# Patient Record
Sex: MALE | Race: WHITE | NOT HISPANIC OR LATINO | Employment: FULL TIME | URBAN - METROPOLITAN AREA
[De-identification: names, ages, dates, MRNs, and addresses within clinical notes are randomized per-mention and may not be internally consistent; named-entity substitution may affect disease eponyms.]

---

## 2017-01-16 ENCOUNTER — ALLSCRIPTS OFFICE VISIT (OUTPATIENT)
Dept: OTHER | Facility: OTHER | Age: 57
End: 2017-01-16

## 2017-01-16 DIAGNOSIS — N49.2 INFLAMMATORY DISORDERS OF SCROTUM: ICD-10-CM

## 2017-01-17 ENCOUNTER — APPOINTMENT (OUTPATIENT)
Dept: LAB | Facility: HOSPITAL | Age: 57
End: 2017-01-17
Payer: COMMERCIAL

## 2017-01-17 DIAGNOSIS — N49.2 INFLAMMATORY DISORDERS OF SCROTUM: ICD-10-CM

## 2017-01-17 PROCEDURE — 87147 CULTURE TYPE IMMUNOLOGIC: CPT

## 2017-01-17 PROCEDURE — 87070 CULTURE OTHR SPECIMN AEROBIC: CPT

## 2017-01-17 PROCEDURE — 87205 SMEAR GRAM STAIN: CPT

## 2017-01-19 ENCOUNTER — GENERIC CONVERSION - ENCOUNTER (OUTPATIENT)
Dept: OTHER | Facility: OTHER | Age: 57
End: 2017-01-19

## 2017-01-19 LAB
BACTERIA WND AEROBE CULT: NORMAL
BACTERIA WND AEROBE CULT: NORMAL
GRAM STN SPEC: NORMAL
GRAM STN SPEC: NORMAL

## 2017-02-23 ENCOUNTER — GENERIC CONVERSION - ENCOUNTER (OUTPATIENT)
Dept: OTHER | Facility: OTHER | Age: 57
End: 2017-02-23

## 2017-03-17 ENCOUNTER — GENERIC CONVERSION - ENCOUNTER (OUTPATIENT)
Dept: OTHER | Facility: OTHER | Age: 57
End: 2017-03-17

## 2017-03-27 LAB
LEFT EYE DIABETIC RETINOPATHY: NORMAL
RIGHT EYE DIABETIC RETINOPATHY: NORMAL

## 2017-03-30 ENCOUNTER — GENERIC CONVERSION - ENCOUNTER (OUTPATIENT)
Dept: OTHER | Facility: OTHER | Age: 57
End: 2017-03-30

## 2017-04-19 ENCOUNTER — ALLSCRIPTS OFFICE VISIT (OUTPATIENT)
Dept: OTHER | Facility: OTHER | Age: 57
End: 2017-04-19

## 2017-06-13 ENCOUNTER — APPOINTMENT (OUTPATIENT)
Dept: LAB | Facility: HOSPITAL | Age: 57
End: 2017-06-13
Payer: COMMERCIAL

## 2017-06-13 ENCOUNTER — TRANSCRIBE ORDERS (OUTPATIENT)
Dept: LAB | Facility: HOSPITAL | Age: 57
End: 2017-06-13

## 2017-06-13 DIAGNOSIS — Z00.8 ENCOUNTER FOR OTHER GENERAL EXAMINATION: ICD-10-CM

## 2017-06-13 DIAGNOSIS — Z00.8 ENCOUNTER FOR OTHER GENERAL EXAMINATION: Primary | ICD-10-CM

## 2017-06-13 LAB
CHOLEST SERPL-MCNC: 138 MG/DL (ref 50–200)
EST. AVERAGE GLUCOSE BLD GHB EST-MCNC: 194 MG/DL
HBA1C MFR BLD: 8.4 % (ref 4.2–6.3)
HDLC SERPL-MCNC: 37 MG/DL (ref 40–60)
LDLC SERPL CALC-MCNC: 72 MG/DL (ref 0–100)
TRIGL SERPL-MCNC: 145 MG/DL

## 2017-06-13 PROCEDURE — 80061 LIPID PANEL: CPT

## 2017-06-13 PROCEDURE — 83036 HEMOGLOBIN GLYCOSYLATED A1C: CPT

## 2017-06-13 PROCEDURE — 36415 COLL VENOUS BLD VENIPUNCTURE: CPT

## 2017-07-07 ENCOUNTER — ALLSCRIPTS OFFICE VISIT (OUTPATIENT)
Dept: OTHER | Facility: OTHER | Age: 57
End: 2017-07-07

## 2017-08-25 ENCOUNTER — GENERIC CONVERSION - ENCOUNTER (OUTPATIENT)
Dept: OTHER | Facility: OTHER | Age: 57
End: 2017-08-25

## 2017-12-07 ENCOUNTER — ALLSCRIPTS OFFICE VISIT (OUTPATIENT)
Dept: OTHER | Facility: OTHER | Age: 57
End: 2017-12-07

## 2017-12-18 NOTE — PROGRESS NOTES
Assessment  1  Acute respiratory infection (519 8) (J22)    Plan  Acute respiratory infection    · Zithromax Z-Burton 250 MG Oral Tablet (Azithromycin); TAKE 2 TABLETS ON DAY 1THEN TAKE 1 TABLET A DAY FOR 4 DAYS  PMH: Cough    · ProAir  (90 Base) MCG/ACT Inhalation Aerosol Solution; INHALE 1 TO 2PUFFS EVERY 4 TO 6 HOURS AS NEEDED  Screening for colon cancer    · COLONOSCOPY; Status:Temporary Deferral - Pt refuses;    12/7/2018    Discussion/Summary    Start zpakplenty of fluids and rest    Possible side effects of new medications were reviewed with the patient/guardian today  The treatment plan was reviewed with the patient/guardian  The patient/guardian understands and agrees with the treatment plan      Chief Complaint  Patient presents today for a cough      History of Present Illness  HPI: patient is here for a cough since novemberon and off cough      Review of Systems   Constitutional: no fever,-- not feeling poorly,-- no chills-- and-- not feeling tired  ENT: no earache,-- no nosebleeds,-- no hearing loss-- and-- no nasal discharge  Cardiovascular: the heart rate was not slow,-- no chest pain,-- the heart rate was not fast-- and-- no palpitations  Respiratory: cough, but-- no shortness of breath,-- no wheezing-- and-- no shortness of breath during exertion  Gastrointestinal: no abdominal pain,-- no nausea,-- no constipation-- and-- no diarrhea  Active Problems  1  Benign essential hypertension (401 1) (I10)   2  Diabetes mellitus type 2, uncontrolled (250 02) (E11 65)   3  Hyperlipidemia (272 4) (E78 5)   4  Kidney stones (592 0) (N20 0)   5  Morbid obesity (278 01) (E66 01)   6  Onychomycosis of toenail (110 1) (B35 1)   7  Rash (782 1) (R21)   8  Screening for colon cancer (V76 51) (Z12 11)   9  Denied: History of Sleep apnea    Past Medical History  Active Problems And Past Medical History Reviewed: The active problems and past medical history were reviewed and updated today        Surgical History  Surgical History Reviewed: The surgical history was reviewed and updated today  Social History   · Caffeine use (V49 89) (F15 90)   · Never a smoker   · No alcohol use   · No drug use  The social history was reviewed and updated today  The social history was reviewed and is unchanged  Family History  Family History Reviewed: The family history was reviewed and updated today  Current Meds   1  Aspirin 81 MG CAPS; TAKE 2 CAPSULE Daily; Therapy: (Recorded:17Jan2017) to Recorded   2  Atorvastatin Calcium 40 MG Oral Tablet; take 1 tablet by mouth every day; Therapy: 15PDQ3052 to (Evaluate:28Jun2016)  Requested for: 69Plc8645; Last Rx:03Osz4265 Ordered   3  Bydureon 2 MG Subcutaneous Suspension Reconstituted ER; inject 2mg subcutaneously every 7 days; Therapy: 49IJY1110 to (Evaluate:01Fht7914)  Requested for: 18SGZ1959; Last Rx:20Oct2017 Ordered   4  Bydureon 2 MG SUSR; inject 2mg subcutaneously every 7 days; Therapy: 01ELU2236 to (Evaluate:18Xzz6279)  Requested for: 21Mar2016; Last Rx:21Mar2016 Ordered   5  Famciclovir 500 MG Oral Tablet; TAKE 1 TABLET 3 TIMES DAILY; Therapy: 54VJF7824 to (Evaluate:39Mwj9005); Last Rx:39Kud3295 Ordered   6  Glimepiride 4 MG Oral Tablet; 1 BID; Therapy: (Recorded:16Jys5388) to Recorded   7  Ibuprofen 800 MG Oral Tablet; TAKE 1 TABLET EVERY 8 HOURS AS NEEDED; Therapy: 57DFS1129 to (Evaluate:28Zsa6581)  Requested for: 64AWY3642; Last Rx:30Oct2017 Ordered   8  Lisinopril 40 MG Oral Tablet; take 1 tablet by mouth every day; Therapy: 54Bol5105 to (Evaluate:30Oct2018)  Requested for: 11JAO2801; Last Rx:04Nov2017 Ordered   9  MetFORMIN HCl - 1000 MG Oral Tablet; TAKE 1 TABLET TWICE DAILY; Therapy: (Recorded:34Owp3156) to Recorded   10  Methocarbamol 500 MG Oral Tablet; Take 1 tablet 3  times daily as needed; Therapy: 23VXO9693 to (Last Rx:03Nov2016)  Requested for: 68LCG9641 Ordered   11  OneTouch Ultra Blue In [employer]; test 3 times daily;   Therapy: 32ZUJ2339 to (Evaluate:05Sxc2332)  Requested for: 44Gcc9115; Last  Rx:68Jcr4718 Ordered   12  ProAir  (90 Base) MCG/ACT Inhalation Aerosol Solution; INHALE 1 TO 2 PUFFS  EVERY 4 TO 6 HOURS AS NEEDED; Therapy: 21CBB4687 to (Evaluate:78Cog2923)  Requested for: 42MBC9397; Last  Rx:04Jan2016 Ordered   13  [place] SoloStar SOPN; INJECT 64 UNIT Daily; Therapy: (Recorded:17Jan2017) to Recorded   14  TraMADol HCl - 50 MG Oral Tablet; TAKE 1 TABLET Every 6 hours PRN pain; Therapy: 14KDC5096 to (Evaluate:06Feb2017)  Requested for: 97XCK9113; Last  Rx:79Drr0115 Ordered    The medication list was reviewed and updated today  Allergies  1  Zocor TABS    Vitals   Recorded: 74PSF5943 04:04PM   Heart Rate 112   Respiration 20   Systolic 219   Diastolic 80   Height 5 ft 5 5 in   Weight 261 lb 0 4 oz   BMI Calculated 42 78   BSA Calculated 2 23   O2 Saturation 97     Physical Exam   Constitutional  General appearance: No acute distress, well appearing and well nourished  Eyes  Conjunctiva and lids: No swelling, erythema, or discharge  Pupils and irises: Equal, round and reactive to light  Ears, Nose, Mouth, and Throat  External inspection of ears and nose: Normal    Otoscopic examination: Tympanic membrance translucent with normal light reflex  Canals patent without erythema  Nasal mucosa, septum, and turbinates: Normal without edema or erythema  Oropharynx: Normal with no erythema, edema, exudate or lesions  Pulmonary  Respiratory effort: No increased work of breathing or signs of respiratory distress  Auscultation of lungs: Clear to auscultation, equal breath sounds bilaterally, no wheezes, no rales, no rhonci  Cardiovascular  Palpation of heart: Normal PMI, no thrills  Auscultation of heart: Normal rate and rhythm, normal S1 and S2, without murmurs  Examination of extremities for edema and/or varicosities: Normal    Carotid pulses: Normal    Abdomen  Abdomen: Non-tender, no masses     Liver and spleen: No hepatomegaly or splenomegaly  Lymphatic  Palpation of lymph nodes in neck: No lymphadenopathy  Musculoskeletal  Gait and station: Normal    Digits and nails: Normal without clubbing or cyanosis  Inspection/palpation of joints, bones, and muscles: Normal    Skin  Skin and subcutaneous tissue: Normal without rashes or lesions  Neurologic  Cranial nerves: Cranial nerves 2-12 intact  Reflexes: 2+ and symmetric  Sensation: No sensory loss  Psychiatric  Orientation to person, place and time: Normal    Mood and affect: Normal          Future Appointments    Date/Time Provider Specialty Site   01/15/2018 04:30 PM LEEANN Alvarez   [facility]     Signatures   Electronically signed by : Arnold Tillman; Dec 13 2017  1:56PM EST                       (Author)    Electronically signed by : LEEANN Azul ; Dec 17 2017  7:56AM EST                       (Review)

## 2018-01-12 VITALS
SYSTOLIC BLOOD PRESSURE: 132 MMHG | HEIGHT: 66 IN | OXYGEN SATURATION: 97 % | TEMPERATURE: 97.8 F | DIASTOLIC BLOOD PRESSURE: 74 MMHG | WEIGHT: 262 LBS | BODY MASS INDEX: 42.11 KG/M2 | HEART RATE: 91 BPM

## 2018-01-14 VITALS
WEIGHT: 262 LBS | TEMPERATURE: 98 F | OXYGEN SATURATION: 96 % | DIASTOLIC BLOOD PRESSURE: 70 MMHG | SYSTOLIC BLOOD PRESSURE: 124 MMHG | RESPIRATION RATE: 16 BRPM | BODY MASS INDEX: 42.11 KG/M2 | HEIGHT: 66 IN | HEART RATE: 93 BPM

## 2018-01-14 VITALS
OXYGEN SATURATION: 92 % | HEIGHT: 66 IN | DIASTOLIC BLOOD PRESSURE: 84 MMHG | TEMPERATURE: 98 F | SYSTOLIC BLOOD PRESSURE: 118 MMHG | BODY MASS INDEX: 41.78 KG/M2 | WEIGHT: 260 LBS | RESPIRATION RATE: 16 BRPM | HEART RATE: 110 BPM

## 2018-01-15 ENCOUNTER — ALLSCRIPTS OFFICE VISIT (OUTPATIENT)
Dept: OTHER | Facility: OTHER | Age: 58
End: 2018-01-15

## 2018-01-15 DIAGNOSIS — N20.0 CALCULUS OF KIDNEY: ICD-10-CM

## 2018-01-15 NOTE — RESULT NOTES
Message   His chest Xray is normal   Finish the Levaquin as prescribed        Verified Results  * XR CHEST PA & LATERAL 25Apr2016 03:43PM [facility] Order Number: [id number]     Test Name Result Flag Reference   XR CHEST PA & LATERAL (Report)     CHEST      INDICATION: Productive cough  Wheezing  COMPARISON: 11/24/2015     VIEWS: Frontal and lateral projections; 2 images     FINDINGS:        Cardiomediastinal silhouette appears unremarkable  The lungs are clear  No pneumothorax or pleural effusion  Visualized osseous structures appear within normal limits for the patient's age  IMPRESSION:     No active pulmonary disease         Workstation performed: [id number]     Signed by:   Pardeep Melgar MD   4/26/16       Signatures   Electronically signed by : LEEANN Barron ; Apr 26 2016  7:11PM EST                       (Author)

## 2018-01-16 NOTE — PROGRESS NOTES
Assessment   1  Benign essential hypertension (401 1) (I10)   2  Diabetes mellitus type 2, uncontrolled (250 02) (E11 65)   3  Hyperlipidemia (272 4) (E78 5)   4  Kidney stones (592 0) (N20 0)   5  Morbid obesity (278 01) (E66 01)    Plan   Benign essential hypertension    · AmLODIPine Besylate 2 5 MG Oral Tablet; TAKE 1 TABLET DAILY  Kidney stones    · (LC) PSA Total (Reflex To Free); Status:Active; Requested for:15Jan2018;    · * XR ABDOMEN 1 VIEW KUB; Status:Active; Requested for:15Jan2018;   PMH: Acute low back pain    · Ibuprofen 800 MG Oral Tablet; TAKE 1 TABLET EVERY 8 HOURS AS NEEDED  Screening for colon cancer    · 1 - Hai Duenas DO (Gastroenterology) Co-Management  *  Status: Active  Requested for:    59DEZ8838  Care Summary provided  : Yes    Discussion/Summary   Discussion Summary:    Start amlodipine if BP is >130/90 in the next week has Rx for this already with Dr Faustine Cushing 6months  Counseling Documentation With Imm: The patient was counseled regarding impressions  Medication SE Review and Pt Understands Tx: Possible side effects of new medications were reviewed with the patient/guardian today  The treatment plan was reviewed with the patient/guardian  The patient/guardian understands and agrees with the treatment plan      Chief Complaint   Chief Complaint Chronic Condition [facility]: Patient is here today for follow up of chronic conditions described in HPI  History of Present Illness   HPI: Doing well overall Dr Faustine Cushing in August prescribed but he did not fill it bec of occasional hypoglycemic spells also prescribed for HTN has a log of his readings form a month ago 120s-130s/80s          Review of Systems   Complete-Male:      Constitutional: no fever-- and-- no chills  Cardiovascular: no chest pain,-- no palpitations-- and-- no extremity edema  Respiratory: no shortness of breath-- and-- no cough        Gastrointestinal: no abdominal pain,-- no constipation-- and-- no diarrhea  Endocrine: as noted in HPI  Active Problems   1  Benign essential hypertension (401 1) (I10)   2  Diabetes mellitus type 2, uncontrolled (250 02) (E11 65)   3  Hyperlipidemia (272 4) (E78 5)   4  Kidney stones (592 0) (N20 0)   5  Morbid obesity (278 01) (E66 01)   6  Onychomycosis of toenail (110 1) (B35 1)   7  Screening for colon cancer (V76 51) (Z12 11)   8  Denied: History of Sleep apnea    Past Medical History   1  History of Acute low back pain (724 2) (M54 5)   2  History of Acute respiratory infection (519 8) (J22)   3  History of Cellulitis, scrotum (608 4) (N49 2)   4  History of Cough (786 2) (R05)   5  History of Cough (786 2) (R05)   6  History of Flu vaccine need (V04 81) (Z23)   7  History of High cholesterol (272 0) (E78 00)   8  History of acute bronchitis (V12 69) (Z87 09)   9  History of herpes zoster (V12 09) (Z86 19)   10  History of hyperkalemia (V12 29) (Z86 39)   11  History of kidney disease (V13 09) (Z87 448)   12  History of low back pain (V13 59) (Z87 39)   13  History of Lumbar radiculopathy (724 4) (M54 16)   14  History of Need for pneumococcal vaccine (V03 82) (Z23)   15  History of Rash (782 1) (R21)   16  History of Retinopathy, central serous (362 41) (H35 719)   17  History of Screening for colon cancer (V76 51) (Z12 11)   18  Denied: History of Sleep apnea   19  History of Trochanteric bursitis (726 5) (M70 60)  Active Problems And Past Medical History Reviewed: The active problems and past medical history were reviewed and updated today  Surgical History   1  History of Percutan Removal & Replace Internal Dwelling Ureteral Stent   2  History of Renal Lithotripsy   3  History of Shoulder Repair  Surgical History Reviewed: The surgical history was reviewed and updated today  Family History   Father    1  Family history of    2  Family history of cardiac disorder (V17 49) (Z82 49)   3   Family history of cerebrovascular accident (V17 1) (Z82 3)   4  Family history of diabetes mellitus (V18 0) (Z83 3)   5  Family history of hypertension (V17 49) (Z82 49)  Sister    10  Family history of malignant neoplasm (V16 9) (Z80 9)  Paternal Grandmother    9  Family history of malignant neoplasm (V16 9) (Z80 9)  Family History    8  Family history of cardiac disorder (V17 49) (Z82 49)   9  Family history of hypertension (V17 49) (Z82 49)   10  Family history of kidney disease (V18 69) (Z84 1)   11  Family history of malignant neoplasm (V16 9) (Z80 9)  Family History Reviewed: The family history was reviewed and updated today  Social History    · Caffeine use (V49 89) (F15 90)   · Never a smoker   · No alcohol use   · No drug use  Social History Reviewed: The social history was reviewed and updated today  Current Meds    1  Aspirin 81 MG CAPS; TAKE 2 CAPSULE Daily; Therapy: (Recorded:17Jan2017) to Recorded   2  Atorvastatin Calcium 40 MG Oral Tablet; take 1 tablet by mouth every day; Therapy: 78UPO0531 to (Evaluate:28Jun2016)  Requested for: 95Jtq6161; Last Rx:77Wpa6754     Ordered   3  Bydureon 2 MG Subcutaneous Suspension Reconstituted ER; inject 2mg subcutaneously every 7     days; Therapy: 66AIG5257 to (Evaluate:57Xcm4316)  Requested for: 85JUV7883; Last Rx:14Jan2018     Ordered   4  Bydureon 2 MG SUSR; inject 2mg subcutaneously every 7 days; Therapy: 71YZI8732 to (Evaluate:81Zhi3981)  Requested for: 21Mar2016; Last Rx:21Mar2016     Ordered   5  Famciclovir 500 MG Oral Tablet; TAKE 1 TABLET 3 TIMES DAILY; Therapy: 87BJQ4544 to (Evaluate:96Yoj2726); Last Rx:00Ezu0139 Ordered   6  Glimepiride 4 MG Oral Tablet; 1 BID; Therapy: (Recorded:22Jfr0578) to Recorded   7  Ibuprofen 800 MG Oral Tablet; TAKE 1 TABLET EVERY 8 HOURS AS NEEDED; Therapy: 44QVD3972 to (Evaluate:67Pod8003)  Requested for: 48UDO0139; Last Rx:30Oct2017     Ordered   8  Lisinopril 40 MG Oral Tablet; take 1 tablet by mouth every day;      Therapy: 12Apr2013 to (Evaluate:30Oct2018)  Requested for: 93GAD8985; Last Rx:04Nov2017     Ordered   9  MetFORMIN HCl - 1000 MG Oral Tablet; TAKE 1 TABLET TWICE DAILY; Therapy: (Recorded:98Qsn3740) to Recorded   10  OneTouch Ultra Blue In [employer]; test 3 times daily; Therapy: 66IWT4945 to (Evaluate:26Cqz7327)  Requested for: 76Bde8620; Last Rx:41Lue8302      Ordered   11  ProAir  (90 Base) MCG/ACT Inhalation Aerosol Solution; INHALE 1 TO 2 PUFFS EVERY 4 TO      6 HOURS AS NEEDED; Therapy: 03ZKC9993 to (Evaluate:06Jan2018)  Requested for: 02CHE2735; Last Rx:67Ebi7831      Ordered   12  [place] SoloStar SOPN; INJECT 64 UNIT Daily; Therapy: (Recorded:17Jan2017) to Recorded  Medication List Reviewed: The medication list was reviewed and updated today  Allergies   1  Zocor TABS    Vitals   Vital Signs    Recorded: 05EEE2136 04:55PM Recorded: 64LBM3922 04:21PM   Temperature  98 3 F   Heart Rate  113   Respiration  18   Systolic 350 801, Sitting   Diastolic 90 80, Sitting   Height  5 ft 5 5 in   Weight  262 lb 0 8 oz   BMI Calculated  42 94   BSA Calculated  2 23   O2 Saturation  95     Physical Exam        Constitutional      General appearance: Abnormal   morbidly obese  Ears, Nose, Mouth, and Throat      Otoscopic examination: Tympanic membrance translucent with normal light reflex  Canals patent without erythema  Oropharynx: Normal with no erythema, edema, exudate or lesions  Pulmonary      Respiratory effort: No increased work of breathing or signs of respiratory distress  Auscultation of lungs: Clear to auscultation, equal breath sounds bilaterally, no wheezes, no rales, no rhonci  Cardiovascular      Auscultation of heart: Normal rate and rhythm, normal S1 and S2, without murmurs  Examination of extremities for edema and/or varicosities: Normal        Abdomen      Abdomen: Non-tender, no masses  Liver and spleen: No hepatomegaly or splenomegaly  Lymphatic      Palpation of lymph nodes in neck: No lymphadenopathy         Musculoskeletal      Gait and station: Normal        Psychiatric      Orientation to person, place and time: Normal        Mood and affect: Normal           Signatures    Electronically signed by : LEEANN Escalante ; Brennon 15 2018  5:49PM EST                       (Author)

## 2018-01-18 NOTE — RESULT NOTES
Message   Culture grew strep   How is he doing on the antibiotic? Is the wound improving? Verified Results  (1) WOUND CULTURE 98FBD2778 12:49PM [facility] Order Number: [id number]     Test Name Result Flag Reference   CLINICAL REPORT (Report)     Test:        Wound culture and Gram stain  Specimen Source:  Wound  Specimen Type: Wound  Specimen Date:   1/17/2017 12:49 PM  Result Date:    1/19/2017 9:04 AM  Result Status:   Final result  Resulting Lab:   [place] [address]            Tel: [phone]      CULTURE                                       ------------------                                   2+ Growth of Beta Hemolytic Streptococcus Group B     *** This organism is intrinisically susceptible to Penicillin  If sensitivites to other antibiotics are required, please call the      Microbiology Department at [phone] within 5 days   ***    2+ Growth of Mixed Skin Malena      STAIN                                        ------------------                                   No polys seen    1+ Gram positive cocci in pairs   Performing Comments: skin swab of left testicle       Signatures   Electronically signed by : LEEANN Platt ; Jan 19 2017 12:02PM EST                       (Author)

## 2018-01-23 VITALS
SYSTOLIC BLOOD PRESSURE: 166 MMHG | OXYGEN SATURATION: 95 % | RESPIRATION RATE: 18 BRPM | DIASTOLIC BLOOD PRESSURE: 90 MMHG | HEIGHT: 66 IN | TEMPERATURE: 98.3 F | BODY MASS INDEX: 42.11 KG/M2 | HEART RATE: 113 BPM | WEIGHT: 262.05 LBS

## 2018-01-23 VITALS
HEART RATE: 112 BPM | RESPIRATION RATE: 20 BRPM | HEIGHT: 66 IN | DIASTOLIC BLOOD PRESSURE: 80 MMHG | SYSTOLIC BLOOD PRESSURE: 140 MMHG | OXYGEN SATURATION: 97 % | BODY MASS INDEX: 41.95 KG/M2 | WEIGHT: 261.02 LBS

## 2018-02-11 LAB
PSA SERPL-MCNC: 7.3 NG/ML (ref 0–4)
SL AMB % FREE PSA: 10.7 %
SL AMB PSA, FREE: 0.78 NG/ML
SL AMB REFLEX CRITERIA: ABNORMAL

## 2018-02-12 ENCOUNTER — TELEPHONE (OUTPATIENT)
Dept: INTERNAL MEDICINE CLINIC | Facility: CLINIC | Age: 58
End: 2018-02-12

## 2018-03-19 ENCOUNTER — HOSPITAL ENCOUNTER (OUTPATIENT)
Dept: RADIOLOGY | Facility: HOSPITAL | Age: 58
Discharge: HOME | End: 2018-03-19
Payer: COMMERCIAL

## 2018-03-19 ENCOUNTER — TRANSCRIBE ORDERS (OUTPATIENT)
Dept: RADIOLOGY | Facility: HOSPITAL | Age: 58
End: 2018-03-19

## 2018-03-19 DIAGNOSIS — N20.0 CALCULUS OF KIDNEY: ICD-10-CM

## 2018-03-19 PROCEDURE — 74018 RADEX ABDOMEN 1 VIEW: CPT

## 2018-04-03 PROBLEM — R97.20 ELEVATED PSA: Status: ACTIVE | Noted: 2018-04-03

## 2018-04-03 NOTE — PROGRESS NOTES
4/5/2018    Arturo Arroyo  1960  [id number]    Discussion and Plan    Patient passed small calculus recently  KUB shows approximate 4-5 millimeter bilateral calculi which were relatively stable  KUB will be repeated in 1 year  Dietary and hydration recommendations again reviewed  We discussed is upward PSA trend which is currently 7 3  I therefore advised he proceed with a transrectal biopsies  Risks including bleeding and infection discussed  All questions answered at this time  1  Kidney stones  -dietary and hydration recommendations  -repeat KUB in 1 year  2  Elevated PSA  - ciprofloxacin (CIPRO) 500 mg tablet; Take 1 tablet (500 mg total) by mouth 2 (two) times a day for 3 days  Dispense: 6 tablet; Refill: 0  - Biopsy prostate; Future    Assessment      Patient Active Problem List   Diagnosis    Kidney stones    Elevated PSA       History of Present Illness    Howie Mccoy is a 63 y o  male seen today in regards to a history of left ESWL done on 12/14/15   Patient has a h/o long-standing calcium oxalate nephrolithiasis  He previously been treated at [facility] urology  Has passed multiple fragments since the time of treatment  Denies flank pain  KUB shows a small cluster of residual fragments on left  PSA has increased from 3 5 to 6 0   Patient had dental infection        Urinary Symptom Assessment        Past Medical History  Past Medical History:   Diagnosis Date    Acute bronchitis     Acute low back pain     Acute respiratory infection     Cellulitis of scrotum     Cough     Diabetes mellitus (HCC)     Herpes zoster     High cholesterol     Hyperkalemia     Hypertension     Kidney disease     Kidney stone     Low back pain     Lumbar radiculopathy     Rash     Retinopathy, central serous     Trochanteric bursitis        Past Social History  Past Surgical History:   Procedure Laterality Date    LITHOTRIPSY      renal    SHOULDER SURGERY         Past Family History  Family History   Problem Relation Age of Onset    Other Father      cardiac disorder    Stroke Father     Diabetes Father     Hypertension Father     Cancer Sister     Cancer Paternal Grandmother        Past Social history  Social History     Social History    Marital status: /Civil Union     Spouse name: N/A    Number of children: N/A    Years of education: N/A     Occupational History    Not on file       Social History Main Topics    Smoking status: Never Smoker    Smokeless tobacco: Never Used    Alcohol use Not on file    Drug use: Unknown    Sexual activity: Not on file     Other Topics Concern    Not on file     Social History Narrative    No narrative on file       Current Medications  Current Outpatient Prescriptions   Medication Sig Dispense Refill    albuterol (PROAIR HFA) 90 mcg/act inhaler Inhale 1-2 puffs      amLODIPine (NORVASC) 2 5 mg tablet Take 1 tablet by mouth daily      ASPIRIN 81 PO Take 2 capsules by mouth daily      atorvastatin (LIPITOR) 80 mg tablet Take 80 mg by mouth daily  0    Exenatide ER (BYDUREON) 2 MG SRER Inject under the skin      glimepiride (AMARYL) 4 mg tablet Take by mouth 2 (two) times a day      ibuprofen (MOTRIN) 800 mg tablet Take 1 tablet by mouth every 8 (eight) hours as needed      Insulin Glargine (TOUJEO SOLOSTAR) injection pen 300 units/mL Inject under the skin Daily      lisinopril (ZESTRIL) 40 mg tablet Take 1 tablet by mouth daily      metFORMIN (GLUCOPHAGE) 1000 MG tablet Take 1 tablet by mouth 2 (two) times a day      methocarbamol (ROBAXIN) 500 mg tablet Take by mouth      traMADol (ULTRAM) 50 mg tablet Take 1 tablet by mouth every 6 (six) hours as needed      ciprofloxacin (CIPRO) 500 mg tablet Take 1 tablet (500 mg total) by mouth 2 (two) times a day for 3 days 6 tablet 0    famciclovir (FAMVIR) 500 mg tablet Take 1 tablet by mouth 3 (three) times a day       No current facility-administered medications for this visit  Allergies  Allergies   Allergen Reactions    Simvastatin        Past Medical History, Social History, Family History, medications and allergies were reviewed  Review of Systems  Review of Systems   Constitutional: Negative  HENT: Negative  Eyes: Negative  Respiratory: Negative  Cardiovascular: Negative  Gastrointestinal: Negative  Endocrine: Negative  Genitourinary: Negative for decreased urine volume, difficulty urinating, hematuria and urgency  Musculoskeletal: Negative  Skin: Negative  Neurological: Negative  Hematological: Negative  Psychiatric/Behavioral: Negative  Vitals  Vitals:    04/05/18 0745   BP: 140/88   Pulse: 86   Weight: 115 kg (254 lb 9 6 oz)   Height: 5' 5" (1 651 m)         Physical Exam    Physical Exam   Constitutional: He is oriented to person, place, and time  He appears well-developed and well-nourished  HENT:   Head: Normocephalic and atraumatic  Eyes: Pupils are equal, round, and reactive to light  Neck: Normal range of motion  Cardiovascular: Normal rate, regular rhythm and normal heart sounds  Pulmonary/Chest: Effort normal and breath sounds normal  No accessory muscle usage  No respiratory distress  Abdominal: Soft  Normal appearance and bowel sounds are normal  There is no tenderness  Genitourinary: Rectum normal, prostate normal and penis normal  No penile tenderness  Genitourinary Comments: 30 grams  No distinct nodules appreciated  Musculoskeletal: Normal range of motion  Neurological: He is alert and oriented to person, place, and time  Skin: Skin is warm, dry and intact  Psychiatric: He has a normal mood and affect  His speech is normal  Cognition and memory are normal    Nursing note and vitals reviewed        Results    PSA Total (Reflex To Free)   Order: [id number]   Status:  Final result   Visible to patient:  No (Inaccessible in [address]) Next appt:  07/03/2018 at 04:30 PM in Internal Medicine Kira Jean MD)    Ref Range & Units 2/10/18  8:52 AM Flag   Prostate Specific Antigen Total 0 0 - 4 0 ng/mL 7 3                Lab Results   Component Value Date/Time    PSA 5 3 (H) 10/29/2016 09:13 AM     No results found for: GLUCOSE, CALCIUM, NA, K, CO2, CL, BUN, CREATININE  No results found for: WBC, HGB, HCT, MCV, PLT    No results found for this or any previous visit (from the past 1 hour(s)) ]    ABDOMEN     INDICATION:  Abdominal pain      COMPARISON:  August 1, 2016     VIEWS:  AP supine        FINDINGS:     There is a nonobstructive bowel gas pattern      No discernible free air on this supine study  Upright or left lateral decubitus imaging is more sensitive to detect subtle free air in the appropriate setting      5 mm left lower pole renal radiodensity likely representing a renal calculus  Questionable 5 mm right mid pole renal calculus  Consider follow-up with ultrasound for better evaluation      Visualized lung bases are clear      Thoracolumbar spine degenerative changes      IMPRESSION:     5 mm left lower pole renal radiodensity likely representing a renal calculus  Questionable 5 mm right mid pole renal calculus    Consider follow-up with ultrasound for better evaluation               Workstation performed: [id number]      Imaging     XR abdomen 1 view kub (Order #[id number]) on 3/19/2018 - Imaging Information

## 2018-04-04 RX ORDER — METHOCARBAMOL 500 MG/1
TABLET, FILM COATED ORAL
COMMUNITY
Start: 2016-11-03 | End: 2018-05-23 | Stop reason: ALTCHOICE

## 2018-04-04 RX ORDER — FAMCICLOVIR 500 MG/1
1 TABLET, FILM COATED ORAL 3 TIMES DAILY
COMMUNITY
Start: 2017-07-07 | End: 2018-05-23 | Stop reason: ALTCHOICE

## 2018-04-04 RX ORDER — LISINOPRIL 40 MG/1
1 TABLET ORAL DAILY
COMMUNITY
Start: 2013-04-12 | End: 2018-10-31 | Stop reason: SDUPTHER

## 2018-04-04 RX ORDER — IBUPROFEN 800 MG/1
1 TABLET ORAL EVERY 8 HOURS PRN
COMMUNITY
Start: 2014-01-03 | End: 2018-07-15 | Stop reason: SDUPTHER

## 2018-04-04 RX ORDER — GLIMEPIRIDE 4 MG/1
TABLET ORAL 2 TIMES DAILY
COMMUNITY
End: 2022-02-03 | Stop reason: SDUPTHER

## 2018-04-04 RX ORDER — ALBUTEROL SULFATE 90 UG/1
1-2 AEROSOL, METERED RESPIRATORY (INHALATION) EVERY 6 HOURS PRN
COMMUNITY
Start: 2016-01-04

## 2018-04-04 RX ORDER — AMLODIPINE BESYLATE 2.5 MG/1
1 TABLET ORAL DAILY
COMMUNITY
Start: 2018-01-15 | End: 2018-05-23 | Stop reason: ALTCHOICE

## 2018-04-04 RX ORDER — ATORVASTATIN CALCIUM 80 MG/1
80 TABLET, FILM COATED ORAL DAILY
Refills: 0 | COMMUNITY
Start: 2018-02-01 | End: 2022-02-03 | Stop reason: SDUPTHER

## 2018-04-04 RX ORDER — TRAMADOL HYDROCHLORIDE 50 MG/1
1 TABLET ORAL EVERY 6 HOURS PRN
COMMUNITY
Start: 2014-06-04 | End: 2018-05-23 | Stop reason: ALTCHOICE

## 2018-04-05 ENCOUNTER — TELEPHONE (OUTPATIENT)
Dept: UROLOGY | Facility: AMBULATORY SURGERY CENTER | Age: 58
End: 2018-04-05

## 2018-04-05 ENCOUNTER — OFFICE VISIT (OUTPATIENT)
Dept: UROLOGY | Facility: AMBULATORY SURGERY CENTER | Age: 58
End: 2018-04-05
Payer: COMMERCIAL

## 2018-04-05 VITALS
HEIGHT: 65 IN | HEART RATE: 86 BPM | SYSTOLIC BLOOD PRESSURE: 140 MMHG | WEIGHT: 254.6 LBS | DIASTOLIC BLOOD PRESSURE: 88 MMHG | BODY MASS INDEX: 42.42 KG/M2

## 2018-04-05 DIAGNOSIS — N20.0 KIDNEY STONES: Primary | ICD-10-CM

## 2018-04-05 DIAGNOSIS — R97.20 ELEVATED PSA: ICD-10-CM

## 2018-04-05 PROCEDURE — 99214 OFFICE O/P EST MOD 30 MIN: CPT | Performed by: UROLOGY

## 2018-04-05 RX ORDER — CIPROFLOXACIN 500 MG/1
500 TABLET, FILM COATED ORAL 2 TIMES DAILY
Qty: 6 TABLET | Refills: 0 | Status: SHIPPED | OUTPATIENT
Start: 2018-04-05 | End: 2018-04-08

## 2018-04-11 NOTE — PROGRESS NOTES
Biopsy prostate     Date/Time 4/11/2018 3:02 PM     Performed by  Efraín Khan by Sibyl Osgood, ERIC       Consent: Verbal consent obtained  Written consent obtained  Risks and benefits: risks, benefits and alternatives were discussed  Consent given by: patient  Patient understanding: patient states understanding of the procedure being performed  Patient consent: the patient's understanding of the procedure matches consent given  Patient identity confirmed: verbally with patient      Local anesthesia used: yes     Anesthesia   Local anesthesia used: yes  Local Anesthetic: lidocaine 1% without epinephrine     Sedation   Patient sedated: no         Trevon Cuellar is a 63 y o  male seen today in regards to a history of left ESWL done on 12/14/15   Patient has a h/o long-standing calcium oxalate nephrolithiasis  He previously been treated at [facility] urology  Has passed multiple fragments since the time of treatment  Denies flank pain  KUB shows a small cluster of residual fragments on left  PSA has increased from 3 5 to 6 0 and 7 2 at present  PROCEDURE- US GUIDED PROSTATE BIOPSY    After identification and obtaining informed consent the patient was placed in the left lateral decubitus position in the ultrasound room  He was prepped in the usual fashion  10 cc of 1% viscous lidocaine was administered per rectum  The 7 5 MHz transrectal probe was then introduced  A periprosthetic nerve block was provided by instilling 10 cc of 1% lidocaine via spinal needle into the periprosthetic space bilaterally  Serial images of the prostate were then obtained  Once completed biopsies were retrieved 4 from each peripheral zone and 2 from each central zone for a total of 12 cores  Patient tolerated this well and will follow up in one to 2 weeks for pathology results  PERTINENT FINDINGS AND PLAN        Prostate volume was approximately 24 cc  Central calcifications noted

## 2018-04-13 ENCOUNTER — PROCEDURE VISIT (OUTPATIENT)
Dept: UROLOGY | Facility: CLINIC | Age: 58
End: 2018-04-13
Payer: COMMERCIAL

## 2018-04-13 VITALS — HEIGHT: 65 IN | BODY MASS INDEX: 42.32 KG/M2 | WEIGHT: 254 LBS

## 2018-04-13 DIAGNOSIS — N20.0 KIDNEY STONES: Primary | ICD-10-CM

## 2018-04-13 DIAGNOSIS — R97.20 ELEVATED PSA: ICD-10-CM

## 2018-04-13 PROCEDURE — 88342 IMHCHEM/IMCYTCHM 1ST ANTB: CPT | Performed by: PATHOLOGY

## 2018-04-13 PROCEDURE — 88344 IMHCHEM/IMCYTCHM EA MLT ANTB: CPT | Performed by: PATHOLOGY

## 2018-04-13 PROCEDURE — 55700 PR BIOPSY OF PROSTATE,NEEDLE/PUNCH: CPT | Performed by: UROLOGY

## 2018-04-13 PROCEDURE — 76872 US TRANSRECTAL: CPT | Performed by: UROLOGY

## 2018-04-13 PROCEDURE — 76942 ECHO GUIDE FOR BIOPSY: CPT | Performed by: UROLOGY

## 2018-04-13 PROCEDURE — G0416 PROSTATE BIOPSY, ANY MTHD: HCPCS | Performed by: PATHOLOGY

## 2018-04-13 RX ORDER — PEN NEEDLE, DIABETIC 32GX 5/32"
NEEDLE, DISPOSABLE MISCELLANEOUS 2 TIMES DAILY
Refills: 5 | COMMUNITY
Start: 2018-04-06 | End: 2021-08-03 | Stop reason: SDUPTHER

## 2018-04-17 ENCOUNTER — TELEPHONE (OUTPATIENT)
Dept: UROLOGY | Facility: AMBULATORY SURGERY CENTER | Age: 58
End: 2018-04-17

## 2018-04-17 NOTE — TELEPHONE ENCOUNTER
Per Dr Kelsy Escalante, pathologist, patient's prostate biopsy pathology results show L peripheral zone and L central zone prostatic adenocarcinoma 3+3=6 and 3+4=7  Results are released in Epic for Dr Florinda Prado to review  Patient has upcoming appt with Dr Florinda Prado on 4/26/18

## 2018-04-23 NOTE — PROGRESS NOTES
4/26/2018    Jillian Ziegler  1960  [id number]    Discussion and Plan    Patient is seen for discussion of pathology results revealing adenocarcinoma of the prostate  At this time I spent approximately 45 minutes discussing both a diagnosis of prostate cancer and treatment options  Specific treatments such as active surveillance, brachytherapy, external beam radiation therapy, and radical prostatectomy including open and robotic techniques were reviewed  Risks, benefits, and long-term disease outcomes were explained  Literature was provided and the patient will be again seen in the next one to 2 weeks to finalize treatment planning  Staging CT abdomen pelvis will be obtained prior to next visit  1  Prostate CA ([facility] [address] )  - CT abdomen pelvis w wo contrast; Future          Assessment      Patient Active Problem List   Diagnosis    Kidney stones    Elevated PSA       History of Present Illness    Rosamaria Chong is a 63 y o  male seen today in regards to a history of left ESWL done on 12/14/15   Patient has a h/o long-standing calcium oxalate nephrolithiasis  He previously been treated at [facility] urology  Has passed multiple fragments since the time of treatment  Denies flank pain  KUB shows a small cluster of residual fragments on left  PSA has increased from 3 5 to 6 0 and 7 2 at present  He since underwent TRUS biopsies of the prostate  This unfortunately reveals [place] 6 and 7 adenocarcinoma involving the left side of the gland primarily      Urinary Symptom Assessment      Past Medical History  Past Medical History:   Diagnosis Date    Acute bronchitis     Acute low back pain     Acute low back pain     [id number] resolved    Acute respiratory infection     Cellulitis of scrotum     Cough     Diabetes mellitus (HCC)     Herpes zoster     High cholesterol     Hyperkalemia     Hypertension     Kidney disease     Kidney stone     Low back pain     Lumbar radiculopathy     Rash     Retinopathy, central serous     Sleep apnea     [id number]    Trochanteric bursitis        Past Social History  Past Surgical History:   Procedure Laterality Date    LITHOTRIPSY      renal    SHOULDER SURGERY         Past Family History  Family History   Problem Relation Age of Onset    Other Father      cardiac disorder    Stroke Father     Diabetes Father      mellitus    Hypertension Father     Heart disease Father      cardiac disorder    Cancer Sister     Cancer Paternal Grandmother     Heart disease Family      cardiac disorder    Kidney disease Family        Past Social history  Social History     Social History    Marital status: /Civil Union     Spouse name: N/A    Number of children: N/A    Years of education: N/A     Occupational History    Not on file       Social History Main Topics    Smoking status: Never Smoker    Smokeless tobacco: Never Used    Alcohol use No    Drug use: No    Sexual activity: Not on file     Other Topics Concern    Not on file     Social History Narrative    Caffeine use       Current Medications  Current Outpatient Prescriptions   Medication Sig Dispense Refill    albuterol (PROAIR HFA) 90 mcg/act inhaler Inhale 1-2 puffs      amLODIPine (NORVASC) 2 5 mg tablet Take 1 tablet by mouth daily      ASPIRIN 81 PO Take 2 capsules by mouth daily      atorvastatin (LIPITOR) 80 mg tablet Take 80 mg by mouth daily  0    BD PEN NEEDLE MAJO U/F 32G X 4 MM MISC 2 (two) times a day  5    Exenatide ER (BYDUREON) 2 MG SRER Inject under the skin      famciclovir (FAMVIR) 500 mg tablet Take 1 tablet by mouth 3 (three) times a day      glimepiride (AMARYL) 4 mg tablet Take by mouth 2 (two) times a day      ibuprofen (MOTRIN) 800 mg tablet Take 1 tablet by mouth every 8 (eight) hours as needed      Insulin Glargine (TOUJEO SOLOSTAR) injection pen 300 units/mL Inject under the skin Daily      lisinopril (ZESTRIL) 40 mg tablet Take 1 tablet by mouth daily      metFORMIN (GLUCOPHAGE) 1000 MG tablet Take 1 tablet by mouth 2 (two) times a day      methocarbamol (ROBAXIN) 500 mg tablet Take by mouth      traMADol (ULTRAM) 50 mg tablet Take 1 tablet by mouth every 6 (six) hours as needed       No current facility-administered medications for this visit  Allergies  Allergies   Allergen Reactions    Simvastatin        Past Medical History, Social History, Family History, medications and allergies were reviewed  Review of Systems  Review of Systems   Constitutional: Negative  HENT: Negative  Eyes: Negative  Respiratory: Negative  Cardiovascular: Negative  Gastrointestinal: Negative  Endocrine: Negative  Genitourinary: Negative for decreased urine volume, difficulty urinating and urgency  Musculoskeletal: Negative  Skin: Negative  Neurological: Negative  Hematological: Negative  Psychiatric/Behavioral: Negative  Vitals  Vitals:    04/26/18 1130   BP: 136/94   BP Location: Right arm   Patient Position: Sitting   Weight: 117 kg (257 lb 12 8 oz)   Height: 5' 5" (1 651 m)         Physical Exam    Physical Exam   Constitutional: He is oriented to person, place, and time  He appears well-developed and well-nourished  No distress  HENT:   Head: Normocephalic and atraumatic  Eyes: Pupils are equal, round, and reactive to light  Neck: Normal range of motion  Cardiovascular: Normal rate, regular rhythm and normal heart sounds  Pulmonary/Chest: Effort normal and breath sounds normal  No accessory muscle usage  No respiratory distress  Abdominal: Soft  Normal appearance and bowel sounds are normal  There is no tenderness  Genitourinary: Rectum normal, prostate normal and penis normal  No penile tenderness  Genitourinary Comments: 30 grams  No distinct nodules appreciated  Unchanged from previous  Musculoskeletal: Normal range of motion  Neurological: He is alert and oriented to person, place, and time   No cranial nerve deficit  Skin: Skin is warm, dry and intact  Psychiatric: He has a normal mood and affect  His speech is normal and behavior is normal  Cognition and memory are normal    Nursing note and vitals reviewed  Results    Below listed labs, pathology results, and radiology images were personally reviewed:    Lab Results   Component Value Date/Time    PSA 5 3 (H) 10/29/2016 09:13 AM     No results found for: GLUCOSE, CALCIUM, NA, K, CO2, CL, BUN, CREATININE  No results found for: WBC, HGB, HCT, MCV, PLT    No results found for this or any previous visit (from the past 1 hour(s)) ]    Case Report   Surgical Pathology Report                         Case: [id number]                                    Authorizing Provider: Tanvi Daly MD             Collected:           04/13/2018 [facility]               Ordering Location:     [address] [place] [address] Road:            04/13/2018 [facility]                                      Urology OS                                                                Pathologist:           Wesly Hahn MD                                                                Specimens:   A) - Prostate, Left PZ                                                                               B) - Prostate, Lect CZ                                                                               C) - Prostate, Right PZ                                                                              D) - Prostate, Right CZ                                                                    Final Diagnosis    A  Prostate, left PZ (core needle biopsy):  - Prostatic adenocarcinoma, [place] score 3+4=7 involving 75%, 90% of two (2) of four (4) cores [Group grade 2, </=5% pattern 4, 11 & 13mm]  - Prostatic adenocarcinoma, Nu score 3+3=6 involving 60%, 90% of two (2) of four (4) cores [Group grade 1, 11 & 9mm]      B   Prostate, left CZ (core needle biopsy):  - Prostatic adenocarcinoma, Nu score 3+3=6 involving 15% of one (1) of two (2) cores [Group grade 1, 2mm]  - Atypical acinar proliferation in one (1) of two (2) cores      C  Prostate, right PZ (core needle biopsy): - Benign prostatic tissue  - No carcinoma identified      Comment: Prostate multiplex stain (, p63, p504S/racemase) shows intact basal cells with absent racemase staining   highlights basal cells       D  Prostate, right CZ (core needle biopsy):   - Benign prostatic tissue  - No carcinoma identified     Comment: Prostate multiplex stain (, p63, p504S/racemase) shows intact basal cells with absent racemase staining       - Intradepartmental consultation concurs with the diagnosis of adenocarcinoma    - Dr Hernan Ibarra office was notified via voicemail of the diagnosis on 4/17/18 at 13:42 hours          MOLECULAR TESTING AND CONSULT SLIDES:      Molecular testing:  Unstained slides from blocks A1, A2 (3+4=7) are available for molecular testing       Consult slides: Freddie Escalante, A2-7, B1-7 are available for consultation              Electronically signed by Delonte Zuluaga MD on 4/17/2018 at  1:44 PM   Preliminary result electronically signed by Delonte Zuluaga MD on 4/16/2018 at 10:14 AM

## 2018-04-26 ENCOUNTER — OFFICE VISIT (OUTPATIENT)
Dept: UROLOGY | Facility: CLINIC | Age: 58
End: 2018-04-26
Payer: COMMERCIAL

## 2018-04-26 ENCOUNTER — TELEPHONE (OUTPATIENT)
Dept: UROLOGY | Facility: CLINIC | Age: 58
End: 2018-04-26

## 2018-04-26 VITALS
BODY MASS INDEX: 42.95 KG/M2 | SYSTOLIC BLOOD PRESSURE: 136 MMHG | DIASTOLIC BLOOD PRESSURE: 94 MMHG | WEIGHT: 257.8 LBS | HEIGHT: 65 IN

## 2018-04-26 DIAGNOSIS — C61 PROSTATE CA (HCC): Primary | ICD-10-CM

## 2018-04-26 PROCEDURE — 99214 OFFICE O/P EST MOD 30 MIN: CPT | Performed by: UROLOGY

## 2018-04-26 NOTE — TELEPHONE ENCOUNTER
Needs follow up with Babatunde Zayas within 2 weeks after Ct scan  Ct is scheduled 4/30/18  Prefers [facility]

## 2018-04-30 ENCOUNTER — TRANSCRIBE ORDERS (OUTPATIENT)
Dept: RADIOLOGY | Facility: HOSPITAL | Age: 58
End: 2018-04-30

## 2018-04-30 ENCOUNTER — HOSPITAL ENCOUNTER (OUTPATIENT)
Dept: RADIOLOGY | Facility: HOSPITAL | Age: 58
Discharge: HOME | End: 2018-04-30
Attending: UROLOGY
Payer: COMMERCIAL

## 2018-04-30 DIAGNOSIS — C61 PROSTATE CANCER (HCC): Primary | ICD-10-CM

## 2018-04-30 DIAGNOSIS — C61 PROSTATE CANCER (HCC): ICD-10-CM

## 2018-04-30 LAB
BUN SERPL-MCNC: 20 MG/DL (ref 6–24)
BUN/CREAT SERPL: 18 (ref 9–20)
CALCIUM SERPL-MCNC: 9.5 MG/DL (ref 8.7–10.2)
CHLORIDE SERPL-SCNC: 102 MMOL/L (ref 96–106)
CO2 SERPL-SCNC: 25 MMOL/L (ref 18–29)
CREAT SERPL-MCNC: 1.12 MG/DL (ref 0.76–1.27)
GLUCOSE SERPL-MCNC: 150 MG/DL (ref 65–99)
POTASSIUM SERPL-SCNC: 5.1 MMOL/L (ref 3.5–5.2)
SL AMB EGFR AFRICAN AMERICAN: 84 ML/MIN/1.73
SL AMB EGFR NON AFRICAN AMERICAN: 73 ML/MIN/1.73
SODIUM SERPL-SCNC: 143 MMOL/L (ref 134–144)

## 2018-04-30 PROCEDURE — 72193 CT PELVIS W/DYE: CPT

## 2018-04-30 RX ADMIN — IOHEXOL 100 ML: 350 INJECTION, SOLUTION INTRAVENOUS at 15:00

## 2018-05-04 DIAGNOSIS — IMO0002 UNCONTROLLED TYPE 2 DIABETES MELLITUS WITH COMPLICATION, WITHOUT LONG-TERM CURRENT USE OF INSULIN: Primary | ICD-10-CM

## 2018-05-04 RX ORDER — EXENATIDE 2 MG
KIT SUBCUTANEOUS
Qty: 4 EACH | Refills: 3 | Status: SHIPPED | OUTPATIENT
Start: 2018-05-04 | End: 2018-08-23 | Stop reason: SDUPTHER

## 2018-05-15 PROBLEM — R97.20 ELEVATED PSA: Status: RESOLVED | Noted: 2018-04-03 | Resolved: 2018-05-15

## 2018-05-15 PROBLEM — C61 PROSTATE CANCER (HCC): Status: ACTIVE | Noted: 2018-05-15

## 2018-05-15 NOTE — PROGRESS NOTES
5/16/2018    Kwame Johnson  1960  [id number]    Discussion and Plan    Patient has elected to proceed with a DaVinci prostatectomy  Operative risks including bleeding, infection, bowel or vascular injury, urinary incontinence, impotence, urethral stricture, disease recurrence, and need for secondary procedures were explained  Medical clearance will be obtained preoperatively  Informed consent obtained at this time  1  Kidney stones    2  Prostate cancer [facility])  - Case request operating room: PROSTATECTOMY RADICAL W ROBOTICS; Standing  - Case request operating room: PROSTATECTOMY RADICAL W ROBOTICS    Assessment      Patient Active Problem List   Diagnosis    Kidney stones    Prostate cancer [facility])       History of Present Illness    Patt Villalobos is a 63 y o  male seen today in regards to a history of history of left ESWL done on 12/14/15 and newly diagnosed prostate cancer  Patient has a h/o long-standing calcium oxalate nephrolithiasis  He previously been treated at [facility] urology  Has passed multiple fragments since the time of treatment  Denies flank pain  KUB shows a small cluster of residual fragments on left  PSA has increased from 3 5 to 6 0 and 7 2 at present  He since underwent TRUS biopsies of the prostate  This unfortunately reveals Nu 6 and 7 adenocarcinoma involving the left side of the gland primarily  CT scan fortunately shows no evidence of metastatic disease  We again extensively discussed treatment options      Urinary Symptom Assessment        Past Medical History  Past Medical History:   Diagnosis Date    Acute bronchitis     Acute low back pain     Acute low back pain     [id number] resolved    Acute respiratory infection     Cellulitis of scrotum     Cough     Diabetes mellitus (HCC)     Herpes zoster     High cholesterol     Hyperkalemia     Hypertension     Kidney disease     Kidney stone     Low back pain     Lumbar radiculopathy     Rash     Retinopathy, central serous     Sleep apnea     [id number]    Trochanteric bursitis        Past Social History  Past Surgical History:   Procedure Laterality Date    LITHOTRIPSY      renal    SHOULDER SURGERY         Past Family History  Family History   Problem Relation Age of Onset    Other Father      cardiac disorder    Stroke Father     Diabetes Father      mellitus    Hypertension Father     Heart disease Father      cardiac disorder    Cancer Sister     Cancer Paternal Grandmother     Heart disease Family      cardiac disorder    Kidney disease Family        Past Social history  Social History     Social History    Marital status: /Civil Union     Spouse name: N/A    Number of children: N/A    Years of education: N/A     Occupational History    Not on file  Social History Main Topics    Smoking status: Never Smoker    Smokeless tobacco: Never Used    Alcohol use No    Drug use: No    Sexual activity: Not on file     Other Topics Concern    Not on file     Social History Narrative    Caffeine use       Current Medications  Current Outpatient Prescriptions   Medication Sig Dispense Refill    albuterol (PROAIR HFA) 90 mcg/act inhaler Inhale 1-2 puffs      ASPIRIN 81 PO Take 2 capsules by mouth daily      atorvastatin (LIPITOR) 80 mg tablet Take 80 mg by mouth daily  0    BD PEN NEEDLE MAJO U/F 32G X 4 MM MISC 2 (two) times a day  5    BYDUREON 2 MG SRER INJECT 2MG SUBCUTANEOUSLY EVERY 7 DAYS   4 each 3    glimepiride (AMARYL) 4 mg tablet Take by mouth 2 (two) times a day      ibuprofen (MOTRIN) 800 mg tablet Take 1 tablet by mouth every 8 (eight) hours as needed      Insulin Glargine (TOUJEO SOLOSTAR) injection pen 300 units/mL Inject under the skin Daily      lisinopril (ZESTRIL) 40 mg tablet Take 1 tablet by mouth daily      metFORMIN (GLUCOPHAGE) 1000 MG tablet Take 1 tablet by mouth 2 (two) times a day      amLODIPine (NORVASC) 2 5 mg tablet Take 1 tablet by mouth daily      famciclovir (FAMVIR) 500 mg tablet Take 1 tablet by mouth 3 (three) times a day      methocarbamol (ROBAXIN) 500 mg tablet Take by mouth      traMADol (ULTRAM) 50 mg tablet Take 1 tablet by mouth every 6 (six) hours as needed       No current facility-administered medications for this visit  Allergies  Allergies   Allergen Reactions    Simvastatin        Past Medical History, Social History, Family History, medications and allergies were reviewed  Review of Systems  Review of Systems   Constitutional: Negative  HENT: Negative  Eyes: Negative  Respiratory: Negative  Cardiovascular: Negative  Gastrointestinal: Negative  Endocrine: Negative  Genitourinary: Negative for decreased urine volume, difficulty urinating, frequency and hematuria  Musculoskeletal: Negative  Skin: Negative  Neurological: Negative  Hematological: Negative  Psychiatric/Behavioral: Negative  Vitals  Vitals:    05/16/18 1550   BP: 152/90   BP Location: Left arm   Patient Position: Sitting   Cuff Size: Adult   Pulse: 66   Weight: 115 kg (254 lb)   Height: 5' 5" (1 651 m)         Physical Exam    Physical Exam   Constitutional: He is oriented to person, place, and time  He appears well-developed and well-nourished  HENT:   Head: Normocephalic and atraumatic  Eyes: Pupils are equal, round, and reactive to light  Neck: Normal range of motion  Cardiovascular: Normal rate, regular rhythm and normal heart sounds  Pulmonary/Chest: Effort normal and breath sounds normal  No accessory muscle usage  No respiratory distress  Abdominal: Soft  Normal appearance and bowel sounds are normal  There is no tenderness  Musculoskeletal: Normal range of motion  Neurological: He is alert and oriented to person, place, and time  Skin: Skin is warm, dry and intact  Psychiatric: He has a normal mood and affect   His speech is normal  Cognition and memory are normal    Nursing note and vitals reviewed  Results    Below listed labs, pathology results, and radiology images were personally reviewed:    Lab Results   Component Value Date/Time    PSA 5 3 (H) 10/29/2016 09:13 AM     Lab Results   Component Value Date    BUN 20 04/30/2018    CREATININE 1 12 04/30/2018     No results found for: WBC, HGB, HCT, MCV, PLT    No results found for this or any previous visit (from the past 1 hour(s)) ]    CT PELVIS WITH IV CONTRAST     INDICATION:   C61: Malignant neoplasm of prostate      COMPARISON:  CT abdomen and pelvis 8/21/2015     TECHNIQUE: CT examination of the pelvis was performed  Axial, sagittal, and coronal 2D reformatted images were created from the source data and submitted for interpretation      Radiation dose length product (DLP) for this visit:  728 11 mGy-cm   This examination, like all CT scans performed in the [facility], was performed utilizing techniques to minimize radiation dose exposure, including the use of iterative   reconstruction and automated exposure control      IV Contrast:  100 mL of iohexol (OMNIPAQUE)  350  Enteric Contrast:  Enteric contrast was not administered       FINDINGS:     VISUALIZED KIDNEYS/URETERS:  No significant abnormality identified in the partially imaged ureters  Kidneys not imaged      REPRODUCTIVE ORGANS:  CT appearance of the prostate gland unremarkable for patient's age  Calcification of bilateral vas deferens attributed to diabetes mellitus      URINARY BLADDER:  4 mm calculus in the left dependent bladder closely approximating left UVJ  No dilation of the distal ureter      APPENDIX:  A normal appendix was visualized      VISUALIZED BOWEL:  Unremarkable      ABDOMINOPELVIC CAVITY:  No enlarged lymph nodes, free fluid, or free gas  VISUALIZED VESSELS:  Minimal iliac artery calcification  No aneurysm  ABDOMINOPELVIC WALL/INGUINAL REGIONS: Subcentimeter ventral umbilical abdominal wall diastases containing fat    No bowel herniation  Tiny bilateral fat-containing inguinal hernias  No inguinal mass      OSSEOUS STRUCTURES:  No acute fracture or osseous destructive lesion identified  Degenerative changes of the spine, pubic symphysis, and multiple joints  Moderate to severe bilateral neuroforaminal narrowing at L4-5  Subcentimeter calcification   adjacent to bilateral greater trochanters attributed to dystrophic calcification or calcific tendinitis in the appropriate clinical scenario      IMPRESSION:     No enlarged lymph nodes or evidence of metastatic disease in the pelvis      4 mm calculus in the left dependent bladder closely approximating left UVJ    No ureteral dilation         Workstation performed: [id number]

## 2018-05-16 ENCOUNTER — OFFICE VISIT (OUTPATIENT)
Dept: UROLOGY | Facility: CLINIC | Age: 58
End: 2018-05-16
Payer: COMMERCIAL

## 2018-05-16 VITALS
SYSTOLIC BLOOD PRESSURE: 152 MMHG | DIASTOLIC BLOOD PRESSURE: 90 MMHG | BODY MASS INDEX: 42.32 KG/M2 | WEIGHT: 254 LBS | HEART RATE: 66 BPM | HEIGHT: 65 IN

## 2018-05-16 DIAGNOSIS — C61 PROSTATE CANCER (HCC): ICD-10-CM

## 2018-05-16 DIAGNOSIS — N20.0 KIDNEY STONES: Primary | ICD-10-CM

## 2018-05-16 PROCEDURE — 99213 OFFICE O/P EST LOW 20 MIN: CPT | Performed by: UROLOGY

## 2018-05-17 ENCOUNTER — TELEPHONE (OUTPATIENT)
Dept: UROLOGY | Facility: AMBULATORY SURGERY CENTER | Age: 58
End: 2018-05-17

## 2018-05-17 NOTE — TELEPHONE ENCOUNTER
Confirmed surgery date of 6/13/18 for robot assisted laparoscopic prostatectomy with Dr Helga Castellanos  Surgery packet emailed to patient

## 2018-05-17 NOTE — TELEPHONE ENCOUNTER
Auth submitted for inpatient [id number] through [facility]  Response was:  "No Action Required"; "Certification Not Required for this Service"; "NO AUTHORIZATION REQUIRED FOR THE SERVICE TYPE SPECIFIED"

## 2018-05-18 ENCOUNTER — OFFICE VISIT (OUTPATIENT)
Dept: LAB | Facility: HOSPITAL | Age: 58
End: 2018-05-18
Attending: UROLOGY
Payer: COMMERCIAL

## 2018-05-18 ENCOUNTER — APPOINTMENT (OUTPATIENT)
Dept: LAB | Facility: HOSPITAL | Age: 58
End: 2018-05-18
Attending: UROLOGY
Payer: COMMERCIAL

## 2018-05-18 DIAGNOSIS — C61 PROSTATE CANCER (HCC): ICD-10-CM

## 2018-05-18 DIAGNOSIS — C61 PROSTATE CA (HCC): ICD-10-CM

## 2018-05-18 LAB
ABO GROUP BLD: NORMAL
ANION GAP SERPL CALCULATED.3IONS-SCNC: 5 MMOL/L (ref 4–13)
APTT PPP: 30 SECONDS (ref 24–36)
BASOPHILS # BLD AUTO: 0.04 THOUSANDS/ΜL (ref 0–0.1)
BASOPHILS NFR BLD AUTO: 0 % (ref 0–1)
BLD GP AB SCN SERPL QL: NEGATIVE
BUN SERPL-MCNC: 25 MG/DL (ref 5–25)
CALCIUM SERPL-MCNC: 9.1 MG/DL (ref 8.3–10.1)
CHLORIDE SERPL-SCNC: 105 MMOL/L (ref 100–108)
CO2 SERPL-SCNC: 28 MMOL/L (ref 21–32)
CREAT SERPL-MCNC: 1.37 MG/DL (ref 0.6–1.3)
EOSINOPHIL # BLD AUTO: 0.3 THOUSAND/ΜL (ref 0–0.61)
EOSINOPHIL NFR BLD AUTO: 2 % (ref 0–6)
ERYTHROCYTE [DISTWIDTH] IN BLOOD BY AUTOMATED COUNT: 12.8 % (ref 11.6–15.1)
EST. AVERAGE GLUCOSE BLD GHB EST-MCNC: 192 MG/DL
GFR SERPL CREATININE-BSD FRML MDRD: 57 ML/MIN/1.73SQ M
GLUCOSE SERPL-MCNC: 188 MG/DL (ref 65–140)
HBA1C MFR BLD: 8.3 % (ref 4.2–6.3)
HCT VFR BLD AUTO: 44.5 % (ref 36.5–49.3)
HGB BLD-MCNC: 14.6 G/DL (ref 12–17)
INR PPP: 0.87 (ref 0.86–1.17)
LYMPHOCYTES # BLD AUTO: 3.88 THOUSANDS/ΜL (ref 0.6–4.47)
LYMPHOCYTES NFR BLD AUTO: 29 % (ref 14–44)
MCH RBC QN AUTO: 29.2 PG (ref 26.8–34.3)
MCHC RBC AUTO-ENTMCNC: 32.8 G/DL (ref 31.4–37.4)
MCV RBC AUTO: 89 FL (ref 82–98)
MONOCYTES # BLD AUTO: 0.93 THOUSAND/ΜL (ref 0.17–1.22)
MONOCYTES NFR BLD AUTO: 7 % (ref 4–12)
NEUTROPHILS # BLD AUTO: 8.31 THOUSANDS/ΜL (ref 1.85–7.62)
NEUTS SEG NFR BLD AUTO: 62 % (ref 43–75)
NRBC BLD AUTO-RTO: 0 /100 WBCS
PLATELET # BLD AUTO: 345 THOUSANDS/UL (ref 149–390)
PMV BLD AUTO: 9.4 FL (ref 8.9–12.7)
POTASSIUM SERPL-SCNC: 4.3 MMOL/L (ref 3.5–5.3)
PROTHROMBIN TIME: 11.9 SECONDS (ref 11.8–14.2)
RBC # BLD AUTO: 5 MILLION/UL (ref 3.88–5.62)
RH BLD: POSITIVE
SODIUM SERPL-SCNC: 138 MMOL/L (ref 136–145)
SPECIMEN EXPIRATION DATE: NORMAL
WBC # BLD AUTO: 13.49 THOUSAND/UL (ref 4.31–10.16)

## 2018-05-18 PROCEDURE — 85610 PROTHROMBIN TIME: CPT

## 2018-05-18 PROCEDURE — 85025 COMPLETE CBC W/AUTO DIFF WBC: CPT

## 2018-05-18 PROCEDURE — 86901 BLOOD TYPING SEROLOGIC RH(D): CPT

## 2018-05-18 PROCEDURE — 85730 THROMBOPLASTIN TIME PARTIAL: CPT

## 2018-05-18 PROCEDURE — 86850 RBC ANTIBODY SCREEN: CPT

## 2018-05-18 PROCEDURE — 80048 BASIC METABOLIC PNL TOTAL CA: CPT

## 2018-05-18 PROCEDURE — 36415 COLL VENOUS BLD VENIPUNCTURE: CPT

## 2018-05-18 PROCEDURE — 93005 ELECTROCARDIOGRAM TRACING: CPT

## 2018-05-18 PROCEDURE — 83036 HEMOGLOBIN GLYCOSYLATED A1C: CPT

## 2018-05-18 PROCEDURE — 86900 BLOOD TYPING SEROLOGIC ABO: CPT

## 2018-05-18 NOTE — TELEPHONE ENCOUNTER
I called [facility] to confirm if no [address] is correct for procedure  I spoke with Johana Greenbegr at [employer] and information is incorrect and started [address] process  I gave her all the clinicals  Pending auth # is [id number]

## 2018-05-21 LAB
ATRIAL RATE: 92 BPM
P AXIS: 57 DEGREES
PR INTERVAL: 152 MS
QRS AXIS: -8 DEGREES
QRSD INTERVAL: 78 MS
QT INTERVAL: 348 MS
QTC INTERVAL: 430 MS
T WAVE AXIS: 41 DEGREES
VENTRICULAR RATE: 92 BPM

## 2018-05-21 PROCEDURE — 93010 ELECTROCARDIOGRAM REPORT: CPT | Performed by: INTERNAL MEDICINE

## 2018-05-23 ENCOUNTER — ANESTHESIA EVENT (INPATIENT)
Dept: PERIOP | Facility: HOSPITAL | Age: 58
DRG: 707 | End: 2018-05-23
Payer: COMMERCIAL

## 2018-05-23 RX ORDER — MULTIVIT-MIN/IRON FUM/FOLIC AC 7.5 MG-4
1 TABLET ORAL DAILY
COMMUNITY

## 2018-05-23 NOTE — PRE-PROCEDURE INSTRUCTIONS
Pre-Surgery Instructions:   Medication Instructions    albuterol (PROAIR HFA) 90 mcg/act inhaler Instructed patient per Anesthesia Guidelines   ASPIRIN 81 PO Instructed patient per Anesthesia Guidelines   atorvastatin (LIPITOR) 80 mg tablet Instructed patient per Anesthesia Guidelines   BD PEN NEEDLE MAJO U/F 32G X 4 MM MISC Instructed patient per Anesthesia Guidelines   BYDUREON 2 MG SRER Instructed patient per Anesthesia Guidelines   glimepiride (AMARYL) 4 mg tablet Instructed patient per Anesthesia Guidelines   ibuprofen (MOTRIN) 800 mg tablet Instructed patient per Anesthesia Guidelines   Insulin Glargine (TOUJEO SOLOSTAR) injection pen 300 units/mL Instructed patient per Anesthesia Guidelines   lisinopril (ZESTRIL) 40 mg tablet Instructed patient per Anesthesia Guidelines   metFORMIN (GLUCOPHAGE) 1000 MG tablet Instructed patient per Anesthesia Guidelines   Multiple Vitamins-Minerals (MULTIVITAMIN WITH MINERALS) tablet Instructed patient per Anesthesia Guidelines  ACE/ARB Med Class   Do not take this medication the day before and the morning of the day of surgery/procedure  ASA Med Class: Aspirin   Should be discontinued at least one week prior to planned operation, unless specifically stated otherwise by surgical service  Your Surgeon may have patient stop taking aspirin up to a week before surgery if having intracranial, middle ear, posterior eye, spine surgery or prostate surgery  [Patients taking aspirin for coronary stents should be reviewed by an anesthesiologist in the optimization clinic  Please do not discontinue aspirin in patients with coronary stents unless given specific permission to do so by the cardiologist who prescribed medication ]   If your surgeon approves please continue to take this medication on your normal schedule  You may take this medication on the morning of your surgery with a sip of water        Inhalational Med Class   Continue to take these inhaler medications on your normal schedule up to and including the day of surgery  Insulin Med Class   Pre-Surgery/Procedure Instructions for Adult Patients who Take Medicine for Diabetes or to Control their Blood Sugar     Day Before Surgery/Procedure  Use the directions based on the type of medicine you take for your diabetes  1  If you are having a procedure that does not require a bowel prep:  ? Pre-Mixed Insulin (Intermediate Acting: Humalog 75/25, Humulin 70/30  Novolog 70/30, Regular Insulin)  § Take ½ your regular dose the evening before your procedure  ? Rapid/Fast Acting Insulin/Long Acting Insulin (Humalog U200, NovoLog, Apidra, Lantus, Levemir, Antonio Bogaert, [place])  § Take your FULL regular dose the day before procedure  ? Oral Diabetic Medicines including Glipizide/Glimepiride/Glucotrol (sulfonylurea)  § Take your regular dose with dinner the evening before your procedure  2  If you are having a procedure (e g  Colonoscopy) that requires a bowel prep and you are allowed to have at least a clear liquid diet:  ? Pre-Mixed Insulin (Intermediate Acting: Humalog 75/25, Humulin 70/30, Novolog 70/30, Regular Insulin)  § Take ½ your regular dose the evening before your procedure  ? Rapid/Fast Acting Insulin (Humalog U200, NovoLog, Apidra, Fiasp)  § Take ½ your regular dose the evening before your procedure  ? Long Acting Insulin (Lantus, Levemir, Antonio Bogaert)  § Take your FULL regular dose the day before procedure  ? Oral Glipizide/Glimepiride/Glucotrol (sulfonylurea)  § Take ½ your regular dose the evening before your procedure  ? Oral Diabetic Medicines that are NOT Glipizide/Glimepiride/Glucotrol  § Take your regular dose with dinner in the evening before your procedure      Day of Surgery/Procedure  · Long Acting Insulin (Lantus, Levemir, Antonio Bogaert)  ? If you usually take your Long-Acting Insulin in the morning, take the full dose as scheduled    · With the exception of the morning Long-Acting Insulin noted above, DO NOT take ANY diabetic medicine on the day of your procedure unless you were instructed by the doctor who manages your diabetic medicines  · Continue to check your blood sugars  · If you have an insulin pump then consult with your Endocrinologist for instructions  · If you cannot see your Endocrinologist, on the day of the procedure set your insulin pump to your basal rate only  Please bring your insulin pump supplies to the hospital      This Educational material has been approved by the Patient Education Advisory Committee  Date prepared: 1/17/2018          Expiration date: 1/17/2019        Approval Number:            NSAID Med Class   Stop taking this medication at least 3 days prior to surgery/procedure  Statin Med Class   Continue to take this medication on your normal schedule  If this is an oral medication and you take it in the morning, then you may take this medicine with a sip of water  Discussed with Pt and his wife the showering, above med instructions and the call from [facility] the day before surgery  Taught graff / leg bag teaching, again Pt & his wife verbalized understanding to both

## 2018-05-25 ENCOUNTER — TELEPHONE (OUTPATIENT)
Dept: UROLOGY | Facility: AMBULATORY SURGERY CENTER | Age: 58
End: 2018-05-25

## 2018-05-25 NOTE — TELEPHONE ENCOUNTER
I spoke with [facility] Holli 5/25/18, 8:45am from [facility]  Auth for [id number] is [id number], good for one overnight stay, then concurrent  Patient aware

## 2018-05-29 ENCOUNTER — OFFICE VISIT (OUTPATIENT)
Dept: INTERNAL MEDICINE CLINIC | Facility: CLINIC | Age: 58
End: 2018-05-29
Payer: COMMERCIAL

## 2018-05-29 VITALS
WEIGHT: 253.6 LBS | SYSTOLIC BLOOD PRESSURE: 128 MMHG | BODY MASS INDEX: 42.25 KG/M2 | HEART RATE: 113 BPM | OXYGEN SATURATION: 98 % | DIASTOLIC BLOOD PRESSURE: 78 MMHG | HEIGHT: 65 IN

## 2018-05-29 DIAGNOSIS — E66.01 MORBID OBESITY (HCC): ICD-10-CM

## 2018-05-29 DIAGNOSIS — E78.2 MIXED HYPERLIPIDEMIA: ICD-10-CM

## 2018-05-29 DIAGNOSIS — C61 PROSTATE CANCER (HCC): ICD-10-CM

## 2018-05-29 DIAGNOSIS — IMO0001 UNCONTROLLED TYPE 2 DIABETES MELLITUS WITHOUT COMPLICATION, WITH LONG-TERM CURRENT USE OF INSULIN: ICD-10-CM

## 2018-05-29 DIAGNOSIS — I10 BENIGN ESSENTIAL HYPERTENSION: ICD-10-CM

## 2018-05-29 DIAGNOSIS — R94.31 ABNORMAL EKG: ICD-10-CM

## 2018-05-29 DIAGNOSIS — Z01.818 PREOP EXAM FOR INTERNAL MEDICINE: Primary | ICD-10-CM

## 2018-05-29 PROCEDURE — 99243 OFF/OP CNSLTJ NEW/EST LOW 30: CPT | Performed by: INTERNAL MEDICINE

## 2018-05-29 NOTE — PATIENT INSTRUCTIONS
Hold ASA and NSAIDs  Start Humalog at night 5units  Only adminster half of the the Toujeo dose the night before  Do not use metformin the day before  Skip Amaryl the night before

## 2018-05-29 NOTE — PROGRESS NOTES
Assessment/Plan:  Stress test is normal! Discussed with his wife  Appears medically stable for surgery but bec of changes on EKG and risk factors, will order a stress test Obtain stress test  Hold ASA and NSAIDs  Start Humalog at night 5units  Only adminster half of the the Toujeo dose the night before  Do not use metformin the day before  Skip Amaryl the night before       Problem List Items Addressed This Visit     [facility]    Preop exam for internal medicine - Primary    Relevant Orders    NM myocardial perfusion spect (rx stress and/or rest)    Benign essential hypertension    Relevant Orders    NM myocardial perfusion spect (rx stress and/or rest)    Diabetes mellitus type 2, uncontrolled ([facility] )    Relevant Orders    NM myocardial perfusion spect (rx stress and/or rest)    Hyperlipidemia    Relevant Orders    NM myocardial perfusion spect (rx stress and/or rest)    Morbid obesity ([facility] )      Other Visit Diagnoses     Abnormal EKG        Relevant Orders    NM myocardial perfusion spect (rx stress and/or rest)            Subjective:      Patient ID: Arturo Arroyo is a 63 y o  male      HPI  Rising PSA   Biopsy + prostate adenocarcinoma  Scheduled for Da Edwardo prostatectomy 6/13/18  Preadmission testing reviewed- A1C stable 8 3 (He has not started the Humalog with dinner that Dr Cele Boudreaux recommended)   WBC( [address]) slightly elevated, normal Hgb Hct, cr also slightly elevated 1 37  FBS at home <150s, evening sugars 110s  + EKG changes (premature supraventricular complexes)  He recalls having a normal stress test years ago that was done bec of ekg changes part of preadmission testing before he had kidney stents  Denies chest pain, palpitations  No recent illness  No h/o DVTs, abnormal bleeding  Denies major adverse reaction to anesthesia      The following portions of the patient's history were reviewed and updated as appropriate: allergies, current medications, past family history, past medical history, past social history, past surgical history and problem list     Review of Systems   Constitutional: Negative for fatigue, fever and unexpected weight change  HENT: Negative for ear pain, hearing loss, sinus pain, sinus pressure and sore throat  Respiratory: Negative for cough, shortness of breath and wheezing  Cardiovascular: Negative for chest pain, palpitations and leg swelling  Gastrointestinal: Negative for abdominal pain, constipation, diarrhea, nausea and vomiting  Musculoskeletal: Negative for arthralgias and myalgias  Neurological: Negative for dizziness and headaches  Objective:      /78   Pulse (!) 113   Ht 5' 5" (1 651 m)   Wt 115 kg (253 lb 9 6 oz)   SpO2 98%   BMI 42 20 kg/m²          Physical Exam   Constitutional: He is oriented to person, place, and time  He appears well-developed and well-nourished  Morbidly obese   HENT:   Head: Normocephalic and atraumatic  Right Ear: External ear normal    Left Ear: External ear normal    Mouth/Throat: Oropharynx is clear and moist    Eyes: Conjunctivae are normal    Neck: Neck supple  Cardiovascular: Normal rate, regular rhythm and normal heart sounds  No murmur heard  Pulmonary/Chest: Effort normal and breath sounds normal  No respiratory distress  He has no wheezes  He has no rales  Abdominal: Soft  Bowel sounds are normal  He exhibits no distension and no mass  There is no tenderness  There is no rebound and no guarding  Musculoskeletal: Normal range of motion  Neurological: He is alert and oriented to person, place, and time  Skin: Skin is warm and dry  Psychiatric: He has a normal mood and affect   His behavior is normal  Judgment and thought content normal

## 2018-05-30 ENCOUNTER — TELEPHONE (OUTPATIENT)
Dept: UROLOGY | Facility: AMBULATORY SURGERY CENTER | Age: 58
End: 2018-05-30

## 2018-05-30 NOTE — TELEPHONE ENCOUNTER
Faxed completed and signed [facility] paperwork to Pablo Barnes [phone], and emailed copy to patient as well

## 2018-05-31 ENCOUNTER — HOSPITAL ENCOUNTER (OUTPATIENT)
Dept: NON INVASIVE DIAGNOSTICS | Facility: CLINIC | Age: 58
Discharge: HOME | End: 2018-05-31
Payer: COMMERCIAL

## 2018-05-31 DIAGNOSIS — Z01.818 PREOP EXAM FOR INTERNAL MEDICINE: ICD-10-CM

## 2018-05-31 DIAGNOSIS — IMO0001 UNCONTROLLED TYPE 2 DIABETES MELLITUS WITHOUT COMPLICATION, WITH LONG-TERM CURRENT USE OF INSULIN: ICD-10-CM

## 2018-05-31 DIAGNOSIS — E78.2 MIXED HYPERLIPIDEMIA: ICD-10-CM

## 2018-05-31 DIAGNOSIS — I10 BENIGN ESSENTIAL HYPERTENSION: ICD-10-CM

## 2018-05-31 DIAGNOSIS — R94.31 ABNORMAL EKG: ICD-10-CM

## 2018-05-31 PROCEDURE — A9502 TC99M TETROFOSMIN: HCPCS

## 2018-05-31 PROCEDURE — 93017 CV STRESS TEST TRACING ONLY: CPT

## 2018-05-31 PROCEDURE — 78452 HT MUSCLE IMAGE SPECT MULT: CPT

## 2018-06-07 ENCOUNTER — TRANSCRIBE ORDERS (OUTPATIENT)
Dept: LAB | Facility: HOSPITAL | Age: 58
End: 2018-06-07

## 2018-06-07 ENCOUNTER — APPOINTMENT (OUTPATIENT)
Dept: LAB | Facility: HOSPITAL | Age: 58
End: 2018-06-07
Payer: COMMERCIAL

## 2018-06-07 DIAGNOSIS — Z00.8 HEALTH EXAMINATION IN POPULATION SURVEY: Primary | ICD-10-CM

## 2018-06-07 DIAGNOSIS — Z00.8 HEALTH EXAMINATION IN POPULATION SURVEY: ICD-10-CM

## 2018-06-07 LAB
CHOLEST SERPL-MCNC: 87 MG/DL (ref 50–200)
EST. AVERAGE GLUCOSE BLD GHB EST-MCNC: 186 MG/DL
HBA1C MFR BLD: 8.1 % (ref 4.2–6.3)
HDLC SERPL-MCNC: 33 MG/DL (ref 40–60)
LDLC SERPL CALC-MCNC: 36 MG/DL (ref 0–100)
NONHDLC SERPL-MCNC: 54 MG/DL
TRIGL SERPL-MCNC: 88 MG/DL

## 2018-06-07 PROCEDURE — 36415 COLL VENOUS BLD VENIPUNCTURE: CPT

## 2018-06-07 PROCEDURE — 83036 HEMOGLOBIN GLYCOSYLATED A1C: CPT

## 2018-06-07 PROCEDURE — 80061 LIPID PANEL: CPT

## 2018-06-12 NOTE — ANESTHESIA PREPROCEDURE EVALUATION
Review of Systems/Medical History  Patient summary reviewed  Chart reviewed  No history of anesthetic complications     Cardiovascular  EKG reviewed, Exercise tolerance (METS): >4,  Hyperlipidemia, Hypertension controlled,   Comment: Normal stress test on 5/31/18,  Pulmonary  Negative pulmonary ROS        GI/Hepatic  Negative GI/hepatic ROS          Kidney stones, Prostatic disorder (prostate cancer),        Endo/Other  Diabetes (A1C 8 1 on 6/7/18) poorly controlled type 2 Insulin,   Obesity (BMI 42 2)  morbid obesity   GYN       Hematology  Negative hematology ROS      Musculoskeletal  Negative musculoskeletal ROS        Neurology  Negative neurology ROS      Psychology   Negative psychology ROS              Physical Exam    Airway    Mallampati score: II  TM Distance: >3 FB  Neck ROM: full     Dental   No notable dental hx     Cardiovascular  Rhythm: regular, Rate: normal, Cardiovascular exam normal    Pulmonary  Pulmonary exam normal Breath sounds clear to auscultation,     Other Findings        Anesthesia Plan  ASA Score- 3     Anesthesia Type- general with ASA Monitors  Additional Monitors: arterial line  Airway Plan: ETT  Plan Factors-    Induction- intravenous  Postoperative Plan- Plan for postoperative opioid use  Planned trial extubation    Informed Consent- Anesthetic plan and risks discussed with patient and spouse  I personally reviewed this patient with the CRNA  Discussed and agreed on the Anesthesia Plan with the CRNA         NPO and allergies verified  Patient did not take any medications this AM   He took 1/2 dose of his usually insulin dose yesterday evening  Pre-op glucose was 133 this AM     Plan:  GETA, +/- arterial line    Risks and benefits discussed with patient  Questions answered  Patient consented

## 2018-06-13 ENCOUNTER — ANESTHESIA (INPATIENT)
Dept: PERIOP | Facility: HOSPITAL | Age: 58
DRG: 707 | End: 2018-06-13
Payer: COMMERCIAL

## 2018-06-13 ENCOUNTER — HOSPITAL ENCOUNTER (INPATIENT)
Facility: HOSPITAL | Age: 58
LOS: 5 days | Discharge: HOME | DRG: 707 | End: 2018-06-18
Attending: UROLOGY | Admitting: UROLOGY
Payer: COMMERCIAL

## 2018-06-13 DIAGNOSIS — E11.65 UNCONTROLLED TYPE 2 DIABETES MELLITUS WITH HYPERGLYCEMIA, WITH LONG-TERM CURRENT USE OF INSULIN (HCC): ICD-10-CM

## 2018-06-13 DIAGNOSIS — E66.01 MORBID OBESITY (HCC): ICD-10-CM

## 2018-06-13 DIAGNOSIS — E78.2 MIXED HYPERLIPIDEMIA: ICD-10-CM

## 2018-06-13 DIAGNOSIS — C61 PROSTATE CANCER (HCC): ICD-10-CM

## 2018-06-13 DIAGNOSIS — I10 ESSENTIAL HYPERTENSION: ICD-10-CM

## 2018-06-13 DIAGNOSIS — I10 BENIGN ESSENTIAL HYPERTENSION: Primary | ICD-10-CM

## 2018-06-13 DIAGNOSIS — Z79.4 UNCONTROLLED TYPE 2 DIABETES MELLITUS WITH HYPERGLYCEMIA, WITH LONG-TERM CURRENT USE OF INSULIN (HCC): ICD-10-CM

## 2018-06-13 DIAGNOSIS — IMO0001 UNCONTROLLED TYPE 2 DIABETES MELLITUS WITHOUT COMPLICATION, WITH LONG-TERM CURRENT USE OF INSULIN: ICD-10-CM

## 2018-06-13 LAB
ABO GROUP BLD: NORMAL
ALBUMIN SERPL BCP-MCNC: 3.9 G/DL (ref 3.5–5)
ALP SERPL-CCNC: 88 U/L (ref 46–116)
ALT SERPL W P-5'-P-CCNC: 62 U/L (ref 12–78)
ANION GAP SERPL CALCULATED.3IONS-SCNC: 7 MMOL/L (ref 4–13)
AST SERPL W P-5'-P-CCNC: 46 U/L (ref 5–45)
BILIRUB SERPL-MCNC: 0.3 MG/DL (ref 0.2–1)
BLD GP AB SCN SERPL QL: NEGATIVE
BUN SERPL-MCNC: 15 MG/DL (ref 5–25)
CALCIUM SERPL-MCNC: 8.9 MG/DL (ref 8.3–10.1)
CHLORIDE SERPL-SCNC: 107 MMOL/L (ref 100–108)
CO2 SERPL-SCNC: 22 MMOL/L (ref 21–32)
CREAT SERPL-MCNC: 1.32 MG/DL (ref 0.6–1.3)
ERYTHROCYTE [DISTWIDTH] IN BLOOD BY AUTOMATED COUNT: 12.5 % (ref 11.6–15.1)
GFR SERPL CREATININE-BSD FRML MDRD: 59 ML/MIN/1.73SQ M
GLUCOSE SERPL-MCNC: 133 MG/DL (ref 65–140)
GLUCOSE SERPL-MCNC: 200 MG/DL (ref 65–140)
GLUCOSE SERPL-MCNC: 214 MG/DL (ref 65–140)
GLUCOSE SERPL-MCNC: 335 MG/DL (ref 65–140)
GLUCOSE SERPL-MCNC: 339 MG/DL (ref 65–140)
HCT VFR BLD AUTO: 48.2 % (ref 36.5–49.3)
HGB BLD-MCNC: 14.9 G/DL (ref 12–17)
MCH RBC QN AUTO: 28.8 PG (ref 26.8–34.3)
MCHC RBC AUTO-ENTMCNC: 30.9 G/DL (ref 31.4–37.4)
MCV RBC AUTO: 93 FL (ref 82–98)
PLATELET # BLD AUTO: 350 THOUSANDS/UL (ref 149–390)
PMV BLD AUTO: 9 FL (ref 8.9–12.7)
POTASSIUM SERPL-SCNC: 4.7 MMOL/L (ref 3.5–5.3)
PROT SERPL-MCNC: 7.5 G/DL (ref 6.4–8.2)
RBC # BLD AUTO: 5.17 MILLION/UL (ref 3.88–5.62)
RH BLD: POSITIVE
SODIUM SERPL-SCNC: 136 MMOL/L (ref 136–145)
SPECIMEN EXPIRATION DATE: NORMAL
WBC # BLD AUTO: 28.41 THOUSAND/UL (ref 4.31–10.16)

## 2018-06-13 PROCEDURE — 8E0W4CZ ROBOTIC ASSISTED PROCEDURE OF TRUNK REGION, PERCUTANEOUS ENDOSCOPIC APPROACH: ICD-10-PCS | Performed by: UROLOGY

## 2018-06-13 PROCEDURE — 80053 COMPREHEN METABOLIC PANEL: CPT | Performed by: UROLOGY

## 2018-06-13 PROCEDURE — 86901 BLOOD TYPING SEROLOGIC RH(D): CPT | Performed by: UROLOGY

## 2018-06-13 PROCEDURE — 88309 TISSUE EXAM BY PATHOLOGIST: CPT | Performed by: PATHOLOGY

## 2018-06-13 PROCEDURE — 0VT04ZZ RESECTION OF PROSTATE, PERCUTANEOUS ENDOSCOPIC APPROACH: ICD-10-PCS | Performed by: UROLOGY

## 2018-06-13 PROCEDURE — 55866 LAPS SURG PRST8ECT RPBIC RAD: CPT | Performed by: UROLOGY

## 2018-06-13 PROCEDURE — 86850 RBC ANTIBODY SCREEN: CPT | Performed by: UROLOGY

## 2018-06-13 PROCEDURE — 86920 COMPATIBILITY TEST SPIN: CPT

## 2018-06-13 PROCEDURE — 82948 REAGENT STRIP/BLOOD GLUCOSE: CPT

## 2018-06-13 PROCEDURE — 88311 DECALCIFY TISSUE: CPT | Performed by: PATHOLOGY

## 2018-06-13 PROCEDURE — 55866 LAPS SURG PRST8ECT RPBIC RAD: CPT | Performed by: PHYSICIAN ASSISTANT

## 2018-06-13 PROCEDURE — 85027 COMPLETE CBC AUTOMATED: CPT | Performed by: UROLOGY

## 2018-06-13 PROCEDURE — 86900 BLOOD TYPING SEROLOGIC ABO: CPT | Performed by: UROLOGY

## 2018-06-13 RX ORDER — PROPOFOL 10 MG/ML
INJECTION, EMULSION INTRAVENOUS AS NEEDED
Status: DISCONTINUED | OUTPATIENT
Start: 2018-06-13 | End: 2018-06-13 | Stop reason: SURG

## 2018-06-13 RX ORDER — SODIUM CHLORIDE, SODIUM LACTATE, POTASSIUM CHLORIDE, CALCIUM CHLORIDE 600; 310; 30; 20 MG/100ML; MG/100ML; MG/100ML; MG/100ML
100 INJECTION, SOLUTION INTRAVENOUS CONTINUOUS
Status: DISCONTINUED | OUTPATIENT
Start: 2018-06-13 | End: 2018-06-13

## 2018-06-13 RX ORDER — GLIMEPIRIDE 2 MG/1
4 TABLET ORAL
Status: DISCONTINUED | OUTPATIENT
Start: 2018-06-14 | End: 2018-06-15

## 2018-06-13 RX ORDER — ONDANSETRON 2 MG/ML
4 INJECTION INTRAMUSCULAR; INTRAVENOUS EVERY 4 HOURS PRN
Status: DISCONTINUED | OUTPATIENT
Start: 2018-06-13 | End: 2018-06-18 | Stop reason: HOSPADM

## 2018-06-13 RX ORDER — LABETALOL HYDROCHLORIDE 5 MG/ML
10 INJECTION, SOLUTION INTRAVENOUS ONCE
Status: COMPLETED | OUTPATIENT
Start: 2018-06-13 | End: 2018-06-13

## 2018-06-13 RX ORDER — BACITRACIN, NEOMYCIN, POLYMYXIN B 400; 3.5; 5 [USP'U]/G; MG/G; [USP'U]/G
1 OINTMENT TOPICAL 2 TIMES DAILY
Status: DISCONTINUED | OUTPATIENT
Start: 2018-06-13 | End: 2018-06-18 | Stop reason: HOSPADM

## 2018-06-13 RX ORDER — SODIUM CHLORIDE, SODIUM LACTATE, POTASSIUM CHLORIDE, CALCIUM CHLORIDE 600; 310; 30; 20 MG/100ML; MG/100ML; MG/100ML; MG/100ML
125 INJECTION, SOLUTION INTRAVENOUS CONTINUOUS
Status: DISCONTINUED | OUTPATIENT
Start: 2018-06-13 | End: 2018-06-15

## 2018-06-13 RX ORDER — HYDRALAZINE HYDROCHLORIDE 20 MG/ML
10 INJECTION INTRAMUSCULAR; INTRAVENOUS
Status: COMPLETED | OUTPATIENT
Start: 2018-06-13 | End: 2018-06-13

## 2018-06-13 RX ORDER — AMOXICILLIN 250 MG
2 CAPSULE ORAL DAILY
Status: DISCONTINUED | OUTPATIENT
Start: 2018-06-13 | End: 2018-06-18 | Stop reason: HOSPADM

## 2018-06-13 RX ORDER — SODIUM CHLORIDE 9 MG/ML
INJECTION, SOLUTION INTRAVENOUS CONTINUOUS PRN
Status: DISCONTINUED | OUTPATIENT
Start: 2018-06-13 | End: 2018-06-13 | Stop reason: SURG

## 2018-06-13 RX ORDER — FENTANYL CITRATE 50 UG/ML
INJECTION, SOLUTION INTRAMUSCULAR; INTRAVENOUS AS NEEDED
Status: DISCONTINUED | OUTPATIENT
Start: 2018-06-13 | End: 2018-06-13 | Stop reason: SURG

## 2018-06-13 RX ORDER — ROCURONIUM BROMIDE 10 MG/ML
INJECTION, SOLUTION INTRAVENOUS AS NEEDED
Status: DISCONTINUED | OUTPATIENT
Start: 2018-06-13 | End: 2018-06-13 | Stop reason: SURG

## 2018-06-13 RX ORDER — HYDROCODONE BITARTRATE AND ACETAMINOPHEN 5; 325 MG/1; MG/1
1 TABLET ORAL EVERY 4 HOURS PRN
Status: DISCONTINUED | OUTPATIENT
Start: 2018-06-13 | End: 2018-06-14

## 2018-06-13 RX ORDER — ALBUTEROL SULFATE 90 UG/1
1 AEROSOL, METERED RESPIRATORY (INHALATION) EVERY 4 HOURS PRN
Status: DISCONTINUED | OUTPATIENT
Start: 2018-06-13 | End: 2018-06-18 | Stop reason: HOSPADM

## 2018-06-13 RX ORDER — GLYCOPYRROLATE 0.2 MG/ML
INJECTION INTRAMUSCULAR; INTRAVENOUS AS NEEDED
Status: DISCONTINUED | OUTPATIENT
Start: 2018-06-13 | End: 2018-06-13 | Stop reason: SURG

## 2018-06-13 RX ORDER — MORPHINE SULFATE 2 MG/ML
2 INJECTION, SOLUTION INTRAMUSCULAR; INTRAVENOUS
Status: DISCONTINUED | OUTPATIENT
Start: 2018-06-13 | End: 2018-06-14

## 2018-06-13 RX ORDER — ONDANSETRON 2 MG/ML
INJECTION INTRAMUSCULAR; INTRAVENOUS AS NEEDED
Status: DISCONTINUED | OUTPATIENT
Start: 2018-06-13 | End: 2018-06-13 | Stop reason: SURG

## 2018-06-13 RX ORDER — DEXTROSE, SODIUM CHLORIDE, SODIUM LACTATE, POTASSIUM CHLORIDE, AND CALCIUM CHLORIDE 5; .6; .31; .03; .02 G/100ML; G/100ML; G/100ML; G/100ML; G/100ML
125 INJECTION, SOLUTION INTRAVENOUS CONTINUOUS
Status: DISCONTINUED | OUTPATIENT
Start: 2018-06-13 | End: 2018-06-13

## 2018-06-13 RX ORDER — FENTANYL CITRATE/PF 50 MCG/ML
50 SYRINGE (ML) INJECTION
Status: DISCONTINUED | OUTPATIENT
Start: 2018-06-13 | End: 2018-06-13 | Stop reason: HOSPADM

## 2018-06-13 RX ORDER — ATORVASTATIN CALCIUM 80 MG/1
80 TABLET, FILM COATED ORAL DAILY
Status: DISCONTINUED | OUTPATIENT
Start: 2018-06-13 | End: 2018-06-18 | Stop reason: HOSPADM

## 2018-06-13 RX ORDER — ONDANSETRON 2 MG/ML
4 INJECTION INTRAMUSCULAR; INTRAVENOUS ONCE AS NEEDED
Status: DISCONTINUED | OUTPATIENT
Start: 2018-06-13 | End: 2018-06-13 | Stop reason: HOSPADM

## 2018-06-13 RX ORDER — METOCLOPRAMIDE HYDROCHLORIDE 5 MG/ML
INJECTION INTRAMUSCULAR; INTRAVENOUS AS NEEDED
Status: DISCONTINUED | OUTPATIENT
Start: 2018-06-13 | End: 2018-06-13 | Stop reason: SURG

## 2018-06-13 RX ORDER — MIDAZOLAM HYDROCHLORIDE 1 MG/ML
INJECTION INTRAMUSCULAR; INTRAVENOUS AS NEEDED
Status: DISCONTINUED | OUTPATIENT
Start: 2018-06-13 | End: 2018-06-13 | Stop reason: SURG

## 2018-06-13 RX ORDER — MAGNESIUM HYDROXIDE 1200 MG/15ML
LIQUID ORAL AS NEEDED
Status: DISCONTINUED | OUTPATIENT
Start: 2018-06-13 | End: 2018-06-13 | Stop reason: HOSPADM

## 2018-06-13 RX ORDER — SODIUM CHLORIDE, SODIUM LACTATE, POTASSIUM CHLORIDE, CALCIUM CHLORIDE 600; 310; 30; 20 MG/100ML; MG/100ML; MG/100ML; MG/100ML
100 INJECTION, SOLUTION INTRAVENOUS CONTINUOUS
Status: DISCONTINUED | OUTPATIENT
Start: 2018-06-13 | End: 2018-06-15

## 2018-06-13 RX ORDER — METOPROLOL TARTRATE 5 MG/5ML
5 INJECTION INTRAVENOUS EVERY 6 HOURS PRN
Status: DISCONTINUED | OUTPATIENT
Start: 2018-06-13 | End: 2018-06-18 | Stop reason: HOSPADM

## 2018-06-13 RX ORDER — ACETAMINOPHEN 325 MG/1
650 TABLET ORAL EVERY 4 HOURS PRN
Status: DISCONTINUED | OUTPATIENT
Start: 2018-06-13 | End: 2018-06-18 | Stop reason: HOSPADM

## 2018-06-13 RX ORDER — LISINOPRIL 20 MG/1
40 TABLET ORAL DAILY
Status: DISCONTINUED | OUTPATIENT
Start: 2018-06-13 | End: 2018-06-15

## 2018-06-13 RX ADMIN — HYDROMORPHONE HYDROCHLORIDE 0.25 MG: 1 INJECTION, SOLUTION INTRAMUSCULAR; INTRAVENOUS; SUBCUTANEOUS at 13:33

## 2018-06-13 RX ADMIN — PROPOFOL 50 MG: 10 INJECTION, EMULSION INTRAVENOUS at 08:24

## 2018-06-13 RX ADMIN — HYDROMORPHONE HYDROCHLORIDE 0.25 MG: 1 INJECTION, SOLUTION INTRAMUSCULAR; INTRAVENOUS; SUBCUTANEOUS at 13:22

## 2018-06-13 RX ADMIN — LABETALOL 20 MG/4 ML (5 MG/ML) INTRAVENOUS SYRINGE 10 MG: at 12:04

## 2018-06-13 RX ADMIN — SODIUM CHLORIDE, SODIUM LACTATE, POTASSIUM CHLORIDE, AND CALCIUM CHLORIDE 125 ML/HR: .6; .31; .03; .02 INJECTION, SOLUTION INTRAVENOUS at 21:09

## 2018-06-13 RX ADMIN — SODIUM CHLORIDE: 0.9 INJECTION, SOLUTION INTRAVENOUS at 08:28

## 2018-06-13 RX ADMIN — LISINOPRIL 40 MG: 20 TABLET ORAL at 15:01

## 2018-06-13 RX ADMIN — NEOSTIGMINE METHYLSULFATE 5 MG: 1 INJECTION, SOLUTION INTRAMUSCULAR; INTRAVENOUS; SUBCUTANEOUS at 11:28

## 2018-06-13 RX ADMIN — Medication 2 TABLET: at 15:01

## 2018-06-13 RX ADMIN — Medication 1 TABLET: at 15:01

## 2018-06-13 RX ADMIN — FENTANYL CITRATE 50 MCG: 50 INJECTION INTRAMUSCULAR; INTRAVENOUS at 11:50

## 2018-06-13 RX ADMIN — INSULIN HUMAN 4 UNITS: 100 INJECTION, SOLUTION PARENTERAL at 12:06

## 2018-06-13 RX ADMIN — HYDROMORPHONE HYDROCHLORIDE 0.25 MG: 1 INJECTION, SOLUTION INTRAMUSCULAR; INTRAVENOUS; SUBCUTANEOUS at 12:40

## 2018-06-13 RX ADMIN — MORPHINE SULFATE 2 MG: 2 INJECTION, SOLUTION INTRAMUSCULAR; INTRAVENOUS at 15:46

## 2018-06-13 RX ADMIN — METFORMIN HYDROCHLORIDE 1000 MG: 500 TABLET, FILM COATED ORAL at 18:02

## 2018-06-13 RX ADMIN — PROPOFOL 200 MG: 10 INJECTION, EMULSION INTRAVENOUS at 08:22

## 2018-06-13 RX ADMIN — ROCURONIUM BROMIDE 10 MG: 10 INJECTION INTRAVENOUS at 09:28

## 2018-06-13 RX ADMIN — HYDROMORPHONE HYDROCHLORIDE 0.25 MG: 1 INJECTION, SOLUTION INTRAMUSCULAR; INTRAVENOUS; SUBCUTANEOUS at 12:35

## 2018-06-13 RX ADMIN — MORPHINE SULFATE 2 MG: 2 INJECTION, SOLUTION INTRAMUSCULAR; INTRAVENOUS at 20:32

## 2018-06-13 RX ADMIN — HYDROCODONE BITARTRATE AND ACETAMINOPHEN 1 TABLET: 5; 325 TABLET ORAL at 22:41

## 2018-06-13 RX ADMIN — ATORVASTATIN CALCIUM 80 MG: 80 TABLET, FILM COATED ORAL at 15:01

## 2018-06-13 RX ADMIN — HYDROMORPHONE HYDROCHLORIDE 0.5 MG: 1 INJECTION, SOLUTION INTRAMUSCULAR; INTRAVENOUS; SUBCUTANEOUS at 09:31

## 2018-06-13 RX ADMIN — ROCURONIUM BROMIDE 50 MG: 10 INJECTION INTRAVENOUS at 08:23

## 2018-06-13 RX ADMIN — HYDROMORPHONE HYDROCHLORIDE 0.25 MG: 1 INJECTION, SOLUTION INTRAMUSCULAR; INTRAVENOUS; SUBCUTANEOUS at 12:30

## 2018-06-13 RX ADMIN — HYDROMORPHONE HYDROCHLORIDE 0.5 MG: 1 INJECTION, SOLUTION INTRAMUSCULAR; INTRAVENOUS; SUBCUTANEOUS at 10:12

## 2018-06-13 RX ADMIN — HYDROCODONE BITARTRATE AND ACETAMINOPHEN 1 TABLET: 5; 325 TABLET ORAL at 15:02

## 2018-06-13 RX ADMIN — METOCLOPRAMIDE 10 MG: 5 INJECTION, SOLUTION INTRAMUSCULAR; INTRAVENOUS at 08:58

## 2018-06-13 RX ADMIN — HYDROMORPHONE HYDROCHLORIDE 0.25 MG: 1 INJECTION, SOLUTION INTRAMUSCULAR; INTRAVENOUS; SUBCUTANEOUS at 12:15

## 2018-06-13 RX ADMIN — Medication 3000 MG: at 08:36

## 2018-06-13 RX ADMIN — ROCURONIUM BROMIDE 10 MG: 10 INJECTION INTRAVENOUS at 11:10

## 2018-06-13 RX ADMIN — ROCURONIUM BROMIDE 10 MG: 10 INJECTION INTRAVENOUS at 09:04

## 2018-06-13 RX ADMIN — METRONIDAZOLE 500 MG: 500 INJECTION, SOLUTION INTRAVENOUS at 22:36

## 2018-06-13 RX ADMIN — FENTANYL CITRATE 50 MCG: 50 INJECTION, SOLUTION INTRAMUSCULAR; INTRAVENOUS at 08:20

## 2018-06-13 RX ADMIN — SODIUM CHLORIDE, SODIUM LACTATE, POTASSIUM CHLORIDE, AND CALCIUM CHLORIDE: .6; .31; .03; .02 INJECTION, SOLUTION INTRAVENOUS at 07:40

## 2018-06-13 RX ADMIN — BACITRACIN, NEOMYCIN, POLYMYXIN B 1 SMALL APPLICATION: 400; 3.5; 5 OINTMENT TOPICAL at 18:02

## 2018-06-13 RX ADMIN — CEFAZOLIN SODIUM 1000 MG: 1 SOLUTION INTRAVENOUS at 23:46

## 2018-06-13 RX ADMIN — DEXTROSE, SODIUM CHLORIDE, SODIUM LACTATE, POTASSIUM CHLORIDE, AND CALCIUM CHLORIDE 125 ML/HR: 5; .6; .31; .03; .02 INJECTION, SOLUTION INTRAVENOUS at 18:03

## 2018-06-13 RX ADMIN — MIDAZOLAM 2 MG: 1 INJECTION INTRAMUSCULAR; INTRAVENOUS at 08:12

## 2018-06-13 RX ADMIN — FENTANYL CITRATE 50 MCG: 50 INJECTION, SOLUTION INTRAMUSCULAR; INTRAVENOUS at 11:01

## 2018-06-13 RX ADMIN — ROCURONIUM BROMIDE 10 MG: 10 INJECTION INTRAVENOUS at 10:39

## 2018-06-13 RX ADMIN — LIDOCAINE HYDROCHLORIDE 100 MG: 20 INJECTION, SOLUTION INTRAVENOUS at 08:21

## 2018-06-13 RX ADMIN — ROCURONIUM BROMIDE 10 MG: 10 INJECTION INTRAVENOUS at 10:12

## 2018-06-13 RX ADMIN — HYDROMORPHONE HYDROCHLORIDE 0.25 MG: 1 INJECTION, SOLUTION INTRAMUSCULAR; INTRAVENOUS; SUBCUTANEOUS at 13:14

## 2018-06-13 RX ADMIN — FENTANYL CITRATE 50 MCG: 50 INJECTION, SOLUTION INTRAMUSCULAR; INTRAVENOUS at 09:04

## 2018-06-13 RX ADMIN — MORPHINE SULFATE 2 MG: 2 INJECTION, SOLUTION INTRAMUSCULAR; INTRAVENOUS at 16:50

## 2018-06-13 RX ADMIN — HYDRALAZINE HYDROCHLORIDE 10 MG: 20 INJECTION INTRAMUSCULAR; INTRAVENOUS at 13:32

## 2018-06-13 RX ADMIN — MORPHINE SULFATE 2 MG: 2 INJECTION, SOLUTION INTRAMUSCULAR; INTRAVENOUS at 23:52

## 2018-06-13 RX ADMIN — METRONIDAZOLE 500 MG: 500 INJECTION, SOLUTION INTRAVENOUS at 15:02

## 2018-06-13 RX ADMIN — HYDROMORPHONE HYDROCHLORIDE 0.25 MG: 1 INJECTION, SOLUTION INTRAMUSCULAR; INTRAVENOUS; SUBCUTANEOUS at 13:28

## 2018-06-13 RX ADMIN — CEFAZOLIN SODIUM 1000 MG: 1 SOLUTION INTRAVENOUS at 15:46

## 2018-06-13 RX ADMIN — DEXTROSE, SODIUM CHLORIDE, SODIUM LACTATE, POTASSIUM CHLORIDE, AND CALCIUM CHLORIDE 125 ML/HR: 5; .6; .31; .03; .02 INJECTION, SOLUTION INTRAVENOUS at 13:50

## 2018-06-13 RX ADMIN — FENTANYL CITRATE 50 MCG: 50 INJECTION INTRAMUSCULAR; INTRAVENOUS at 12:03

## 2018-06-13 RX ADMIN — ONDANSETRON 4 MG: 2 INJECTION INTRAMUSCULAR; INTRAVENOUS at 11:19

## 2018-06-13 RX ADMIN — HYDRALAZINE HYDROCHLORIDE 10 MG: 20 INJECTION INTRAMUSCULAR; INTRAVENOUS at 13:18

## 2018-06-13 RX ADMIN — GLYCOPYRROLATE 0.8 MG: 0.2 INJECTION, SOLUTION INTRAMUSCULAR; INTRAVENOUS at 11:28

## 2018-06-13 NOTE — H&P (VIEW-ONLY)
5/16/2018    Bree Tuttle  1960  [id number]    Discussion and Plan    Patient has elected to proceed with a DaVinci prostatectomy  Operative risks including bleeding, infection, bowel or vascular injury, urinary incontinence, impotence, urethral stricture, disease recurrence, and need for secondary procedures were explained  Medical clearance will be obtained preoperatively  Informed consent obtained at this time  1  Kidney stones    2  Prostate cancer [facility])  - Case request operating room: PROSTATECTOMY RADICAL W ROBOTICS; Standing  - Case request operating room: PROSTATECTOMY RADICAL W ROBOTICS    Assessment      Patient Active Problem List   Diagnosis    Kidney stones    Prostate cancer [facility])       History of Present Illness    José Miguel Pérez is a 63 y o  male seen today in regards to a history of history of left ESWL done on 12/14/15 and newly diagnosed prostate cancer  Patient has a h/o long-standing calcium oxalate nephrolithiasis  He previously been treated at [facility] urology  Has passed multiple fragments since the time of treatment  Denies flank pain  KUB shows a small cluster of residual fragments on left  PSA has increased from 3 5 to 6 0 and 7 2 at present  He since underwent TRUS biopsies of the prostate  This unfortunately reveals [place] 6 and 7 adenocarcinoma involving the left side of the gland primarily  CT scan fortunately shows no evidence of metastatic disease  We again extensively discussed treatment options      Urinary Symptom Assessment        Past Medical History  Past Medical History:   Diagnosis Date    Acute bronchitis     Acute low back pain     Acute low back pain     [id number] resolved    Acute respiratory infection     Cellulitis of scrotum     Cough     Diabetes mellitus (HCC)     Herpes zoster     High cholesterol     Hyperkalemia     Hypertension     Kidney disease     Kidney stone     Low back pain     Lumbar radiculopathy     Rash     Retinopathy, central serous     Sleep apnea     [id number]    Trochanteric bursitis        Past Social History  Past Surgical History:   Procedure Laterality Date    LITHOTRIPSY      renal    SHOULDER SURGERY         Past Family History  Family History   Problem Relation Age of Onset    Other Father      cardiac disorder    Stroke Father     Diabetes Father      mellitus    Hypertension Father     Heart disease Father      cardiac disorder    Cancer Sister     Cancer Paternal Grandmother     Heart disease Family      cardiac disorder    Kidney disease Family        Past Social history  Social History     Social History    Marital status: /Civil Union     Spouse name: N/A    Number of children: N/A    Years of education: N/A     Occupational History    Not on file  Social History Main Topics    Smoking status: Never Smoker    Smokeless tobacco: Never Used    Alcohol use No    Drug use: No    Sexual activity: Not on file     Other Topics Concern    Not on file     Social History Narrative    Caffeine use       Current Medications  Current Outpatient Prescriptions   Medication Sig Dispense Refill    albuterol (PROAIR HFA) 90 mcg/act inhaler Inhale 1-2 puffs      ASPIRIN 81 PO Take 2 capsules by mouth daily      atorvastatin (LIPITOR) 80 mg tablet Take 80 mg by mouth daily  0    BD PEN NEEDLE MAJO U/F 32G X 4 MM MISC 2 (two) times a day  5    BYDUREON 2 MG SRER INJECT 2MG SUBCUTANEOUSLY EVERY 7 DAYS   4 each 3    glimepiride (AMARYL) 4 mg tablet Take by mouth 2 (two) times a day      ibuprofen (MOTRIN) 800 mg tablet Take 1 tablet by mouth every 8 (eight) hours as needed      Insulin Glargine (TOUJEO SOLOSTAR) injection pen 300 units/mL Inject under the skin Daily      lisinopril (ZESTRIL) 40 mg tablet Take 1 tablet by mouth daily      metFORMIN (GLUCOPHAGE) 1000 MG tablet Take 1 tablet by mouth 2 (two) times a day      amLODIPine (NORVASC) 2 5 mg tablet Take 1 tablet by mouth daily      famciclovir (FAMVIR) 500 mg tablet Take 1 tablet by mouth 3 (three) times a day      methocarbamol (ROBAXIN) 500 mg tablet Take by mouth      traMADol (ULTRAM) 50 mg tablet Take 1 tablet by mouth every 6 (six) hours as needed       No current facility-administered medications for this visit  Allergies  Allergies   Allergen Reactions    Simvastatin        Past Medical History, Social History, Family History, medications and allergies were reviewed  Review of Systems  Review of Systems   Constitutional: Negative  HENT: Negative  Eyes: Negative  Respiratory: Negative  Cardiovascular: Negative  Gastrointestinal: Negative  Endocrine: Negative  Genitourinary: Negative for decreased urine volume, difficulty urinating, frequency and hematuria  Musculoskeletal: Negative  Skin: Negative  Neurological: Negative  Hematological: Negative  Psychiatric/Behavioral: Negative  Vitals  Vitals:    05/16/18 1550   BP: 152/90   BP Location: Left arm   Patient Position: Sitting   Cuff Size: Adult   Pulse: 66   Weight: 115 kg (254 lb)   Height: 5' 5" (1 651 m)         Physical Exam    Physical Exam   Constitutional: He is oriented to person, place, and time  He appears well-developed and well-nourished  HENT:   Head: Normocephalic and atraumatic  Eyes: Pupils are equal, round, and reactive to light  Neck: Normal range of motion  Cardiovascular: Normal rate, regular rhythm and normal heart sounds  Pulmonary/Chest: Effort normal and breath sounds normal  No accessory muscle usage  No respiratory distress  Abdominal: Soft  Normal appearance and bowel sounds are normal  There is no tenderness  Musculoskeletal: Normal range of motion  Neurological: He is alert and oriented to person, place, and time  Skin: Skin is warm, dry and intact  Psychiatric: He has a normal mood and affect   His speech is normal  Cognition and memory are normal    Nursing note and vitals reviewed  Results    Below listed labs, pathology results, and radiology images were personally reviewed:    Lab Results   Component Value Date/Time    PSA 5 3 (H) 10/29/2016 09:13 AM     Lab Results   Component Value Date    BUN 20 04/30/2018    CREATININE 1 12 04/30/2018     No results found for: WBC, HGB, HCT, MCV, PLT    No results found for this or any previous visit (from the past 1 hour(s)) ]    CT PELVIS WITH IV CONTRAST     INDICATION:   C61: Malignant neoplasm of prostate      COMPARISON:  CT abdomen and pelvis 8/21/2015     TECHNIQUE: CT examination of the pelvis was performed  Axial, sagittal, and coronal 2D reformatted images were created from the source data and submitted for interpretation      Radiation dose length product (DLP) for this visit:  728 11 mGy-cm   This examination, like all CT scans performed in the [facility], was performed utilizing techniques to minimize radiation dose exposure, including the use of iterative   reconstruction and automated exposure control      IV Contrast:  100 mL of iohexol (OMNIPAQUE)  350  Enteric Contrast:  Enteric contrast was not administered       FINDINGS:     VISUALIZED KIDNEYS/URETERS:  No significant abnormality identified in the partially imaged ureters  Kidneys not imaged      REPRODUCTIVE ORGANS:  CT appearance of the prostate gland unremarkable for patient's age  Calcification of bilateral vas deferens attributed to diabetes mellitus      URINARY BLADDER:  4 mm calculus in the left dependent bladder closely approximating left UVJ  No dilation of the distal ureter      APPENDIX:  A normal appendix was visualized      VISUALIZED BOWEL:  Unremarkable      ABDOMINOPELVIC CAVITY:  No enlarged lymph nodes, free fluid, or free gas  VISUALIZED VESSELS:  Minimal iliac artery calcification  No aneurysm  ABDOMINOPELVIC WALL/INGUINAL REGIONS: Subcentimeter ventral umbilical abdominal wall diastases containing fat    No bowel herniation  Tiny bilateral fat-containing inguinal hernias  No inguinal mass      OSSEOUS STRUCTURES:  No acute fracture or osseous destructive lesion identified  Degenerative changes of the spine, pubic symphysis, and multiple joints  Moderate to severe bilateral neuroforaminal narrowing at L4-5  Subcentimeter calcification   adjacent to bilateral greater trochanters attributed to dystrophic calcification or calcific tendinitis in the appropriate clinical scenario      IMPRESSION:     No enlarged lymph nodes or evidence of metastatic disease in the pelvis      4 mm calculus in the left dependent bladder closely approximating left UVJ    No ureteral dilation         Workstation performed: [id number]

## 2018-06-13 NOTE — OP NOTE
OPERATIVE REPORT  PATIENT NAME: Fito Back    :  1960  MRN: [id number]  Pt Location: BE OR ROOM 14    SURGERY DATE: 2018    Surgeon(s) and Role:     * Kristal Ortega MD - Primary     * Gerard Millard PA-C - Assisting    Preop Diagnosis:  Prostate cancer ([facility] [address] ) Renetta Nathanael    Post-Op Diagnosis Codes:     * Prostate cancer ([facility] [address] ) Renetta Span    Procedure(s) (LRB):  PROSTATECTOMY RADICAL W ROBOTICS (N/A)  LYSIS ADHESIONS (N/A)    Specimen(s):  ID Type Source Tests Collected by Time Destination   1 :  Tissue Prostate TISSUE EXAM Kristal Ortega MD 2018 1109        Estimated Blood Loss:   50 mL    Drains:  Closed/Suction Drain Left LLQ Bulb 10 Fr  (Active)   Number of days: 0       NG/OG/Enteral Tube Orogastric 18 Fr Center mouth (Active)   Number of days: 0       Urethral Catheter Latex; Other (Comment) 20 Fr  (Active)   Site Assessment Clean 2018 10:35 AM   Collection Container Standard drainage bag 2018 10:35 AM   Securement Method Tape 2018 10:35 AM   Number of days: 0       [REMOVED] Urethral Catheter Latex 18 Fr  (Removed)   Number of days: 0       Anesthesia Type:   General    Operative Indications:  Prostate cancer ([facility] [address] ) [C61]    Operative Findings:  Adhesions right mid-abdomen    Complications:   None    Procedure and Technique:    After in formed consent including risks of bleeding, infection, bowel/vascular injury, urinary incontinence, impotence, disease recurrence and need for secondary procedures, patient was properly identified and placed supine in the operating room theater  General anesthesia was administered  He was then placed in the low dorsal lithotomy position with arms tucked at the side care to pad all contact points  Sterilely prepped and draped in usual fashion  #18 [place] Grady catheter was placed  A 12 mm incision was made with a 11 blade supraumbilical region  Veress needle was introduced and the abdomen insufflated to 15 mmHg  A 12 mm trocar was then inserted   Laparoscopy was performed showing intra-abdominal adhesions  These were mobilized with sharp dissection  The remaining trochars were placed at this time  Two 8 mm trochars were placed below the umbilicus in the midclavicular line bilaterally  A 8 mm trochar was placed above the left iliac crest  A 12 mm trocar was placed above the right iliac crest  The patient was placed in steep Trendelenburg and the robot docked at this time  Dissection was initiated by mobilizing the bladder from its anterior peritoneal attachments  Space of Retzius was entered and fibrofatty tissue overlying the prostate was resected  Endopelvic fascia was incised bilaterally working from the base of the prostate toward the apex  With the apex reached the dorsal venous complex was oversewn with 2 interrupted figure-of-eight 0-Vicryl sutures  Prostatovesical junction was taken down in a bladder neck sparing approach  Grady catheter was then withdrawn and posterior bladder neck incised  Dissection proceeded further until the seminal vesicles and vasa were reached  The vasa were transected and the seminal vesicles dissected out in their entirety  Prostate pedicles were then taken down with a combination of bipolar cautery and sharp scissor dissection  The dorsal venous complex and urethra were then sharply transected as well  Specimen was set aside within an Endo-Catch bag  Inspection showed no evidence of rectal injury  Vesicourethral anastomosis is done in a standard fashion using a double-armed 3-0 Monocryl suture  With the posterior half of the anastomosis completed, a new catheter was placed under direct vision into the bladder  Anterior anastomosis was then performed and then tied down  Irrigation showed no evidence of extravasation  Therefore at this time the robot was undocked  Specimen string was retrieved via the midline port  A 10 flat KRISTI was placed via the left a robotic trocar  The abdomen was then desufflated and all trochars were removed  Midline incision was extended slightly to facilitate delivery of the prostate specimen  Fascia was closed with #1 PDS  Skin was closed with 4-0 Monocryl and histacryl  Patient awakened from anesthesia having tolerated the procedure well       I was present for the entire procedure    Patient Disposition:  PACU     SIGNATURE: Yojana Jansen MD  DATE: June 13, 2018  TIME: 11:27 AM

## 2018-06-13 NOTE — RESPIRATORY THERAPY NOTE
RT Protocol Note  Fito Back 63 y o  male MRN: [id number]  Unit/Bed#: [facility] 833-01 Encounter: [id number]    Assessment    Principal Problem:    Prostate cancer [facility])      Home Pulmonary Medications:  Albuterol hfa more then a month ago  Does not currently use  Past Medical History:   Diagnosis Date    Acute bronchitis     Acute low back pain     Acute low back pain     [id number] resolved    Acute respiratory infection     Cellulitis of scrotum     Cough     Diabetes mellitus (HCC)     Herpes zoster     High cholesterol     Hyperkalemia     Hypertension     Kidney disease     Kidney stone     Low back pain     Lumbar radiculopathy     Rash     Retinopathy, central serous     Sleep apnea     [id number]    Trochanteric bursitis      Social History     Social History    Marital status: /Civil Union     Spouse name: N/A    Number of children: N/A    Years of education: N/A     Social History Main Topics    Smoking status: Never Smoker    Smokeless tobacco: Never Used    Alcohol use No    Drug use: No    Sexual activity: Not Asked     Other Topics Concern    None     Social History Narrative    Caffeine use       Subjective         Objective    Physical Exam:   Assessment Type: Assess only  General Appearance: Drowsy  Respiratory Pattern: Normal  Chest Assessment: Chest expansion symmetrical  Bilateral Breath Sounds: Clear    Vitals:  Blood pressure 159/68, pulse 96, temperature 98 5 °F (36 9 °C), temperature source Axillary, resp  rate 18, height 5' 6" (1 676 m), weight 113 kg (250 lb), SpO2 91 %  Imaging and other studies: I have personally reviewed pertinent reports  Plan    Respiratory Plan: Discontinue Protocol        Resp Comments: pt  assessed for respiratory protocol at this time  pt has no pulmonary history, pt did not have a CXR on current admission  pt has previously been instructed on use of incentive spirometry  by RN   Pt demonstrated use for me and complained of abdominal pain  pt was then instructed on use of splinting to help with pain  pt able to achieve 750 ml at this time  pt is aware to continue use 5-10x per hour while awake  respiratory protocol dc'd at this time

## 2018-06-13 NOTE — SOCIAL WORK
CM met with pt and pt wife all aware cm role at discharge  Pt lives in a house with his wife , there are 2 steps to get in and 13 to get to bedroom and bathroom   Prior to admission pt independent all ADL" s  Pt denies any DME's, VNA or SNF   Pt has no mental health or [address] [place] [address] rehab in the past   Pt uses [facility] [address]   When medically clear wife will transport home  Pt and wife decline VNA wife is a nurse and can take care of the graff   CM reviewed d/c planning process including the following: identifying help at home, patient preference for d/c planning needs, Discharge Lounge, [facility] Meds to Bed program, availability of treatment team to discuss questions or concerns patient and/or family may have regarding understanding medications and recognizing signs and symptoms once discharged  CM also encouraged patient to follow up with all recommended appointments after discharge  Patient advised of importance for patient and family to participate in managing patients medical well being  Discharge checklist discussed with patient and family

## 2018-06-13 NOTE — ADDENDUM NOTE
Addendum  created 06/13/18 1239 by Jeanelle Barthel, CRNA    Order list changed, Order sets accessed

## 2018-06-13 NOTE — ANESTHESIA POSTPROCEDURE EVALUATION
Post-Op Assessment Note      CV Status:  Stable    Mental Status:  Alert and awake    Hydration Status:  Euvolemic    PONV Controlled:  Controlled    Airway Patency:  Patent    Post Op Vitals Reviewed: Yes          Staff: CRNA           BP (!) 185/90 (06/13/18 1152)    Temp (!) 97 3 °F (36 3 °C) (06/13/18 1152)    Pulse 96 (06/13/18 1152)   Resp 16 (06/13/18 1152)    SpO2 96 % (06/13/18 1152)

## 2018-06-14 LAB
ANION GAP SERPL CALCULATED.3IONS-SCNC: 8 MMOL/L (ref 4–13)
BUN SERPL-MCNC: 20 MG/DL (ref 5–25)
CALCIUM SERPL-MCNC: 7.9 MG/DL (ref 8.3–10.1)
CHLORIDE SERPL-SCNC: 106 MMOL/L (ref 100–108)
CO2 SERPL-SCNC: 24 MMOL/L (ref 21–32)
CREAT SERPL-MCNC: 2.22 MG/DL (ref 0.6–1.3)
ERYTHROCYTE [DISTWIDTH] IN BLOOD BY AUTOMATED COUNT: 12.7 % (ref 11.6–15.1)
GFR SERPL CREATININE-BSD FRML MDRD: 32 ML/MIN/1.73SQ M
GLUCOSE SERPL-MCNC: 219 MG/DL (ref 65–140)
GLUCOSE SERPL-MCNC: 238 MG/DL (ref 65–140)
GLUCOSE SERPL-MCNC: 242 MG/DL (ref 65–140)
GLUCOSE SERPL-MCNC: 247 MG/DL (ref 65–140)
GLUCOSE SERPL-MCNC: 285 MG/DL (ref 65–140)
HCT VFR BLD AUTO: 40.5 % (ref 36.5–49.3)
HGB BLD-MCNC: 13 G/DL (ref 12–17)
MCH RBC QN AUTO: 28.4 PG (ref 26.8–34.3)
MCHC RBC AUTO-ENTMCNC: 32.1 G/DL (ref 31.4–37.4)
MCV RBC AUTO: 88 FL (ref 82–98)
PLATELET # BLD AUTO: 365 THOUSANDS/UL (ref 149–390)
PMV BLD AUTO: 9.5 FL (ref 8.9–12.7)
POTASSIUM SERPL-SCNC: 4.7 MMOL/L (ref 3.5–5.3)
RBC # BLD AUTO: 4.58 MILLION/UL (ref 3.88–5.62)
SODIUM SERPL-SCNC: 138 MMOL/L (ref 136–145)
WBC # BLD AUTO: 17.5 THOUSAND/UL (ref 4.31–10.16)

## 2018-06-14 PROCEDURE — 85027 COMPLETE CBC AUTOMATED: CPT | Performed by: UROLOGY

## 2018-06-14 PROCEDURE — 80048 BASIC METABOLIC PNL TOTAL CA: CPT | Performed by: UROLOGY

## 2018-06-14 PROCEDURE — 99024 POSTOP FOLLOW-UP VISIT: CPT | Performed by: UROLOGY

## 2018-06-14 PROCEDURE — 82948 REAGENT STRIP/BLOOD GLUCOSE: CPT

## 2018-06-14 RX ORDER — METOCLOPRAMIDE HYDROCHLORIDE 5 MG/ML
10 INJECTION INTRAMUSCULAR; INTRAVENOUS ONCE
Status: COMPLETED | OUTPATIENT
Start: 2018-06-14 | End: 2018-06-14

## 2018-06-14 RX ORDER — OXYCODONE HYDROCHLORIDE 5 MG/1
5 TABLET ORAL EVERY 6 HOURS PRN
Qty: 40 TABLET | Refills: 0 | Status: SHIPPED | OUTPATIENT
Start: 2018-06-14 | End: 2018-06-17

## 2018-06-14 RX ORDER — OXYCODONE HYDROCHLORIDE 5 MG/1
5 TABLET ORAL EVERY 4 HOURS PRN
Status: DISCONTINUED | OUTPATIENT
Start: 2018-06-14 | End: 2018-06-18 | Stop reason: HOSPADM

## 2018-06-14 RX ADMIN — ONDANSETRON 4 MG: 2 INJECTION INTRAMUSCULAR; INTRAVENOUS at 20:25

## 2018-06-14 RX ADMIN — MORPHINE SULFATE 2 MG: 2 INJECTION, SOLUTION INTRAMUSCULAR; INTRAVENOUS at 07:20

## 2018-06-14 RX ADMIN — SODIUM CHLORIDE, SODIUM LACTATE, POTASSIUM CHLORIDE, AND CALCIUM CHLORIDE 125 ML/HR: .6; .31; .03; .02 INJECTION, SOLUTION INTRAVENOUS at 14:23

## 2018-06-14 RX ADMIN — BACITRACIN, NEOMYCIN, POLYMYXIN B 1 SMALL APPLICATION: 400; 3.5; 5 OINTMENT TOPICAL at 09:57

## 2018-06-14 RX ADMIN — INSULIN LISPRO 3 UNITS: 100 INJECTION, SOLUTION INTRAVENOUS; SUBCUTANEOUS at 19:23

## 2018-06-14 RX ADMIN — LISINOPRIL 40 MG: 20 TABLET ORAL at 09:56

## 2018-06-14 RX ADMIN — METFORMIN HYDROCHLORIDE 1000 MG: 500 TABLET, FILM COATED ORAL at 18:17

## 2018-06-14 RX ADMIN — OXYCODONE HYDROCHLORIDE 5 MG: 5 TABLET ORAL at 11:52

## 2018-06-14 RX ADMIN — Medication 2 TABLET: at 09:57

## 2018-06-14 RX ADMIN — ONDANSETRON 4 MG: 2 INJECTION INTRAMUSCULAR; INTRAVENOUS at 07:20

## 2018-06-14 RX ADMIN — Medication 1 TABLET: at 09:56

## 2018-06-14 RX ADMIN — GLIMEPIRIDE 4 MG: 2 TABLET ORAL at 09:59

## 2018-06-14 RX ADMIN — ATORVASTATIN CALCIUM 80 MG: 80 TABLET, FILM COATED ORAL at 09:56

## 2018-06-14 RX ADMIN — BACITRACIN, NEOMYCIN, POLYMYXIN B 1 SMALL APPLICATION: 400; 3.5; 5 OINTMENT TOPICAL at 18:17

## 2018-06-14 RX ADMIN — SODIUM CHLORIDE, SODIUM LACTATE, POTASSIUM CHLORIDE, AND CALCIUM CHLORIDE 125 ML/HR: .6; .31; .03; .02 INJECTION, SOLUTION INTRAVENOUS at 06:14

## 2018-06-14 RX ADMIN — METOCLOPRAMIDE 10 MG: 5 INJECTION, SOLUTION INTRAMUSCULAR; INTRAVENOUS at 23:20

## 2018-06-14 RX ADMIN — HYDROMORPHONE HYDROCHLORIDE 0.5 MG: 1 INJECTION, SOLUTION INTRAMUSCULAR; INTRAVENOUS; SUBCUTANEOUS at 14:30

## 2018-06-14 RX ADMIN — METFORMIN HYDROCHLORIDE 1000 MG: 500 TABLET, FILM COATED ORAL at 09:57

## 2018-06-14 RX ADMIN — SODIUM CHLORIDE, SODIUM LACTATE, POTASSIUM CHLORIDE, AND CALCIUM CHLORIDE 125 ML/HR: .6; .31; .03; .02 INJECTION, SOLUTION INTRAVENOUS at 22:29

## 2018-06-14 NOTE — CASE MANAGEMENT
[address] [facility] in the [facility] by [place] for 2017  Network Utilization Review Department  Phone: [phone]; Fax [phone]  ATTENTION: The Network Utilization Review Department is now centralized for our 7 Facilities  Please call with any questions or concerns to [phone] and carefully follow the prompts so that you are directed to the right person  All voicemails are confidential  Fax any determinations, approvals, denials, and requests for initial or continue stay review clinical to [phone]  Due to HIGH CALL volume, it would be easier if you could please send faxed requests to expedite your requests and in part, help us provide discharge notifications faster   /////////////////////////////////////////////////////////////////////////////////////////////////////////////////    Initial Clinical Review    Age/Sex: 63 y o  male    Surgery Date:  6/13    Procedure:   PROSTATECTOMY RADICAL W ROBOTICS -  LYSIS ADHESIONS (N/A)     Anesthesia: general    Admission Orders: Date/Time/Statement: 6/13/18 @ 32 82 12     Orders Placed This Encounter   Procedures    Inpatient Admission     Standing Status:   Standing     Number of Occurrences:   1     Order Specific Question:   Admitting Physician     Answer:   Alba Andrews [[id number]]     Order Specific Question:   Level of Care     Answer:   Med Surg [16]     Order Specific Question:   Bed request comments     Answer:   Sw 8 only     Order Specific Question:   Estimated length of stay     Answer:   More than 2 Midnights     Order Specific Question:   Certification     Answer:   I certify that inpatient services are medically necessary for this patient for a duration of greater than two midnights  See H&P and MD Progress Notes for additional information about the patient's course of treatment         Vital Signs: BP (!) 179/87 (BP Location: Right arm)   Pulse 99   Temp 98 8 °F (37 1 °C) (Oral)   Resp 16   Ht 5' 6" (1 676 m)   Wt 113 kg (250 lb)   SpO2 94%   BMI 40 35 kg/m²     Diet:        Diet Orders            Start     Ordered    06/13/18 1427  Diet Clear Liquid  Diet effective now     Question Answer Comment   Diet Type Clear Liquid    RD to adjust diet per protocol? Yes        06/13/18 1426          Mobility:  ambulate    DVT Prophylaxis:   Sequential compression device to b/l LE     Pain Control:   Pain Medications             ASPIRIN 81 PO Take 1 capsule by mouth 2 (two) times a day      ibuprofen (MOTRIN) 800 mg tablet Take 1 tablet by mouth every 8 (eight) hours as needed          6/14/2018  98 8   99   16   179/87   94% on 2L NC oxygen  MS IV @ 0720  zofran IV @ 0720  Tolerated 480 cc po intake this am     6/15/2018  99 4      122 - 129     16    92% RA   He states that he has been passing gas, however he continues to have episodes of nausea, and vomited small amount of clear liquid last night  -KRISTI out 125cc in last 24 hours, removed at bedside without any difficulty  -Will repeat BMP and check creatinine    2 22 yesterday, likely secondary to ATN  -Maintain graff catheter for discharge home, and provide teaching  -OOB and ambulating, continue incentive spirometer, advance diet slowly, continue anti emetics as needed, stop IVF as patient is adequately taking oral fluids

## 2018-06-14 NOTE — POST OP PROGRESS NOTES
UROLOGY PROGRESS NOTE   Patient Identifiers: Bella Keene (MRN [id number])  Date of Service: 6/14/2018    Subjective:     24 HR EVENTS:   no significant events  Patient has  complaints of abdominal discomfort, patient describes as "burning "  He states pain is primarily in left side of abdomen  Patient states having nausea, no vomiting, but per patient he has passed gas  Patient did not eat breakfast this morning, or gotten OOB          Objective:     VITALS:    Vitals:    06/13/18 2300   BP: 143/82   Pulse: 93   Resp: 16   Temp: 98 7 °F (37 1 °C)   SpO2: 94%       INS & OUTS:  I/O last 24 hours:   In: 4043 8 [I V :4043 8]  Out: 7140 [Urine:1525; Emesis/NG output:170; Drains:50; Blood:50]    LABS:  Lab Results   Component Value Date    HGB 13 0 06/14/2018    HCT 40 5 06/14/2018    WBC 17 50 (H) 06/14/2018     06/14/2018   ]    Lab Results   Component Value Date     06/14/2018    K 4 7 06/14/2018     06/14/2018    CO2 24 06/14/2018    BUN 20 06/14/2018    CREATININE 2 22 (H) 06/14/2018    CALCIUM 7 9 (L) 06/14/2018    GLUCOSE 247 (H) 06/14/2018   ]    INPATIENT MEDS:    Current Facility-Administered Medications:     acetaminophen (TYLENOL) tablet 650 mg, 650 mg, Oral, Q4H PRN, Khoi Manriquez MD    albuterol (PROVENTIL HFA,VENTOLIN HFA) inhaler 1 puff, 1 puff, Inhalation, Q4H PRN, Khoi Manriquez MD    atorvastatin (LIPITOR) tablet 80 mg, 80 mg, Oral, Daily, Khoi Manriquez MD, 80 mg at 06/13/18 1501    glimepiride (AMARYL) tablet 4 mg, 4 mg, Oral, Daily With Breakfast, Khoi Manriquez MD    HYDROcodone-acetaminophen [facility]) 5-325 mg per tablet 1 tablet, 1 tablet, Oral, Q4H PRN, Khoi Manriquez MD, 1 tablet at 06/13/18 2241    lactated ringers infusion, 100 mL/hr, Intravenous, Continuous, Tanya Lemus MD, Stopped at 06/13/18 1128    lactated ringers infusion, 100 mL/hr, Intravenous, Continuous, Tanya Lemus MD, Stopped at 06/13/18 1356    lactated ringers infusion, 125 mL/hr, Intravenous, Continuous, Haroon Reed PA-C, Last Rate: 125 mL/hr at 06/14/18 0614, 125 mL/hr at 06/14/18 0614    lisinopril (ZESTRIL) tablet 40 mg, 40 mg, Oral, Daily, Haley Keating MD, 40 mg at 06/13/18 1501    metFORMIN (GLUCOPHAGE) tablet 1,000 mg, 1,000 mg, Oral, BID, Haley Keating MD, 1,000 mg at 06/13/18 1802    metoprolol (LOPRESSOR) injection 5 mg, 5 mg, Intravenous, Q6H PRN, AKSHAT Ng    morphine injection 2 mg, 2 mg, Intravenous, Q1H PRN, Haley Keating MD, 2 mg at 06/14/18 0720    multivitamin-minerals (CENTRUM) tablet 1 tablet, 1 tablet, Oral, Daily, Haley Keating MD, 1 tablet at 06/13/18 1501    naloxone (NARCAN) 0 04 mg/mL syringe 0 04 mg, 0 04 mg, Intravenous, Q1MIN PRN, Haley Keating MD    neomycin-bacitracin-polymyxin b (NEOSPORIN) ointment 1 small application, 1 small application, Topical, BID, Haley Keating MD, 1 small application at 61/29/19 1802    ondansetron [facility]) injection 4 mg, 4 mg, Intravenous, Q4H PRN, Haley Keating MD, 4 mg at 06/14/18 0720    senna-docusate sodium (SENOKOT S) 8 6-50 mg per tablet 2 tablet, 2 tablet, Oral, Daily, Haley Keating MD, 2 tablet at 06/13/18 1501      Physical Exam:     GEN: alert and oriented x 3    RESP: breathing comfortably with no accessory muscle use    ABD: soft, appropriately tender to palpation, non-distended hypoactive bs  INCISION: no erythema, induration, or drainage   EXT: no significant peripheral edema   DRAINS: serosanguineous  DIALLO: in place draining clear yellow urine  no clots        Assessment:   POD 1 radical prostatectomy with robotics and lysis of adhesions     Plan:   -Creatinine elevated to 2 22, WBC 17 50, will continue to trend  -Maintain diallo catheter, begin diallo care teaching for discharge  -KRISTI out 50cc in last 24 hours, plans to remove prior to discharge  -OOB and ambulating, explained importance of ambulation to patient as he was noted to have hypoactive bowel sounds, patient receiving Senokot    Continue incentive spirometer, pain control, advance diet when tolerating clears, continue IVF  -Discharge planning for tomorrow      RN participated in rounds with AP  Plan of care discussed with patient and RN

## 2018-06-15 PROBLEM — D72.829 LEUKOCYTOSIS: Status: ACTIVE | Noted: 2018-06-15

## 2018-06-15 PROBLEM — N17.9 AKI (ACUTE KIDNEY INJURY) (HCC): Status: ACTIVE | Noted: 2018-06-15

## 2018-06-15 LAB
ABO GROUP BLD BPU: NORMAL
ABO GROUP BLD BPU: NORMAL
ANION GAP SERPL CALCULATED.3IONS-SCNC: 11 MMOL/L (ref 4–13)
BPU ID: NORMAL
BPU ID: NORMAL
BUN SERPL-MCNC: 25 MG/DL (ref 5–25)
CALCIUM SERPL-MCNC: 8.9 MG/DL (ref 8.3–10.1)
CHLORIDE SERPL-SCNC: 105 MMOL/L (ref 100–108)
CO2 SERPL-SCNC: 23 MMOL/L (ref 21–32)
CREAT SERPL-MCNC: 2.09 MG/DL (ref 0.6–1.3)
CROSSMATCH: NORMAL
CROSSMATCH: NORMAL
GFR SERPL CREATININE-BSD FRML MDRD: 34 ML/MIN/1.73SQ M
GLUCOSE SERPL-MCNC: 265 MG/DL (ref 65–140)
GLUCOSE SERPL-MCNC: 284 MG/DL (ref 65–140)
GLUCOSE SERPL-MCNC: 314 MG/DL (ref 65–140)
GLUCOSE SERPL-MCNC: 315 MG/DL (ref 65–140)
GLUCOSE SERPL-MCNC: 319 MG/DL (ref 65–140)
POTASSIUM SERPL-SCNC: 4.1 MMOL/L (ref 3.5–5.3)
SODIUM SERPL-SCNC: 139 MMOL/L (ref 136–145)
UNIT DISPENSE STATUS: NORMAL
UNIT DISPENSE STATUS: NORMAL
UNIT PRODUCT CODE: NORMAL
UNIT PRODUCT CODE: NORMAL
UNIT RH: NORMAL
UNIT RH: NORMAL

## 2018-06-15 PROCEDURE — 99024 POSTOP FOLLOW-UP VISIT: CPT | Performed by: NURSE PRACTITIONER

## 2018-06-15 PROCEDURE — 99254 IP/OBS CNSLTJ NEW/EST MOD 60: CPT | Performed by: PHYSICIAN ASSISTANT

## 2018-06-15 PROCEDURE — 80048 BASIC METABOLIC PNL TOTAL CA: CPT | Performed by: NURSE PRACTITIONER

## 2018-06-15 PROCEDURE — 82948 REAGENT STRIP/BLOOD GLUCOSE: CPT

## 2018-06-15 RX ORDER — SODIUM CHLORIDE, SODIUM LACTATE, POTASSIUM CHLORIDE, CALCIUM CHLORIDE 600; 310; 30; 20 MG/100ML; MG/100ML; MG/100ML; MG/100ML
75 INJECTION, SOLUTION INTRAVENOUS CONTINUOUS
Status: DISCONTINUED | OUTPATIENT
Start: 2018-06-15 | End: 2018-06-15

## 2018-06-15 RX ORDER — SODIUM CHLORIDE 9 MG/ML
75 INJECTION, SOLUTION INTRAVENOUS CONTINUOUS
Status: DISCONTINUED | OUTPATIENT
Start: 2018-06-15 | End: 2018-06-18

## 2018-06-15 RX ORDER — INSULIN GLARGINE 100 [IU]/ML
40 INJECTION, SOLUTION SUBCUTANEOUS
Status: DISCONTINUED | OUTPATIENT
Start: 2018-06-15 | End: 2018-06-16

## 2018-06-15 RX ADMIN — SODIUM CHLORIDE, SODIUM LACTATE, POTASSIUM CHLORIDE, AND CALCIUM CHLORIDE 75 ML/HR: .6; .31; .03; .02 INJECTION, SOLUTION INTRAVENOUS at 15:20

## 2018-06-15 RX ADMIN — INSULIN LISPRO 4 UNITS: 100 INJECTION, SOLUTION INTRAVENOUS; SUBCUTANEOUS at 16:22

## 2018-06-15 RX ADMIN — INSULIN LISPRO 5 UNITS: 100 INJECTION, SOLUTION INTRAVENOUS; SUBCUTANEOUS at 08:36

## 2018-06-15 RX ADMIN — SODIUM CHLORIDE, SODIUM LACTATE, POTASSIUM CHLORIDE, AND CALCIUM CHLORIDE 125 ML/HR: .6; .31; .03; .02 INJECTION, SOLUTION INTRAVENOUS at 05:56

## 2018-06-15 RX ADMIN — ONDANSETRON 4 MG: 2 INJECTION INTRAMUSCULAR; INTRAVENOUS at 01:08

## 2018-06-15 RX ADMIN — GLIMEPIRIDE 4 MG: 2 TABLET ORAL at 08:36

## 2018-06-15 RX ADMIN — ONDANSETRON 4 MG: 2 INJECTION INTRAMUSCULAR; INTRAVENOUS at 17:51

## 2018-06-15 RX ADMIN — ATORVASTATIN CALCIUM 80 MG: 80 TABLET, FILM COATED ORAL at 08:36

## 2018-06-15 RX ADMIN — INSULIN GLARGINE 40 UNITS: 100 INJECTION, SOLUTION SUBCUTANEOUS at 22:10

## 2018-06-15 RX ADMIN — INSULIN LISPRO 5 UNITS: 100 INJECTION, SOLUTION INTRAVENOUS; SUBCUTANEOUS at 12:46

## 2018-06-15 RX ADMIN — BACITRACIN, NEOMYCIN, POLYMYXIN B 1 SMALL APPLICATION: 400; 3.5; 5 OINTMENT TOPICAL at 17:51

## 2018-06-15 RX ADMIN — ONDANSETRON 4 MG: 2 INJECTION INTRAMUSCULAR; INTRAVENOUS at 09:38

## 2018-06-15 RX ADMIN — SODIUM CHLORIDE 75 ML/HR: 0.9 INJECTION, SOLUTION INTRAVENOUS at 20:39

## 2018-06-15 RX ADMIN — Medication 1 TABLET: at 08:36

## 2018-06-15 RX ADMIN — Medication 2 TABLET: at 08:36

## 2018-06-15 RX ADMIN — BACITRACIN, NEOMYCIN, POLYMYXIN B 1 SMALL APPLICATION: 400; 3.5; 5 OINTMENT TOPICAL at 08:36

## 2018-06-15 RX ADMIN — OXYCODONE HYDROCHLORIDE 5 MG: 5 TABLET ORAL at 12:47

## 2018-06-15 RX ADMIN — METFORMIN HYDROCHLORIDE 1000 MG: 500 TABLET, FILM COATED ORAL at 17:51

## 2018-06-15 RX ADMIN — METOPROLOL TARTRATE 5 MG: 5 INJECTION, SOLUTION INTRAVENOUS at 00:02

## 2018-06-15 RX ADMIN — ONDANSETRON 4 MG: 2 INJECTION INTRAMUSCULAR; INTRAVENOUS at 05:14

## 2018-06-15 RX ADMIN — LISINOPRIL 40 MG: 20 TABLET ORAL at 08:36

## 2018-06-15 RX ADMIN — METFORMIN HYDROCHLORIDE 1000 MG: 500 TABLET, FILM COATED ORAL at 08:36

## 2018-06-15 NOTE — ASSESSMENT & PLAN NOTE
· Limited prior labs, however prior creatinines between 1 12-1 37  · Creatinine up to 2 22 post-procedurally, down slightly to 2 09 today  · Stop metformin, glimepiride, and lisinopril   · Change IVF from LR to NS   · BMP in AM

## 2018-06-15 NOTE — ASSESSMENT & PLAN NOTE
Lab Results   Component Value Date    HGBA1C 8 1 (H) 06/07/2018       Recent Labs      06/14/18   2107  06/15/18   0720  06/15/18   1146  06/15/18   1552   POCGLU  219*  314*  315*  284*       Blood Sugar Average: Last 72 hrs:  (P) 265 [id number]     · Patient reports home diabetes regimen is as follows:  · Metformin 1,000mg BID, Glimepiride BID, Toujeo 72 units at bedtime, and HumaLog 5 units with dinner  · Glimepiride and meformin were re-ordered on admission - given renal function, will discontinue  · Discussed with pharmacist - from [address] to Lantus requires an 80% dose reduction (72U x 0 8 = 57 6U)  Patient reports poor appetite but is eating  Will start with even lower dose of long-acting coverage for now, 40 units, until appetite improves to avoid hypoglycemia  Check 2AM glucose  Hypoglycemia protocol ordered     · Continue diabetic diet  · SSI coverage

## 2018-06-15 NOTE — ASSESSMENT & PLAN NOTE
· Patient known to Dr Marilu Cadena  · Tissue exam from 4/13/18 revealed prostatic adenocarcinoma  · Patient is POD#2 radical prostatectomy with robotics  · Per primary team, urology

## 2018-06-15 NOTE — POST OP PROGRESS NOTES
UROLOGY PROGRESS NOTE   Patient Identifiers: Jaiden Gonzalez (MRN [id number])  Date of Service: 6/15/2018    Subjective:     24 HR EVENTS:   no significant events  Patient has  complaints of occasional hiccups  He states that he has been passing gas, however he continues to have episodes of nausea, and vomited small amount of clear liquid last night  Pain is well tolerated  Patient and nursing staff report walking x2 yesterday  He is currently lying in bed  BG remain elevated, Hgb A1C was 8 1% prior to surgery  Objective:     VITALS:    Vitals:    06/15/18 0718   BP: (!) 171/99   Pulse: (!) 129   Resp: 16   Temp: 99 4 °F (37 4 °C)   SpO2: 92%       INS & OUTS:  I/O last 24 hours: In: 3558 3 [P O :600;  I V :2958 3]  Out: 5964 [Urine:2400; Emesis/NG output:50; Drains:125]    LABS:  Lab Results   Component Value Date    HGB 13 0 06/14/2018    HCT 40 5 06/14/2018    WBC 17 50 (H) 06/14/2018     06/14/2018   ]    Lab Results   Component Value Date     06/14/2018    K 4 7 06/14/2018     06/14/2018    CO2 24 06/14/2018    BUN 20 06/14/2018    CREATININE 2 22 (H) 06/14/2018    CALCIUM 7 9 (L) 06/14/2018    GLUCOSE 247 (H) 06/14/2018   ]    INPATIENT MEDS:    Current Facility-Administered Medications:     acetaminophen (TYLENOL) tablet 650 mg, 650 mg, Oral, Q4H PRN, Iram Stone MD    albuterol (PROVENTIL HFA,VENTOLIN HFA) inhaler 1 puff, 1 puff, Inhalation, Q4H PRN, Iram Stone MD    atorvastatin (LIPITOR) tablet 80 mg, 80 mg, Oral, Daily, Iram Stone MD, 80 mg at 06/14/18 0956    glimepiride (AMARYL) tablet 4 mg, 4 mg, Oral, Daily With Breakfast, Iram Stone MD, 4 mg at 06/14/18 0959    HYDROmorphone (DILAUDID) injection 0 5 mg, 0 5 mg, Intravenous, Q2H PRN, AKSHAT Linares, 0 5 mg at 06/14/18 1430    insulin lispro (HumaLOG) 100 units/mL subcutaneous injection 1-6 Units, 1-6 Units, Subcutaneous, TID AC, 3 Units at 06/14/18 1923 **AND** Fingerstick Glucose (POCT), , , TID AC, AKSHAT Ambriz    lactated ringers infusion, 100 mL/hr, Intravenous, Continuous, Leonidas Villasenor MD, Stopped at 06/13/18 1128    lactated ringers infusion, 100 mL/hr, Intravenous, Continuous, Leonidas Villasenor MD, Stopped at 06/13/18 1356    lactated ringers infusion, 125 mL/hr, Intravenous, Continuous, Danielle Licona PA-C, Last Rate: 125 mL/hr at 06/15/18 0556, 125 mL/hr at 06/15/18 0556    lisinopril (ZESTRIL) tablet 40 mg, 40 mg, Oral, Daily, Norah Malcolm MD, 40 mg at 06/14/18 0956    metFORMIN (GLUCOPHAGE) tablet 1,000 mg, 1,000 mg, Oral, BID, Norah Malcolm MD, 1,000 mg at 06/14/18 1817    metoprolol (LOPRESSOR) injection 5 mg, 5 mg, Intravenous, Q6H PRN, AKSHAT Ambriz, 5 mg at 06/15/18 0002    multivitamin-minerals (CENTRUM) tablet 1 tablet, 1 tablet, Oral, Daily, Norha Malcolm MD, 1 tablet at 06/14/18 0956    naloxone (NARCAN) 0 04 mg/mL syringe 0 04 mg, 0 04 mg, Intravenous, Q1MIN PRN, Norah Malcolm MD    neomycin-bacitracin-polymyxin b (NEOSPORIN) ointment 1 small application, 1 small application, Topical, BID, Norah Malcolm MD, 1 small application at 79/31/30 1817    ondansetron (ZOFRAN) injection 4 mg, 4 mg, Intravenous, Q4H PRN, Norah Malcolm MD, 4 mg at 06/15/18 0514    oxyCODONE (ROXICODONE) IR tablet 5 mg, 5 mg, Oral, Q4H PRN, AKSHAT Ambriz, 5 mg at 06/14/18 1152    senna-docusate sodium (SENOKOT S) 8 6-50 mg per tablet 2 tablet, 2 tablet, Oral, Daily, Norah Malcolm MD, 2 tablet at 06/14/18 0957      Physical Exam:     GEN: alert and oriented x 3    RESP: breathing comfortably with no accessory muscle use    ABD: soft, appropriately tender to palpation, non-distended hypoactive bs, +gas  INCISION: no erythema, induration, or drainage   EXT: no significant peripheral edema   DRAINS: serosanguineous  DIALLO: in place draining clear yellow urine  no clots        Assessment:   POD 2 radical prostatectomy with robotics and lysis of adhesions     Plan:   -KRISTI out 125cc in last 24 hours, removed at bedside without any difficulty  -Will repeat BMP and check creatinine  2 22 yesterday, likely secondary to ATN  -Maintain graff catheter for discharge home, and provide teaching  -OOB and ambulating, continue incentive spirometer, advance diet slowly, continue anti emetics as needed, stop IVF as patient is adequately taking oral fluids  -Discharge planning for this afternoon  -Follow up in approximately 2 weeks with Dr Susannah Toussaint        RN participated in rounds with AP  Plan of care discussed with patient and RN

## 2018-06-15 NOTE — ASSESSMENT & PLAN NOTE
· BP has been fluctuating greatly, most recently stable  · Given renal function, will hold lisinopril  · Has PRN IV Lopressor on board

## 2018-06-15 NOTE — CONSULTS
Consult- Asim Iglesias 1960, 63 y o  male MRN: [id number]    [facility] Encounter: [id number]    Primary Care Provider: Nilton Aguilera MD   Date and time admitted to hospital: 6/13/2018  6:01 AM      Inpatient consult to Internal Medicine  Consult performed by: Nesha Escobar ordered by: Radha Bob Prostate cancer [facility])   Assessment & Plan    · Patient known to Dr Emir Mcgee  · Tissue exam from 4/13/18 revealed prostatic adenocarcinoma  · Patient is POD#2 radical prostatectomy with robotics  · Per primary team, urology        Uncontrolled type 2 diabetes mellitus with hyperglycemia, with long-term current use of insulin [facility])   Assessment & Plan    Lab Results   Component Value Date    HGBA1C 8 1 (H) 06/07/2018       Recent Labs      06/14/18   2107  06/15/18   0720  06/15/18   1146  06/15/18   1552   POCGLU  219*  314*  315*  284*       Blood Sugar Average: Last 72 hrs:  (P) 265 [id number]     · Patient reports home diabetes regimen is as follows:  · Metformin 1,000mg BID, Glimepiride BID, Toujeo 72 units at bedtime, and HumaLog 5 units with dinner  · Glimepiride and meformin were re-ordered on admission - given renal function, will discontinue  · Discussed with pharmacist - from Swapna Cooper to Lantus requires an 80% dose reduction (72U x 0 8 = 57 6U)  Patient reports poor appetite but is eating  Will start with even lower dose of long-acting coverage for now, 40 units, until appetite improves to avoid hypoglycemia  Check 2AM glucose  Hypoglycemia protocol ordered     · Continue diabetic diet  · SSI coverage        JAYNE (acute kidney injury) ([facility] [address] )   Assessment & Plan    · Limited prior labs, however prior creatinines between 1 12-1 37  · Creatinine up to 2 22 post-procedurally, down slightly to 2 09 today  · Stop metformin, glimepiride, and lisinopril   · Change IVF from LR to NS   · BMP in AM        Leukocytosis   Assessment & Plan    · Only one prior CBC pre-admission and WBCs were 13  · WBCs trended thus far this admission 28, 17  Unclear etiology, suspect reactive vs secondary to prostate cancer   · CBC in AM        Benign essential hypertension   Assessment & Plan    · BP has been fluctuating greatly, most recently stable  · Given renal function, will hold lisinopril  · Has PRN IV Lopressor on board        Hyperlipidemia   Assessment & Plan    · Continue statin        Morbid obesity ([address] )   Assessment & Plan    · Encourage lifestyle modifications  · Carb controlled diet            VTE Prophylaxis: Per primary service  Patient had SCDs on b/l  Recommendations for Discharge:  · SLIM will continue to follow and make recommendations regarding sugar control and BP     Counseling / Coordination of Care Time: 45 minutes  Greater than 50% of total time spent on patient counseling and coordination of care  Collaboration of Care: Were Recommendations Directly Discussed with Primary Treatment Team? - Yes  Mark Sesay  Also discussed the above plan with Dr Carol Panda  History of Present Illness:    Romulo Alonzo is a 63 y o  male with a history of prostate adenocarcinoma, type 2 diabetes, hypertension and hyperlipidemia who is originally admitted to the urology service for radical prostatectomy with robotics  We are consulted for diabetes, hypertension, and hyperlipidemia  Patient is POD#2  He reports that he takes metformin twice daily, glimepiride twice daily, 72 units of Toujeo at night and 5 units of HumaLog with dinner at home and his sugars average   He reports that with the addition of the HumaLog his A1c is already improving  He reports feeling generally unwell and attributes this to his high sugars  He reports poor appetite but is eating  He reports taking lisinopril for BP and Lipitor for his cholesterol  He reports that he has been feeling nauseous and has been burping and hiccupping  He reports abdominal "gas" pain   He reports that he is passing gas  He is having BMs  He has a Grady and urine is dark yellow without obvious blood  He reports that his KRISTI drain was removed and he had some discomfort there  He denies fever, chills, dizziness or lightheadedness, neck pain  He reports back pain when sitting or lying in bed too long  He denies chest pain or shortness of breath  He denies rash or easy bruising  He denies pain or swelling in his legs  He reports feeling fatigued  Review of Systems:    Review of Systems   Constitutional: Positive for appetite change (Decreased) and fatigue  Negative for chills and fever  HENT: Negative  Respiratory: Negative for shortness of breath  Cardiovascular: Negative for chest pain and leg swelling  Gastrointestinal: Positive for abdominal pain and nausea  Negative for constipation, diarrhea and vomiting  Genitourinary: Negative for hematuria  Musculoskeletal: Negative for neck pain  Skin: Negative for rash  Neurological: Negative for dizziness, light-headedness, numbness and headaches  Hematological: Does not bruise/bleed easily         Past Medical and Surgical History:     Past Medical History:   Diagnosis Date    Acute bronchitis     Acute low back pain     Acute low back pain     [id number] resolved    Acute respiratory infection     Cellulitis of scrotum     Cough     Diabetes mellitus (HCC)     Herpes zoster     High cholesterol     Hyperkalemia     Hypertension     Kidney disease     Kidney stone     Low back pain     Lumbar radiculopathy     Rash     Retinopathy, central serous     Sleep apnea     [id number]    Trochanteric bursitis        Past Surgical History:   Procedure Laterality Date    ABDOMINAL ADHESION SURGERY N/A 6/13/2018    Procedure: LYSIS ADHESIONS;  Surgeon: Bandra Kathleen MD;  [facility]: [facility];  Service: Urology    LITHOTRIPSY      renal    IL LAP,PROSTATECTOMY,RADICAL,W/NERVE SPARE,INCL ROBOTIC N/A 6/13/2018    Procedure: PROSTATECTOMY RADICAL W ROBOTICS;  Surgeon: Horace Matthews MD;  Location: [facility];  Service: Urology    SHOULDER SURGERY         Meds/Allergies:    PTA meds:   Prior to Admission Medications   Prescriptions Last Dose Informant Patient Reported? Taking? ASPIRIN 81 PO 2018 Self Yes Yes   Sig: Take 1 capsule by mouth 2 (two) times a day     BD PEN NEEDLE MAJO U/F 32G X 4 MM MISC  Self Yes Yes   Si (two) times a day   BYDUREON 2 MG SRER 6/10/2018 Self No Yes   Sig: INJECT 2MG SUBCUTANEOUSLY EVERY 7 DAYS  Patient taking differently: INJECT 2MG SUBCUTANEOUSLY EVERY 7 DAYS  on sundays   Insulin Glargine (TOUJEO SOLOSTAR) injection pen 300 units/mL 2018 at Unknown time Self Yes Yes   Sig: Inject 72 Units under the skin Daily     Multiple Vitamins-Minerals (MULTIVITAMIN WITH MINERALS) tablet 2018  Yes Yes   Sig: Take 1 tablet by mouth daily   albuterol (PROAIR HFA) 90 mcg/act inhaler More than a month at Unknown time Self Yes No   Sig: Inhale 1-2 puffs every 6 (six) hours as needed     atorvastatin (LIPITOR) 80 mg tablet 2018 Self Yes Yes   Sig: Take 80 mg by mouth daily   glimepiride (AMARYL) 4 mg tablet 2018 at Unknown time Self Yes Yes   Sig: Take by mouth 2 (two) times a day   ibuprofen (MOTRIN) 800 mg tablet More than a month at Unknown time Self Yes No   Sig: Take 1 tablet by mouth every 8 (eight) hours as needed   insulin lispro (HUMALOG) 100 units/mL injection 2018  Yes Yes   lisinopril (ZESTRIL) 40 mg tablet 2018 Self Yes Yes   Sig: Take 1 tablet by mouth daily   metFORMIN (GLUCOPHAGE) 1000 MG tablet 2018 Self Yes Yes   Sig: Take 1 tablet by mouth 2 (two) times a day      Facility-Administered Medications: None       Allergies:    Allergies   Allergen Reactions    Simvastatin      Increases Liver functoin       Social History:     Marital Status: /Civil Union    Substance Use History:   History   Alcohol Use No     History   Smoking Status    Never Smoker   Smokeless Tobacco    Never Used     History   Drug Use No       Family History:    Family History   Problem Relation Age of Onset    Other Father         cardiac disorder    Stroke Father     Diabetes Father         mellitus    Hypertension Father     Heart disease Father         cardiac disorder    Cancer Sister     Cancer Paternal Grandmother     Heart disease Family         cardiac disorder    Kidney disease Family        Physical Exam:     Vitals:   Blood Pressure: 115/59 (06/15/18 1546)  Pulse: (!) 117 (Lizzy Ng (RN) notified) (06/15/18 1546)  Temperature: 98 7 °F (37 1 °C) (06/15/18 1546)  Temp Source: Oral (06/15/18 1546)  Respirations: 18 (06/15/18 1546)  Height: 5' 6" (167 6 cm) (06/13/18 0702)  Weight - Scale: 113 kg (250 lb) (06/13/18 0702)  SpO2: 92 % (06/15/18 1546)    Physical Exam   Constitutional: He is oriented to person, place, and time  Patient seen sitting upright in bedside chair  He has oscillating fan behind him and meal tray in front of him  obese   Cardiovascular: Regular rhythm  Tachycardia present  Pulmonary/Chest: Effort normal and breath sounds normal  No respiratory distress  He has no wheezes  Abdominal: Soft  Bowel sounds are normal  He exhibits distension  Left side of abdomen where patient reports KRISTI drain was with dressing which he reports was just changed  Small abdominal incisions sites status post suturing   Musculoskeletal: He exhibits no edema or tenderness (He denies calf tenderness bilaterally)  Neurological: He is alert and oriented to person, place, and time  Eyebrows raise equally bilaterally  Corners of mouth are even  Speech is clear  Patient reports equal and intact sensation to light touch on face x3, upper and lower extremities bilaterally  Shoulder shrug,  strength, elbow flexion and extension all equal bilaterally  Knee extension on right slightly decreased compared to left due to Grady being on that side and causing discomfort   Skin: Skin is warm  Psychiatric: He has a normal mood and affect  Vitals reviewed  Additional Data:     Lab Results: I have personally reviewed pertinent reports  Results from last 7 days  Lab Units 06/14/18  0436   WBC Thousand/uL 17 50*   HEMOGLOBIN g/dL 13 0   HEMATOCRIT % 40 5   PLATELETS Thousands/uL 365       Results from last 7 days  Lab Units 06/15/18  0935  06/13/18  1229   SODIUM mmol/L 139  < > 136   POTASSIUM mmol/L 4 1  < > 4 7   CHLORIDE mmol/L 105  < > 107   CO2 mmol/L 23  < > 22   BUN mg/dL 25  < > 15   CREATININE mg/dL 2 09*  < > 1 32*   CALCIUM mg/dL 8 9  < > 8 9   TOTAL PROTEIN g/dL  --   --  7 5   BILIRUBIN TOTAL mg/dL  --   --  0 30   ALK PHOS U/L  --   --  88   ALT U/L  --   --  62   AST U/L  --   --  46*   GLUCOSE RANDOM mg/dL 319*  < > 200*   < > = values in this interval not displayed  Lab Results   Component Value Date/Time    HGBA1C 8 1 (H) 06/07/2018 07:07 AM    HGBA1C 8 3 (H) 05/18/2018 03:33 PM    HGBA1C 8 4 (H) 06/13/2017 07:03 AM    HGBA1C 11 3 (H) 06/17/2015 07:59 AM       Results from last 7 days  Lab Units 06/15/18  1552 06/15/18  1146 06/15/18  0720 06/14/18  2107 06/14/18  1732 06/14/18  1118 06/14/18  0714 06/13/18  2044 06/13/18  1733 06/13/18  1148 06/13/18  0709   POC GLUCOSE mg/dl 284* 315* 314* 219* 238* 285* 242* 339* 335* 214* 133       Imaging: I have personally reviewed pertinent reports  No orders to display       EKG, Pathology, and Other Studies Reviewed on Admission:   · 5/31/18 NM myocardial perfusion spect    ** Please Note: This note has been constructed using a voice recognition system   **

## 2018-06-15 NOTE — ASSESSMENT & PLAN NOTE
· Only one prior CBC pre-admission and WBCs were 13  · WBCs trended thus far this admission 28, 17   Unclear etiology, suspect reactive vs secondary to prostate cancer   · CBC in AM

## 2018-06-15 NOTE — PROGRESS NOTES
Progress Note - Lexis Santos 63 y o  male MRN: [id number]    [facility]: [id number]      Mr April Jurado is a 60-year-old male with adenocarcinoma of the prostate Postop day 2 robotic radical prostatectomy  Patient tolerating small amounts of diabetic diet  As ambulated intermittently  Is afebrile however, blood pressure and blood sugar lability noted dot blood glucose values exceeding 300  Patient states this is making him unwell  Awaiting internal medicine consultation for further medical management

## 2018-06-15 NOTE — SOCIAL WORK
Cm discussed pt during care coordination rounds pt having nausea , pain and elevated blood pressure   Cm spoke with pt wife and when medically clear she will transport home

## 2018-06-16 LAB
ANION GAP SERPL CALCULATED.3IONS-SCNC: 10 MMOL/L (ref 4–13)
BUN SERPL-MCNC: 30 MG/DL (ref 5–25)
CALCIUM SERPL-MCNC: 8.7 MG/DL (ref 8.3–10.1)
CHLORIDE SERPL-SCNC: 107 MMOL/L (ref 100–108)
CO2 SERPL-SCNC: 23 MMOL/L (ref 21–32)
CREAT SERPL-MCNC: 1.82 MG/DL (ref 0.6–1.3)
ERYTHROCYTE [DISTWIDTH] IN BLOOD BY AUTOMATED COUNT: 13 % (ref 11.6–15.1)
GFR SERPL CREATININE-BSD FRML MDRD: 40 ML/MIN/1.73SQ M
GLUCOSE SERPL-MCNC: 233 MG/DL (ref 65–140)
GLUCOSE SERPL-MCNC: 251 MG/DL (ref 65–140)
GLUCOSE SERPL-MCNC: 255 MG/DL (ref 65–140)
GLUCOSE SERPL-MCNC: 256 MG/DL (ref 65–140)
GLUCOSE SERPL-MCNC: 269 MG/DL (ref 65–140)
GLUCOSE SERPL-MCNC: 303 MG/DL (ref 65–140)
HCT VFR BLD AUTO: 38.4 % (ref 36.5–49.3)
HGB BLD-MCNC: 12.3 G/DL (ref 12–17)
MCH RBC QN AUTO: 28.6 PG (ref 26.8–34.3)
MCHC RBC AUTO-ENTMCNC: 32 G/DL (ref 31.4–37.4)
MCV RBC AUTO: 89 FL (ref 82–98)
PLATELET # BLD AUTO: 407 THOUSANDS/UL (ref 149–390)
PMV BLD AUTO: 9.2 FL (ref 8.9–12.7)
POTASSIUM SERPL-SCNC: 3.8 MMOL/L (ref 3.5–5.3)
RBC # BLD AUTO: 4.3 MILLION/UL (ref 3.88–5.62)
SODIUM SERPL-SCNC: 140 MMOL/L (ref 136–145)
WBC # BLD AUTO: 23.33 THOUSAND/UL (ref 4.31–10.16)

## 2018-06-16 PROCEDURE — 85027 COMPLETE CBC AUTOMATED: CPT | Performed by: PHYSICIAN ASSISTANT

## 2018-06-16 PROCEDURE — 99024 POSTOP FOLLOW-UP VISIT: CPT | Performed by: UROLOGY

## 2018-06-16 PROCEDURE — 82948 REAGENT STRIP/BLOOD GLUCOSE: CPT

## 2018-06-16 PROCEDURE — 80048 BASIC METABOLIC PNL TOTAL CA: CPT | Performed by: PHYSICIAN ASSISTANT

## 2018-06-16 RX ORDER — PROMETHAZINE HYDROCHLORIDE 25 MG/ML
25 INJECTION, SOLUTION INTRAMUSCULAR; INTRAVENOUS EVERY 6 HOURS PRN
Status: DISCONTINUED | OUTPATIENT
Start: 2018-06-16 | End: 2018-06-18 | Stop reason: HOSPADM

## 2018-06-16 RX ORDER — HYDRALAZINE HYDROCHLORIDE 20 MG/ML
10 INJECTION INTRAMUSCULAR; INTRAVENOUS EVERY 6 HOURS PRN
Status: DISCONTINUED | OUTPATIENT
Start: 2018-06-16 | End: 2018-06-18 | Stop reason: HOSPADM

## 2018-06-16 RX ORDER — INSULIN GLARGINE 100 [IU]/ML
50 INJECTION, SOLUTION SUBCUTANEOUS
Status: DISCONTINUED | OUTPATIENT
Start: 2018-06-16 | End: 2018-06-17

## 2018-06-16 RX ADMIN — OXYCODONE HYDROCHLORIDE 5 MG: 5 TABLET ORAL at 12:02

## 2018-06-16 RX ADMIN — SODIUM CHLORIDE 75 ML/HR: 0.9 INJECTION, SOLUTION INTRAVENOUS at 08:38

## 2018-06-16 RX ADMIN — ONDANSETRON 4 MG: 2 INJECTION INTRAMUSCULAR; INTRAVENOUS at 12:02

## 2018-06-16 RX ADMIN — INSULIN LISPRO 3 UNITS: 100 INJECTION, SOLUTION INTRAVENOUS; SUBCUTANEOUS at 17:10

## 2018-06-16 RX ADMIN — ATORVASTATIN CALCIUM 80 MG: 80 TABLET, FILM COATED ORAL at 08:35

## 2018-06-16 RX ADMIN — Medication 1 TABLET: at 08:35

## 2018-06-16 RX ADMIN — INSULIN GLARGINE 50 UNITS: 100 INJECTION, SOLUTION SUBCUTANEOUS at 21:28

## 2018-06-16 RX ADMIN — ONDANSETRON 4 MG: 2 INJECTION INTRAMUSCULAR; INTRAVENOUS at 05:10

## 2018-06-16 RX ADMIN — INSULIN LISPRO 4 UNITS: 100 INJECTION, SOLUTION INTRAVENOUS; SUBCUTANEOUS at 07:45

## 2018-06-16 RX ADMIN — BACITRACIN, NEOMYCIN, POLYMYXIN B 1 SMALL APPLICATION: 400; 3.5; 5 OINTMENT TOPICAL at 08:35

## 2018-06-16 RX ADMIN — BACITRACIN, NEOMYCIN, POLYMYXIN B 1 SMALL APPLICATION: 400; 3.5; 5 OINTMENT TOPICAL at 17:10

## 2018-06-16 RX ADMIN — OXYCODONE HYDROCHLORIDE 5 MG: 5 TABLET ORAL at 21:28

## 2018-06-16 RX ADMIN — PROMETHAZINE HYDROCHLORIDE 25 MG: 25 INJECTION INTRAMUSCULAR; INTRAVENOUS at 07:46

## 2018-06-16 RX ADMIN — INSULIN LISPRO 3 UNITS: 100 INJECTION, SOLUTION INTRAVENOUS; SUBCUTANEOUS at 12:01

## 2018-06-16 RX ADMIN — SODIUM CHLORIDE 75 ML/HR: 0.9 INJECTION, SOLUTION INTRAVENOUS at 20:34

## 2018-06-16 RX ADMIN — Medication 2 TABLET: at 08:35

## 2018-06-16 NOTE — PROGRESS NOTES
UROLOGY PROGRESS NOTE   Patient Identifiers: Priscilla Rankin (MRN [id number])  Date of Service: 6/16/2018    Subjective:     24 HR EVENTS:   no significant events  Patient has  complaints of sporadic nausea      Feeling better overall however  Seen sitting in a chair today  Has not been ambulating well thus far  Objective:     VITALS:    Vitals:    06/16/18 0808   BP: (!) 200/81   Pulse: 56   Resp: 18   Temp: 98 3 °F (36 8 °C)   SpO2: 97%       INS & OUTS:  I/O last 24 hours:   In: 620 5 [P O :388; I V :232 5]  Out: 1600 [Urine:1600]    LABS:  Lab Results   Component Value Date    HGB 12 3 06/16/2018    HCT 38 4 06/16/2018    WBC 23 33 (H) 06/16/2018     (H) 06/16/2018   ]    Lab Results   Component Value Date     06/16/2018    K 3 8 06/16/2018     06/16/2018    CO2 23 06/16/2018    BUN 30 (H) 06/16/2018    CREATININE 1 82 (H) 06/16/2018    CALCIUM 8 7 06/16/2018    GLUCOSE 251 (H) 06/16/2018   ]    INPATIENT MEDS:    Current Facility-Administered Medications:     acetaminophen (TYLENOL) tablet 650 mg, 650 mg, Oral, Q4H PRN, Norah Malcolm MD    albuterol (PROVENTIL HFA,VENTOLIN HFA) inhaler 1 puff, 1 puff, Inhalation, Q4H PRN, Norah Malcolm MD    atorvastatin (LIPITOR) tablet 80 mg, 80 mg, Oral, Daily, Norah Malcolm MD, 80 mg at 06/16/18 0835    HYDROmorphone (DILAUDID) injection 0 5 mg, 0 5 mg, Intravenous, Q2H PRN, AKSHAT Ambriz, 0 5 mg at 06/14/18 1430    insulin glargine (LANTUS) subcutaneous injection 40 Units 0 4 mL, 40 Units, Subcutaneous, HS, Phyllis Brooks PA-C, 40 Units at 06/15/18 2210    insulin lispro (HumaLOG) 100 units/mL subcutaneous injection 1-6 Units, 1-6 Units, Subcutaneous, TID AC, 4 Units at 06/16/18 0745 **AND** Fingerstick Glucose (POCT), , , TID AC, Phyllis Brooks, JUSTINA    metoprolol (LOPRESSOR) injection 5 mg, 5 mg, Intravenous, Q6H PRN, AKSHAT Ambriz, 5 mg at 06/15/18 0002    multivitamin-minerals (CENTRUM) tablet 1 tablet, 1 tablet, Oral, Daily, Trevon Werner MD, 1 tablet at 06/16/18 0835    naloxone (NARCAN) 0 04 mg/mL syringe 0 04 mg, 0 04 mg, Intravenous, Q1MIN PRN, Trevon Werner MD    neomycin-bacitracin-polymyxin b (NEOSPORIN) ointment 1 small application, 1 small application, Topical, BID, Trevon Werner MD, 1 small application at 47/68/25 0835    ondansetron (ZOFRAN) injection 4 mg, 4 mg, Intravenous, Q4H PRN, Trevon Werner MD, 4 mg at 06/16/18 0510    oxyCODONE (ROXICODONE) IR tablet 5 mg, 5 mg, Oral, Q4H PRN, Cleophsharee Yanes, CRNP, 5 mg at 06/15/18 1247    promethazine (PHENERGAN) injection 25 mg, 25 mg, Intramuscular, Q6H PRN, W Romayne Stengel Hohenshilt, PA-C, 25 mg at 06/16/18 0746    senna-docusate sodium (SENOKOT S) 8 6-50 mg per tablet 2 tablet, 2 tablet, Oral, Daily, Trevon Werner MD, 2 tablet at 06/16/18 0835    sodium chloride 0 9 % infusion, 75 mL/hr, Intravenous, Continuous, Constantino Baldwin PA-C, Last Rate: 75 mL/hr at 06/16/18 0838, 75 mL/hr at 06/16/18 0838      Physical Exam:   BP (!) 200/81 (BP Location: Right arm)   Pulse 56   Temp 98 3 °F (36 8 °C) (Oral)   Resp 18   Ht 5' 6" (1 676 m)   Wt 113 kg (250 lb)   SpO2 97%   BMI 40 35 kg/m²   GEN: alert and oriented x 3    RESP: breathing comfortably with no accessory muscle use    ABD: soft, appropriately tender to palpation, non-distended   INCISION: clean, dry, intact   EXT: no significant peripheral edema   DRAINS: none  DIALLO: draining pink clear urine        RADIOLOGY:    I have personally reviewed pertinent reports  Assessment:   Principal Problem:    Prostate cancer ([facility] )  Active Problems:    Benign essential hypertension    Uncontrolled type 2 diabetes mellitus with hyperglycemia, with long-term current use of insulin (HCC)    Hyperlipidemia    Morbid obesity (HCC)    JAYNE (acute kidney injury) ([address] )    Leukocytosis       Plan:   -medicine input greatly appreciated  -strongly encouraged ambulation and incentive spirometry use    -etiology of leukocytosis uncertain, patient is afebrile    Will continue to monitor closely   -maintain Grady catheter

## 2018-06-17 LAB
ALBUMIN SERPL BCP-MCNC: 2.5 G/DL (ref 3.5–5)
ALP SERPL-CCNC: 69 U/L (ref 46–116)
ALT SERPL W P-5'-P-CCNC: 43 U/L (ref 12–78)
ANION GAP SERPL CALCULATED.3IONS-SCNC: 8 MMOL/L (ref 4–13)
AST SERPL W P-5'-P-CCNC: 33 U/L (ref 5–45)
BASOPHILS # BLD AUTO: 0.03 THOUSANDS/ΜL (ref 0–0.1)
BASOPHILS NFR BLD AUTO: 0 % (ref 0–1)
BILIRUB SERPL-MCNC: 0.48 MG/DL (ref 0.2–1)
BUN SERPL-MCNC: 37 MG/DL (ref 5–25)
CALCIUM SERPL-MCNC: 8.1 MG/DL (ref 8.3–10.1)
CHLORIDE SERPL-SCNC: 107 MMOL/L (ref 100–108)
CO2 SERPL-SCNC: 24 MMOL/L (ref 21–32)
CREAT SERPL-MCNC: 1.68 MG/DL (ref 0.6–1.3)
EOSINOPHIL # BLD AUTO: 0.03 THOUSAND/ΜL (ref 0–0.61)
EOSINOPHIL NFR BLD AUTO: 0 % (ref 0–6)
ERYTHROCYTE [DISTWIDTH] IN BLOOD BY AUTOMATED COUNT: 12.9 % (ref 11.6–15.1)
GFR SERPL CREATININE-BSD FRML MDRD: 44 ML/MIN/1.73SQ M
GLUCOSE SERPL-MCNC: 159 MG/DL (ref 65–140)
GLUCOSE SERPL-MCNC: 185 MG/DL (ref 65–140)
GLUCOSE SERPL-MCNC: 225 MG/DL (ref 65–140)
GLUCOSE SERPL-MCNC: 226 MG/DL (ref 65–140)
GLUCOSE SERPL-MCNC: 238 MG/DL (ref 65–140)
HCT VFR BLD AUTO: 39 % (ref 36.5–49.3)
HGB BLD-MCNC: 12.3 G/DL (ref 12–17)
IMM GRANULOCYTES # BLD AUTO: 0.15 THOUSAND/UL (ref 0–0.2)
IMM GRANULOCYTES NFR BLD AUTO: 1 % (ref 0–2)
LYMPHOCYTES # BLD AUTO: 1.84 THOUSANDS/ΜL (ref 0.6–4.47)
LYMPHOCYTES NFR BLD AUTO: 10 % (ref 14–44)
MCH RBC QN AUTO: 28.5 PG (ref 26.8–34.3)
MCHC RBC AUTO-ENTMCNC: 31.5 G/DL (ref 31.4–37.4)
MCV RBC AUTO: 91 FL (ref 82–98)
MONOCYTES # BLD AUTO: 1.91 THOUSAND/ΜL (ref 0.17–1.22)
MONOCYTES NFR BLD AUTO: 10 % (ref 4–12)
NEUTROPHILS # BLD AUTO: 14.33 THOUSANDS/ΜL (ref 1.85–7.62)
NEUTS SEG NFR BLD AUTO: 79 % (ref 43–75)
NRBC BLD AUTO-RTO: 0 /100 WBCS
PLATELET # BLD AUTO: 396 THOUSANDS/UL (ref 149–390)
PMV BLD AUTO: 9.4 FL (ref 8.9–12.7)
POTASSIUM SERPL-SCNC: 3.8 MMOL/L (ref 3.5–5.3)
PROT SERPL-MCNC: 6.2 G/DL (ref 6.4–8.2)
RBC # BLD AUTO: 4.31 MILLION/UL (ref 3.88–5.62)
SODIUM SERPL-SCNC: 139 MMOL/L (ref 136–145)
WBC # BLD AUTO: 18.29 THOUSAND/UL (ref 4.31–10.16)

## 2018-06-17 PROCEDURE — 99233 SBSQ HOSP IP/OBS HIGH 50: CPT | Performed by: INTERNAL MEDICINE

## 2018-06-17 PROCEDURE — 80053 COMPREHEN METABOLIC PANEL: CPT | Performed by: UROLOGY

## 2018-06-17 PROCEDURE — 99254 IP/OBS CNSLTJ NEW/EST MOD 60: CPT | Performed by: INTERNAL MEDICINE

## 2018-06-17 PROCEDURE — 82948 REAGENT STRIP/BLOOD GLUCOSE: CPT

## 2018-06-17 PROCEDURE — 85025 COMPLETE CBC W/AUTO DIFF WBC: CPT | Performed by: INTERNAL MEDICINE

## 2018-06-17 PROCEDURE — 99024 POSTOP FOLLOW-UP VISIT: CPT | Performed by: UROLOGY

## 2018-06-17 RX ORDER — INSULIN GLARGINE 100 [IU]/ML
55 INJECTION, SOLUTION SUBCUTANEOUS
Status: DISCONTINUED | OUTPATIENT
Start: 2018-06-17 | End: 2018-06-18 | Stop reason: HOSPADM

## 2018-06-17 RX ORDER — OXYCODONE HYDROCHLORIDE 5 MG/1
5 TABLET ORAL EVERY 6 HOURS PRN
Qty: 20 TABLET | Refills: 0 | Status: SHIPPED | OUTPATIENT
Start: 2018-06-17 | End: 2018-06-27

## 2018-06-17 RX ADMIN — METOPROLOL TARTRATE 5 MG: 5 INJECTION, SOLUTION INTRAVENOUS at 03:50

## 2018-06-17 RX ADMIN — BACITRACIN, NEOMYCIN, POLYMYXIN B 1 SMALL APPLICATION: 400; 3.5; 5 OINTMENT TOPICAL at 17:21

## 2018-06-17 RX ADMIN — INSULIN LISPRO 5 UNITS: 100 INJECTION, SOLUTION INTRAVENOUS; SUBCUTANEOUS at 17:25

## 2018-06-17 RX ADMIN — INSULIN LISPRO 4 UNITS: 100 INJECTION, SOLUTION INTRAVENOUS; SUBCUTANEOUS at 11:37

## 2018-06-17 RX ADMIN — Medication 1 TABLET: at 08:07

## 2018-06-17 RX ADMIN — ONDANSETRON 4 MG: 2 INJECTION INTRAMUSCULAR; INTRAVENOUS at 17:21

## 2018-06-17 RX ADMIN — ATORVASTATIN CALCIUM 80 MG: 80 TABLET, FILM COATED ORAL at 08:07

## 2018-06-17 RX ADMIN — INSULIN LISPRO 10 UNITS: 100 INJECTION, SOLUTION INTRAVENOUS; SUBCUTANEOUS at 11:37

## 2018-06-17 RX ADMIN — SODIUM CHLORIDE 75 ML/HR: 0.9 INJECTION, SOLUTION INTRAVENOUS at 21:35

## 2018-06-17 RX ADMIN — Medication 2 TABLET: at 08:07

## 2018-06-17 RX ADMIN — INSULIN GLARGINE 55 UNITS: 100 INJECTION, SOLUTION SUBCUTANEOUS at 21:32

## 2018-06-17 RX ADMIN — HYDRALAZINE HYDROCHLORIDE 10 MG: 20 INJECTION INTRAMUSCULAR; INTRAVENOUS at 01:57

## 2018-06-17 RX ADMIN — BACITRACIN, NEOMYCIN, POLYMYXIN B 1 SMALL APPLICATION: 400; 3.5; 5 OINTMENT TOPICAL at 08:07

## 2018-06-17 RX ADMIN — INSULIN LISPRO 2 UNITS: 100 INJECTION, SOLUTION INTRAVENOUS; SUBCUTANEOUS at 17:23

## 2018-06-17 RX ADMIN — SODIUM CHLORIDE 75 ML/HR: 0.9 INJECTION, SOLUTION INTRAVENOUS at 08:19

## 2018-06-17 RX ADMIN — INSULIN LISPRO 1 UNITS: 100 INJECTION, SOLUTION INTRAVENOUS; SUBCUTANEOUS at 21:32

## 2018-06-17 RX ADMIN — INSULIN LISPRO 3 UNITS: 100 INJECTION, SOLUTION INTRAVENOUS; SUBCUTANEOUS at 08:07

## 2018-06-17 NOTE — PROGRESS NOTES
UROLOGY PROGRESS NOTE   Patient Identifiers: Lexis Santos (MRN [id number])  Date of Service: 6/17/2018    Subjective:     24 HR EVENTS:   no significant events  Patient has improvement in regards to bowel function  He offers no complaints  Ambulated somewhat better yesterday  Leukocytosis is greatly improved and renal function is trending towards baseline as well  Objective:     VITALS:    Vitals:    06/17/18 0700   BP: 156/67   Pulse: 96   Resp: 18   Temp: 97 8 °F (36 6 °C)   SpO2: 96%       INS & OUTS:  I/O last 24 hours: In: 2280 [P O :720;  I V :1560]  Out: 1405 [Urine:1405]    LABS:  Lab Results   Component Value Date    HGB 12 3 06/17/2018    HCT 39 0 06/17/2018    WBC 18 29 (H) 06/17/2018     (H) 06/17/2018   ]    Lab Results   Component Value Date     06/17/2018    K 3 8 06/17/2018     06/17/2018    CO2 24 06/17/2018    BUN 37 (H) 06/17/2018    CREATININE 1 68 (H) 06/17/2018    CALCIUM 8 1 (L) 06/17/2018    GLUCOSE 226 (H) 06/17/2018   ]    INPATIENT MEDS:    Current Facility-Administered Medications:     acetaminophen (TYLENOL) tablet 650 mg, 650 mg, Oral, Q4H PRN, Ben Croft MD    albuterol (PROVENTIL HFA,VENTOLIN HFA) inhaler 1 puff, 1 puff, Inhalation, Q4H PRN, Ben Croft MD    atorvastatin (LIPITOR) tablet 80 mg, 80 mg, Oral, Daily, Ben Croft MD, 80 mg at 06/17/18 0807    hydrALAZINE (APRESOLINE) injection 10 mg, 10 mg, Intravenous, Q6H PRN, Karsten Conley MD, 10 mg at 06/17/18 0157    HYDROmorphone (DILAUDID) injection 0 5 mg, 0 5 mg, Intravenous, Q2H PRN, AKSHAT Aldrich, 0 5 mg at 06/14/18 1430    insulin glargine (LANTUS) subcutaneous injection 55 Units 0 55 mL, 55 Units, Subcutaneous, HS, Delicia Paniagua,     insulin lispro (HumaLOG) 100 units/mL subcutaneous injection 1-6 Units, 1-6 Units, Subcutaneous, HS, [place] Hence, DO    insulin lispro (HumaLOG) 100 units/mL subcutaneous injection 10 Units, 10 Units, Subcutaneous, TID With Meals, Madalyn Burns DO    insulin lispro (HumaLOG) 100 units/mL subcutaneous injection 2-12 Units, 2-12 Units, Subcutaneous, TID AC **AND** Fingerstick Glucose (POCT), , , TID AC, Madalyn Burns DO    metoprolol (LOPRESSOR) injection 5 mg, 5 mg, Intravenous, Q6H PRN, Mearl Haste, CRNP, 5 mg at 06/17/18 0350    multivitamin-minerals (CENTRUM) tablet 1 tablet, 1 tablet, Oral, Daily, Savannah Chapman MD, 1 tablet at 06/17/18 0807    naloxone (NARCAN) 0 04 mg/mL syringe 0 04 mg, 0 04 mg, Intravenous, Q1MIN PRN, Savannah Chapman MD    neomycin-bacitracin-polymyxin b (NEOSPORIN) ointment 1 small application, 1 small application, Topical, BID, Savannah Chapman MD, 1 small application at 90/79/41 0807    ondansetron (ZOFRAN) injection 4 mg, 4 mg, Intravenous, Q4H PRN, Savannah Chapman MD, 4 mg at 06/16/18 1202    oxyCODONE (ROXICODONE) IR tablet 5 mg, 5 mg, Oral, Q4H PRN, Mearl Haste, CRNP, 5 mg at 06/16/18 2128    promethazine (PHENERGAN) injection 25 mg, 25 mg, Intramuscular, Q6H PRN, W Romayne Stengel Hohenshilt, PA-C, 25 mg at 06/16/18 0746    senna-docusate sodium (SENOKOT S) 8 6-50 mg per tablet 2 tablet, 2 tablet, Oral, Daily, Savannah Chapman MD, 2 tablet at 06/17/18 0807    sodium chloride 0 9 % infusion, 75 mL/hr, Intravenous, Continuous, Alejandra Espana PA-C, Last Rate: 75 mL/hr at 06/17/18 0819, 75 mL/hr at 06/17/18 0819      Physical Exam:   /67 (BP Location: Right arm)   Pulse 96   Temp 97 8 °F (36 6 °C) (Oral)   Resp 18   Ht 5' 6" (1 676 m)   Wt 113 kg (250 lb)   SpO2 96%   BMI 40 35 kg/m²   GEN: alert and oriented x 3    RESP: breathing comfortably with no accessory muscle use    ABD: soft, appropriately tender to palpation, non-distended   INCISION: clean, dry, intact   EXT: no significant peripheral edema   DRAINS: none  DIALLO: draining pink clear urine        RADIOLOGY:    I have personally reviewed pertinent reports        Assessment:   Principal Problem:    Prostate cancer [facility])  Active Problems: Benign essential hypertension    Uncontrolled type 2 diabetes mellitus with hyperglycemia, with long-term current use of insulin (HCC)    Hyperlipidemia    Morbid obesity (HCC)    JAYNE (acute kidney injury) ([facility] )    Leukocytosis       Plan:   -medicine input greatly appreciated  -strongly encouraged ambulation and incentive spirometry use   -maintain Grady catheter  -discharge planning  Patient may return home once medically cleared

## 2018-06-17 NOTE — PROGRESS NOTES
[facility] Internal Medicine Progress Note  Patient: Guillaume Vila 63 y o  male   MRN: [id number]  PCP: Melchor Bloch, MD  Unit/Bed#: [facility] 833-01 Encounter: [id number]  Date Of Visit: 06/17/18    Assessment:    Principal Problem:    Prostate cancer ([facility] )  Active Problems:    Benign essential hypertension    Uncontrolled type 2 diabetes mellitus with hyperglycemia, with long-term current use of insulin (HCC)    Hyperlipidemia    Morbid obesity ([facility] )    JAYNE (acute kidney injury) ([place] [id number] )    Leukocytosis      Plan:    #1 status post radical prostatectomy  Continue Grady catheter  Follow-up with urology closely    #2DM Type II:    Begin no concentrated carbohydrate diet  Cover with Aspart SSI as needed , prandial insulin currently at 10 units  Will order HgbA1C to assess status of recent glycemic control  Well initiate home medications once med rec is completed and confirmed by pharmacy  Appreciate endocrinology input  #3 History of HTN:   We will continue patient home medications  Although initially his blood pressure was higher in emergency department, it later stabilized  Well also initiate IV hydralazine and IV metoprolol for SBP greater than 160 mmHg  We will advise patient to follow-up with next PCP in regards to further better management of  ongoing HTN  Patient does report some noncompliance was dietary regimen, extensive consultation was given about appropriate last child nutritional modifications, including stress reduction to achieve optimal blood pressure control  #4 acute kidney injury  Renal function is stabilized  Will avoid nephrotoxic agents  Daily labs    #5 disposition:  Patient is a discharge by urology today, I recommend patient to be discharged in a m  To give us adequate time 20 with control of blood pressure and blood glucose levels  Discharge patient with current insulin regimen              VTE Pharmacologic Prophylaxis:   Pharmacologic: Heparin  Mechanical VTE Prophylaxis in Place: Yes    Patient Centered Rounds: I have performed bedside rounds with nursing staff today  Discussions with Specialists or Other Care Team Provider: Yes    Education and Discussions with Family / Patient: yes    Time Spent for Care: 45 minutes  More than 50% of total time spent on counseling and coordination of care as described above  Current Length of Stay: 4 day(s)    Current Patient Status: Inpatient   Certification Statement: The patient will continue to require additional inpatient hospital stay due to acute renal failure, hyperglycemia,    Discharge Plan / Estimated Discharge Date: medically stable for discharge in a m  Code Status: No Order      Subjective:   No complaints "feeling better today "    Objective:     Vitals:   Temp (24hrs), Av 5 °F (36 9 °C), Min:97 8 °F (36 6 °C), Max:99 °F (37 2 °C)    HR:  [] 97  Resp:  [16-18] 18  BP: (137-172)/(67-97) 137/81  SpO2:  [95 %-96 %] 95 %  Body mass index is 40 35 kg/m²  Input and Output Summary (last 24 hours): Intake/Output Summary (Last 24 hours) at 18 1750  Last data filed at 18 1300   Gross per 24 hour   Intake             2400 ml   Output             1255 ml   Net             1145 ml       Physical Exam:     Physical Exam    GENERAL APPEARANCE: WD/WN in NAD pleasant  SKIN: no rash  HEENT: NC/AT, PERRLA (B), moist MM, no epistaxis  NECK: Supple, no JVD    LUNGS: CTA (B) mildly prolonged expiratory phase,   no use of accessory muscles  HEART:          S1S 2, RRR  , PMI is not displaced  ABDOMEN: Soft, nontender, nondistended, +BS  Rectal exam:  Genitourinary, Grady catheter in place decrease gross hematuria in Grady catheter bag compared with yesterday    EXTREMITIES: no edema   PERIPHERAL VASCULAR: palpable pulses   NEURO:  AAO x 3, CN 2-12: non focal  MUSCLE STRENGHT: 5/5 (B), SENSATION: nonfocal  DTR: ++, CEREBELLAR: non focal  Additional Data:     Labs:      Results from last 7 days  Lab Units 18  0720   WBC Thousand/uL 18 29*   HEMOGLOBIN g/dL 12 3   HEMATOCRIT % 39 0   PLATELETS Thousands/uL 396*   NEUTROS PCT % 79*   LYMPHS PCT % 10*   MONOS PCT % 10   EOS PCT % 0       Results from last 7 days  Lab Units 06/17/18  0720   SODIUM mmol/L 139   POTASSIUM mmol/L 3 8   CHLORIDE mmol/L 107   CO2 mmol/L 24   BUN mg/dL 37*   CREATININE mg/dL 1 68*   CALCIUM mg/dL 8 1*   TOTAL PROTEIN g/dL 6 2*   BILIRUBIN TOTAL mg/dL 0 48   ALK PHOS U/L 69   ALT U/L 43   AST U/L 33   GLUCOSE RANDOM mg/dL 226*           * I Have Reviewed All Lab Data Listed Above  * Additional Pertinent Lab Tests Reviewed:  [facility] Admission Reviewed    Imaging:    Imaging Reports Reviewed Today Include: this  Imaging Personally Reviewed by Myself Includes:  yes    Recent Cultures (last 7 days):           Last 24 Hours Medication List:     Current Facility-Administered Medications:  acetaminophen 650 mg Oral Q4H PRN Arcadio Breen MD    albuterol 1 puff Inhalation Q4H PRN Arcadio Breen MD    atorvastatin 80 mg Oral Daily Arcadio Breen MD    hydrALAZINE 10 mg Intravenous Q6H PRN Eddie Deras MD    HYDROmorphone 0 5 mg Intravenous Q2H PRN AKSHAT Martinez    insulin glargine 55 Units Subcutaneous HS Chey Colvin, DO    insulin lispro 1-6 Units Subcutaneous HS Chey Colvin, DO    insulin lispro 10 Units Subcutaneous TID With Meals Chey Colvin, DO    insulin lispro 2-12 Units Subcutaneous TID AC Chey Colvin, DO    metoprolol 5 mg Intravenous Q6H PRN AKSHAT Martinez    multivitamin-minerals 1 tablet Oral Daily Arcadio Breen MD    naloxone 0 04 mg Intravenous Q1MIN PRN Arcadio Brene MD    neomycin-bacitracin-polymyxin b 1 small application Topical BID Arcadio Breen MD    ondansetron 4 mg Intravenous Q4H PRN Arcadio Breen MD    oxyCODONE 5 mg Oral Q4H PRN AKSHAT Martinez    promethazine 25 mg Intramuscular Q6H PRN W Romayne Stengel Hohenshilt, PA-C    senna-docusate sodium 2 tablet Oral Daily Arcadio Breen MD    sodium chloride 75 mL/hr Intravenous Continuous Ambar Menezes PA-C Last Rate: 75 mL/hr (06/17/18 0819)        Today, Patient Was Seen By: Rosalio Lawler MD    ** Please Note: This note has been constructed using a voice recognition system   **

## 2018-06-17 NOTE — CONSULTS
Consultation - Liza Rainey 63 y o  male MRN: [id number]    Unit/Bed#: [place] 833-01 Encounter: [id number]      Assessment/Plan     Assessment/Plan:  Type 2 diabetes with long-term insulin use and hyperglycemia:  Will add Humalog 10 units with meals  Will increase sliding scale algorithm  Increase Lantus to 55 units q h s     Will continue to follow and adjust as needed  Monitor for hypoglycemia tree per protocol  May need increasing prandial doses as appetite improves  CC: Diabetes Consult    History of Present Illness     HPI: Liza Rainey is a 63y o  year old male with type 2 diabetes for over 20 years  He is on oral agents and insulin at home  He denies any polyuria, polydipsia, nocturia and blurry vision  He denies neuropathy, nephropathy and retinopathy  He denies any hypoglycemia  He is postop day number 4 status post radical prostatectomy  He reports some nausea as well as poor appetite  He did eat about 25% of his breakfast which included fruit  Consults    Review of Systems   Constitutional: Positive for appetite change  Negative for chills and fever  HENT: Negative for congestion and trouble swallowing  Eyes: Negative for visual disturbance  Respiratory: Negative for shortness of breath  Cardiovascular: Negative for chest pain, palpitations and leg swelling  Gastrointestinal: Positive for nausea  Negative for constipation and diarrhea  Endocrine: Negative for polydipsia and polyuria  Genitourinary: Negative for difficulty urinating and frequency  Musculoskeletal: Negative for arthralgias  Skin: Negative for rash  Neurological: Negative for dizziness and weakness  Psychiatric/Behavioral: Negative for agitation and confusion         Historical Information   Past Medical History:   Diagnosis Date    Acute bronchitis     Acute low back pain     Acute low back pain     [id number] resolved    Acute respiratory infection     Cellulitis of scrotum     Cough     Diabetes mellitus ([facility] )     Herpes zoster     High cholesterol     Hyperkalemia     Hypertension     Kidney disease     Kidney stone     Low back pain     Lumbar radiculopathy     Rash     Retinopathy, central serous     Sleep apnea     [id number]    Trochanteric bursitis      Past Surgical History:   Procedure Laterality Date    ABDOMINAL ADHESION SURGERY N/A 6/13/2018    Procedure: LYSIS ADHESIONS;  Surgeon: Phineas Mohs, MD;  Location: [facility];  Service: Urology    LITHOTRIPSY      renal    HI LAP,PROSTATECTOMY,RADICAL,W/NERVE SPARE,INCL ROBOTIC N/A 6/13/2018    Procedure: PROSTATECTOMY RADICAL W ROBOTICS;  Surgeon: Phineas Mohs, MD;  Location: [facility];  Service: Urology    SHOULDER SURGERY       Social History   History   Alcohol Use No     History   Drug Use No     History   Smoking Status    Never Smoker   Smokeless Tobacco    Never Used     Family History:   Family History   Problem Relation Age of Onset    Other Father         cardiac disorder    Stroke Father     Diabetes Father         mellitus    Hypertension Father     Heart disease Father         cardiac disorder    Cancer Sister     Cancer Paternal Grandmother     Heart disease Family         cardiac disorder    Kidney disease Family        Meds/Allergies   Current Facility-Administered Medications   Medication Dose Route Frequency Provider Last Rate Last Dose    acetaminophen (TYLENOL) tablet 650 mg  650 mg Oral Q4H PRN Phineas Mohs, MD        albuterol (PROVENTIL HFA,VENTOLIN HFA) inhaler 1 puff  1 puff Inhalation Q4H PRN Phineas Mohs, MD        atorvastatin (LIPITOR) tablet 80 mg  80 mg Oral Daily Phineas Mohs, MD   80 mg at 06/17/18 0807    hydrALAZINE (APRESOLINE) injection 10 mg  10 mg Intravenous Q6H PRN Trixie Smith MD   10 mg at 06/17/18 0157    HYDROmorphone (DILAUDID) injection 0 5 mg  0 5 mg Intravenous Q2H PRN AKSHAT Berry   0 5 mg at 06/14/18 1430    insulin glargine (LANTUS) subcutaneous injection 50 Units 0 5 mL  50 Units Subcutaneous HS Santi Schroeder MD   50 Units at 06/16/18 2128    insulin lispro (HumaLOG) 100 units/mL subcutaneous injection 1-6 Units  1-6 Units Subcutaneous [facility] K[facility]   3 Units at 06/17/18 0807    metoprolol (LOPRESSOR) injection 5 mg  5 mg Intravenous Q6H PRN Colleen Grady, CRNP   5 mg at 06/17/18 0350    multivitamin-minerals (CENTRUM) tablet 1 tablet  1 tablet Oral Daily Abigail Clark MD   1 tablet at 06/17/18 0807    naloxone (NARCAN) 0 04 mg/mL syringe 0 04 mg  0 04 mg Intravenous Q1MIN PRN Abigail Clark MD        neomycin-bacitracin-polymyxin b (NEOSPORIN) ointment 1 small application  1 small application Topical BID Abigail Clark MD   1 small application at 63/12/60 0807    ondansetron (ZOFRAN) injection 4 mg  4 mg Intravenous Q4H PRN Abigail Clark MD   4 mg at 06/16/18 1202    oxyCODONE (ROXICODONE) IR tablet 5 mg  5 mg Oral Q4H PRN Colleen Miguel A, CRNP   5 mg at 06/16/18 2128    promethazine (PHENERGAN) injection 25 mg  25 mg Intramuscular Q6H PRN W Romayne Stengel Hohenshilt, PA-C   25 mg at 06/16/18 0746    senna-docusate sodium (SENOKOT S) 8 6-50 mg per tablet 2 tablet  2 tablet Oral Daily Abigail Clark MD   2 tablet at 06/17/18 0807    sodium chloride 0 9 % infusion  75 mL/hr Intravenous Continuous Dax Dorsey PA-C 75 mL/hr at 06/17/18 0819 75 mL/hr at 06/17/18 0819     Allergies   Allergen Reactions    Simvastatin      Increases Liver functoin       Objective   Vitals: Blood pressure 156/67, pulse 96, temperature 97 8 °F (36 6 °C), temperature source Oral, resp  rate 18, height 5' 6" (1 676 m), weight 113 kg (250 lb), SpO2 96 %      Intake/Output Summary (Last 24 hours) at 06/17/18 0919  Last data filed at 06/17/18 0700   Gross per 24 hour   Intake             2160 ml   Output             1405 ml   Net              755 ml     Invasive Devices     Peripheral Intravenous Line            Peripheral IV 06/17/18 Right Forearm less than 1 day          Drain Urethral Catheter Latex; Other (Comment) 20 Fr  3 days                Physical Exam   Constitutional: He is oriented to person, place, and time  He appears well-developed and well-nourished  No distress  HENT:   Head: Normocephalic and atraumatic  Mouth/Throat: No oropharyngeal exudate  Eyes: Conjunctivae and EOM are normal  Pupils are equal, round, and reactive to light  No scleral icterus  Neck: Normal range of motion  Neck supple  No thyromegaly present  Cardiovascular: Normal rate and regular rhythm  No murmur heard  Pulmonary/Chest: Effort normal and breath sounds normal  No respiratory distress  He has no wheezes  Abdominal: Soft  Bowel sounds are normal  He exhibits no distension  There is no tenderness  Musculoskeletal: Normal range of motion  He exhibits no edema  Lymphadenopathy:     He has no cervical adenopathy  Neurological: He is alert and oriented to person, place, and time  He exhibits normal muscle tone  Skin: Skin is warm and dry  No rash noted  He is not diaphoretic  Psychiatric: He has a normal mood and affect  His behavior is normal        The history was obtained from the review of the chart, patient      Lab Results:       Lab Results   Component Value Date    WBC 18 29 (H) 06/17/2018    HGB 12 3 06/17/2018    HCT 39 0 06/17/2018    MCV 91 06/17/2018     (H) 06/17/2018     Lab Results   Component Value Date/Time    BUN 37 (H) 06/17/2018 07:20 AM    BUN 20 04/30/2018 10:57 AM     06/17/2018 07:20 AM    K 3 8 06/17/2018 07:20 AM     06/17/2018 07:20 AM    CO2 24 06/17/2018 07:20 AM    CREATININE 1 68 (H) 06/17/2018 07:20 AM    AST 33 06/17/2018 07:20 AM    ALT 43 06/17/2018 07:20 AM    ALB 2 5 (L) 06/17/2018 07:20 AM       POC Glucose (mg/dl)   Date Value   06/17/2018 238 (H)   06/16/2018 233 (H)   06/16/2018 255 (H)   06/16/2018 269 (H)   06/16/2018 303 (H)   06/16/2018 256 (H)   06/15/2018 265 (H)   06/15/2018 284 (H)   06/15/2018 315 (H) 06/15/2018 314 (H)       Imaging Studies: No imaging to review this admission

## 2018-06-18 VITALS
SYSTOLIC BLOOD PRESSURE: 121 MMHG | BODY MASS INDEX: 42.52 KG/M2 | TEMPERATURE: 98.1 F | DIASTOLIC BLOOD PRESSURE: 81 MMHG | OXYGEN SATURATION: 95 % | WEIGHT: 264.55 LBS | RESPIRATION RATE: 20 BRPM | HEIGHT: 66 IN | HEART RATE: 105 BPM

## 2018-06-18 LAB
ANION GAP SERPL CALCULATED.3IONS-SCNC: 9 MMOL/L (ref 4–13)
BASOPHILS # BLD AUTO: 0.03 THOUSANDS/ΜL (ref 0–0.1)
BASOPHILS NFR BLD AUTO: 0 % (ref 0–1)
BUN SERPL-MCNC: 31 MG/DL (ref 5–25)
CALCIUM SERPL-MCNC: 7.8 MG/DL (ref 8.3–10.1)
CHLORIDE SERPL-SCNC: 107 MMOL/L (ref 100–108)
CO2 SERPL-SCNC: 24 MMOL/L (ref 21–32)
CREAT SERPL-MCNC: 1.32 MG/DL (ref 0.6–1.3)
EOSINOPHIL # BLD AUTO: 0.14 THOUSAND/ΜL (ref 0–0.61)
EOSINOPHIL NFR BLD AUTO: 1 % (ref 0–6)
ERYTHROCYTE [DISTWIDTH] IN BLOOD BY AUTOMATED COUNT: 12.8 % (ref 11.6–15.1)
GFR SERPL CREATININE-BSD FRML MDRD: 59 ML/MIN/1.73SQ M
GLUCOSE SERPL-MCNC: 147 MG/DL (ref 65–140)
GLUCOSE SERPL-MCNC: 188 MG/DL (ref 65–140)
GLUCOSE SERPL-MCNC: 214 MG/DL (ref 65–140)
HCT VFR BLD AUTO: 37.4 % (ref 36.5–49.3)
HGB BLD-MCNC: 11.7 G/DL (ref 12–17)
IMM GRANULOCYTES # BLD AUTO: 0.08 THOUSAND/UL (ref 0–0.2)
IMM GRANULOCYTES NFR BLD AUTO: 1 % (ref 0–2)
LYMPHOCYTES # BLD AUTO: 2.36 THOUSANDS/ΜL (ref 0.6–4.47)
LYMPHOCYTES NFR BLD AUTO: 14 % (ref 14–44)
MCH RBC QN AUTO: 28.2 PG (ref 26.8–34.3)
MCHC RBC AUTO-ENTMCNC: 31.3 G/DL (ref 31.4–37.4)
MCV RBC AUTO: 90 FL (ref 82–98)
MONOCYTES # BLD AUTO: 2 THOUSAND/ΜL (ref 0.17–1.22)
MONOCYTES NFR BLD AUTO: 12 % (ref 4–12)
NEUTROPHILS # BLD AUTO: 12.84 THOUSANDS/ΜL (ref 1.85–7.62)
NEUTS SEG NFR BLD AUTO: 72 % (ref 43–75)
NRBC BLD AUTO-RTO: 0 /100 WBCS
PLATELET # BLD AUTO: 392 THOUSANDS/UL (ref 149–390)
PMV BLD AUTO: 9.6 FL (ref 8.9–12.7)
POTASSIUM SERPL-SCNC: 3.7 MMOL/L (ref 3.5–5.3)
RBC # BLD AUTO: 4.15 MILLION/UL (ref 3.88–5.62)
SODIUM SERPL-SCNC: 140 MMOL/L (ref 136–145)
WBC # BLD AUTO: 17.45 THOUSAND/UL (ref 4.31–10.16)

## 2018-06-18 PROCEDURE — 85025 COMPLETE CBC W/AUTO DIFF WBC: CPT | Performed by: INTERNAL MEDICINE

## 2018-06-18 PROCEDURE — 82948 REAGENT STRIP/BLOOD GLUCOSE: CPT

## 2018-06-18 PROCEDURE — 99024 POSTOP FOLLOW-UP VISIT: CPT | Performed by: NURSE PRACTITIONER

## 2018-06-18 PROCEDURE — 80048 BASIC METABOLIC PNL TOTAL CA: CPT | Performed by: UROLOGY

## 2018-06-18 PROCEDURE — 99232 SBSQ HOSP IP/OBS MODERATE 35: CPT | Performed by: INTERNAL MEDICINE

## 2018-06-18 RX ORDER — ONDANSETRON 4 MG/1
4 TABLET, FILM COATED ORAL EVERY 8 HOURS PRN
Qty: 10 TABLET | Refills: 0 | Status: SHIPPED | OUTPATIENT
Start: 2018-06-18 | End: 2019-11-19 | Stop reason: ALTCHOICE

## 2018-06-18 RX ADMIN — INSULIN LISPRO 2 UNITS: 100 INJECTION, SOLUTION INTRAVENOUS; SUBCUTANEOUS at 08:08

## 2018-06-18 RX ADMIN — BACITRACIN, NEOMYCIN, POLYMYXIN B 1 SMALL APPLICATION: 400; 3.5; 5 OINTMENT TOPICAL at 08:09

## 2018-06-18 RX ADMIN — INSULIN LISPRO 10 UNITS: 100 INJECTION, SOLUTION INTRAVENOUS; SUBCUTANEOUS at 08:09

## 2018-06-18 RX ADMIN — ATORVASTATIN CALCIUM 80 MG: 80 TABLET, FILM COATED ORAL at 08:09

## 2018-06-18 RX ADMIN — INSULIN LISPRO 4 UNITS: 100 INJECTION, SOLUTION INTRAVENOUS; SUBCUTANEOUS at 11:50

## 2018-06-18 RX ADMIN — Medication 1 TABLET: at 08:09

## 2018-06-18 RX ADMIN — ONDANSETRON 4 MG: 2 INJECTION INTRAMUSCULAR; INTRAVENOUS at 01:59

## 2018-06-18 RX ADMIN — Medication 2 TABLET: at 08:09

## 2018-06-18 RX ADMIN — INSULIN LISPRO 10 UNITS: 100 INJECTION, SOLUTION INTRAVENOUS; SUBCUTANEOUS at 11:50

## 2018-06-18 NOTE — POST OP PROGRESS NOTES
UROLOGY PROGRESS NOTE   Patient Identifiers: Jacinta Lester (MRN [id number])  Date of Service: 6/18/2018    Subjective:     24 HR EVENTS:   no significant events  Patient has  no complaints  He denies any abdominal discomfort, however states that he has occasional nausea, but has been able to tolerate diet without any vomiting  Patient states that he has had several BM  He is OOB and sitting in chair  Patient remains afebrile, pain well tolerated  Grady in place draining clear, angela urine  Objective:     VITALS:    Vitals:    06/18/18 0700   BP: 121/81   Pulse: 105   Resp: 20   Temp: 98 1 °F (36 7 °C)   SpO2: 95%       INS & OUTS:  I/O last 24 hours:   In: 1855 [P O :880; I V :975]  Out: 1200 [Urine:1200]    LABS:  Lab Results   Component Value Date    HGB 11 7 (L) 06/18/2018    HCT 37 4 06/18/2018    WBC 17 45 (H) 06/18/2018     (H) 06/18/2018   ]    Lab Results   Component Value Date     06/18/2018    K 3 7 06/18/2018     06/18/2018    CO2 24 06/18/2018    BUN 31 (H) 06/18/2018    CREATININE 1 32 (H) 06/18/2018    CALCIUM 7 8 (L) 06/18/2018    GLUCOSE 147 (H) 06/18/2018   ]    INPATIENT MEDS:    Current Facility-Administered Medications:     acetaminophen (TYLENOL) tablet 650 mg, 650 mg, Oral, Q4H PRN, Tanvi Daly MD    albuterol (PROVENTIL HFA,VENTOLIN HFA) inhaler 1 puff, 1 puff, Inhalation, Q4H PRN, Tanvi Daly MD    atorvastatin (LIPITOR) tablet 80 mg, 80 mg, Oral, Daily, Tanvi Daly MD, 80 mg at 06/17/18 0807    hydrALAZINE (APRESOLINE) injection 10 mg, 10 mg, Intravenous, Q6H PRN, Jose De Jesus Roy MD, 10 mg at 06/17/18 0157    HYDROmorphone (DILAUDID) injection 0 5 mg, 0 5 mg, Intravenous, Q2H PRN, AKSHAT Lemos, 0 5 mg at 06/14/18 1430    insulin glargine (LANTUS) subcutaneous injection 55 Units 0 55 mL, 55 Units, Subcutaneous, HS, Zenon Tobias DO, 55 Units at 06/17/18 2130    insulin lispro (HumaLOG) 100 units/mL subcutaneous injection 1-6 Units, 1-6 Units, Subcutaneous, HS, Ricci Found, DO, 1 Units at 06/17/18 2132    insulin lispro (HumaLOG) 100 units/mL subcutaneous injection 10 Units, 10 Units, Subcutaneous, TID With Meals, Ricci Found, DO, 5 Units at 06/17/18 1725    insulin lispro (HumaLOG) 100 units/mL subcutaneous injection 2-12 Units, 2-12 Units, Subcutaneous, TID AC, 2 Units at 06/17/18 1723 **AND** Fingerstick Glucose (POCT), , , TID AC, Ricci Found, DO    metoprolol (LOPRESSOR) injection 5 mg, 5 mg, Intravenous, Q6H PRN, AKSHAT Guzman, 5 mg at 06/17/18 0350    multivitamin-minerals (CENTRUM) tablet 1 tablet, 1 tablet, Oral, Daily, Aleksandr Desai MD, 1 tablet at 06/17/18 0807    naloxone (NARCAN) 0 04 mg/mL syringe 0 04 mg, 0 04 mg, Intravenous, Q1MIN PRN, Aleksandr Desai MD    neomycin-bacitracin-polymyxin b (NEOSPORIN) ointment 1 small application, 1 small application, Topical, BID, Aleksandr Desai MD, 1 small application at 65/37/37 1721    ondansetron (ZOFRAN) injection 4 mg, 4 mg, Intravenous, Q4H PRN, Aleksandr Desai MD, 4 mg at 06/18/18 0159    oxyCODONE (ROXICODONE) IR tablet 5 mg, 5 mg, Oral, Q4H PRN, AKSHAT Guzman, 5 mg at 06/16/18 2128    promethazine (PHENERGAN) injection 25 mg, 25 mg, Intramuscular, Q6H PRN, W Romayne Stengel Hohenshilt, PA-C, 25 mg at 06/16/18 0746    senna-docusate sodium (SENOKOT S) 8 6-50 mg per tablet 2 tablet, 2 tablet, Oral, Daily, Aleksandr Desai MD, 2 tablet at 06/17/18 0807    sodium chloride 0 9 % infusion, 75 mL/hr, Intravenous, Continuous, Yo Mata PA-C, Last Rate: 75 mL/hr at 06/17/18 2135, 75 mL/hr at 06/17/18 2135      Physical Exam:     GEN: alert and oriented x 3    RESP: breathing comfortably with no accessory muscle use    ABD: soft, appropriately tender to palpation, non-distended +bs, +gas  INCISION: no erythema, induration, or drainage   EXT: no significant peripheral edema   DIALLO: in place draining clear angela urine  no clots      Assessment:   POD 5 robotic radical prostatectomy with lysis of adhesions    Plan:   -Creatinine stabilizing 1 32, WBC 17 45, Hgb 11 7  -Continue patient's home medications for control of DMII and HTN, patient evaluated by SLIM and endocrinology  -Patient will follow up with PCP in regards to better management of HTN  -Maintain graff catheter upon discharge home  -Discharge today  -Follow up in approximately 10 days with Dr Marilu Cadena      RN participated in rounds with AP  Plan of care discussed with patient and RN

## 2018-06-18 NOTE — RESTORATIVE TECHNICIAN NOTE
Restorative Specialist Mobility Note       Activity: Other (Comment) (Patient refused OOB ambulatio at this time as he is preparing for d/c)

## 2018-06-18 NOTE — CASE MANAGEMENT
Continued Stay Review    Date/POD#: 6/18/18 POD #5     Vital Signs: /81 (BP Location: Left arm)   Pulse 105   Temp 98 1 °F (36 7 °C) (Oral)   Resp 20   Ht 5' 6" (1 676 m)   Wt 120 kg (264 lb 8 8 oz)   SpO2 95%   BMI 42 70 kg/m²     Medication:   Scheduled Meds:   Current Facility-Administered Medications:  acetaminophen 650 mg Oral Q4H PRN Payton Nair MD   albuterol 1 puff Inhalation Q4H PRN Payton Nair MD   atorvastatin 80 mg Oral Daily Payton Nair MD   hydrALAZINE 10 mg Intravenous Q6H PRN Nunu Dodson MD   HYDROmorphone 0 5 mg Intravenous Q2H PRN AKSHAT Michaels   insulin glargine 55 Units Subcutaneous HS Henrry Hu DO   insulin lispro 1-6 Units Subcutaneous HS Henrry Hu DO   insulin lispro 10 Units Subcutaneous TID With Meals Henrry Hu DO   insulin lispro 2-12 Units Subcutaneous TID AC Henrry Hu DO   metoprolol 5 mg Intravenous Q6H PRN AKSHAT Michaels   multivitamin-minerals 1 tablet Oral Daily Payton Nair MD   naloxone 0 04 mg Intravenous Q1MIN PRN Payton Nair MD   neomycin-bacitracin-polymyxin b 1 small application Topical BID Payton Nair MD   ondansetron 4 mg Intravenous Q4H PRN Payton Nair MD   oxyCODONE 5 mg Oral Q4H PRN AKSHAT Michaels   promethazine 25 mg Intramuscular Q6H PRN W Romayne Stengel Hohenshilt, PA-C   senna-docusate sodium 2 tablet Oral Daily Payton Nair MD     PRN Meds:   acetaminophen    albuterol    hydrALAZINE    HYDROmorphone    metoprolol    naloxone    Ondansetron x1    oxyCODONE    promethazine    Abnormal Labs/Diagnostic Results:   BUN/Cr 31/1 32  Glucose 147  Calc 7 8  WBC 17 45  Hgb 11 7  POC glucose 188, 214    Age/Sex: 63 y o  male     Assessment/Plan:   6/18 Urology Progress Note  POD 5 robotic radical prostatectomy with lysis of adhesions     Plan:   -Creatinine stabilizing 1 32, WBC 17 45, Hgb 11 7  -Continue patient's home medications for control of DMII and HTN, patient evaluated by SLIM and endocrinology  -Patient will follow up with PCP in regards to better management of HTN  -Maintain graff catheter upon discharge home  -Discharge today  -Follow up in approximately 10 days with Dr Dilma Amin     Discharge Plan: possible home today

## 2018-06-18 NOTE — PROGRESS NOTES
Progress Note - Veronica Angel 63 y o  male MRN: [id number]    Unit/Bed#: [facility] 833-01 Encounter: [id number]      CC: diabetes f/u    Subjective:   Veronica Angel is a 63y o  year old male with type 2 diabetes  Feels well  No complaints  No hypoglycemia  Objective:     Vitals: Blood pressure 121/81, pulse 105, temperature 98 1 °F (36 7 °C), temperature source Oral, resp  rate 20, height 5' 6" (1 676 m), weight 120 kg (264 lb 8 8 oz), SpO2 95 %  ,Body mass index is 42 7 kg/m²  Intake/Output Summary (Last 24 hours) at 06/18/18 1430  Last data filed at 06/18/18 0800   Gross per 24 hour   Intake             2276 ml   Output              850 ml   Net             1426 ml       Physical Exam:  General: No acute distress  Alert, awake, and oriented x3  HEENT: Normocephalic, atraumatic  Heart: Regular rate and rhythm  Lungs: Clear  No respiratory distress  Extremities: No edema  Skin: Warm, dry  No rash  Neuro: Moves all 4 extremities spontaneously  Psych: Appropriate mood and affect  Cooperative  Lab, Imaging and other studies: I have personally reviewed pertinent reports  POC Glucose (mg/dl)   Date Value   06/18/2018 214 (H)   06/18/2018 188 (H)   06/17/2018 185 (H)   06/17/2018 159 (H)   06/17/2018 225 (H)   06/17/2018 238 (H)   06/16/2018 233 (H)   06/16/2018 255 (H)   06/16/2018 269 (H)   06/16/2018 303 (H)       Assessment/Plan:  Type 2 diabetes with long-term insulin use and hyperglycemia:  Patient to be discharged today  Upon discharge I would continue Lantus 55 units q h s   Continue Humalog 10 units with meals  He can resume metformin and glipizide  He has an appointment with his provider who manages his diabetes in about 10 days which will work well

## 2018-06-18 NOTE — DISCHARGE SUMMARY
Discharge Summary    Johnnie Meckel 63 y o  male MRN: [id number]  [facility] Encounter: [id number]    Admission Date: 6/13/18  Admission Orders     Ordered        06/13/18 9952  Inpatient Admission  Once                Discharge Date: 6/18/18    Admitting Diagnosis: Prostate cancer [facility]) [place] [place]    Discharge Diagnosis: Prostate Cancer    Resolved Problems  Date Reviewed: 6/18/2018    None          Attending: Dr Sandra Mccabe      Procedures Performed: No orders of the defined types were placed in this encounter  Robotic assisted laparoscopic prostatectomy     Pathology: Pending    Hospital Course: 5 days    Condition at Discharge: good     Discharge instructions/Information to patient and family:   See after visit summary for information provided to patient and family  Provisions for Follow-Up Care:  See after visit summary for information related to follow-up care and any pertinent home health orders  Disposition: See After Visit Summary for discharge disposition information  Planned Readmission: No    Discharge Statement   I spent 25 minutes discharging the patient  This time was spent on the day of discharge  I had direct contact with the patient on the day of discharge  Additional documentation is required if more than 30 minutes were spent on discharge  Discharge Medications:  See after visit summary for reconciled discharge medications provided to patient and family

## 2018-06-19 ENCOUNTER — TRANSITIONAL CARE MANAGEMENT (OUTPATIENT)
Dept: INTERNAL MEDICINE CLINIC | Facility: CLINIC | Age: 58
End: 2018-06-19

## 2018-06-19 NOTE — CASE MANAGEMENT
Notification of Discharge  This is a Notification of Discharge from our facility [address]  Please be advised that this patient has been discharge from our facility  Below you will find the admission and discharge date and time including the patients disposition  PRESENTATION DATE: 6/13/2018  6:01 AM  IP ADMISSION DATE: 6/13/18 0712  DISCHARGE DATE: 6/18/2018  4:28 PM  DISPOSITION: [address] [place] [address] in the [facility] [place] for 2017  Network Utilization Review Department  Phone: [phone]; Fax [phone]  ATTENTION: The Network Utilization Review Department is now centralized for our 7 Facilities  Make a note that we have a new phone and fax numbers for our Department  Please call with any questions or concerns to [phone] and carefully follow the prompts so that you are directed to the right person  All voicemails are confidential  Fax any determinations, approvals, denials, and requests for initial or continue stay review clinical to [phone]  Due to HIGH CALL volume, it would be easier if you could please send faxed requests to expedite your requests and in part, help us provide discharge notifications faster

## 2018-06-25 DIAGNOSIS — C61 PROSTATE CANCER (HCC): Primary | ICD-10-CM

## 2018-06-25 RX ORDER — CIPROFLOXACIN 500 MG/1
500 TABLET, FILM COATED ORAL EVERY 12 HOURS SCHEDULED
Qty: 6 TABLET | Refills: 0 | Status: SHIPPED | OUTPATIENT
Start: 2018-06-27 | End: 2018-06-30

## 2018-06-25 NOTE — TELEPHONE ENCOUNTER
Patient needs Cipro sent to [facility] [address] to start prior to Grady removal on 6/28/18  Informed wife patient should start the Cipro on the morning of 6/27/18, and it is for a 3 day course  Wife verbalized understanding

## 2018-06-26 ENCOUNTER — OFFICE VISIT (OUTPATIENT)
Dept: INTERNAL MEDICINE CLINIC | Facility: CLINIC | Age: 58
End: 2018-06-26
Payer: COMMERCIAL

## 2018-06-26 VITALS
DIASTOLIC BLOOD PRESSURE: 72 MMHG | HEIGHT: 66 IN | BODY MASS INDEX: 42.07 KG/M2 | SYSTOLIC BLOOD PRESSURE: 148 MMHG | HEART RATE: 103 BPM | OXYGEN SATURATION: 99 % | WEIGHT: 261.8 LBS

## 2018-06-26 DIAGNOSIS — Z79.4 UNCONTROLLED TYPE 2 DIABETES MELLITUS WITH HYPERGLYCEMIA, WITH LONG-TERM CURRENT USE OF INSULIN (HCC): ICD-10-CM

## 2018-06-26 DIAGNOSIS — E87.71 TRANSFUSION ASSOCIATED CIRCULATORY OVERLOAD: Primary | ICD-10-CM

## 2018-06-26 DIAGNOSIS — I10 BENIGN ESSENTIAL HYPERTENSION: ICD-10-CM

## 2018-06-26 DIAGNOSIS — R60.9 EDEMA, UNSPECIFIED TYPE: ICD-10-CM

## 2018-06-26 DIAGNOSIS — E11.65 UNCONTROLLED TYPE 2 DIABETES MELLITUS WITH HYPERGLYCEMIA, WITH LONG-TERM CURRENT USE OF INSULIN (HCC): ICD-10-CM

## 2018-06-26 DIAGNOSIS — C61 PROSTATE CANCER (HCC): ICD-10-CM

## 2018-06-26 DIAGNOSIS — N17.9 AKI (ACUTE KIDNEY INJURY) (HCC): ICD-10-CM

## 2018-06-26 PROBLEM — E87.70 VOLUME OVERLOAD: Status: ACTIVE | Noted: 2018-06-26

## 2018-06-26 PROBLEM — Z01.818 PREOP EXAM FOR INTERNAL MEDICINE: Status: RESOLVED | Noted: 2018-05-29 | Resolved: 2018-06-26

## 2018-06-26 PROCEDURE — 99496 TRANSJ CARE MGMT HIGH F2F 7D: CPT | Performed by: INTERNAL MEDICINE

## 2018-06-26 PROCEDURE — 1111F DSCHRG MED/CURRENT MED MERGE: CPT | Performed by: INTERNAL MEDICINE

## 2018-06-26 RX ORDER — POTASSIUM CHLORIDE 20 MEQ/1
20 TABLET, EXTENDED RELEASE ORAL DAILY
Qty: 10 TABLET | Refills: 0 | Status: SHIPPED | OUTPATIENT
Start: 2018-06-26 | End: 2019-11-19 | Stop reason: ALTCHOICE

## 2018-06-26 RX ORDER — FUROSEMIDE 40 MG/1
40 TABLET ORAL DAILY
Qty: 10 TABLET | Refills: 0 | Status: SHIPPED | OUTPATIENT
Start: 2018-06-26 | End: 2018-08-14

## 2018-06-26 NOTE — PROGRESS NOTES
Assessment/Plan:  Volume overload- 3 days of furosemide + Kcl   BMP next week if not sooner  Cr peaked at 2 22 in hospital, was 1 32 on discharge  F/U with urology  Continue current Toujeo 30 units and Humalog 5 units with dinner, Bydureon, metformin, Amaryl  F/U with Dr Ashely Diaz as scheduled       Problem List Items Addressed This Visit     Prostate cancer ([facility] )    Benign essential hypertension    Relevant Medications    furosemide (LASIX) 40 mg tablet    Uncontrolled type 2 diabetes mellitus with hyperglycemia, with long-term current use of insulin (HCC)    JAYNE (acute kidney injury) ([facility] )    Relevant Medications    furosemide (LASIX) 40 mg tablet    Other Relevant Orders    Basic metabolic panel    Volume overload - Primary      Other Visit Diagnoses     Edema, unspecified type        Relevant Medications    furosemide (LASIX) 40 mg tablet    potassium chloride (K-DUR,KLOR-CON) 20 mEq tablet    Other Relevant Orders    Basic metabolic panel            Subjective:      Patient ID: Veronica Angel is a 63 y o  male      HPI   Date and time hospital follow up call was made:  6/19/2018  8:45 AM  Hospital care reviewed:  Records reviewed  Patient was hopsitalized at:  [address]  Date of admission:  6/13/18  Date of discharge:  6/18/18  Diagnosis:  Prostate cancer  Disposition:  Home  Were the patients medicaitons reviewed and updated:  No  Current symptoms:  None (Comment: edema)  Post hospital issues:  Reduced activity  Should patient be enrolled in anticoag monitoring?:  No  Scheduled for follow up?:  Yes  Patients specialists:  Urologist, Endocrinologist  Endrocinologist's Name:  Dr Dhruv Watters  Urologist's Name:  Dr Paul Olmedo  Did you obtain your prescribed medications:  Yes  I have advised the patient to call PCP with any new or worsening symptoms (please type in name along with any credentials):  Faith Rivera   Are you recieving outpatient services:  No  Are you recieving home care services: No  Are you using any community resources:  No  Have you fallen in the last 12 months:  No  Interperter language line required?:  No  Counseling:  Family         S/p robotic radical prosatatectomy 6/13  Discharged 6/18  Blood sugars were very high in the hosital   Endocrine was consulted and Humalog increased to 55 units and Humalog to 10 units with meals  Sugars have been under 100 fasting at times since he has been home, other readings under 150  He is only administering 30 units of Toujeo and 5 units of Humalog with dinner  Reports severe fatigue and swelling in the abdomen, LE  LE edema slightly better  Denies SOB, pain  Gained 14 lbs in the hospital    The following portions of the patient's history were reviewed and updated as appropriate: allergies, current medications, past family history, past social history, past surgical history and problem list     Review of Systems   Constitutional: Positive for fatigue  Negative for fever and unexpected weight change  HENT: Negative for sinus pain  Respiratory: Negative for cough, shortness of breath and wheezing  Cardiovascular: Positive for leg swelling  Negative for chest pain and palpitations  Gastrointestinal: Positive for abdominal distention  Negative for abdominal pain, constipation, diarrhea, nausea and vomiting  Genitourinary: Grady in place  + leaking around meatus   Musculoskeletal: Negative for arthralgias  Objective:      /72   Pulse 103   Ht 5' 6" (1 676 m)   Wt 119 kg (261 lb 12 8 oz)   SpO2 99%   BMI 42 26 kg/m²          Physical Exam   Constitutional: He is oriented to person, place, and time  No distress  Appears tired   HENT:   Head: Normocephalic and atraumatic  Eyes: Conjunctivae are normal    Neck: Neck supple  Cardiovascular: Normal rate, regular rhythm and normal heart sounds  No murmur heard  Pulmonary/Chest: Effort normal  No respiratory distress  He has no wheezes   He has rales in the right lower field  Abdominal: Soft  He exhibits distension  He exhibits no mass  There is no tenderness  There is no rebound and no guarding  Musculoskeletal: Normal range of motion  He exhibits edema (1+ LE edema)  Neurological: He is alert and oriented to person, place, and time  Skin: Skin is warm and dry  He is not diaphoretic  Psychiatric: He has a normal mood and affect   His behavior is normal  Judgment and thought content normal

## 2018-06-27 NOTE — PROGRESS NOTES
6/28/2018    Jaiden Gonzalez  1960  [id number]    Discussion and Plan    Pathology results were reviewed demonstrating a Rupert 7 4+3 T2c adenocarcinoma with all margins negative  Grady catheter was removed without incident  Lorne hercules reviewed  He will otherwise return in 1 month with PSA prior to visit  I again discussed the need for him to follow up in regards to further medical management of his diabetes and severe morbid obesity  1  Prostate cancer ([facility] )  - PSA Total, Diagnostic; Future    Assessment      Patient Active Problem List   Diagnosis    Kidney stones    Prostate cancer ([facility] )    Benign essential hypertension    Uncontrolled type 2 diabetes mellitus with hyperglycemia, with long-term current use of insulin (HCC)    Hyperlipidemia    Hypertension    Morbid obesity ([facility] )    [facility]    Leukocytosis    Volume overload       History of Present Illness    José Agrawal is a 63 y o  male seen today in regards to a history of left ESWL done on 12/14/15 and newly diagnosed prostate cancer  Patient has a h/o long-standing calcium oxalate nephrolithiasis  He previously been treated at [facility] urology  Has passed multiple fragments since the time of treatment  Denies flank pain  KUB shows a small cluster of residual fragments on left  PSA has increased from 3 5 to 6 0 and 7 2 at present  Thais Funez since underwent TRUS biopsies of the prostate   This unfortunately reveals Rupert 6 and 7 adenocarcinoma involving the left side of the gland primarily  CT scan fortunately shows no evidence of metastatic disease  He since underwent DaVinci prostatectomy  Procedure was challenging secondary to a BMI of greater than 50      Urinary Symptom Assessment        Past Medical History  Past Medical History:   Diagnosis Date    Acute bronchitis     Acute low back pain     Acute low back pain     [id number] resolved    Acute respiratory infection     Cellulitis of scrotum  Cough     Diabetes mellitus (HCC)     Herpes zoster     High cholesterol     Hyperkalemia     Hypertension     Kidney disease     Kidney stone     Low back pain     Lumbar radiculopathy     Rash     Retinopathy, central serous     Sleep apnea     [id number]    Trochanteric bursitis        Past Social History  Past Surgical History:   Procedure Laterality Date    ABDOMINAL ADHESION SURGERY N/A 6/13/2018    Procedure: LYSIS ADHESIONS;  Surgeon: Natali Browning MD;  [facility];  Service: Urology    LITHOTRIPSY      renal    NM LAP,PROSTATECTOMY,RADICAL,W/NERVE SPARE,INCL ROBOTIC N/A 6/13/2018    Procedure: Agustin Quinn;  Surgeon: Natali Browning MD;  [facility];  Service: Urology    PROSTATE SURGERY  06/13/2018    SHOULDER SURGERY         Past Family History  Family History   Problem Relation Age of Onset    Other Father         cardiac disorder    Stroke Father     Diabetes Father         mellitus    Hypertension Father     Heart disease Father         cardiac disorder    Cancer Sister     Cancer Paternal Grandmother     Heart disease Family         cardiac disorder    Kidney disease Family        Past Social history  Social History     Social History    Marital status: /Civil Union     Spouse name: N/A    Number of children: N/A    Years of education: N/A     Occupational History    Not on file       Social History Main Topics    Smoking status: Never Smoker    Smokeless tobacco: Never Used    Alcohol use No    Drug use: No    Sexual activity: Not on file     Other Topics Concern    Not on file     Social History Narrative    Caffeine use       Current Medications  Current Outpatient Prescriptions   Medication Sig Dispense Refill    albuterol (PROAIR HFA) 90 mcg/act inhaler Inhale 1-2 puffs every 6 (six) hours as needed        ASPIRIN 81 PO Take 1 capsule by mouth 2 (two) times a day        atorvastatin (LIPITOR) 80 mg tablet Take 80 mg by mouth daily  0    BD PEN NEEDLE MAJO U/F 32G X 4 MM MISC 2 (two) times a day  5    BYDUREON 2 MG SRER INJECT 2MG SUBCUTANEOUSLY EVERY 7 DAYS  (Patient taking differently: INJECT 2MG SUBCUTANEOUSLY EVERY 7 DAYS  on sundays) 4 each 3    ciprofloxacin (CIPRO) 500 mg tablet Take 1 tablet (500 mg total) by mouth every 12 (twelve) hours for 3 days Starting the morning prior to Grady catheter removal 6 tablet 0    furosemide (LASIX) 40 mg tablet Take 1 tablet (40 mg total) by mouth daily 10 tablet 0    glimepiride (AMARYL) 4 mg tablet Take by mouth 2 (two) times a day      ibuprofen (MOTRIN) 800 mg tablet Take 1 tablet by mouth every 8 (eight) hours as needed      Insulin Glargine (TOUJEO SOLOSTAR) 300 units/mL CONCETRATED U-300 injection pen 55 units daily  3 pen 0    insulin lispro (HUMALOG) 100 units/mL injection 10 units with each meal  10 mL 0    lisinopril (ZESTRIL) 40 mg tablet Take 1 tablet by mouth daily      metFORMIN (GLUCOPHAGE) 1000 MG tablet Take 1 tablet by mouth 2 (two) times a day      Multiple Vitamins-Minerals (MULTIVITAMIN WITH MINERALS) tablet Take 1 tablet by mouth daily      ondansetron (ZOFRAN) 4 mg tablet Take 1 tablet (4 mg total) by mouth every 8 (eight) hours as needed for nausea or vomiting 10 tablet 0    potassium chloride (K-DUR,KLOR-CON) 20 mEq tablet Take 1 tablet (20 mEq total) by mouth daily 10 tablet 0     No current facility-administered medications for this visit  Allergies  Allergies   Allergen Reactions    Simvastatin      Increases Liver functoin       Past Medical History, Social History, Family History, medications and allergies were reviewed  Review of Systems  Review of Systems   Constitutional: Negative  HENT: Negative  Eyes: Negative  Respiratory: Negative  Cardiovascular: Negative  Gastrointestinal: Negative  Endocrine: Negative  Musculoskeletal: Negative  Skin: Negative  Neurological: Negative  Hematological: Negative  Psychiatric/Behavioral: Negative  Vitals  Vitals:    06/28/18 1058   BP: 140/90   BP Location: Left arm   Patient Position: Sitting   Cuff Size: Adult   Pulse: 76   Weight: 115 kg (252 lb 9 6 oz)   Height: 5' 6" (1 676 m)         Physical Exam    Physical Exam   Constitutional: He is oriented to person, place, and time  He appears well-developed and well-nourished  HENT:   Head: Normocephalic and atraumatic  Eyes: Pupils are equal, round, and reactive to light  Neck: Normal range of motion  Cardiovascular: Normal rate, regular rhythm and normal heart sounds  Pulmonary/Chest: Effort normal and breath sounds normal  No accessory muscle usage  No respiratory distress  Abdominal: Soft  Normal appearance and bowel sounds are normal  There is no tenderness  Musculoskeletal: Normal range of motion  Neurological: He is alert and oriented to person, place, and time  Skin: Skin is warm, dry and intact  Psychiatric: He has a normal mood and affect  His speech is normal  Cognition and memory are normal    Nursing note and vitals reviewed        Results    Below listed labs, pathology results, and radiology images were personally reviewed:    Lab Results   Component Value Date/Time    PSA 5 3 (H) 10/29/2016 09:13 AM     Lab Results   Component Value Date    GLUCOSE 147 (H) 06/18/2018    CALCIUM 7 8 (L) 06/18/2018     06/18/2018    K 3 7 06/18/2018    CO2 24 06/18/2018     06/18/2018    BUN 31 (H) 06/18/2018    CREATININE 1 32 (H) 06/18/2018     Lab Results   Component Value Date    WBC 17 45 (H) 06/18/2018    HGB 11 7 (L) 06/18/2018    HCT 37 4 06/18/2018    MCV 90 06/18/2018     (H) 06/18/2018       No results found for this or any previous visit (from the past 1 hour(s)) ]    Case Report   Surgical Pathology Report                         Case: [id number]                                    Authorizing Provider: Arcadio Breen MD             Collected:           06/13/2018 0953               Ordering Location:     [facility]      Received:            06/13/2018 1210                                      [facility] Operating Room                                                       Pathologist:           Melissa Yoo MD                                                          Specimen:    Prostate                                                                                   Final Diagnosis   A  Prostate, prostatectomy:     - Adenocarcinoma of prostate, acinar type, Nu's sum 4+3=7      - See synoptic report below      Surgical Pathology Cancer Case Summary - Carcinoma of the Prostate     1  Specimen identification:       - Procedure:  Radical prostatectomy with robotics     - Prostate size and weight:  4 3 x 4 2 x 3 6 cm; 34 grams     - Lymph node sampling:  Not performed   2  Tumor     - Histologic type:  Acinar type prostatic adenocarcinoma     - Histologic Grade: Nu Pattern:       * primary pattern:  4     * secondary pattern:  3     * tertiary pattern:  Not applicable     * total Nu Score:  7     * Grade Group:  3     * Percentage of pattern 4:  55%     * Percentage of pattern 5:  45%     * Intraductal carcinoma:  None identified    - Tumor quantitation:  Tumor present in left lobe and involves approximately 15% of specimen    - Extraprostatic extension (pT2):  None identified    - Urinary bladder neck invasion:  None identified    - Seminal vesicle invasion:  None identified   5   Margins:     - Right Apical:  Negative for tumor     - Right Bladder neck:  Negative for tumor     - Right Anterior:  Negative for tumor     - Right Lateral:  Negative for tumor     - Right Postero-lateral (neurovascular bundle):  Negative for tumor     - Right Posterior: Negative for tumor     - Left Apical:  Negative for tumor     - Left Bladder neck:  Negative for tumor     - Left Anterior:  Negative for tumor     - Left Lateral:  Negative for tumor     - Left Postero-lateral (neurovascular bundle):  Negative for tumor     - Left Posterior:  Negative for tumor   6  Margin positivity in area of extraprostatic extension:   7  Treatment effect on carcinoma:  Not applicable   8  Lymph-vascular invasion:  None identified     9  Perineural invasion:  Present    10  Regional lymph nodes (pNX):  None submitted or identified    11  Additional pathologic findings:  No further significant pathologic abnormalities    12  Ancillary studies:  Unknown  13  PSA:  7 3 ng/mL  (2/10/18)  14  Best representative block if additional studies are needed:  A17  15  8th Ed AJCC Prognostic Stage Group:  at least Stage IIC -pT2, pNX, Nu's score 4+3=7      Electronically signed by Sinai Arzate MD on 6/20/2018 at  3:14 PM   Additional Information   All controls performed with the immunohistochemical stains reported above reacted appropriately  These tests were developed and their performance characteristics determined by [facility] or [facility]  They may not be cleared or approved by the [place]  Food and Drug Administration  The FDA has determined that such clearance or approval is not necessary  These tests are used for clinical purposes  They should not be regarded as investigational or for research   This laboratory has been approved by CLIA 88, designated as a high-complexity laboratory and is qualified to perform these tests      - Interpretation performed at [facility], [address] [place], [place] [id number]

## 2018-06-28 ENCOUNTER — OFFICE VISIT (OUTPATIENT)
Dept: UROLOGY | Facility: CLINIC | Age: 58
End: 2018-06-28

## 2018-06-28 VITALS
HEART RATE: 76 BPM | WEIGHT: 252.6 LBS | SYSTOLIC BLOOD PRESSURE: 140 MMHG | BODY MASS INDEX: 40.6 KG/M2 | DIASTOLIC BLOOD PRESSURE: 90 MMHG | HEIGHT: 66 IN

## 2018-06-28 DIAGNOSIS — C61 PROSTATE CANCER (HCC): Primary | ICD-10-CM

## 2018-06-28 PROCEDURE — 99024 POSTOP FOLLOW-UP VISIT: CPT | Performed by: UROLOGY

## 2018-07-03 ENCOUNTER — OFFICE VISIT (OUTPATIENT)
Dept: INTERNAL MEDICINE CLINIC | Facility: CLINIC | Age: 58
End: 2018-07-03
Payer: COMMERCIAL

## 2018-07-03 VITALS
SYSTOLIC BLOOD PRESSURE: 144 MMHG | HEART RATE: 97 BPM | BODY MASS INDEX: 40.15 KG/M2 | WEIGHT: 249.8 LBS | OXYGEN SATURATION: 98 % | DIASTOLIC BLOOD PRESSURE: 90 MMHG | HEIGHT: 66 IN

## 2018-07-03 DIAGNOSIS — Z79.4 UNCONTROLLED TYPE 2 DIABETES MELLITUS WITH HYPERGLYCEMIA, WITH LONG-TERM CURRENT USE OF INSULIN (HCC): ICD-10-CM

## 2018-07-03 DIAGNOSIS — R53.83 OTHER FATIGUE: ICD-10-CM

## 2018-07-03 DIAGNOSIS — I10 BENIGN ESSENTIAL HYPERTENSION: ICD-10-CM

## 2018-07-03 DIAGNOSIS — N17.9 AKI (ACUTE KIDNEY INJURY) (HCC): Primary | ICD-10-CM

## 2018-07-03 DIAGNOSIS — E78.2 MIXED HYPERLIPIDEMIA: ICD-10-CM

## 2018-07-03 DIAGNOSIS — E11.65 UNCONTROLLED TYPE 2 DIABETES MELLITUS WITH HYPERGLYCEMIA, WITH LONG-TERM CURRENT USE OF INSULIN (HCC): ICD-10-CM

## 2018-07-03 DIAGNOSIS — E87.71 TRANSFUSION ASSOCIATED CIRCULATORY OVERLOAD: ICD-10-CM

## 2018-07-03 DIAGNOSIS — E66.01 MORBID OBESITY (HCC): ICD-10-CM

## 2018-07-03 PROBLEM — E87.70 VOLUME OVERLOAD: Status: RESOLVED | Noted: 2018-06-26 | Resolved: 2018-07-03

## 2018-07-03 LAB
BUN SERPL-MCNC: 14 MG/DL (ref 6–24)
BUN/CREAT SERPL: 11 (ref 9–20)
CALCIUM SERPL-MCNC: 8.6 MG/DL (ref 8.7–10.2)
CHLORIDE SERPL-SCNC: 102 MMOL/L (ref 96–106)
CO2 SERPL-SCNC: 23 MMOL/L (ref 20–29)
CREAT SERPL-MCNC: 1.23 MG/DL (ref 0.76–1.27)
GLUCOSE SERPL-MCNC: 75 MG/DL (ref 65–99)
POTASSIUM SERPL-SCNC: 3.7 MMOL/L (ref 3.5–5.2)
SL AMB EGFR AFRICAN AMERICAN: 75 ML/MIN/1.73
SL AMB EGFR NON AFRICAN AMERICAN: 65 ML/MIN/1.73
SODIUM SERPL-SCNC: 143 MMOL/L (ref 134–144)

## 2018-07-03 PROCEDURE — 99214 OFFICE O/P EST MOD 30 MIN: CPT | Performed by: INTERNAL MEDICINE

## 2018-07-03 NOTE — PROGRESS NOTES
Assessment/Plan:  Cont current meds  BP normal at home  Call if new symptoms develop  Labs as ordered by Dr Micaela Roman for Nov       Problem List Items Addressed This Visit     Benign essential hypertension    Uncontrolled type 2 diabetes mellitus with hyperglycemia, with long-term current use of insulin ([facility] )    Hyperlipidemia    Morbid obesity ([facility] )    RESOLVED: J[facility]NE [facility] - Primary    RESOLVED: Volume overload    Other fatigue            Subjective:      Patient ID: Lolis Conley is a 63 y o  male  HPI  S/p prostatectomy on 6/13  He was seen here last week c/o severe fatigue and edema  Responded well to 3 days of furosemide  BMP this morning was normal  Edema has resolved, minimal in the RLE, mild abd distention  Grady is out  + Urinary incontinence, trying to keep up with Kegel exercises  He is walking much better with the resolution of the swelling  Continues to feel tired  Just saw Dr Micaela Roman- Krunal Cord 28u and Humalog 5u with dinner  Sugars much better, very well controlled, no symptomatic hypoglycemic spells    The following portions of the patient's history were reviewed and updated as appropriate: allergies, current medications, past family history, past medical history, past social history, past surgical history and problem list     Review of Systems   Constitutional: Positive for fatigue  Negative for fever and unexpected weight change  HENT: Negative for sinus pain, sinus pressure and sore throat  Respiratory: Negative for cough, shortness of breath and wheezing  Cardiovascular: Negative for chest pain, palpitations and leg swelling  Gastrointestinal: Negative for abdominal pain, constipation, diarrhea, nausea and vomiting     Genitourinary:        Incontinence         Objective:      /90   Pulse 97   Ht 5' 6" (1 676 m)   Wt 113 kg (249 lb 12 8 oz)   SpO2 98%   BMI 40 32 kg/m²          Physical Exam   Constitutional: He is oriented to person, place, and time  He appears well-developed and well-nourished  HENT:   Head: Normocephalic and atraumatic  Neck: Neck supple  Cardiovascular: Normal rate, regular rhythm and normal heart sounds  No murmur heard  Pulmonary/Chest: Effort normal and breath sounds normal  No respiratory distress  He has no wheezes  He has no rales  Abdominal: Soft  Bowel sounds are normal  He exhibits no distension and no mass  There is no tenderness  There is no rebound and no guarding  Musculoskeletal: Normal range of motion  He exhibits no edema  Neurological: He is alert and oriented to person, place, and time  Skin: Skin is warm and dry  Psychiatric: He has a normal mood and affect   His behavior is normal  Judgment and thought content normal

## 2018-07-10 ENCOUNTER — HOSPITAL ENCOUNTER (OUTPATIENT)
Dept: RADIOLOGY | Facility: MEDICAL CENTER | Age: 58
Discharge: HOME | End: 2018-07-10
Payer: COMMERCIAL

## 2018-07-10 ENCOUNTER — TELEPHONE (OUTPATIENT)
Dept: UROLOGY | Facility: AMBULATORY SURGERY CENTER | Age: 58
End: 2018-07-10

## 2018-07-10 ENCOUNTER — TELEPHONE (OUTPATIENT)
Dept: UROLOGY | Facility: CLINIC | Age: 58
End: 2018-07-10

## 2018-07-10 DIAGNOSIS — N20.0 NEPHROLITHIASIS: ICD-10-CM

## 2018-07-10 DIAGNOSIS — R35.0 URINARY FREQUENCY: Primary | ICD-10-CM

## 2018-07-10 DIAGNOSIS — N20.0 NEPHROLITHIASIS: Primary | ICD-10-CM

## 2018-07-10 PROCEDURE — 76770 US EXAM ABDO BACK WALL COMP: CPT

## 2018-07-10 NOTE — TELEPHONE ENCOUNTER
Called and informed patient, recommend that he be scheduled for ultrasound of kidney and bladder, advised order has been placed and instructed patient to call central scheduling, provided him with phone number and advised him to call to schedule  Provided ER precautions, advised to go to ER for worsening pain, fever, chills, nausea or vomiting  Advised he will be called with results once reviewed by provider

## 2018-07-10 NOTE — TELEPHONE ENCOUNTER
Patient managed by Dr Wolfe at the [facility] office  Patient left message reporting pain  Called and spoke to patient  Patient is reporting pain in the right testicle and right lower abdominal quadrant  Patient denies pain the back  Patient is not having any nausea or vomiting  Patient is not having any fevers or chills  Patient states that he is having frequency and incontinence  Patient reports that he has had the frequency and incontinence since the catheter was removed but the pain started yesterday  Patient is s/p prostatectomy on 6/13/18  Patient reports that he does also have a history of kidney stones

## 2018-07-10 NOTE — TELEPHONE ENCOUNTER
Would advise patient to have kidney/bladder ultrasound to rule out calculus  Order placed in EPIC  Will call patient with results  Please provide ER precautions if pain becomes severe

## 2018-07-10 NOTE — TELEPHONE ENCOUNTER
Please inform patient that results of recent ultrasound show no evidence of obstructing obstructing calculi or hydronephrosis  5 mm non-obstructing left lower pole calculus again noted, as in previous images  Can also obtain UA and culture to rule out UTI due to urinary frequency and incontinence, however would encourage to continue practicing Kegel exercises

## 2018-07-10 NOTE — TELEPHONE ENCOUNTER
Spoke with patient  Patient instructed that U/S showed no obstructing stones, stable 5 mm non obstructing left lower pole stone  Patient instructed on possible urine testing  Patient unsure if he would be able to get a urine sample as urine "leaks out" when he "takes 3 steps "     Patient voiced concern regarding right sided testicle pain  Patient denies swelling  Reports pain is tolerable when sitting with leg evaluated, and no pressure is on testicle  Patient reports pain severe when standing or walking and there is pressure on testicle  Discussed further with AKSHAT Sandoval, who advised patient should have OV to further evaluate  Patient scheduled for OV tomorrow with AKSHAT Bustillos  Patient instructed to take ibuprofen as needed for pain  Instructed to monitor symptoms, if pain becomes severe or he is unable to urinate, patient instructed to go to ER

## 2018-07-11 ENCOUNTER — OFFICE VISIT (OUTPATIENT)
Dept: UROLOGY | Facility: CLINIC | Age: 58
End: 2018-07-11

## 2018-07-11 VITALS
SYSTOLIC BLOOD PRESSURE: 140 MMHG | WEIGHT: 237.8 LBS | DIASTOLIC BLOOD PRESSURE: 84 MMHG | HEART RATE: 78 BPM | BODY MASS INDEX: 38.22 KG/M2 | HEIGHT: 66 IN

## 2018-07-11 DIAGNOSIS — N45.1 EPIDIDYMITIS: Primary | ICD-10-CM

## 2018-07-11 DIAGNOSIS — N39.3 URINARY STRESS INCONTINENCE, MALE: ICD-10-CM

## 2018-07-11 DIAGNOSIS — C61 PROSTATE CANCER (HCC): ICD-10-CM

## 2018-07-11 PROCEDURE — 99024 POSTOP FOLLOW-UP VISIT: CPT | Performed by: NURSE PRACTITIONER

## 2018-07-11 RX ORDER — CIPROFLOXACIN 500 MG/1
500 TABLET, FILM COATED ORAL EVERY 12 HOURS SCHEDULED
Qty: 24 TABLET | Refills: 0 | Status: SHIPPED | OUTPATIENT
Start: 2018-07-11 | End: 2018-07-23

## 2018-07-11 RX ORDER — INSULIN LISPRO 100 [IU]/ML
5 INJECTION, SOLUTION INTRAVENOUS; SUBCUTANEOUS
COMMUNITY
Start: 2018-07-05 | End: 2018-10-02 | Stop reason: SDUPTHER

## 2018-07-11 NOTE — PATIENT INSTRUCTIONS
Epididymitis   WHAT YOU NEED TO KNOW:   Epididymitis is inflammation of your epididymis  The epididymis is a coiled tube inside your scrotum  It stores and carries sperm from your testicles to your penis  Acute epididymitis lasts for 6 weeks or less  Chronic epididymitis lasts longer than 6 weeks  DISCHARGE INSTRUCTIONS:   Return to the emergency department if:   · You have severe pain in your testicles  · Your symptoms become worse even after you start treatment with medicine  Contact your healthcare provider if:   · Your symptoms do not get better within 3 days of treatment or come back after treatment  · You have a hot, red, tender area on your testicles  · You have questions or concerns about your condition or care  Medicines:   · Antibiotics  may be given if epididymitis is caused by a bacterial infection  · NSAIDs , such as ibuprofen, help decrease swelling, pain, and fever  This medicine is available with or without a doctor's order  NSAIDs can cause stomach bleeding or kidney problems in certain people  If you take blood thinner medicine, always ask if NSAIDs are safe for you  Always read the medicine label and follow directions  Do not give these medicines to children under 10months of age without direction from your child's healthcare provider  · Acetaminophen  decreases pain and fever  It is available without a doctor's order  Ask how much to take and how often to take it  Follow directions  Acetaminophen can cause liver damage if not taken correctly  · Prescription pain medicine  may be given  Ask how to take this medicine safely  · Take your medicine as directed  Contact your healthcare provider if you think your medicine is not helping or if you have side effects  Tell him of her if you are allergic to any medicine  Keep a list of the medicines, vitamins, and herbs you take  Include the amounts, and when and why you take them   Bring the list or the pill bottles to follow-up visits  Carry your medicine list with you in case of an emergency  Self-care:   · Apply ice  on your testicles for 15 to 20 minutes every hour or as directed  Use an ice pack, or put crushed ice in a plastic bag  Cover it with a towel  Ice helps prevent tissue damage and decreases swelling and pain  · Rest in bed  as directed  Elevate your scrotum when you sit or lie down to help reduce swelling and pain  You may be asked to do this by placing a rolled-up towel under your scrotum  · Scrotal support  may be recommended  An athletic supporter provides scrotal support and may make you more comfortable when you stand  Ask your healthcare provider how to use an athletic supporter  · Do not lift heavy objects  You can make swelling worse if you lift heavy objects or strain  Follow up with your healthcare provider as directed:  Write down your questions so you remember to ask them during your visits  © 2017 [employer] Information is for End User's use only and may not be sold, redistributed or otherwise used for commercial purposes  All illustrations and images included in CareNotes® are the copyrighted property of [employer]  or [employer]  The above information is an  only  It is not intended as medical advice for individual conditions or treatments  Talk to your doctor, nurse or pharmacist before following any medical regimen to see if it is safe and effective for you

## 2018-07-11 NOTE — PROGRESS NOTES
7/11/2018    Priscilla Rankin  1960  [id number]      Assessment  -T2c [place] 7 (4+3) prostate cancer s/p radical prostatectomy 6/13/18  -Urinary stress incontinence  -Epididymitis     Discussion/Plan  Grzegorz Gonzalez is a 63 y o  male being managed by Dr Dilma Amin        -Based on patient's physical exam findings, of extreme pain upon palpating right scrotum, will prescribe 12 day course of Cipro for possible epididymitis  Prescription sent to patient's pharmacy  Patient unable to provide urine specimen  Advised patient that he can be provided specimen cups to produce urine sample at home  However, patient adamantly states that he is unable to provide, as he is constantly leaking small amounts of urine  Patient refusing straight cath to obtain clean urine specimen  Would recommend ultrasound of scrotum/testicles, however patient states that given current discomfort, he is unable to have ultrasound probe touch scrotum  Will have patient return in 2 weeks to re-evaluate, and will discuss obtaining ultrasound at that time  Conservative measures were instructed to patient including taking ibuprofen PRN for discomfort, and wearing scrotal support  -All questions answered, patient and wife agrees with plan      History of Present Illness  63 y o  male with a history of T2c Nu 7 (4+3) prostate cancer s/p radical prostatectomy 6/13/18, urinary stress incontinence, and suspected epididymitis presents today for follow up  Patient called office yesterday with reports of severe right-sided back pain radiating to the right lower quadrant and scrotum  He denies any associated vomiting, fever, or chills, but does report nausea with pain and episode of dysuria  Patient has been taking ibuprofen as needed for discomfort  He states he has not had any scrotal swelling, erythema, or gross hematuria  Renal ultrasound was obtained to rule out obstructing calculi, as he has known history of nephrolithiasis    Results showed no evidence of ureteral calculi or hydronephrosis, known 5mm right renal stone again identified  Patient was recently evaluated postoperatively by Dr Emir Mcgee on 06/20/2018, Grady catheter was removed at that office visit without any difficulties  Patient reports that since prostatectomy he has had ongoing urinary incontinence and frequency  He continues to wear a diaper for protection  He reports constant bladder fullness and incomplete bladder emptying  Review of Systems  Review of Systems   Constitutional: Negative  HENT: Negative  Respiratory: Negative  Cardiovascular: Negative  Gastrointestinal: Negative  Genitourinary: Positive for difficulty urinating, dysuria (urinary incontinence), frequency and testicular pain  Negative for decreased urine volume, discharge, flank pain, hematuria, penile pain, penile swelling, scrotal swelling ( right-sided) and urgency  Musculoskeletal: Negative  Skin: Negative  Neurological: Negative  Psychiatric/Behavioral: Negative            Past Medical History  Past Medical History:   Diagnosis Date    Acute bronchitis     Acute low back pain     Acute low back pain     [id number] resolved    Acute respiratory infection     Cellulitis of scrotum     Cough     Diabetes mellitus (HCC)     Herpes zoster     High cholesterol     Hyperkalemia     Hypertension     Kidney disease     Kidney stone     Low back pain     Lumbar radiculopathy     Rash     Retinopathy, central serous     Sleep apnea     [id number]    Trochanteric bursitis        Past Social History  Past Surgical History:   Procedure Laterality Date    ABDOMINAL ADHESION SURGERY N/A 6/13/2018    Procedure: LYSIS ADHESIONS;  Surgeon: Alejandra Gan MD;  [facility];  Service: Urology    LITHOTRIPSY      renal    MS LAP,PROSTATECTOMY,RADICAL,W/NERVE SPARE,INCL ROBOTIC N/A 6/13/2018    Procedure: Issa Soler W ROBOTICS;  Surgeon: Alejandra Gan MD;  [facility];  Service: Urology    PROSTATE SURGERY  06/13/2018    SHOULDER SURGERY         Past Family History  Family History   Problem Relation Age of Onset    Other Father         cardiac disorder    Stroke Father     Diabetes Father         mellitus    Hypertension Father     Heart disease Father         cardiac disorder    Cancer Sister     Cancer Paternal Grandmother     Heart disease Family         cardiac disorder    Kidney disease Family        Past Social history  Social History     Social History    Marital status: /Civil Union     Spouse name: N/A    Number of children: N/A    Years of education: N/A     Occupational History    Not on file  Social History Main Topics    Smoking status: Never Smoker    Smokeless tobacco: Never Used    Alcohol use No    Drug use: No    Sexual activity: Not on file     Other Topics Concern    Not on file     Social History Narrative    Caffeine use       Current Medications  Current Outpatient Prescriptions   Medication Sig Dispense Refill    albuterol (PROAIR HFA) 90 mcg/act inhaler Inhale 1-2 puffs every 6 (six) hours as needed        ASPIRIN 81 PO Take 1 capsule by mouth 2 (two) times a day        atorvastatin (LIPITOR) 80 mg tablet Take 80 mg by mouth daily  0    BD PEN NEEDLE MAJO U/F 32G X 4 MM MISC 2 (two) times a day  5    BYDUREON 2 MG SRER INJECT 2MG SUBCUTANEOUSLY EVERY 7 DAYS  (Patient taking differently: INJECT 2MG SUBCUTANEOUSLY EVERY 7 DAYS  on sundays) 4 each 3    furosemide (LASIX) 40 mg tablet Take 1 tablet (40 mg total) by mouth daily 10 tablet 0    glimepiride (AMARYL) 4 mg tablet Take by mouth 2 (two) times a day      HUMALOG KWIKPEN 100 units/mL injection pen 5 Units        ibuprofen (MOTRIN) 800 mg tablet Take 1 tablet by mouth every 8 (eight) hours as needed      Insulin Glargine (TOUJEO SOLOSTAR) 300 units/mL CONCETRATED U-300 injection pen 55 units daily   (Patient taking differently: 28 Units 55 units daily  ) 3 pen 0    insulin lispro (HUMALOG) 100 units/mL injection 10 units with each meal  10 mL 0    lisinopril (ZESTRIL) 40 mg tablet Take 1 tablet by mouth daily      metFORMIN (GLUCOPHAGE) 1000 MG tablet Take 1 tablet by mouth 2 (two) times a day      Multiple Vitamins-Minerals (MULTIVITAMIN WITH MINERALS) tablet Take 1 tablet by mouth daily      ondansetron (ZOFRAN) 4 mg tablet Take 1 tablet (4 mg total) by mouth every 8 (eight) hours as needed for nausea or vomiting 10 tablet 0    ONE TOUCH ULTRA TEST test strip       potassium chloride (K-DUR,KLOR-CON) 20 mEq tablet Take 1 tablet (20 mEq total) by mouth daily 10 tablet 0    ciprofloxacin (CIPRO) 500 mg tablet Take 1 tablet (500 mg total) by mouth every 12 (twelve) hours for 12 days 24 tablet 0     No current facility-administered medications for this visit  Allergies  Allergies   Allergen Reactions    Simvastatin      Increases Liver functoin       Past Medical History, Social History, Family History, medications and allergies were reviewed  Vitals  Vitals:    07/11/18 1042   BP: 140/84   BP Location: Right arm   Patient Position: Sitting   Cuff Size: Standard   Pulse: 78   Weight: 108 kg (237 lb 12 8 oz)   Height: 5' 6" (1 676 m)       Physical Exam  Physical Exam   Constitutional: He is oriented to person, place, and time  He appears well-developed and well-nourished  HENT:   Head: Normocephalic  Eyes: Pupils are equal, round, and reactive to light  Neck: Normal range of motion  Cardiovascular: Normal rate and regular rhythm  Pulmonary/Chest: Effort normal    Abdominal: Soft  Normal appearance  There is no CVA tenderness  Genitourinary: Penis normal    Genitourinary Comments: Negative CVA tenderness, difficulty performing physical exam due to extreme pain/discomfort upon palpating right sided scrotum/testicles  No erythema or edema noted, no bilateral inguinal hernia noted  Musculoskeletal: Normal range of motion     Gait unsteady, using cane due to pain  Patient unable to sit appropriately on chair due to scrotal discomfort   Neurological: He is alert and oriented to person, place, and time  He has normal reflexes  Skin: Skin is warm and dry  Psychiatric: He has a normal mood and affect  His behavior is normal  Judgment and thought content normal        Results    I have personally reviewed all pertinent lab results and reviewed with patient  Lab Results   Component Value Date    PSA 5 3 (H) 10/29/2016    PSA 6 0 (H) 08/20/2016     Lab Results   Component Value Date    GLUCOSE 147 (H) 06/18/2018    CALCIUM 7 8 (L) 06/18/2018     06/18/2018    K 3 7 06/18/2018    CO2 24 06/18/2018     06/18/2018    BUN 14 07/02/2018    CREATININE 1 23 07/02/2018     Lab Results   Component Value Date    WBC 17 45 (H) 06/18/2018    HGB 11 7 (L) 06/18/2018    HCT 37 4 06/18/2018    MCV 90 06/18/2018     (H) 06/18/2018     No results found for this or any previous visit (from the past 1 hour(s))

## 2018-07-15 DIAGNOSIS — M54.5 CHRONIC LOW BACK PAIN, UNSPECIFIED BACK PAIN LATERALITY, WITH SCIATICA PRESENCE UNSPECIFIED: Primary | ICD-10-CM

## 2018-07-15 DIAGNOSIS — G89.29 CHRONIC LOW BACK PAIN, UNSPECIFIED BACK PAIN LATERALITY, WITH SCIATICA PRESENCE UNSPECIFIED: Primary | ICD-10-CM

## 2018-07-15 RX ORDER — IBUPROFEN 800 MG/1
TABLET ORAL
Qty: 90 TABLET | Refills: 0 | Status: SHIPPED | OUTPATIENT
Start: 2018-07-15 | End: 2022-08-09

## 2018-07-31 ENCOUNTER — OFFICE VISIT (OUTPATIENT)
Dept: UROLOGY | Facility: CLINIC | Age: 58
End: 2018-07-31

## 2018-07-31 VITALS
SYSTOLIC BLOOD PRESSURE: 142 MMHG | HEART RATE: 80 BPM | DIASTOLIC BLOOD PRESSURE: 76 MMHG | WEIGHT: 237.6 LBS | HEIGHT: 66 IN | BODY MASS INDEX: 38.18 KG/M2

## 2018-07-31 DIAGNOSIS — N45.1 RIGHT EPIDIDYMITIS: Primary | ICD-10-CM

## 2018-07-31 PROCEDURE — 99024 POSTOP FOLLOW-UP VISIT: CPT | Performed by: PHYSICIAN ASSISTANT

## 2018-07-31 RX ORDER — LANCETS
EACH MISCELLANEOUS 4 TIMES DAILY
Refills: 3 | COMMUNITY
Start: 2018-07-05 | End: 2021-03-19

## 2018-07-31 NOTE — PROGRESS NOTES
1  Right epididymitis  US scrotum and testicles       Assessment and plan:       1  But[place] 7 (4+3) T2c prostate cancer s/p robotic prostatectomy (6/13/18) - managed by Dr Sarmad Beard  - patient scheduled in the near future for his post postoperative PSA    2  Stress urinary incontinence  - we reviewed that this is common after recent prostatectomy  I encouraged him to continue to perform Kegel exercises  Reviewed pelvic floor physical therapy which he is going to further consider  3   Right epididymitis  -status post 12 day course of ciprofloxacin  - right testicular pain has resolved  Mild right testicular fullness/firmness appreciated on physical exam   No erythema, fluctuance, or drainage  We discussed scrotal support, anti-inflammatories, and warm soaks  We will obtain an ultrasound of the testicles/scrotum prior to follow-up to review  Patient was unable to provide a urine specimen in the office today  Sai Torres PA-C      Chief Complaint     F/u prostate cancer    History of Present Illness     Justyna Chavez is a 63 y o  male patient of Dr Sarmad Beard with a history of nephrolithiasis and Butler 7 (4+3) T2c prostate cancer s/p robotic prostatectomy (6/13/18) presenting for follow up  Patient has longstanding history of npehrolithiasis, previously  been treated at [facility]rology  He was found to have a progression in PSA to 7 2  TRUS prostate biopsy (4/13/18) revealed Butler 7 prostate cancer  CT was negative for metastatic disease  Robotic radical prostatectomy 8/92/98 without complications  Pathology revealed negative surgical margins  Patient had been seen in our office 2 weeks ago due to right testicular pain  He was found to have epididymitis  He was on a 12 day course of ciprofloxacin  Patient's testicular discomfort has significantly improved following his 12 day course of ciprofloxacin  He denies right testicular fullness sensation    Denies any suprapubic pressure, flank pain, fevers, chills, dysuria, or gross hematuria  Denies any drainage from his scrotum  Patient continues to have constant urinary leakage  Wearing approximately 12 depends daily  Laboratory     Lab Results   Component Value Date    CREATININE 1 23 07/02/2018       Lab Results   Component Value Date    PSA 5 3 (H) 10/29/2016    PSA 6 0 (H) 08/20/2016       Review of Systems     Review of Systems   Constitutional: Negative for activity change, appetite change, chills, diaphoresis, fatigue, fever and unexpected weight change  Respiratory: Negative for chest tightness and shortness of breath  Cardiovascular: Negative for chest pain, palpitations and leg swelling  Gastrointestinal: Negative for abdominal distention, abdominal pain, constipation, diarrhea, nausea and vomiting  Genitourinary: Negative for decreased urine volume, difficulty urinating, dysuria, enuresis, flank pain, frequency, genital sores, hematuria and urgency  Musculoskeletal: Negative for back pain, gait problem and myalgias  Skin: Negative for color change, pallor, rash and wound  Psychiatric/Behavioral: Negative for behavioral problems  The patient is not nervous/anxious  Allergies     Allergies   Allergen Reactions    Simvastatin      Increases Liver functoin       Physical Exam     Physical Exam   Constitutional: He is oriented to person, place, and time  He appears well-developed and well-nourished  No distress  HENT:   Head: Normocephalic and atraumatic  Eyes: Conjunctivae are normal    Neck: Normal range of motion  No tracheal deviation present  Pulmonary/Chest: Effort normal    Abdominal:   Obese   Genitourinary:   Genitourinary Comments: Constant urine drip  Left testicle within normal limits  Right testicle mildly larger with firmness  No erythema, fluctuance, or drainage  Nontender to palpation  Musculoskeletal: Normal range of motion  He exhibits no edema or deformity  Neurological: He is alert and oriented to person, place, and time  Skin: Skin is warm and dry  No rash noted  He is not diaphoretic  No erythema  Psychiatric: He has a normal mood and affect  His behavior is normal          Vital Signs     Vitals:    07/31/18 0909   BP: 142/76   BP Location: Left arm   Patient Position: Sitting   Cuff Size: Standard   Pulse: 80   Weight: 108 kg (237 lb 9 6 oz)   Height: 5' 5 5" (1 664 m)         Current Medications       Current Outpatient Prescriptions:     albuterol (PROAIR HFA) 90 mcg/act inhaler, Inhale 1-2 puffs every 6 (six) hours as needed  , Disp: , Rfl:     ASPIRIN 81 PO, Take 1 capsule by mouth 2 (two) times a day  , Disp: , Rfl:     atorvastatin (LIPITOR) 80 mg tablet, Take 80 mg by mouth daily, Disp: , Rfl: 0    BD PEN NEEDLE MAJO U/F 32G X 4 MM MISC, 2 (two) times a day, Disp: , Rfl: 5    BYDUREON 2 MG SRER, INJECT 2MG SUBCUTANEOUSLY EVERY 7 DAYS  (Patient taking differently: INJECT 2MG SUBCUTANEOUSLY EVERY 7 DAYS  on sundays), Disp: 4 each, Rfl: 3    glimepiride (AMARYL) 4 mg tablet, Take by mouth 2 (two) times a day, Disp: , Rfl:     HUMALOG KWIKPEN 100 units/mL injection pen, 5 Units  , Disp: , Rfl:     ibuprofen (MOTRIN) 800 mg tablet, TAKE 1 TABLET EVERY 8 HOURS AS NEEDED, Disp: 90 tablet, Rfl: 0    Insulin Glargine (TOUJEO SOLOSTAR) 300 units/mL CONCETRATED U-300 injection pen, 55 units daily   (Patient taking differently: 28 Units 55 units daily  ), Disp: 3 pen, Rfl: 0    insulin lispro (HUMALOG) 100 units/mL injection, 10 units with each meal , Disp: 10 mL, Rfl: 0    Lancets (ONETOUCH ULTRASOFT) lancets, 4 (four) times a day Test, Disp: , Rfl: 3    lisinopril (ZESTRIL) 40 mg tablet, Take 1 tablet by mouth daily, Disp: , Rfl:     metFORMIN (GLUCOPHAGE) 1000 MG tablet, Take 1 tablet by mouth 2 (two) times a day, Disp: , Rfl:     Multiple Vitamins-Minerals (MULTIVITAMIN WITH MINERALS) tablet, Take 1 tablet by mouth daily, Disp: , Rfl:     ONE TOUCH ULTRA TEST test strip, , Disp: , Rfl:     furosemide (LASIX) 40 mg tablet, Take 1 tablet (40 mg total) by mouth daily, Disp: 10 tablet, Rfl: 0    ondansetron (ZOFRAN) 4 mg tablet, Take 1 tablet (4 mg total) by mouth every 8 (eight) hours as needed for nausea or vomiting, Disp: 10 tablet, Rfl: 0    potassium chloride (K-DUR,KLOR-CON) 20 mEq tablet, Take 1 tablet (20 mEq total) by mouth daily, Disp: 10 tablet, Rfl: 0      Active Problems     Patient Active Problem List   Diagnosis    Kidney stones    [facility]    Benign essential hypertension    Uncontrolled type 2 diabetes mellitus with hyperglycemia, with long-term current use of insulin (HCC)    Hyperlipidemia    Hypertension    Morbid obesity ([facility] )    Leukocytosis    Other fatigue         Past Medical History     Past Medical History:   Diagnosis Date    Acute bronchitis     Acute low back pain     Acute low back pain     [id number] resolved    Acute respiratory infection     Cellulitis of scrotum     Cough     Diabetes mellitus (HCC)     Herpes zoster     High cholesterol     Hyperkalemia     Hypertension     Kidney disease     Kidney stone     Low back pain     Lumbar radiculopathy     Rash     Retinopathy, central serous     Sleep apnea     [id number]    Trochanteric bursitis          Surgical History     Past Surgical History:   Procedure Laterality Date    ABDOMINAL ADHESION SURGERY N/A 6/13/2018    Procedure: LYSIS ADHESIONS;  Surgeon: Alvina Simmons MD;  [facility];  Service: Urology    LITHOTRIPSY      renal    HI LAP,PROSTATECTOMY,RADICAL,W/NERVE SPARE,INCL ROBOTIC N/A 6/13/2018    Procedure: PROSTATECTOMY RADICAL W ROBOTICS;  Surgeon: Alvina Simmons MD;  [facility];  Service: Urology    PROSTATE SURGERY  06/13/2018    SHOULDER SURGERY           Family History     Family History   Problem Relation Age of Onset    Other Father         cardiac disorder    Stroke Father     Diabetes Father mellitus    Hypertension Father     Heart disease Father         cardiac disorder    Cancer Sister     Cancer Paternal Grandmother     Heart disease Family         cardiac disorder    Kidney disease Family          Social History     Social History       Radiology

## 2018-08-02 LAB
LABORATORY COMMENT REPORT: NORMAL
THYROPEROXIDASE AB SERPL-ACNC: 12 IU/ML (ref 0–34)

## 2018-08-03 ENCOUNTER — APPOINTMENT (OUTPATIENT)
Dept: LAB | Facility: HOSPITAL | Age: 58
End: 2018-08-03
Attending: UROLOGY
Payer: COMMERCIAL

## 2018-08-03 DIAGNOSIS — C61 PROSTATE CANCER (HCC): ICD-10-CM

## 2018-08-03 LAB — PSA SERPL-MCNC: <0.1 NG/ML (ref 0–4)

## 2018-08-03 PROCEDURE — 84153 ASSAY OF PSA TOTAL: CPT

## 2018-08-08 ENCOUNTER — HOSPITAL ENCOUNTER (OUTPATIENT)
Dept: RADIOLOGY | Facility: MEDICAL CENTER | Age: 58
Discharge: HOME | End: 2018-08-08
Payer: COMMERCIAL

## 2018-08-08 DIAGNOSIS — N45.1 RIGHT EPIDIDYMITIS: ICD-10-CM

## 2018-08-08 PROCEDURE — 76870 US EXAM SCROTUM: CPT

## 2018-08-13 NOTE — PROGRESS NOTES
8/14/2018    [facility]  1960  [id number]    Discussion and Plan    Patient continues to do well status post DaVinci prostatectomy for [place] 7 stage T2c adenocarcinoma  Is undetectable  He will continue Kegel exercises  Return in 3 months with PSA prior to visit  KUB will be recheck next year  Advised testicular self examination  1  Prostate cancer ([facility] [address] )  - PSA Total, Diagnostic; Future    2  Kidney stones    Assessment      Patient Active Problem List   Diagnosis    Kidney stones    Prostate cancer ([facility] [address] )    Benign essential hypertension    Uncontrolled type 2 diabetes mellitus with hyperglycemia, with long-term current use of insulin ([facility] )    Hyperlipidemia    Hypertension    Morbid [facility] ([email or web address]    Leukocytosis    Other fatigue       History of Present Illness    Miah Carrillo is a 63 y o  male seen today in regards to a history of nephrolithiasis and Nu 7 (4+3) T2c prostate cancer s/p robotic prostatectomy (6/13/18) presenting for follow up      Patient has longstanding history of npehrolithiasis, previously  been treated at [facility] urology      He was found to have a progression in PSA to 7 2  TRUS prostate biopsy (4/13/18) revealed [place] 7 prostate cancer  CT was negative for metastatic disease  Robotic radical prostatectomy 3/03/45 without complications  Pathology revealed negative surgical margins       Patient had been seen due to right testicular pain  He was found to have epididymitis  He was on a 12 day course of ciprofloxacin  Patient's testicular discomfort has significantly improved following his 12 day course of ciprofloxacin  He denies right testicular fullness sensation  Denies any suprapubic pressure, flank pain, fevers, chills, dysuria, or gross hematuria  Denies any drainage from his scrotum  Symptoms have largely resolved  Urinary incontinence is greatly improved as well now noted with certain position changes or coughing    Ultrasound reviewed showing no significant findings except a solitary microlith within the left testis  PSA is undetectable at less than 0 1  Urinary Symptom Assessment        Past Medical History  Past Medical History:   Diagnosis Date    Acute bronchitis     Acute low back pain     Acute low back pain     [id number] resolved    Acute respiratory infection     Cellulitis of scrotum     Cough     Diabetes mellitus (HCC)     Herpes zoster     High cholesterol     Hyperkalemia     Hypertension     Kidney disease     Kidney stone     Low back pain     Lumbar radiculopathy     Rash     Retinopathy, central serous     Sleep apnea     [id number]    Trochanteric bursitis        Past Social History  Past Surgical History:   Procedure Laterality Date    ABDOMINAL ADHESION SURGERY N/A 6/13/2018    Procedure: LYSIS ADHESIONS;  Surgeon: Haley Keating MD;  [facility];  Service: Urology    LITHOTRIPSY      renal    OR LAP,PROSTATECTOMY,RADICAL,W/NERVE SPARE,INCL ROBOTIC N/A 6/13/2018    Procedure: PROSTATECTOMY RADICAL W ROBOTICS;  Surgeon: Haley Keating MD;  [facility];  Service: Urology    PROSTATE SURGERY  06/13/2018    SHOULDER SURGERY         Past Family History  Family History   Problem Relation Age of Onset    Other Father         cardiac disorder    Stroke Father     Diabetes Father         mellitus    Hypertension Father     Heart disease Father         cardiac disorder    Cancer Sister     Cancer Paternal Grandmother     Heart disease Family         cardiac disorder    Kidney disease Family        Past Social history  Social History     Social History    Marital status: /Civil Union     Spouse name: N/A    Number of children: N/A    Years of education: N/A     Occupational History    Not on file       Social History Main Topics    Smoking status: Never Smoker    Smokeless tobacco: Never Used    Alcohol use No    Drug use: No    Sexual activity: Not on file     Other Topics Concern    Not on file     Social History Narrative    Caffeine use       Current Medications  Current Outpatient Prescriptions   Medication Sig Dispense Refill    albuterol (PROAIR HFA) 90 mcg/act inhaler Inhale 1-2 puffs every 6 (six) hours as needed        ASPIRIN 81 PO Take 1 capsule by mouth 2 (two) times a day        atorvastatin (LIPITOR) 80 mg tablet Take 80 mg by mouth daily  0    BD PEN NEEDLE MAJO U/F 32G X 4 MM MISC 2 (two) times a day  5    BYDUREON 2 MG SRER INJECT 2MG SUBCUTANEOUSLY EVERY 7 DAYS  (Patient taking differently: INJECT 2MG SUBCUTANEOUSLY EVERY 7 DAYS  on sundays) 4 each 3    glimepiride (AMARYL) 4 mg tablet Take by mouth 2 (two) times a day      HUMALOG KWIKPEN 100 units/mL injection pen 5 Units        ibuprofen (MOTRIN) 800 mg tablet TAKE 1 TABLET EVERY 8 HOURS AS NEEDED 90 tablet 0    Insulin Glargine (TOUJEO SOLOSTAR) 300 units/mL CONCETRATED U-300 injection pen 55 units daily  (Patient taking differently: 28 Units 55 units daily  ) 3 pen 0    insulin lispro (HUMALOG) 100 units/mL injection 10 units with each meal  10 mL 0    Lancets (ONETOUCH ULTRASOFT) lancets 4 (four) times a day Test  3    lisinopril (ZESTRIL) 40 mg tablet Take 1 tablet by mouth daily      metFORMIN (GLUCOPHAGE) 1000 MG tablet Take 1 tablet by mouth 2 (two) times a day      Multiple Vitamins-Minerals (MULTIVITAMIN WITH MINERALS) tablet Take 1 tablet by mouth daily      ondansetron (ZOFRAN) 4 mg tablet Take 1 tablet (4 mg total) by mouth every 8 (eight) hours as needed for nausea or vomiting 10 tablet 0    ONE TOUCH ULTRA TEST test strip       potassium chloride (K-DUR,KLOR-CON) 20 mEq tablet Take 1 tablet (20 mEq total) by mouth daily 10 tablet 0     No current facility-administered medications for this visit          Allergies  Allergies   Allergen Reactions    Simvastatin      Increases Liver functoin       Past Medical History, Social History, Family History, medications and allergies were reviewed  Review of Systems  Review of Systems   Constitutional: Negative  HENT: Negative  Eyes: Negative  Respiratory: Negative  Cardiovascular: Negative  Gastrointestinal: Negative  Endocrine: Negative  Genitourinary: Negative for decreased urine volume, difficulty urinating, hematuria and urgency  Musculoskeletal: Negative  Skin: Negative  Neurological: Negative  Hematological: Negative  Psychiatric/Behavioral: Negative  Vitals  Vitals:    08/14/18 0742   BP: 148/80   BP Location: Left arm   Patient Position: Sitting   Cuff Size: Standard   Pulse: 78   Weight: 109 kg (239 lb 3 2 oz)   Height: 5' 5" (1 651 m)         Physical Exam    Physical Exam   Constitutional: He is oriented to person, place, and time  He appears well-developed and well-nourished  HENT:   Head: Normocephalic and atraumatic  Eyes: Pupils are equal, round, and reactive to light  Neck: Normal range of motion  Cardiovascular: Normal rate, regular rhythm and normal heart sounds  Pulmonary/Chest: Effort normal and breath sounds normal  No accessory muscle usage  No respiratory distress  Abdominal: Soft  Normal appearance and bowel sounds are normal  There is no tenderness  Musculoskeletal: Normal range of motion  Neurological: He is alert and oriented to person, place, and time  Skin: Skin is warm, dry and intact  Psychiatric: He has a normal mood and affect  His speech is normal  Cognition and memory are normal    Nursing note and vitals reviewed        Results    Below listed labs, pathology results, and radiology images were personally reviewed:    Lab Results   Component Value Date/Time    PSA <0 1 08/03/2018 07:06 AM    PSA 5 3 (H) 10/29/2016 09:13 AM     Lab Results   Component Value Date    GLUCOSE 147 (H) 06/18/2018    CALCIUM 7 8 (L) 06/18/2018     06/18/2018    K 3 7 06/18/2018    CO2 24 06/18/2018     06/18/2018    BUN 14 07/02/2018    CREATININE 1 23 07/02/2018 Lab Results   Component Value Date    WBC 17 45 (H) 06/18/2018    HGB 11 7 (L) 06/18/2018    HCT 37 4 06/18/2018    MCV 90 06/18/2018     (H) 06/18/2018       No results found for this or any previous visit (from the past 1 hour(s)) ]    SCROTAL ULTRASOUND     INDICATION:    N45 1: Epididymitis      COMPARISON: None     TECHNIQUE:   Ultrasound the scrotal contents was performed with a high frequency linear transducer utilizing volumetric sweep imaging as well as standard still image techniques  Imaging performed in longitudinal and transverse orientation  Color and   spectral Doppler evaluation also performed bilaterally      FINDINGS:     TESTES:   Testes are symmetric and normal in size      RIGHT testis = 3 7 x 2 3 x 2 8 cm   Normal contour with homogeneous smooth echotexture  No intratesticular mass lesion or calcifications      LEFT testis = 4 6 x 2 x 3 1 cm  Normal contour with homogeneous smooth echotexture  No intratesticular mass lesion  Single punctate microlith image 33      Doppler flow within both testes is present and symmetric      EPIDIDYMIDES:   Normal Size  Doppler ultrasound demonstrates normal blood flow  No epididymal lesions      HYDROCELE:  No significant fluid present      VARICOCELE:  None present      SCROTUM:  Scrotal thickness and appearance within normal limits  No evidence for extratesticular mass or hernia demonstrated      IMPRESSION:     Essentially normal scrotal sonogram      Single left testicular microlith is present without intratesticular mass or other worrisome findings  In the absence of any other risk factors for testicular cancer (e g , personal history of testicular cancer, father or brother with testicular cancer,   history of cryptorchidism for maldescent, testicular atrophy, or other risk factors), no further imaging or biochemical follow-up is necessary; all that is recommended is routine monthly testicular self-examination    However if the patient has risk   factors for testicular cancer, evaluation and determination of an optimal follow-up strategy is deferred to urologist  (Reference: AJR 2016: [id number] )      Workstation performed: [id number]

## 2018-08-14 ENCOUNTER — OFFICE VISIT (OUTPATIENT)
Dept: UROLOGY | Facility: CLINIC | Age: 58
End: 2018-08-14

## 2018-08-14 VITALS
WEIGHT: 239.2 LBS | DIASTOLIC BLOOD PRESSURE: 80 MMHG | HEIGHT: 65 IN | BODY MASS INDEX: 39.85 KG/M2 | HEART RATE: 78 BPM | SYSTOLIC BLOOD PRESSURE: 148 MMHG

## 2018-08-14 DIAGNOSIS — C61 PROSTATE CANCER (HCC): Primary | ICD-10-CM

## 2018-08-14 DIAGNOSIS — N20.0 KIDNEY STONES: ICD-10-CM

## 2018-08-14 PROCEDURE — 99024 POSTOP FOLLOW-UP VISIT: CPT | Performed by: UROLOGY

## 2018-08-23 DIAGNOSIS — IMO0002 UNCONTROLLED TYPE 2 DIABETES MELLITUS WITH COMPLICATION, WITHOUT LONG-TERM CURRENT USE OF INSULIN: ICD-10-CM

## 2018-08-23 RX ORDER — EXENATIDE 2 MG
KIT SUBCUTANEOUS
Qty: 4 EACH | Refills: 3 | Status: SHIPPED | OUTPATIENT
Start: 2018-08-23 | End: 2018-12-22 | Stop reason: SDUPTHER

## 2018-10-02 ENCOUNTER — OFFICE VISIT (OUTPATIENT)
Dept: INTERNAL MEDICINE CLINIC | Facility: CLINIC | Age: 58
End: 2018-10-02
Payer: COMMERCIAL

## 2018-10-02 VITALS
HEIGHT: 65 IN | DIASTOLIC BLOOD PRESSURE: 80 MMHG | SYSTOLIC BLOOD PRESSURE: 158 MMHG | HEART RATE: 82 BPM | BODY MASS INDEX: 39.35 KG/M2 | WEIGHT: 236.2 LBS

## 2018-10-02 DIAGNOSIS — E66.01 MORBID OBESITY (HCC): ICD-10-CM

## 2018-10-02 DIAGNOSIS — E11.65 UNCONTROLLED TYPE 2 DIABETES MELLITUS WITH HYPERGLYCEMIA, WITH LONG-TERM CURRENT USE OF INSULIN (HCC): Primary | ICD-10-CM

## 2018-10-02 DIAGNOSIS — C61 PROSTATE CANCER (HCC): ICD-10-CM

## 2018-10-02 DIAGNOSIS — E78.2 MIXED HYPERLIPIDEMIA: ICD-10-CM

## 2018-10-02 DIAGNOSIS — I10 BENIGN ESSENTIAL HYPERTENSION: ICD-10-CM

## 2018-10-02 DIAGNOSIS — Z79.4 UNCONTROLLED TYPE 2 DIABETES MELLITUS WITH HYPERGLYCEMIA, WITH LONG-TERM CURRENT USE OF INSULIN (HCC): Primary | ICD-10-CM

## 2018-10-02 DIAGNOSIS — Z12.11 SCREENING FOR COLON CANCER: ICD-10-CM

## 2018-10-02 PROCEDURE — 99214 OFFICE O/P EST MOD 30 MIN: CPT | Performed by: INTERNAL MEDICINE

## 2018-10-02 NOTE — PROGRESS NOTES
Assessment/Plan:  Already had his flu vaccine  Discussed Shingrix  Continue current meds-lisinopril for HTN  He is on atorvastatin for hyperlipidemia  DM managed by Dr Meme Rice  F/U with urology         Problem List Items Addressed This Visit        Endocrine    Uncontrolled type 2 diabetes mellitus with hyperglycemia, with long-term current use of insulin ([facility]) - Primary    Relevant Medications    insulin lispro (HUMALOG) 100 units/mL injection    Insulin Glargine (TOUJEO SOLOSTAR) 300 units/mL CONCETRATED U-300 injection pen       Cardiovascular and Mediastinum    Benign essential hypertension       Genitourinary    Prostate cancer ([facility] [address] )       Other    Hyperlipidemia    Morbid obesity ([facility] [address] )      Other Visit Diagnoses     Screening for colon cancer                Subjective:      Patient ID: Kaylynn Hernandez is a 63 y o  male  HPI  4 months since prostatectomy  Urinary incontinence every time he gets up from sitting  Dizziness with getting up from a chair, bed  FBS ; noon 130s   No hypoglycemia  Cognitively better, busy at work  Denies feeling depressed    The following portions of the patient's history were reviewed and updated as appropriate: allergies, current medications, past family history, past medical history, past social history, past surgical history and problem list     Review of Systems   Constitutional: Negative for fatigue, fever and unexpected weight change  HENT: Negative for ear pain, hearing loss, sinus pain, sinus pressure and sore throat  Respiratory: Negative for cough, shortness of breath and wheezing  Cardiovascular: Negative for chest pain, palpitations and leg swelling  Gastrointestinal: Negative for abdominal pain, constipation, diarrhea, nausea and vomiting  Genitourinary:        Incontinence   Musculoskeletal: Negative for arthralgias and myalgias  Neurological: Negative for dizziness and headaches           Objective:      /80   Pulse 82   Ht 5' 5" (7 421 m)   Wt 107 kg (236 lb 3 2 oz)   BMI 39 31 kg/m²          Physical Exam   Constitutional: He is oriented to person, place, and time  He appears well-developed and well-nourished  HENT:   Head: Normocephalic and atraumatic  Right Ear: External ear normal    Left Ear: External ear normal    Mouth/Throat: Oropharynx is clear and moist    Eyes: Conjunctivae are normal    Neck: Neck supple  Cardiovascular: Normal rate, regular rhythm and normal heart sounds  No murmur heard  Pulmonary/Chest: Effort normal and breath sounds normal  No respiratory distress  He has no wheezes  He has no rales  Abdominal: Soft  Bowel sounds are normal  He exhibits no distension and no mass  There is no tenderness  There is no rebound and no guarding  Musculoskeletal: Normal range of motion  Neurological: He is alert and oriented to person, place, and time  Skin: Skin is warm and dry  Psychiatric: He has a normal mood and affect   His behavior is normal  Judgment and thought content normal

## 2018-10-31 DIAGNOSIS — I10 HYPERTENSION, ESSENTIAL, BENIGN: Primary | ICD-10-CM

## 2018-11-01 PROCEDURE — 4010F ACE/ARB THERAPY RXD/TAKEN: CPT | Performed by: INTERNAL MEDICINE

## 2018-11-01 RX ORDER — LISINOPRIL 40 MG/1
TABLET ORAL
Qty: 90 TABLET | Refills: 3 | Status: SHIPPED | OUTPATIENT
Start: 2018-11-01 | End: 2019-10-24 | Stop reason: SDUPTHER

## 2018-11-06 ENCOUNTER — APPOINTMENT (OUTPATIENT)
Dept: LAB | Facility: HOSPITAL | Age: 58
End: 2018-11-06
Attending: UROLOGY
Payer: COMMERCIAL

## 2018-11-06 DIAGNOSIS — E11.641 UNCONTROLLED TYPE 2 DIABETES MELLITUS WITH HYPOGLYCEMIA AND COMA (HCC): Primary | ICD-10-CM

## 2018-11-06 DIAGNOSIS — I10 ESSENTIAL HYPERTENSION, MALIGNANT: ICD-10-CM

## 2018-11-06 DIAGNOSIS — C61 PROSTATE CANCER (HCC): ICD-10-CM

## 2018-11-06 DIAGNOSIS — E03.9 MYXEDEMA HEART DISEASE: ICD-10-CM

## 2018-11-06 DIAGNOSIS — I51.9 MYXEDEMA HEART DISEASE: ICD-10-CM

## 2018-11-06 LAB
25(OH)D3 SERPL-MCNC: 26.6 NG/ML (ref 30–100)
ANION GAP SERPL CALCULATED.3IONS-SCNC: 7 MMOL/L (ref 4–13)
BUN SERPL-MCNC: 36 MG/DL (ref 5–25)
CALCIUM SERPL-MCNC: 9.4 MG/DL (ref 8.3–10.1)
CHLORIDE SERPL-SCNC: 105 MMOL/L (ref 100–108)
CO2 SERPL-SCNC: 24 MMOL/L (ref 21–32)
CREAT SERPL-MCNC: 1.43 MG/DL (ref 0.6–1.3)
EST. AVERAGE GLUCOSE BLD GHB EST-MCNC: 163 MG/DL
GFR SERPL CREATININE-BSD FRML MDRD: 54 ML/MIN/1.73SQ M
GLUCOSE SERPL-MCNC: 112 MG/DL (ref 65–140)
HBA1C MFR BLD: 7.3 % (ref 4.2–6.3)
POTASSIUM SERPL-SCNC: 5.4 MMOL/L (ref 3.5–5.3)
PSA SERPL-MCNC: <0.1 NG/ML (ref 0–4)
SODIUM SERPL-SCNC: 136 MMOL/L (ref 136–145)
TSH SERPL DL<=0.05 MIU/L-ACNC: 1.66 UIU/ML (ref 0.36–3.74)

## 2018-11-06 PROCEDURE — 84153 ASSAY OF PSA TOTAL: CPT

## 2018-11-06 PROCEDURE — 84443 ASSAY THYROID STIM HORMONE: CPT

## 2018-11-06 PROCEDURE — 82306 VITAMIN D 25 HYDROXY: CPT

## 2018-11-06 PROCEDURE — 36415 COLL VENOUS BLD VENIPUNCTURE: CPT

## 2018-11-06 PROCEDURE — 83036 HEMOGLOBIN GLYCOSYLATED A1C: CPT

## 2018-11-06 PROCEDURE — 80048 BASIC METABOLIC PNL TOTAL CA: CPT

## 2018-11-19 NOTE — PROGRESS NOTES
11/21/2018    Arcelia Johns  1960  [id number]    Discussion and Plan    Patient continues to do well with no evidence of recurrent prostate cancer  I stressed the need for continuing Kegel exercises which were reviewed again today  He will otherwise return in 3 months with PSA prior to visit  KUB should be obtained in 6 months  All questions answered at this time  1  Prostate cancer ([facility] [address] )  - PSA Total, Diagnostic; Future    2  Kidney stones    Assessment      Patient Active Problem List   Diagnosis    Kidney stones    Prostate cancer ([facility] [address] [facility]    Benign essential hypertension    Uncontrolled type 2 diabetes mellitus with hyperglycemia, with [facility]term [facility]    Hyperlipidemia    Hypertension    [facility]    Leukocytosis    Other fatigue       History of Present Illness    Adams Rodriguez is a 63 y o  male seen today in regards to a history of nephrolithiasis and Nu 7 (4+3) T2c prostate cancer s/p robotic prostatectomy (6/13/18) presenting for follow up      Patient has longstanding history of npehrolithiasis, previously  been treated at [facility] urology      He was found to have a progression in PSA to 7 2  TRUS prostate biopsy (4/13/18) revealed Nu 7 prostate cancer  CT was negative for metastatic disease  Robotic radical prostatectomy 3/32/88 without complications  Pathology revealed negative surgical margins  Has persistent stress incontinence requiring 4-5 pads daily      Urinary Symptom Assessment        Past Medical History  Past Medical History:   Diagnosis Date    Acute bronchitis     Acute low back pain     Acute low back pain     [id number] resolved    Acute respiratory infection     Cellulitis of scrotum     Cough     Diabetes mellitus (HCC)     Herpes zoster     High cholesterol     Hyperkalemia     Hypertension     Kidney disease     Kidney stone     Low back pain     Lumbar radiculopathy     Rash     Retinopathy, central serous     Sleep apnea     [id number]    Trochanteric bursitis        Past Social History  Past Surgical History:   Procedure Laterality Date    ABDOMINAL ADHESION SURGERY N/A 6/13/2018    Procedure: LYSIS ADHESIONS;  Surgeon: Too Thomson MD;  [facility];  Service: Urology    LITHOTRIPSY      renal    OR LAP,PROSTATECTOMY,RADICAL,W/NERVE SPARE,INCL ROBOTIC N/A 6/13/2018    Procedure: Gattis Po;  Surgeon: Too Thomson MD;  [facility];  Service: Urology    PROSTATE SURGERY  06/13/2018    SHOULDER SURGERY         Past Family History  Family History   Problem Relation Age of Onset    Other Father         cardiac disorder    Stroke Father     Diabetes Father         mellitus    Hypertension Father     Heart disease Father         cardiac disorder    Cancer Sister     Cancer Paternal Grandmother     Heart disease Family         cardiac disorder    Kidney disease Family        Past Social history  Social History     Social History    Marital status: /Civil Union     Spouse name: N/A    Number of children: N/A    Years of education: N/A     Occupational History    Not on file  Social History Main Topics    Smoking status: Never Smoker    Smokeless tobacco: Never Used    Alcohol use No    Drug use: No    Sexual activity: Not on file     Other Topics Concern    Not on file     Social History Narrative    Caffeine use       Current Medications  Current Outpatient Prescriptions   Medication Sig Dispense Refill    albuterol (PROAIR HFA) 90 mcg/act inhaler Inhale 1-2 puffs every 6 (six) hours as needed        ASPIRIN 81 PO Take 1 capsule by mouth 2 (two) times a day        atorvastatin (LIPITOR) 80 mg tablet Take 80 mg by mouth daily  0    BD PEN NEEDLE MAJO U/F 32G X 4 MM MISC 2 (two) times a day  5    BYDUREON 2 MG SRER INJECT 2MG SUBCUTANEOUSLY EVERY 7 DAYS   4 each 3    glimepiride (AMARYL) 4 mg tablet Take by mouth 2 (two) times a day      HUMALOG KWIKPEN 100 units/mL injection pen INJECT 5 UNITS BEFORE EACH MEAL  3    ibuprofen (MOTRIN) 800 mg tablet TAKE 1 TABLET EVERY 8 HOURS AS NEEDED 90 tablet 0    Insulin Glargine (TOUJEO SOLOSTAR) 300 units/mL CONCETRATED U-300 injection pen 38 units daily  3 pen 0    insulin lispro (HUMALOG) 100 units/mL injection 5-10 units with each meal  10 mL 0    Lancets (ONETOUCH ULTRASOFT) lancets 4 (four) times a day Test  3    lisinopril (ZESTRIL) 40 mg tablet TAKE 1 TABLET BY MOUTH EVERY DAY 90 tablet 3    metFORMIN (GLUCOPHAGE) 1000 MG tablet Take 1 tablet by mouth 2 (two) times a day      Multiple Vitamins-Minerals (MULTIVITAMIN WITH MINERALS) tablet Take 1 tablet by mouth daily      ONE TOUCH ULTRA TEST test strip       ondansetron (ZOFRAN) 4 mg tablet Take 1 tablet (4 mg total) by mouth every 8 (eight) hours as needed for nausea or vomiting (Patient not taking: Reported on 11/21/2018 ) 10 tablet 0    potassium chloride (K-DUR,KLOR-CON) 20 mEq tablet Take 1 tablet (20 mEq total) by mouth daily (Patient not taking: Reported on 11/21/2018 ) 10 tablet 0     No current facility-administered medications for this visit  Allergies  Allergies   Allergen Reactions    Simvastatin      Increases Liver functoin       Past Medical History, Social History, Family History, medications and allergies were reviewed  Review of Systems  Review of Systems   Constitutional: Negative  HENT: Negative  Eyes: Negative  Respiratory: Negative  Cardiovascular: Negative  Gastrointestinal: Negative  Endocrine: Negative  Genitourinary: Negative for decreased urine volume, difficulty urinating, hematuria and urgency  Musculoskeletal: Negative  Skin: Negative  Neurological: Negative  Hematological: Negative  Psychiatric/Behavioral: Negative  Vitals  There were no vitals filed for this visit        Physical Exam    Physical Exam   Constitutional: He is oriented to person, place, and time  He appears well-developed and well-nourished  HENT:   Head: Normocephalic and atraumatic  Eyes: Pupils are equal, round, and reactive to light  Neck: Normal range of motion  Cardiovascular: Normal rate, regular rhythm and normal heart sounds  Pulmonary/Chest: Effort normal and breath sounds normal  No accessory muscle usage  No respiratory distress  Abdominal: Soft  Normal appearance and bowel sounds are normal  There is no tenderness  Musculoskeletal: Normal range of motion  Neurological: He is alert and oriented to person, place, and time  Skin: Skin is warm, dry and intact  Psychiatric: He has a normal mood and affect  His speech is normal  Cognition and memory are normal    Nursing note and vitals reviewed        Results    Below listed labs, pathology results, and radiology images were personally reviewed:    Lab Results   Component Value Date/Time    PSA <0 1 11/06/2018 06:58 AM    PSA 5 3 (H) 10/29/2016 09:13 AM     Lab Results   Component Value Date    CALCIUM 9 4 11/06/2018    K 5 4 (H) 11/06/2018    CO2 24 11/06/2018     11/06/2018    BUN 36 (H) 11/06/2018    CREATININE 1 43 (H) 11/06/2018     Lab Results   Component Value Date    WBC 17 45 (H) 06/18/2018    HGB 11 7 (L) 06/18/2018    HCT 37 4 06/18/2018    MCV 90 06/18/2018     (H) 06/18/2018       No results found for this or any previous visit (from the past 1 hour(s)) ]

## 2018-11-21 ENCOUNTER — OFFICE VISIT (OUTPATIENT)
Dept: UROLOGY | Facility: CLINIC | Age: 58
End: 2018-11-21
Payer: COMMERCIAL

## 2018-11-21 DIAGNOSIS — N20.0 KIDNEY STONES: ICD-10-CM

## 2018-11-21 DIAGNOSIS — C61 PROSTATE CANCER (HCC): Primary | ICD-10-CM

## 2018-11-21 PROCEDURE — 99213 OFFICE O/P EST LOW 20 MIN: CPT | Performed by: UROLOGY

## 2018-11-21 RX ORDER — INSULIN LISPRO 100 [IU]/ML
15 INJECTION, SOLUTION INTRAVENOUS; SUBCUTANEOUS
Refills: 3 | COMMUNITY
Start: 2018-11-12 | End: 2021-08-30 | Stop reason: SDUPTHER

## 2018-12-22 DIAGNOSIS — IMO0002 UNCONTROLLED TYPE 2 DIABETES MELLITUS WITH COMPLICATION, WITHOUT LONG-TERM CURRENT USE OF INSULIN: ICD-10-CM

## 2018-12-22 RX ORDER — EXENATIDE 2 MG
KIT SUBCUTANEOUS
Qty: 4 EACH | Refills: 3 | Status: SHIPPED | OUTPATIENT
Start: 2018-12-22 | End: 2019-11-19

## 2018-12-23 DIAGNOSIS — IMO0002 UNCONTROLLED TYPE 2 DIABETES MELLITUS WITH COMPLICATION, WITHOUT LONG-TERM CURRENT USE OF INSULIN: ICD-10-CM

## 2018-12-24 RX ORDER — EXENATIDE 2 MG
KIT SUBCUTANEOUS
Qty: 4 EACH | Refills: 3 | Status: SHIPPED | OUTPATIENT
Start: 2018-12-24 | End: 2019-11-19 | Stop reason: ALTCHOICE

## 2019-02-05 ENCOUNTER — OFFICE VISIT (OUTPATIENT)
Dept: INTERNAL MEDICINE CLINIC | Facility: CLINIC | Age: 59
End: 2019-02-05
Payer: COMMERCIAL

## 2019-02-05 VITALS
WEIGHT: 250.6 LBS | DIASTOLIC BLOOD PRESSURE: 78 MMHG | HEART RATE: 102 BPM | HEIGHT: 65 IN | OXYGEN SATURATION: 99 % | BODY MASS INDEX: 41.75 KG/M2 | SYSTOLIC BLOOD PRESSURE: 144 MMHG

## 2019-02-05 DIAGNOSIS — Z79.4 UNCONTROLLED TYPE 2 DIABETES MELLITUS WITH HYPERGLYCEMIA, WITH LONG-TERM CURRENT USE OF INSULIN (HCC): Primary | ICD-10-CM

## 2019-02-05 DIAGNOSIS — E55.9 VITAMIN D DEFICIENCY: ICD-10-CM

## 2019-02-05 DIAGNOSIS — E66.01 MORBID OBESITY (HCC): ICD-10-CM

## 2019-02-05 DIAGNOSIS — I10 BENIGN ESSENTIAL HYPERTENSION: ICD-10-CM

## 2019-02-05 DIAGNOSIS — Z11.59 NEED FOR HEPATITIS C SCREENING TEST: ICD-10-CM

## 2019-02-05 DIAGNOSIS — E11.65 UNCONTROLLED TYPE 2 DIABETES MELLITUS WITH HYPERGLYCEMIA, WITH LONG-TERM CURRENT USE OF INSULIN (HCC): Primary | ICD-10-CM

## 2019-02-05 DIAGNOSIS — N20.0 KIDNEY STONES: ICD-10-CM

## 2019-02-05 DIAGNOSIS — E78.2 MIXED HYPERLIPIDEMIA: ICD-10-CM

## 2019-02-05 DIAGNOSIS — C61 PROSTATE CANCER (HCC): ICD-10-CM

## 2019-02-05 PROBLEM — N39.3 STRESS INCONTINENCE OF URINE: Status: ACTIVE | Noted: 2019-02-05

## 2019-02-05 PROCEDURE — 3008F BODY MASS INDEX DOCD: CPT | Performed by: INTERNAL MEDICINE

## 2019-02-05 PROCEDURE — 99214 OFFICE O/P EST MOD 30 MIN: CPT | Performed by: INTERNAL MEDICINE

## 2019-02-05 NOTE — PROGRESS NOTES
Assessment/Plan:    Uncontrolled type 2 diabetes mellitus with hyperglycemia, with long-term current use of insulin ([facility])  Lab Results   Component Value Date    HGBA1C 7 3 (H) 11/06/2018   Better controlled  Managed by Dr Sj Thomson  On metformin Amaryl Bydureon Toujeo and premeal insulin        Benign essential hypertension  I asked that he start checking his BP at home  Discussed starting a second agent like amlodipine    Kidney stones  Stable on right    Prostate cancer ([facility] [address] )  Continues to follow up with urology    Hyperlipidemia  Last LDL > 6months ago was 36 on atorvastatin    Start vit d 1000 units daily in addition to MVI  Hep c screen with next labs for Dr Sj Thomson     Problem List Items Addressed This Visit        Endocrine    Uncontrolled type 2 diabetes mellitus with hyperglycemia, with long-term current use of insulin ([facility] [address] ) - Primary     Lab Results   Component Value Date    HGBA1C 7 3 (H) 11/06/2018   Better controlled  Managed by Dr Sj Thomson  On metformin Amaryl Bydureon Toujeo and premeal insulin                Cardiovascular and Mediastinum    Benign essential hypertension     I asked that he start checking his BP at home  Discussed starting a second agent like amlodipine            Genitourinary    Kidney stones     Stable on right         Prostate cancer ([facility] [address] )     Continues to follow up with urology            Other    Morbid obesity ([facility] [address] )    Hyperlipidemia     Last LDL > 6months ago was 36 on atorvastatin           Other Visit Diagnoses     Need for hepatitis C screening test        Relevant Orders    Hepatitis C antibody    Vitamin D deficiency                Subjective:      Patient ID: Jani Ugalde is a 63 y o  male      HPI  Here for a follow up  He remains incontinent since robotic radical prostatectomy in June 2018  DM managed by Dr Sj Thomson, A1C  In Nov 7 3  FBS remain high, 120-200, prelunch/noon close to 100 , predinner same as fasting    The following portions of the patient's history were reviewed and updated as appropriate: allergies, past family history, past medical history, past social history, past surgical history and problem list     Review of Systems   Constitutional: Negative for fatigue, fever and unexpected weight change  HENT: Negative for ear pain, hearing loss, sinus pain, sinus pressure and sore throat  Respiratory: Negative for cough, shortness of breath and wheezing  Cardiovascular: Negative for chest pain, palpitations and leg swelling  Gastrointestinal: Negative for abdominal pain, constipation, diarrhea, nausea and vomiting  Genitourinary:        Urinary incontinence   Musculoskeletal: Negative for arthralgias and myalgias  Neurological: Negative for dizziness and headaches  Objective:      /78   Pulse 102   Ht 5' 5" (1 651 m)   Wt 114 kg (250 lb 9 6 oz)   SpO2 99%   BMI 41 70 kg/m²          Physical Exam   Constitutional: He is oriented to person, place, and time  He appears well-developed and well-nourished  HENT:   Head: Normocephalic and atraumatic  Right Ear: External ear normal    Left Ear: External ear normal    Mouth/Throat: Oropharynx is clear and moist    Eyes: Conjunctivae are normal    Neck: Neck supple  Cardiovascular: Normal rate, regular rhythm and normal heart sounds  No murmur heard  Pulmonary/Chest: Effort normal and breath sounds normal  No respiratory distress  He has no wheezes  He has no rales  Abdominal: Soft  Bowel sounds are normal  He exhibits no distension and no mass  There is no tenderness  There is no rebound and no guarding  Musculoskeletal: Normal range of motion  Neurological: He is alert and oriented to person, place, and time  Skin: Skin is warm and dry  Psychiatric: He has a normal mood and affect   His behavior is normal  Judgment and thought content normal

## 2019-02-06 NOTE — ASSESSMENT & PLAN NOTE
Lab Results   Component Value Date    HGBA1C 7 3 (H) 11/06/2018   Better controlled  Managed by Dr Bonita Ng  On metformin Amaryl Bydureon Toujeo and premeal insulin

## 2019-02-21 ENCOUNTER — TELEPHONE (OUTPATIENT)
Dept: INTERNAL MEDICINE CLINIC | Facility: CLINIC | Age: 59
End: 2019-02-21

## 2019-02-21 NOTE — TELEPHONE ENCOUNTER
Please call Dr Avina's office about this   She manages his diabetes  I would like her to decide what he should use instead  Please inform patient/his wife as well

## 2019-02-21 NOTE — TELEPHONE ENCOUNTER
I spoke to  at Dr [facility] and advised them of the situation  They will order something for Abby Fonseca before Sunday  I then called the patient and spoke to his wife  I explained the situation to her  All is taken care of

## 2019-02-21 NOTE — TELEPHONE ENCOUNTER
Pt's wife left a message on the prescription line stating they received a call from [employer] that [id number] is no longer made and they would need a different medication sent into the pharmacy in its place

## 2019-02-25 DIAGNOSIS — IMO0002 UNCONTROLLED TYPE 2 DIABETES MELLITUS WITH COMPLICATION, WITHOUT LONG-TERM CURRENT USE OF INSULIN: ICD-10-CM

## 2019-02-25 RX ORDER — EXENATIDE 2 MG/.65ML
2 INJECTION, SUSPENSION, EXTENDED RELEASE SUBCUTANEOUS
Qty: 4 EACH | Refills: 2 | Status: SHIPPED | OUTPATIENT
Start: 2019-02-25 | End: 2019-05-06 | Stop reason: SDUPTHER

## 2019-03-19 ENCOUNTER — APPOINTMENT (OUTPATIENT)
Dept: LAB | Facility: HOSPITAL | Age: 59
End: 2019-03-19
Attending: UROLOGY
Payer: COMMERCIAL

## 2019-03-19 ENCOUNTER — TELEPHONE (OUTPATIENT)
Dept: UROLOGY | Facility: MEDICAL CENTER | Age: 59
End: 2019-03-19

## 2019-03-19 DIAGNOSIS — C61 PROSTATE CANCER (HCC): ICD-10-CM

## 2019-03-19 DIAGNOSIS — N20.0 KIDNEY STONES: Primary | ICD-10-CM

## 2019-03-19 DIAGNOSIS — E78.5 HYPERLIPIDEMIA, UNSPECIFIED HYPERLIPIDEMIA TYPE: ICD-10-CM

## 2019-03-19 DIAGNOSIS — E11.649 UNCONTROLLED TYPE 2 DIABETES MELLITUS WITH HYPOGLYCEMIA, UNSPECIFIED HYPOGLYCEMIA COMA STATUS (HCC): Primary | ICD-10-CM

## 2019-03-19 DIAGNOSIS — Z11.59 NEED FOR HEPATITIS C SCREENING TEST: ICD-10-CM

## 2019-03-19 LAB
CHOLEST SERPL-MCNC: 119 MG/DL (ref 50–200)
EST. AVERAGE GLUCOSE BLD GHB EST-MCNC: 163 MG/DL
HBA1C MFR BLD: 7.3 % (ref 4.2–6.3)
HCV AB SER QL: NORMAL
HDLC SERPL-MCNC: 39 MG/DL (ref 40–60)
LDLC SERPL CALC-MCNC: 55 MG/DL (ref 0–100)
NONHDLC SERPL-MCNC: 80 MG/DL
PSA SERPL-MCNC: <0.1 NG/ML (ref 0–4)
TRIGL SERPL-MCNC: 127 MG/DL

## 2019-03-19 PROCEDURE — 80061 LIPID PANEL: CPT

## 2019-03-19 PROCEDURE — 84153 ASSAY OF PSA TOTAL: CPT

## 2019-03-19 PROCEDURE — 83036 HEMOGLOBIN GLYCOSYLATED A1C: CPT

## 2019-03-19 PROCEDURE — 86803 HEPATITIS C AB TEST: CPT

## 2019-03-19 PROCEDURE — 36415 COLL VENOUS BLD VENIPUNCTURE: CPT

## 2019-03-19 NOTE — TELEPHONE ENCOUNTER
Pt called to reschedule due to provider this will be 3rd new appointment his schedule varies with meetings please call him directly at his office he was to be seen Feb 3 month follow w/psa only will see Dr Wolfe

## 2019-03-20 NOTE — TELEPHONE ENCOUNTER
Patient made aware that PSA remains undetectable  He continues to have mild incontinence but denies any other issues  We discussed that a follow up is not necessary at this time  Patient should return in 3 months with a PSA and KUB for history of prostate cancer and kidney stones  OK to see Dr Markie Motta or AP  Orders placed in Epic for testing  Please mail patient a copy of these orders

## 2019-03-21 NOTE — TELEPHONE ENCOUNTER
Spoke with patient and scheduled 6/5/19 at 8am with Dr Yvrose Stubbs on [address]  I confirmed address and mailed KUB/PSA scripts and appointment card

## 2019-04-01 LAB
LEFT EYE DIABETIC RETINOPATHY: NORMAL
RIGHT EYE DIABETIC RETINOPATHY: NORMAL

## 2019-04-01 PROCEDURE — 3072F LOW RISK FOR RETINOPATHY: CPT | Performed by: INTERNAL MEDICINE

## 2019-05-06 DIAGNOSIS — IMO0002 UNCONTROLLED TYPE 2 DIABETES MELLITUS WITH COMPLICATION, WITHOUT LONG-TERM CURRENT USE OF INSULIN: ICD-10-CM

## 2019-05-06 RX ORDER — EXENATIDE 2 MG/.65ML
2 INJECTION, SUSPENSION, EXTENDED RELEASE SUBCUTANEOUS
Qty: 4 EACH | Refills: 2 | Status: SHIPPED | OUTPATIENT
Start: 2019-05-06 | End: 2019-11-19 | Stop reason: ALTCHOICE

## 2019-05-28 ENCOUNTER — TRANSCRIBE ORDERS (OUTPATIENT)
Dept: RADIOLOGY | Facility: HOSPITAL | Age: 59
End: 2019-05-28

## 2019-05-28 ENCOUNTER — APPOINTMENT (OUTPATIENT)
Dept: LAB | Facility: HOSPITAL | Age: 59
End: 2019-05-28
Payer: COMMERCIAL

## 2019-05-28 ENCOUNTER — HOSPITAL ENCOUNTER (OUTPATIENT)
Dept: RADIOLOGY | Facility: HOSPITAL | Age: 59
Discharge: HOME | End: 2019-05-28
Payer: COMMERCIAL

## 2019-05-28 DIAGNOSIS — C61 PROSTATE CANCER (HCC): ICD-10-CM

## 2019-05-28 DIAGNOSIS — N20.0 KIDNEY STONES: ICD-10-CM

## 2019-05-28 LAB — PSA SERPL-MCNC: <0.1 NG/ML (ref 0–4)

## 2019-05-28 PROCEDURE — 74018 RADEX ABDOMEN 1 VIEW: CPT

## 2019-05-28 PROCEDURE — 36415 COLL VENOUS BLD VENIPUNCTURE: CPT

## 2019-05-28 PROCEDURE — G0103 PSA SCREENING: HCPCS

## 2019-06-05 ENCOUNTER — OFFICE VISIT (OUTPATIENT)
Dept: UROLOGY | Facility: AMBULATORY SURGERY CENTER | Age: 59
End: 2019-06-05
Payer: COMMERCIAL

## 2019-06-05 VITALS
WEIGHT: 251.2 LBS | SYSTOLIC BLOOD PRESSURE: 142 MMHG | BODY MASS INDEX: 41.85 KG/M2 | HEART RATE: 86 BPM | DIASTOLIC BLOOD PRESSURE: 82 MMHG | HEIGHT: 65 IN

## 2019-06-05 DIAGNOSIS — C61 PROSTATE CANCER (HCC): Primary | ICD-10-CM

## 2019-06-05 DIAGNOSIS — N20.0 KIDNEY STONES: ICD-10-CM

## 2019-06-05 PROCEDURE — 99213 OFFICE O/P EST LOW 20 MIN: CPT | Performed by: UROLOGY

## 2019-06-12 ENCOUNTER — OFFICE VISIT (OUTPATIENT)
Dept: INTERNAL MEDICINE CLINIC | Facility: CLINIC | Age: 59
End: 2019-06-12
Payer: COMMERCIAL

## 2019-06-12 VITALS
WEIGHT: 241 LBS | BODY MASS INDEX: 40.15 KG/M2 | DIASTOLIC BLOOD PRESSURE: 82 MMHG | OXYGEN SATURATION: 96 % | SYSTOLIC BLOOD PRESSURE: 146 MMHG | HEIGHT: 65 IN | HEART RATE: 94 BPM

## 2019-06-12 DIAGNOSIS — E55.9 VITAMIN D DEFICIENCY: ICD-10-CM

## 2019-06-12 DIAGNOSIS — IMO0002 UNCONTROLLED TYPE 2 DIABETES MELLITUS WITH COMPLICATION, WITHOUT LONG-TERM CURRENT USE OF INSULIN: Primary | ICD-10-CM

## 2019-06-12 DIAGNOSIS — E11.65 UNCONTROLLED TYPE 2 DIABETES MELLITUS WITH HYPERGLYCEMIA, WITH LONG-TERM CURRENT USE OF INSULIN (HCC): ICD-10-CM

## 2019-06-12 DIAGNOSIS — C61 PROSTATE CANCER (HCC): ICD-10-CM

## 2019-06-12 DIAGNOSIS — E78.2 MIXED HYPERLIPIDEMIA: ICD-10-CM

## 2019-06-12 DIAGNOSIS — Z79.4 UNCONTROLLED TYPE 2 DIABETES MELLITUS WITH HYPERGLYCEMIA, WITH LONG-TERM CURRENT USE OF INSULIN (HCC): ICD-10-CM

## 2019-06-12 DIAGNOSIS — I10 BENIGN ESSENTIAL HYPERTENSION: ICD-10-CM

## 2019-06-12 PROCEDURE — 1036F TOBACCO NON-USER: CPT | Performed by: INTERNAL MEDICINE

## 2019-06-12 PROCEDURE — 99214 OFFICE O/P EST MOD 30 MIN: CPT | Performed by: INTERNAL MEDICINE

## 2019-08-02 ENCOUNTER — TRANSCRIBE ORDERS (OUTPATIENT)
Dept: LAB | Facility: HOSPITAL | Age: 59
End: 2019-08-02

## 2019-08-02 ENCOUNTER — APPOINTMENT (OUTPATIENT)
Dept: LAB | Facility: HOSPITAL | Age: 59
End: 2019-08-02
Attending: INTERNAL MEDICINE
Payer: COMMERCIAL

## 2019-08-02 DIAGNOSIS — E11.641 UNCONTROLLED TYPE 2 DIABETES MELLITUS WITH HYPOGLYCEMIA AND COMA (HCC): Primary | ICD-10-CM

## 2019-08-02 DIAGNOSIS — E78.00 PURE HYPERCHOLESTEROLEMIA: ICD-10-CM

## 2019-08-02 DIAGNOSIS — E11.641 UNCONTROLLED TYPE 2 DIABETES MELLITUS WITH HYPOGLYCEMIA AND COMA (HCC): ICD-10-CM

## 2019-08-02 LAB
25(OH)D3 SERPL-MCNC: 30 NG/ML (ref 30–100)
ALBUMIN SERPL BCP-MCNC: 3.8 G/DL (ref 3.5–5)
ALP SERPL-CCNC: 89 U/L (ref 46–116)
ALT SERPL W P-5'-P-CCNC: 44 U/L (ref 12–78)
ANION GAP SERPL CALCULATED.3IONS-SCNC: 5 MMOL/L (ref 4–13)
AST SERPL W P-5'-P-CCNC: 26 U/L (ref 5–45)
BASOPHILS # BLD AUTO: 0.04 THOUSANDS/ΜL (ref 0–0.1)
BASOPHILS NFR BLD AUTO: 0 % (ref 0–1)
BILIRUB SERPL-MCNC: 0.37 MG/DL (ref 0.2–1)
BUN SERPL-MCNC: 25 MG/DL (ref 5–25)
CALCIUM SERPL-MCNC: 9.3 MG/DL (ref 8.3–10.1)
CHLORIDE SERPL-SCNC: 107 MMOL/L (ref 100–108)
CO2 SERPL-SCNC: 27 MMOL/L (ref 21–32)
CREAT SERPL-MCNC: 1.4 MG/DL (ref 0.6–1.3)
EOSINOPHIL # BLD AUTO: 0.3 THOUSAND/ΜL (ref 0–0.61)
EOSINOPHIL NFR BLD AUTO: 3 % (ref 0–6)
ERYTHROCYTE [DISTWIDTH] IN BLOOD BY AUTOMATED COUNT: 12.5 % (ref 11.6–15.1)
EST. AVERAGE GLUCOSE BLD GHB EST-MCNC: 148 MG/DL
GFR SERPL CREATININE-BSD FRML MDRD: 55 ML/MIN/1.73SQ M
GLUCOSE P FAST SERPL-MCNC: 105 MG/DL (ref 65–99)
HBA1C MFR BLD: 6.8 % (ref 4.2–6.3)
HCT VFR BLD AUTO: 43.4 % (ref 36.5–49.3)
HGB BLD-MCNC: 13.8 G/DL (ref 12–17)
IMM GRANULOCYTES # BLD AUTO: 0.03 THOUSAND/UL (ref 0–0.2)
IMM GRANULOCYTES NFR BLD AUTO: 0 % (ref 0–2)
LYMPHOCYTES # BLD AUTO: 2.87 THOUSANDS/ΜL (ref 0.6–4.47)
LYMPHOCYTES NFR BLD AUTO: 25 % (ref 14–44)
MCH RBC QN AUTO: 28.5 PG (ref 26.8–34.3)
MCHC RBC AUTO-ENTMCNC: 31.8 G/DL (ref 31.4–37.4)
MCV RBC AUTO: 90 FL (ref 82–98)
MONOCYTES # BLD AUTO: 0.98 THOUSAND/ΜL (ref 0.17–1.22)
MONOCYTES NFR BLD AUTO: 9 % (ref 4–12)
NEUTROPHILS # BLD AUTO: 7.33 THOUSANDS/ΜL (ref 1.85–7.62)
NEUTS SEG NFR BLD AUTO: 63 % (ref 43–75)
NRBC BLD AUTO-RTO: 0 /100 WBCS
PLATELET # BLD AUTO: 301 THOUSANDS/UL (ref 149–390)
PMV BLD AUTO: 9.6 FL (ref 8.9–12.7)
POTASSIUM SERPL-SCNC: 4.4 MMOL/L (ref 3.5–5.3)
PROT SERPL-MCNC: 7.5 G/DL (ref 6.4–8.2)
RBC # BLD AUTO: 4.85 MILLION/UL (ref 3.88–5.62)
SODIUM SERPL-SCNC: 139 MMOL/L (ref 136–145)
WBC # BLD AUTO: 11.55 THOUSAND/UL (ref 4.31–10.16)

## 2019-08-02 PROCEDURE — 82306 VITAMIN D 25 HYDROXY: CPT | Performed by: INTERNAL MEDICINE

## 2019-08-02 PROCEDURE — 36415 COLL VENOUS BLD VENIPUNCTURE: CPT | Performed by: INTERNAL MEDICINE

## 2019-08-02 PROCEDURE — 83036 HEMOGLOBIN GLYCOSYLATED A1C: CPT

## 2019-08-02 PROCEDURE — 85025 COMPLETE CBC W/AUTO DIFF WBC: CPT | Performed by: INTERNAL MEDICINE

## 2019-08-02 PROCEDURE — 80053 COMPREHEN METABOLIC PANEL: CPT | Performed by: INTERNAL MEDICINE

## 2019-09-03 ENCOUNTER — APPOINTMENT (OUTPATIENT)
Dept: LAB | Facility: HOSPITAL | Age: 59
End: 2019-09-03
Attending: UROLOGY
Payer: COMMERCIAL

## 2019-09-03 DIAGNOSIS — C61 PROSTATE CANCER (HCC): ICD-10-CM

## 2019-09-03 LAB — PSA SERPL-MCNC: <0.1 NG/ML (ref 0–4)

## 2019-09-03 PROCEDURE — 84153 ASSAY OF PSA TOTAL: CPT

## 2019-09-08 NOTE — PROGRESS NOTES
9/9/2019    Arturo Arroyo  1960  [id number]    Discussion and Plan      Dietary and hydration recommendations again provided to minimize future stone risk  We discussed potentially undergoing pelvic physiotherapy to assist in recovery of continence  He wishes to defer for now  Patient will otherwise return in 3 months with PSA and KUB prior to visit  All questions answered at this time  1  Prostate cancer ([address] )  - PSA Total, Diagnostic; Future    2  Kidney stones  - XR abdomen 1 view kub; Future      Assessment      Patient Active Problem List   Diagnosis    Kidney stones    Prostate cancer ([address] )    Benign essential hypertension    Uncontrolled type 2 diabetes mellitus with hyperglycemia, with long-term current use of insulin ([address] )    Hyperlipidemia    Morbid obesity ([facility] [address] )    Leukocytosis    Other fatigue    Stress incontinence of urine       History of Present Illness    Howie Mccoy is a 63 y o  male seen today in regards to a history of nephrolithiasis and Nu 7 (4+3) T2c prostate cancer s/p robotic prostatectomy (6/13/18) presenting for follow up      Patient has longstanding history of npehrolithiasis, previously  been treated at [facility] urology      He was found to have a progression in PSA to 7 2  TRUS prostate biopsy (4/13/18) revealed Nu 7 prostate cancer  CT was negative for metastatic disease  Robotic radical prostatectomy 0/05/55 without complications  Pathology revealed negative surgical margins    Has persistent stress incontinence requiring pads daily however this continues to improve  KUB results discussed with patient demonstrating a 7 mm intrarenal right stone and 2 smaller calculi on the left  He passed a calculus since the time of his last visit    PSA results reviewed        Urinary Symptom Assessment        Past Medical History  Past Medical History:   Diagnosis Date    Acute bronchitis     Acute low back pain     Acute low back pain     [id number] resolved    Acute respiratory infection     Cellulitis of scrotum     Cough     Diabetes mellitus (HCC)     Herpes zoster     High cholesterol     Hyperkalemia     Hypertension     Kidney disease     Kidney stone     Low back pain     Lumbar radiculopathy     Rash     Retinopathy, central serous     Sleep apnea     [id number]    Trochanteric bursitis        Past Social History  Past Surgical History:   Procedure Laterality Date    ABDOMINAL ADHESION SURGERY N/A 6/13/2018    Procedure: LYSIS ADHESIONS;  Surgeon: Modesta Bustillo MD;  Location: [facility];  Service: Urology    LITHOTRIPSY      renal    NH LAP,PROSTATECTOMY,RADICAL,W/NERVE SPARE,INCL ROBOTIC N/A 6/13/2018    Procedure: Pretty Pickard;  Surgeon: Modesta Bustillo MD;  Location: [facility];  Service: Urology    PROSTATE SURGERY  06/13/2018    SHOULDER SURGERY         Past Family History  Family History   Problem Relation Age of Onset    Other Father         cardiac disorder    Stroke Father     Diabetes Father         mellitus    Hypertension Father     Heart disease Father         cardiac disorder    Cancer Sister     Cancer Paternal Grandmother     Heart disease Family         cardiac disorder    Kidney disease Family        Past Social history  Social History     Socioeconomic History    Marital status: /Civil Union     Spouse name: Not on file    Number of children: Not on file    Years of education: Not on file    Highest education level: Not on file   Occupational History    Not on file   Social Needs    Financial resource strain: Not on file    Food insecurity:     Worry: Not on file     Inability: Not on file    Transportation needs:     Medical: Not on file     Non-medical: Not on file   Tobacco Use    Smoking status: Never Smoker    Smokeless tobacco: Never Used   Substance and Sexual Activity    Alcohol use: No    Drug use: No    Sexual activity: Not on file   Lifestyle    Physical activity:     Days per week: Not on file     Minutes per session: Not on file    Stress: Not on file   Relationships    Social connections:     Talks on phone: Not on file     Gets together: Not on file     Attends [employer] service: Not on file     Active member of club or organization: Not on file     Attends meetings of clubs or organizations: Not on file     Relationship status: Not on file    Intimate partner violence:     Fear of current or ex partner: Not on file     Emotionally abused: Not on file     Physically abused: Not on file     Forced sexual activity: Not on file   Other Topics Concern    Not on file   Social History Narrative    Caffeine use       Current Medications  Current Outpatient Medications   Medication Sig Dispense Refill    albuterol (PROAIR HFA) 90 mcg/act inhaler Inhale 1-2 puffs every 6 (six) hours as needed        ASPIRIN 81 PO Take 1 capsule by mouth 2 (two) times a day        atorvastatin (LIPITOR) 80 mg tablet Take 80 mg by mouth daily  0    BD PEN NEEDLE MAJO U/F 32G X 4 MM MISC 2 (two) times a day  5    glimepiride (AMARYL) 4 mg tablet Take by mouth 2 (two) times a day      HUMALOG KWIKPEN 100 units/mL injection pen 15 Units 3 (three) times a day with meals   3    ibuprofen (MOTRIN) 800 mg tablet TAKE 1 TABLET EVERY 8 HOURS AS NEEDED 90 tablet 0    Insulin Glargine (TOUJEO SOLOSTAR) 300 units/mL CONCETRATED U-300 injection pen 38 units daily   (Patient taking differently: 42 units daily ) 3 pen 0    Lancets (ONETOUCH ULTRASOFT) lancets 4 (four) times a day Test  3    lisinopril (ZESTRIL) 40 mg tablet TAKE 1 TABLET BY MOUTH EVERY DAY 90 tablet 3    metFORMIN (GLUCOPHAGE) 1000 MG tablet Take 1 tablet by mouth 2 (two) times a day      Multiple Vitamins-Minerals (MULTIVITAMIN WITH MINERALS) tablet Take 1 tablet by mouth daily      ONE TOUCH ULTRA TEST test strip       Vitamin D, Cholecalciferol, 400 units TABS Take by mouth daily      BYDUREON 2 MG PEN INJECT 2 MG UNDER THE SKIN EVERY 7 DAYS (Patient not taking: Reported on 9/9/2019) 4 each 2    BYDUREON 2 MG SRER INJECT 2MG SUBCUTANEOUSLY EVERY 7 DAYS  (Patient not taking: Reported on 9/9/2019) 4 each 3    BYDUREON 2 MG SRER INJECT 2MG SUBCUTANEOUSLY EVERY 7 DAYS  (Patient not taking: Reported on 9/9/2019) 4 each 3    insulin lispro (HUMALOG) 100 units/mL injection 5-10 units with each meal  (Patient not taking: Reported on 9/9/2019) 10 mL 0    ondansetron (ZOFRAN) 4 mg tablet Take 1 tablet (4 mg total) by mouth every 8 (eight) hours as needed for nausea or vomiting (Patient not taking: Reported on 9/9/2019) 10 tablet 0    potassium chloride (K-DUR,KLOR-CON) 20 mEq tablet Take 1 tablet (20 mEq total) by mouth daily (Patient not taking: Reported on 9/9/2019) 10 tablet 0     No current facility-administered medications for this visit  Allergies  Allergies   Allergen Reactions    Simvastatin      Increases Liver functoin       Past Medical History, Social History, Family History, medications and allergies were reviewed  Review of Systems  Review of Systems   Constitutional: Negative  HENT: Negative  Eyes: Negative  Respiratory: Negative  Cardiovascular: Negative  Gastrointestinal: Negative  Endocrine: Negative  Genitourinary: Negative for decreased urine volume, difficulty urinating, hematuria and urgency  Musculoskeletal: Negative  Skin: Negative  Neurological: Negative  Hematological: Negative  Psychiatric/Behavioral: Negative  Vitals  Vitals:    09/09/19 0745   BP: 142/84   BP Location: Left arm   Patient Position: Sitting   Cuff Size: Large   Pulse: 96   Weight: 116 kg (256 lb 12 8 oz)   Height: 5' 5" (1 651 m)         Physical Exam    Physical Exam   Constitutional: He is oriented to person, place, and time  He appears well-developed and well-nourished  HENT:   Head: Normocephalic and atraumatic  Eyes: Pupils are equal, round, and reactive to light     Neck: Normal range of motion  Cardiovascular: Normal rate, regular rhythm and normal heart sounds  Pulmonary/Chest: Effort normal and breath sounds normal  No accessory muscle usage  No respiratory distress  Abdominal: Soft  Normal appearance and bowel sounds are normal  There is no tenderness  Musculoskeletal: Normal range of motion  Neurological: He is alert and oriented to person, place, and time  Skin: Skin is warm, dry and intact  Psychiatric: He has a normal mood and affect  His speech is normal  Cognition and memory are normal    Nursing note and vitals reviewed        Results    Below listed labs, pathology results, and radiology images were personally reviewed:    Lab Results   Component Value Date/Time    PSA <0 1 09/03/2019 06:59 AM    PSA <0 1 05/28/2019 07:06 AM    PSA 5 3 (H) 10/29/2016 09:13 AM     Lab Results   Component Value Date    CALCIUM 9 3 08/02/2019    K 4 4 08/02/2019    CO2 27 08/02/2019     08/02/2019    BUN 25 08/02/2019    CREATININE 1 40 (H) 08/02/2019     Lab Results   Component Value Date    WBC 11 55 (H) 08/02/2019    HGB 13 8 08/02/2019    HCT 43 4 08/02/2019    MCV 90 08/02/2019     08/02/2019       No results found for this or any previous visit (from the past 1 hour(s)) ]

## 2019-09-09 ENCOUNTER — OFFICE VISIT (OUTPATIENT)
Dept: UROLOGY | Facility: AMBULATORY SURGERY CENTER | Age: 59
End: 2019-09-09
Payer: COMMERCIAL

## 2019-09-09 VITALS
DIASTOLIC BLOOD PRESSURE: 84 MMHG | HEIGHT: 65 IN | BODY MASS INDEX: 42.78 KG/M2 | WEIGHT: 256.8 LBS | SYSTOLIC BLOOD PRESSURE: 142 MMHG | HEART RATE: 96 BPM

## 2019-09-09 DIAGNOSIS — C61 PROSTATE CANCER (HCC): Primary | ICD-10-CM

## 2019-09-09 DIAGNOSIS — N20.0 KIDNEY STONES: ICD-10-CM

## 2019-09-09 PROCEDURE — 99213 OFFICE O/P EST LOW 20 MIN: CPT | Performed by: UROLOGY

## 2019-09-09 PROCEDURE — 82360 CALCULUS ASSAY QUANT: CPT | Performed by: UROLOGY

## 2019-09-09 RX ORDER — ERGOCALCIFEROL (VITAMIN D2) 10 MCG
TABLET ORAL DAILY
COMMUNITY

## 2019-09-17 LAB
CA PHOS MFR STONE: 3 %
COLOR STONE: NORMAL
COM MFR STONE: 12 %
COMMENT-STONE3: NORMAL
COMPOSITION: NORMAL
LABORATORY COMMENT REPORT: NORMAL
NIDUS STONE QL: NORMAL
PHOTO: NORMAL
SIZE STONE: NORMAL MM
STONE ANALYSIS-IMP: NORMAL
URATE MFR STONE: 85 %
WT STONE: 20 MG

## 2019-10-24 DIAGNOSIS — I10 HYPERTENSION, ESSENTIAL, BENIGN: ICD-10-CM

## 2019-10-24 RX ORDER — LISINOPRIL 40 MG/1
TABLET ORAL
Qty: 90 TABLET | Refills: 3 | Status: SHIPPED | OUTPATIENT
Start: 2019-10-24 | End: 2020-09-22

## 2019-11-19 ENCOUNTER — OFFICE VISIT (OUTPATIENT)
Dept: INTERNAL MEDICINE CLINIC | Facility: CLINIC | Age: 59
End: 2019-11-19
Payer: COMMERCIAL

## 2019-11-19 VITALS
OXYGEN SATURATION: 97 % | DIASTOLIC BLOOD PRESSURE: 86 MMHG | HEIGHT: 65 IN | HEART RATE: 102 BPM | WEIGHT: 257.8 LBS | BODY MASS INDEX: 42.95 KG/M2 | TEMPERATURE: 98.4 F | SYSTOLIC BLOOD PRESSURE: 142 MMHG

## 2019-11-19 DIAGNOSIS — E66.01 MORBID OBESITY (HCC): ICD-10-CM

## 2019-11-19 DIAGNOSIS — I10 BENIGN ESSENTIAL HYPERTENSION: Primary | ICD-10-CM

## 2019-11-19 DIAGNOSIS — E11.9 CONTROLLED TYPE 2 DIABETES MELLITUS WITHOUT COMPLICATION, WITH LONG-TERM CURRENT USE OF INSULIN (HCC): ICD-10-CM

## 2019-11-19 DIAGNOSIS — E78.2 MIXED HYPERLIPIDEMIA: ICD-10-CM

## 2019-11-19 DIAGNOSIS — Z79.4 CONTROLLED TYPE 2 DIABETES MELLITUS WITHOUT COMPLICATION, WITH LONG-TERM CURRENT USE OF INSULIN (HCC): ICD-10-CM

## 2019-11-19 PROCEDURE — 99214 OFFICE O/P EST MOD 30 MIN: CPT | Performed by: INTERNAL MEDICINE

## 2019-11-19 PROCEDURE — 1036F TOBACCO NON-USER: CPT | Performed by: INTERNAL MEDICINE

## 2019-11-19 RX ORDER — DULAGLUTIDE 1.5 MG/.5ML
INJECTION, SOLUTION SUBCUTANEOUS
Refills: 3 | COMMUNITY
Start: 2019-09-09 | End: 2022-01-05 | Stop reason: SDUPTHER

## 2019-11-19 NOTE — ASSESSMENT & PLAN NOTE
Continue current regimen  F/U with Dr Anca Fowler  Lab Results   Component Value Date    HGBA1C 6 8 (H) 08/02/2019

## 2019-11-19 NOTE — PROGRESS NOTES
Assessment/Plan:    Controlled type 2 diabetes mellitus without complication, with long-term current use of insulin ([facility])  Continue current regimen  F/U with Dr Carlitos Mcclelland  Lab Results   Component Value Date    HGBA1C 6 8 (H) 08/02/2019       Benign essential hypertension  Continue checking at home  Considering amlodipine to add to lisinopril    Prostate cancer [facility])  F/U with urology  PSA <0 1    Hyperlipidemia  Controlled on atorvastatin         Problem List Items Addressed This Visit        Endocrine    Controlled type 2 diabetes mellitus without complication, with long-term current use of insulin ([address] )     Continue current regimen  F/U with Dr Carlitos Mcclelland  Lab Results   Component Value Date    HGBA1C 6 8 (H) 08/02/2019            Relevant Medications    TRULICITY 1 5 MI/4 8AR SOPN    insulin lispro (HUMALOG) 100 units/mL injection    Insulin Glargine (TOUJEO) 300 units/mL CONCETRATED U-300 injection pen    Other Relevant Orders    Microalbumin / creatinine urine ratio       Cardiovascular and Mediastinum    Benign essential hypertension - Primary     Continue checking at home  Considering amlodipine to add to lisinopril            Other    Morbid obesity ([facility] [address] )    Hyperlipidemia     Controlled on atorvastatin                 Subjective:      Patient ID: Mary Brown is a 63 y o  male  HPI  Here for a follow up  DM managed by Dr Carlitos Mcclelland  A1C was 6 8 in August  He is on metformin glimepiride Trulicity Toujeo 07L and Humalog 5-15u with dinner  Checks his BP at home and it is 130/80s  Prostate cancer s/p TURP  April 2018  He continues to have incontinence    The following portions of the patient's history were reviewed and updated as appropriate: allergies, current medications, past family history, past medical history, past surgical history and problem list     Review of Systems   Constitutional: Negative for fatigue, fever and unexpected weight change     HENT: Negative for congestion, ear pain, hearing loss, sinus pressure, sinus pain and sore throat  Respiratory: Negative for cough, shortness of breath and wheezing  Cardiovascular: Negative for chest pain, palpitations and leg swelling  Hands feel cold in the colder months  No claudication in LE   Gastrointestinal: Negative for abdominal pain, constipation, diarrhea, nausea and vomiting  Genitourinary: Negative for difficulty urinating  Incontinence   Musculoskeletal: Positive for arthralgias (stiffness of the fingers)  Negative for myalgias  Neurological: Negative for dizziness and headaches  Objective:      /86   Pulse 102   Temp 98 4 °F (36 9 °C)   Ht 5' 5" (1 651 m)   Wt 117 kg (257 lb 12 8 oz)   SpO2 97%   BMI 42 90 kg/m²          Physical Exam   Constitutional: He is oriented to person, place, and time  He appears well-developed and well-nourished  HENT:   Head: Normocephalic and atraumatic  Right Ear: External ear normal    Left Ear: External ear normal    Mouth/Throat: Oropharynx is clear and moist    Eyes: Conjunctivae are normal    Neck: Neck supple  Cardiovascular: Normal rate, regular rhythm and normal heart sounds  No murmur heard  Pulmonary/Chest: Effort normal and breath sounds normal  No respiratory distress  He has no wheezes  He has no rales  Abdominal: Soft  He exhibits no distension and no mass  There is no tenderness  There is no rebound and no guarding  Musculoskeletal: He exhibits no edema (no swelling of the fingers)  Neurological: He is alert and oriented to person, place, and time  Skin: Skin is warm and dry  Psychiatric: He has a normal mood and affect   His behavior is normal  Judgment and thought content normal

## 2019-12-09 ENCOUNTER — APPOINTMENT (OUTPATIENT)
Dept: LAB | Facility: HOSPITAL | Age: 59
End: 2019-12-09
Attending: UROLOGY
Payer: COMMERCIAL

## 2019-12-09 ENCOUNTER — HOSPITAL ENCOUNTER (OUTPATIENT)
Dept: RADIOLOGY | Facility: HOSPITAL | Age: 59
Discharge: HOME | End: 2019-12-09
Attending: UROLOGY
Payer: COMMERCIAL

## 2019-12-09 ENCOUNTER — TRANSCRIBE ORDERS (OUTPATIENT)
Dept: RADIOLOGY | Facility: HOSPITAL | Age: 59
End: 2019-12-09

## 2019-12-09 ENCOUNTER — TRANSCRIBE ORDERS (OUTPATIENT)
Dept: LAB | Facility: HOSPITAL | Age: 59
End: 2019-12-09

## 2019-12-09 DIAGNOSIS — N20.0 KIDNEY STONES: ICD-10-CM

## 2019-12-09 DIAGNOSIS — E11.65 TYPE II DIABETES MELLITUS WITH HYPEROSMOLARITY, UNCONTROLLED (HCC): Primary | ICD-10-CM

## 2019-12-09 DIAGNOSIS — C61 PROSTATE CANCER (HCC): ICD-10-CM

## 2019-12-09 DIAGNOSIS — E11.65 TYPE II DIABETES MELLITUS WITH HYPEROSMOLARITY, UNCONTROLLED (HCC): ICD-10-CM

## 2019-12-09 DIAGNOSIS — E11.00 TYPE II DIABETES MELLITUS WITH HYPEROSMOLARITY, UNCONTROLLED (HCC): ICD-10-CM

## 2019-12-09 DIAGNOSIS — E11.00 TYPE II DIABETES MELLITUS WITH HYPEROSMOLARITY, UNCONTROLLED (HCC): Primary | ICD-10-CM

## 2019-12-09 LAB
EST. AVERAGE GLUCOSE BLD GHB EST-MCNC: 183 MG/DL
HBA1C MFR BLD: 8 % (ref 4.2–6.3)
PSA SERPL-MCNC: <0.1 NG/ML (ref 0–4)

## 2019-12-09 PROCEDURE — 36415 COLL VENOUS BLD VENIPUNCTURE: CPT

## 2019-12-09 PROCEDURE — 74018 RADEX ABDOMEN 1 VIEW: CPT

## 2019-12-09 PROCEDURE — 84153 ASSAY OF PSA TOTAL: CPT

## 2019-12-09 PROCEDURE — 83036 HEMOGLOBIN GLYCOSYLATED A1C: CPT

## 2019-12-13 NOTE — PROGRESS NOTES
12/16/2019    Tanner Greggfatoumata  1960  [id number]    Discussion and Plan    Patient continues to do well with no evidence of recurrent prostate cancer  Review of KUB shows stable stone burden a greatest size 4 millimeters  He therefore will return in 3 months with PSA prior to visit  All questions answered at this time  1  Prostate cancer ([facility] [address] )  - PSA Total, Diagnostic; Future    Assessment      Patient Active Problem List   Diagnosis    Kidney stones    Prostate cancer ([facility] )    Benign essential hypertension    Controlled type 2 diabetes mellitus without complication, with long-term current use of insulin ([facility] )    Hyperlipidemia    Morbid obesity ([facility] )    Leukocytosis    Other fatigue    Stress incontinence of urine       History of Present Illness    Mihaela Guaman is a 63 y o  male seen today in regards to a history of nephrolithiasis and Nu 7 (4+3) T2c prostate cancer s/p robotic prostatectomy (6/13/18) presenting for follow up      Patient has longstanding history of npehrolithiasis, previously  been treated at [facility] urology      He was found to have a progression in PSA to 7 2  TRUS prostate biopsy (4/13/18) revealed [place] 7 prostate cancer  CT was negative for metastatic disease  Robotic radical prostatectomy 4/19/89 without complications  Pathology revealed negative surgical margins    Has persistent stress incontinence requiring pads daily however this continues to improve  PSA results reviewed      Urinary Symptom Assessment        Past Medical History  Past Medical History:   Diagnosis Date    Acute bronchitis     Acute low back pain     Acute low back pain     [id number] resolved    Acute respiratory infection     Cellulitis of scrotum     Cough     Diabetes mellitus (HCC)     Herpes zoster     High cholesterol     Hyperkalemia     Hypertension     Kidney disease     Kidney stone     Low back pain     Lumbar radiculopathy     Rash     Retinopathy, central serous  Sleep apnea     [id number]    Trochanteric bursitis        Past Social History  Past Surgical History:   Procedure Laterality Date    ABDOMINAL ADHESION SURGERY N/A 6/13/2018    Procedure: LYSIS ADHESIONS;  Surgeon: Yelena Jones MD;  [facility];  Service: Urology    LITHOTRIPSY      renal    ME LAP,PROSTATECTOMY,RADICAL,W/NERVE SPARE,INCL ROBOTIC N/A 6/13/2018    Procedure: [place] Backbone;  Surgeon: Yelena Jones MD;  [facility];  Service: Urology    PROSTATE SURGERY  06/13/2018    SHOULDER SURGERY         Past Family History  Family History   Problem Relation Age of Onset    Other Father         cardiac disorder    Stroke Father     Diabetes Father         mellitus    Hypertension Father     Heart disease Father         cardiac disorder    Cancer Sister     Cancer Paternal Grandmother     Heart disease Family         cardiac disorder    Kidney disease Family        Past Social history  Social History     Socioeconomic History    Marital status: /Civil Union     Spouse name: Not on file    Number of children: Not on file    Years of education: Not on file    Highest education level: Not on file   Occupational History    Not on file   Social Needs    Financial resource strain: Not on file    Food insecurity:     Worry: Not on file     Inability: Not on file    Transportation needs:     Medical: Not on file     Non-medical: Not on file   Tobacco Use    Smoking status: Never Smoker    Smokeless tobacco: Never Used   Substance and Sexual Activity    Alcohol use: No    Drug use: No    Sexual activity: Not on file   Lifestyle    Physical activity:     Days per week: Not on file     Minutes per session: Not on file    Stress: Not on file   Relationships    Social connections:     Talks on phone: Not on file     Gets together: Not on file     Attends Hinduism service: Not on file     Active member of club or organization: Not on file     Attends meetings of clubs or organizations: Not on file     Relationship status: Not on file    Intimate partner violence:     Fear of current or ex partner: Not on file     Emotionally abused: Not on file     Physically abused: Not on file     Forced sexual activity: Not on file   Other Topics Concern    Not on file   Social History Narrative    Caffeine use       Current Medications  Current Outpatient Medications   Medication Sig Dispense Refill    albuterol (PROAIR HFA) 90 mcg/act inhaler Inhale 1-2 puffs every 6 (six) hours as needed        ASPIRIN 81 PO Take 1 capsule by mouth 2 (two) times a day        atorvastatin (LIPITOR) 80 mg tablet Take 80 mg by mouth daily  0    BD PEN NEEDLE MAJO U/F 32G X 4 MM MISC 2 (two) times a day  5    glimepiride (AMARYL) 4 mg tablet Take by mouth 2 (two) times a day      HUMALOG KWIKPEN 100 units/mL injection pen 15 Units 3 (three) times a day with meals   3    ibuprofen (MOTRIN) 800 mg tablet TAKE 1 TABLET EVERY 8 HOURS AS NEEDED 90 tablet 0    Insulin Glargine (TOUJEO) 300 units/mL CONCETRATED U-300 injection pen 46 units daily 3 pen 0    insulin lispro (HUMALOG) 100 units/mL injection 5-15 units with dinner 10 mL 0    Lancets (ONETOUCH ULTRASOFT) lancets 4 (four) times a day Test  3    lisinopril (ZESTRIL) 40 mg tablet TAKE 1 TABLET BY MOUTH EVERY DAY 90 tablet 3    metFORMIN (GLUCOPHAGE) 1000 MG tablet Take 1 tablet by mouth 2 (two) times a day      Multiple Vitamins-Minerals (MULTIVITAMIN WITH MINERALS) tablet Take 1 tablet by mouth daily      ONE TOUCH ULTRA TEST test strip       TRULICITY 1 5 QU/5 2ZE SOPN INJECT 1 5 MG WEEKLY PER INSTRUCTIONS  3    Vitamin D, Cholecalciferol, 400 units TABS Take by mouth daily       No current facility-administered medications for this visit          Allergies  Allergies   Allergen Reactions    Simvastatin      Increases Liver functoin       Past Medical History, Social History, Family History, medications and allergies were reviewed  Review of Systems  Review of Systems   Constitutional: Negative  HENT: Negative  Eyes: Negative  Respiratory: Negative  Cardiovascular: Negative  Gastrointestinal: Negative  Endocrine: Negative  Genitourinary: Negative for difficulty urinating and hematuria  Musculoskeletal: Negative  Skin: Negative  Neurological: Negative  Hematological: Negative  Psychiatric/Behavioral: Negative  Vitals  Vitals:    12/16/19 0750   BP: 140/84   BP Location: Left arm   Patient Position: Sitting   Cuff Size: Adult   Pulse: 84   Weight: 117 kg (257 lb)   Height: 5' 5" (1 651 m)         Physical Exam    Physical Exam   Constitutional: He is oriented to person, place, and time  He appears well-developed and well-nourished  HENT:   Head: Normocephalic and atraumatic  Eyes: Pupils are equal, round, and reactive to light  Neck: Normal range of motion  Cardiovascular: Normal rate, regular rhythm and normal heart sounds  Pulmonary/Chest: Effort normal and breath sounds normal  No accessory muscle usage  No respiratory distress  Abdominal: Soft  Normal appearance and bowel sounds are normal  There is no tenderness  Musculoskeletal: Normal range of motion  Neurological: He is alert and oriented to person, place, and time  Skin: Skin is warm, dry and intact  Psychiatric: He has a normal mood and affect  His speech is normal  Cognition and memory are normal    Nursing note and vitals reviewed        Results    Below listed labs, pathology results, and radiology images were personally reviewed:    Lab Results   Component Value Date/Time    PSA <0 1 12/09/2019 08:55 AM    PSA <0 1 05/28/2019 07:06 AM    PSA 5 3 (H) 10/29/2016 09:13 AM     Lab Results   Component Value Date    CALCIUM 9 3 08/02/2019    K 4 4 08/02/2019    CO2 27 08/02/2019     08/02/2019    BUN 25 08/02/2019    CREATININE 1 40 (H) 08/02/2019     Lab Results   Component Value Date    WBC 11 55 (H) 08/02/2019    HGB 13 8 08/02/2019    HCT 43 4 08/02/2019    MCV 90 08/02/2019     08/02/2019       No results found for this or any previous visit (from the past 1 hour(s)) ]    Component      Latest Ref Rng & Units 8/20/2016 10/29/2016 2/10/2018 8/3/2018   PSA, Total      0 0 - 4 0 ng/mL 6 0 (H) 5 3 (H) 7 3 (H) <0 1     Component      Latest Ref Rng & Units 11/6/2018 3/19/2019 5/28/2019 9/3/2019   PSA, Total      0 0 - 4 0 ng/mL <0 1 <0 1 <0 1 <0 1     Component      Latest Ref Rng & Units 12/9/2019   PSA, Total      0 0 - 4 0 ng/mL <0 1     -KUB  ABDOMEN     INDICATION:   N20 0: Calculus of kidney      COMPARISON:  May 20, 2019     VIEWS:  AP supine        FINDINGS:     Stable bilateral renal calculi measuring 4 mm on the right and left  The calculi are somewhat indistinct likely due to respiratory motion      No radiopaque ureteral calculi identified      Nonobstructive bowel gas pattern      No acute osseous abnormality is seen      IMPRESSION:     Stable bilateral nephrolithiasis          Workstation performed: [id number]      Imaging     XR abdomen 1 view kub (Order: [id number]) - 12/9/2019

## 2019-12-16 ENCOUNTER — OFFICE VISIT (OUTPATIENT)
Dept: UROLOGY | Facility: AMBULATORY SURGERY CENTER | Age: 59
End: 2019-12-16
Payer: COMMERCIAL

## 2019-12-16 VITALS
DIASTOLIC BLOOD PRESSURE: 84 MMHG | WEIGHT: 257 LBS | BODY MASS INDEX: 42.82 KG/M2 | HEIGHT: 65 IN | SYSTOLIC BLOOD PRESSURE: 140 MMHG | HEART RATE: 84 BPM

## 2019-12-16 DIAGNOSIS — C61 PROSTATE CANCER (HCC): Primary | ICD-10-CM

## 2019-12-16 PROCEDURE — 99213 OFFICE O/P EST LOW 20 MIN: CPT | Performed by: UROLOGY

## 2019-12-17 ENCOUNTER — APPOINTMENT (OUTPATIENT)
Dept: LAB | Facility: HOSPITAL | Age: 59
End: 2019-12-17
Payer: COMMERCIAL

## 2019-12-17 LAB
CREAT UR-MCNC: 191 MG/DL
MICROALBUMIN UR-MCNC: 25.6 MG/L (ref 0–20)
MICROALBUMIN/CREAT 24H UR: 13 MG/G CREATININE (ref 0–30)

## 2019-12-17 PROCEDURE — 82570 ASSAY OF URINE CREATININE: CPT | Performed by: INTERNAL MEDICINE

## 2019-12-17 PROCEDURE — 82043 UR ALBUMIN QUANTITATIVE: CPT | Performed by: INTERNAL MEDICINE

## 2020-03-03 ENCOUNTER — TRANSCRIBE ORDERS (OUTPATIENT)
Dept: LAB | Facility: HOSPITAL | Age: 60
End: 2020-03-03

## 2020-03-03 ENCOUNTER — APPOINTMENT (OUTPATIENT)
Dept: LAB | Facility: HOSPITAL | Age: 60
End: 2020-03-03
Payer: COMMERCIAL

## 2020-03-03 DIAGNOSIS — Z00.8 HEALTH EXAMINATION IN POPULATION SURVEY: ICD-10-CM

## 2020-03-03 DIAGNOSIS — Z00.8 HEALTH EXAMINATION IN POPULATION SURVEY: Primary | ICD-10-CM

## 2020-03-03 DIAGNOSIS — C61 PROSTATE CANCER (HCC): ICD-10-CM

## 2020-03-03 LAB
CHOLEST SERPL-MCNC: 133 MG/DL (ref 50–200)
EST. AVERAGE GLUCOSE BLD GHB EST-MCNC: 209 MG/DL
HBA1C MFR BLD: 8.9 %
HDLC SERPL-MCNC: 37 MG/DL
LDLC SERPL CALC-MCNC: 57 MG/DL (ref 0–100)
NONHDLC SERPL-MCNC: 96 MG/DL
PSA SERPL-MCNC: <0.1 NG/ML (ref 0–4)
TRIGL SERPL-MCNC: 193 MG/DL

## 2020-03-03 PROCEDURE — 84153 ASSAY OF PSA TOTAL: CPT

## 2020-03-03 PROCEDURE — 80061 LIPID PANEL: CPT

## 2020-03-03 PROCEDURE — 36415 COLL VENOUS BLD VENIPUNCTURE: CPT

## 2020-03-03 PROCEDURE — 83036 HEMOGLOBIN GLYCOSYLATED A1C: CPT

## 2020-03-17 ENCOUNTER — OFFICE VISIT (OUTPATIENT)
Dept: UROLOGY | Facility: CLINIC | Age: 60
End: 2020-03-17
Payer: COMMERCIAL

## 2020-03-17 VITALS
BODY MASS INDEX: 43.72 KG/M2 | HEART RATE: 99 BPM | HEIGHT: 65 IN | DIASTOLIC BLOOD PRESSURE: 88 MMHG | WEIGHT: 262.4 LBS | SYSTOLIC BLOOD PRESSURE: 142 MMHG

## 2020-03-17 DIAGNOSIS — C61 PROSTATE CANCER (HCC): Primary | ICD-10-CM

## 2020-03-17 PROCEDURE — 99213 OFFICE O/P EST LOW 20 MIN: CPT | Performed by: PHYSICIAN ASSISTANT

## 2020-03-17 PROCEDURE — 3008F BODY MASS INDEX DOCD: CPT | Performed by: PHYSICIAN ASSISTANT

## 2020-03-17 PROCEDURE — 3052F HG A1C>EQUAL 8.0%<EQUAL 9.0%: CPT | Performed by: PHYSICIAN ASSISTANT

## 2020-03-17 PROCEDURE — 2022F DILAT RTA XM EVC RTNOPTHY: CPT | Performed by: PHYSICIAN ASSISTANT

## 2020-03-17 PROCEDURE — 3077F SYST BP >= 140 MM HG: CPT | Performed by: PHYSICIAN ASSISTANT

## 2020-03-17 PROCEDURE — 3079F DIAST BP 80-89 MM HG: CPT | Performed by: PHYSICIAN ASSISTANT

## 2020-03-17 PROCEDURE — 1036F TOBACCO NON-USER: CPT | Performed by: PHYSICIAN ASSISTANT

## 2020-03-17 NOTE — PROGRESS NOTES
3/17/2020    Leonidas Beltrán  1960  [id number]    Discussion and Plan    1  Nu 7 (4+3) T2c prostate cancer s/p robotic prostatectomy (6/13/18) - previously managed by Dr Mary Brown  - PSA remains undetectable  - follow-up 4 months with a PSA prior to visit for continued surveillance  If remains undetectable at that time will transition to every 6 month follow-ups  2  History of nephrolithiasis  - KUB revealing stable bilateral nephrolithiasis  - Patient asymptomatic  3  Stress urinary incontinence  - reviewed pelvic floor physical therapy, artificial urinary sphincter, as well as male urethral sling  Patient is not interested in surgery at this time  He will consider pelvic floor physical therapy and contact us should he wish to proceed with referral     4  Erectile dysfunction  - patient is not interested in treatment for ED at this time      1  Prostate cancer ([facility] [address] )  PSA Total, Diagnostic         Patient Active Problem List   Diagnosis    Kidney stones    Prostate cancer ([facility] [address] )    Benign essential hypertension    Controlled type 2 diabetes mellitus without complication, with long-term current use of insulin ([facility])    Hyperlipidemia    Morbid obesity ([address] )    Leukocytosis    Other fatigue    Stress incontinence of urine       History of Present Illness    Skip Earl is a 62 y o  male patient previously under the care of Dr Mary Brown wish a history of nephrolithiasis and [place] 7 (4+3) T2c prostate cancer s/p robotic prostatectomy (6/13/18) presenting for follow up      Patient has longstanding history of npehrolithiasis, previously  been treated at [facility] urology  Recent KUB (12/9/2019) revealing stable bilateral nephrolithiasis -- approximately 4mm bilaterally       He was found to have a progression in PSA to 7 2  TRUS prostate biopsy (4/13/18) revealed [place] 7 prostate cancer  CT was negative for metastatic disease  Robotic radical prostatectomy 6/47/38 without complications  Pathology revealed negative surgical margins        PSA remains undetectable at <0 1 (3/3/2020)  Has persistent stress incontinence requiring approximately 6 depends throughout the day  Patient is overall comfortable with his incontinence at this time  Patient denies any erectile function since his prostatectomy  Reports that he is not interested in medication for this          Past Medical History  Past Medical History:   Diagnosis Date    Acute bronchitis     Acute low back pain     Acute low back pain     [id number] resolved    Acute respiratory infection     Cellulitis of scrotum     Cough     Diabetes mellitus (HCC)     Herpes zoster     High cholesterol     Hyperkalemia     Hypertension     Kidney disease     Kidney stone     Low back pain     Lumbar radiculopathy     Rash     Retinopathy, central serous     Sleep apnea     [id number]    Trochanteric bursitis        Past Social History  Past Surgical History:   Procedure Laterality Date    ABDOMINAL ADHESION SURGERY N/A 6/13/2018    Procedure: LYSIS ADHESIONS;  Surgeon: Flower Guzman MD;  [facility];  Service: Urology    LITHOTRIPSY      renal    [place] LAP,PROSTATECTOMY,RADICAL,W/NERVE SPARE,INCL ROBOTIC N/A 6/13/2018    Procedure: PROSTATECTOMY RADICAL W ROBOTICS;  Surgeon: Flower Guzman MD;  [facility];  Service: Urology    PROSTATE SURGERY  06/13/2018    SHOULDER SURGERY         Past Family History  Family History   Problem Relation Age of Onset    Other Father         cardiac disorder    Stroke Father     Diabetes Father         mellitus    Hypertension Father     Heart disease Father         cardiac disorder    Cancer Sister     Cancer Paternal Grandmother     Heart disease Family         cardiac disorder    Kidney disease Family        Past Social history  Social History     Socioeconomic History    Marital status: /Civil Union     Spouse name: Not on file    Number of children: Not on file   Rene Lawton Years of education: Not on file    Highest education level: Not on file   Occupational History    Not on file   Social Needs    Financial resource strain: Not on file    Food insecurity:     Worry: Not on file     Inability: Not on file    Transportation needs:     Medical: Not on file     Non-medical: Not on file   Tobacco Use    Smoking status: Never Smoker    Smokeless tobacco: Never Used   Substance and Sexual Activity    Alcohol use: No    Drug use: No    Sexual activity: Not on file   Lifestyle    Physical activity:     Days per week: Not on file     Minutes per session: Not on file    Stress: Not on file   Relationships    Social connections:     Talks on phone: Not on file     Gets together: Not on file     Attends Mandaen service: Not on file     Active member of club or organization: Not on file     Attends meetings of clubs or organizations: Not on file     Relationship status: Not on file    Intimate partner violence:     Fear of current or ex partner: Not on file     Emotionally abused: Not on file     Physically abused: Not on file     Forced sexual activity: Not on file   Other Topics Concern    Not on file   Social History Narrative    Caffeine use       Current Medications  Current Outpatient Medications   Medication Sig Dispense Refill    albuterol (PROAIR HFA) 90 mcg/act inhaler Inhale 1-2 puffs every 6 (six) hours as needed        ASPIRIN 81 PO Take 1 capsule by mouth 2 (two) times a day        atorvastatin (LIPITOR) 80 mg tablet Take 80 mg by mouth daily  0    BD PEN NEEDLE MAJO U/F 32G X 4 MM MISC 2 (two) times a day  5    glimepiride (AMARYL) 4 mg tablet Take by mouth 2 (two) times a day      HUMALOG KWIKPEN 100 units/mL injection pen 15 Units 3 (three) times a day with meals   3    ibuprofen (MOTRIN) 800 mg tablet TAKE 1 TABLET EVERY 8 HOURS AS NEEDED 90 tablet 0    Insulin Glargine (TOUJEO) 300 units/mL CONCETRATED U-300 injection pen 46 units daily 3 pen 0    Lancets (ONETOUCH ULTRASOFT) lancets 4 (four) times a day Test  3    lisinopril (ZESTRIL) 40 mg tablet TAKE 1 TABLET BY MOUTH EVERY DAY 90 tablet 3    metFORMIN (GLUCOPHAGE) 1000 MG tablet Take 1 tablet by mouth 2 (two) times a day      Multiple Vitamins-Minerals (MULTIVITAMIN WITH MINERALS) tablet Take 1 tablet by mouth daily      ONE TOUCH ULTRA TEST test strip       TRULICITY 1 5 KS/6 7ZP SOPN INJECT 1 5 MG WEEKLY PER INSTRUCTIONS  3    Vitamin D, Cholecalciferol, 400 units TABS Take by mouth daily       No current facility-administered medications for this visit  Allergies  Allergies   Allergen Reactions    Simvastatin      Increases Liver functoin       Past Medical History, Social History, Family History, medications and allergies were reviewed  Review of Systems  Review of Systems   Constitutional: Negative  HENT: Negative  Eyes: Negative  Respiratory: Negative  Cardiovascular: Negative  Gastrointestinal: Negative  Endocrine: Negative  Genitourinary: Negative for difficulty urinating and hematuria  Stress urinary incontinence  Musculoskeletal: Negative  Skin: Negative  Neurological: Negative  Hematological: Negative  Psychiatric/Behavioral: Negative  Vitals  Vitals:    03/17/20 0720   BP: 142/88   BP Location: Left arm   Patient Position: Sitting   Cuff Size: Adult   Pulse: 99   Weight: 119 kg (262 lb 6 4 oz)   Height: 5' 5" (1 651 m)         Physical Exam    Physical Exam   Constitutional: He is oriented to person, place, and time  He appears well-developed and well-nourished  No distress  HENT:   Head: Normocephalic and atraumatic  Eyes: Conjunctivae are normal  Right eye exhibits no discharge  Left eye exhibits no discharge  Neck: Normal range of motion  No tracheal deviation present  Pulmonary/Chest: Effort normal and breath sounds normal  No accessory muscle usage  No respiratory distress  Abdominal: Normal appearance  overweight   Musculoskeletal: Normal range of motion  He exhibits no edema or deformity  Neurological: He is alert and oriented to person, place, and time  Skin: Skin is warm, dry and intact  He is not diaphoretic  Psychiatric: He has a normal mood and affect  His speech is normal  Cognition and memory are normal    Nursing note and vitals reviewed  Results    Below listed labs, pathology results, and radiology images were personally reviewed:    Lab Results   Component Value Date/Time    PSA <0 1 03/03/2020 06:45 AM    PSA <0 1 05/28/2019 07:06 AM    PSA 5 3 (H) 10/29/2016 09:13 AM     Lab Results   Component Value Date    CALCIUM 9 3 08/02/2019    K 4 4 08/02/2019    CO2 27 08/02/2019     08/02/2019    BUN 25 08/02/2019    CREATININE 1 40 (H) 08/02/2019     Lab Results   Component Value Date    WBC 11 55 (H) 08/02/2019    HGB 13 8 08/02/2019    HCT 43 4 08/02/2019    MCV 90 08/02/2019     08/02/2019       No results found for this or any previous visit (from the past 1 hour(s)) ]    Component      Latest Ref Rng & Units 8/20/2016 10/29/2016 2/10/2018 8/3/2018   PSA, Total      0 0 - 4 0 ng/mL 6 0 (H) 5 3 (H) 7 3 (H) <0 1     Component      Latest Ref Rng & Units 11/6/2018 3/19/2019 5/28/2019 9/3/2019   PSA, Total      0 0 - 4 0 ng/mL <0 1 <0 1 <0 1 <0 1     Component      Latest Ref Rng & Units 12/9/2019   PSA, Total      0 0 - 4 0 ng/mL <0 1     -KUB  ABDOMEN     INDICATION:   N20 0: Calculus of kidney      COMPARISON:  May 20, 2019     VIEWS:  AP supine        FINDINGS:     Stable bilateral renal calculi measuring 4 mm on the right and left  The calculi are somewhat indistinct likely due to respiratory motion      No radiopaque ureteral calculi identified      Nonobstructive bowel gas pattern      No acute osseous abnormality is seen      IMPRESSION:     Stable bilateral nephrolithiasis          Workstation performed: [id number]      Imaging     XR abdomen 1 view kub (Order: [id number]) - 12/9/2019

## 2020-04-14 ENCOUNTER — TRANSCRIBE ORDERS (OUTPATIENT)
Dept: LAB | Facility: HOSPITAL | Age: 60
End: 2020-04-14

## 2020-04-14 ENCOUNTER — APPOINTMENT (OUTPATIENT)
Dept: LAB | Facility: HOSPITAL | Age: 60
End: 2020-04-14
Attending: INTERNAL MEDICINE
Payer: COMMERCIAL

## 2020-04-14 DIAGNOSIS — E78.5 HYPERLIPIDEMIA, UNSPECIFIED HYPERLIPIDEMIA TYPE: ICD-10-CM

## 2020-04-14 DIAGNOSIS — I10 ESSENTIAL HYPERTENSION, MALIGNANT: Primary | ICD-10-CM

## 2020-04-14 DIAGNOSIS — I10 ESSENTIAL HYPERTENSION, MALIGNANT: ICD-10-CM

## 2020-04-14 DIAGNOSIS — E11.649 UNCONTROLLED TYPE 2 DIABETES MELLITUS WITH HYPOGLYCEMIA WITHOUT COMA (HCC): ICD-10-CM

## 2020-04-14 LAB
25(OH)D3 SERPL-MCNC: 29.4 NG/ML (ref 30–100)
ALBUMIN SERPL BCP-MCNC: 3.6 G/DL (ref 3.5–5)
ALP SERPL-CCNC: 81 U/L (ref 46–116)
ALT SERPL W P-5'-P-CCNC: 69 U/L (ref 12–78)
ANION GAP SERPL CALCULATED.3IONS-SCNC: 8 MMOL/L (ref 4–13)
AST SERPL W P-5'-P-CCNC: 45 U/L (ref 5–45)
BILIRUB SERPL-MCNC: 0.36 MG/DL (ref 0.2–1)
BUN SERPL-MCNC: 19 MG/DL (ref 5–25)
CALCIUM SERPL-MCNC: 9 MG/DL (ref 8.3–10.1)
CHLORIDE SERPL-SCNC: 107 MMOL/L (ref 100–108)
CHOLEST SERPL-MCNC: 115 MG/DL (ref 50–200)
CO2 SERPL-SCNC: 25 MMOL/L (ref 21–32)
CREAT SERPL-MCNC: 1.2 MG/DL (ref 0.6–1.3)
EST. AVERAGE GLUCOSE BLD GHB EST-MCNC: 183 MG/DL
GFR SERPL CREATININE-BSD FRML MDRD: 66 ML/MIN/1.73SQ M
GLUCOSE P FAST SERPL-MCNC: 92 MG/DL (ref 65–99)
HBA1C MFR BLD: 8 %
HDLC SERPL-MCNC: 37 MG/DL
LDLC SERPL CALC-MCNC: 56 MG/DL (ref 0–100)
NONHDLC SERPL-MCNC: 78 MG/DL
POTASSIUM SERPL-SCNC: 4 MMOL/L (ref 3.5–5.3)
PROT SERPL-MCNC: 7.2 G/DL (ref 6.4–8.2)
SODIUM SERPL-SCNC: 140 MMOL/L (ref 136–145)
TRIGL SERPL-MCNC: 112 MG/DL
TSH SERPL DL<=0.05 MIU/L-ACNC: 2.13 UIU/ML (ref 0.36–3.74)

## 2020-04-14 PROCEDURE — 84443 ASSAY THYROID STIM HORMONE: CPT

## 2020-04-14 PROCEDURE — 3052F HG A1C>EQUAL 8.0%<EQUAL 9.0%: CPT | Performed by: PHYSICIAN ASSISTANT

## 2020-04-14 PROCEDURE — 36415 COLL VENOUS BLD VENIPUNCTURE: CPT

## 2020-04-14 PROCEDURE — 83036 HEMOGLOBIN GLYCOSYLATED A1C: CPT

## 2020-04-14 PROCEDURE — 80061 LIPID PANEL: CPT

## 2020-04-14 PROCEDURE — 82306 VITAMIN D 25 HYDROXY: CPT

## 2020-04-14 PROCEDURE — 80053 COMPREHEN METABOLIC PANEL: CPT

## 2020-04-21 ENCOUNTER — TELEMEDICINE (OUTPATIENT)
Dept: INTERNAL MEDICINE CLINIC | Facility: CLINIC | Age: 60
End: 2020-04-21
Payer: COMMERCIAL

## 2020-04-21 DIAGNOSIS — I10 BENIGN ESSENTIAL HYPERTENSION: Primary | ICD-10-CM

## 2020-04-21 DIAGNOSIS — E78.2 MIXED HYPERLIPIDEMIA: ICD-10-CM

## 2020-04-21 DIAGNOSIS — E11.65 UNCONTROLLED TYPE 2 DIABETES MELLITUS WITH HYPERGLYCEMIA (HCC): ICD-10-CM

## 2020-04-21 DIAGNOSIS — E66.01 MORBID OBESITY (HCC): ICD-10-CM

## 2020-04-21 DIAGNOSIS — C61 PROSTATE CANCER (HCC): ICD-10-CM

## 2020-04-21 DIAGNOSIS — N39.3 STRESS INCONTINENCE OF URINE: ICD-10-CM

## 2020-04-21 PROCEDURE — 99214 OFFICE O/P EST MOD 30 MIN: CPT | Performed by: INTERNAL MEDICINE

## 2020-07-06 NOTE — TELEPHONE ENCOUNTER
Patient is having Zahida Fossa on 4/13 with Dr David Mathews  He will need an appointment for BX results  Either [facility] or [facility] are ok   Please help to schedule
Spoke with patient and scheduled discussion visit with Dr Tammy Davies 4/26/18 at the [facility] office  Patient verbalized understanding and agrees to appt 
Present (15 x15 bpm)

## 2020-07-09 ENCOUNTER — APPOINTMENT (OUTPATIENT)
Dept: LAB | Facility: HOSPITAL | Age: 60
End: 2020-07-09
Payer: COMMERCIAL

## 2020-07-09 DIAGNOSIS — C61 PROSTATE CANCER (HCC): ICD-10-CM

## 2020-07-09 LAB — PSA SERPL-MCNC: <0.1 NG/ML (ref 0–4)

## 2020-07-09 PROCEDURE — 84153 ASSAY OF PSA TOTAL: CPT

## 2020-07-09 PROCEDURE — 36415 COLL VENOUS BLD VENIPUNCTURE: CPT

## 2020-07-20 ENCOUNTER — OFFICE VISIT (OUTPATIENT)
Dept: UROLOGY | Facility: CLINIC | Age: 60
End: 2020-07-20
Payer: COMMERCIAL

## 2020-07-20 VITALS
BODY MASS INDEX: 42.59 KG/M2 | TEMPERATURE: 97.9 F | DIASTOLIC BLOOD PRESSURE: 90 MMHG | WEIGHT: 265 LBS | HEART RATE: 100 BPM | HEIGHT: 66 IN | SYSTOLIC BLOOD PRESSURE: 150 MMHG

## 2020-07-20 DIAGNOSIS — C61 PROSTATE CANCER (HCC): Primary | ICD-10-CM

## 2020-07-20 PROCEDURE — 1036F TOBACCO NON-USER: CPT | Performed by: PHYSICIAN ASSISTANT

## 2020-07-20 PROCEDURE — 2022F DILAT RTA XM EVC RTNOPTHY: CPT | Performed by: PHYSICIAN ASSISTANT

## 2020-07-20 PROCEDURE — 3077F SYST BP >= 140 MM HG: CPT | Performed by: PHYSICIAN ASSISTANT

## 2020-07-20 PROCEDURE — 99213 OFFICE O/P EST LOW 20 MIN: CPT | Performed by: PHYSICIAN ASSISTANT

## 2020-07-20 PROCEDURE — 3052F HG A1C>EQUAL 8.0%<EQUAL 9.0%: CPT | Performed by: PHYSICIAN ASSISTANT

## 2020-07-20 PROCEDURE — 3008F BODY MASS INDEX DOCD: CPT | Performed by: PHYSICIAN ASSISTANT

## 2020-07-20 PROCEDURE — 3080F DIAST BP >= 90 MM HG: CPT | Performed by: PHYSICIAN ASSISTANT

## 2020-07-20 NOTE — PROGRESS NOTES
7/20/2020    Klaudia Beltran  1960  [id number]    Discussion and Plan    1  Nu 7 (4+3) T2c prostate cancer s/p robotic prostatectomy (6/13/18) - previously managed by Dr Albert Clarke  - PSA remains undetectable  - follow-up 6 months PSA prior to visit    2  History of nephrolithiasis  - KUB (12/2019) revealing stable bilateral nephrolithiasis  - Patient asymptomatic  3  Stress urinary incontinence  - reviewed pelvic floor physical therapy, artificial urinary sphincter, as well as male urethral sling  Patient is not interested in surgery at this time  He will consider pelvic floor physical therapy and contact us should he wish to proceed with referral     4  Erectile dysfunction  - patient is not interested in treatment for ED at this time      1  Prostate cancer ([facility] [address] )  PSA Total, Diagnostic         Patient Active Problem List   Diagnosis    Kidney stones    Prostate cancer ([facility] [address] )    Benign essential hypertension    Uncontrolled type 2 diabetes mellitus with hyperglycemia ([facility] [address] )    Hyperlipidemia    Morbid obesity ([facility] [address] )    Leukocytosis    Other fatigue    Stress incontinence of urine       History of Present Illness    Maty Monteiro is a 62 y o  male patient previously under the care of Dr Albert Clarke wish a history of nephrolithiasis and Nu 7 (4+3) T2c prostate cancer s/p robotic prostatectomy (6/13/18) presenting for follow up      Patient has longstanding history of npehrolithiasis, previously  been treated at [facility] urology  Recent KUB (12/9/2019) revealing stable bilateral nephrolithiasis -- approximately 4mm bilaterally       He was found to have a progression in PSA to 7 2  TRUS prostate biopsy (4/13/18) revealed Nu 7 prostate cancer  CT was negative for metastatic disease  Robotic radical prostatectomy 7/33/97 without complications  Pathology revealed negative surgical margins        PSA remains undetectable at <0 1 (7/9/2020)      Has persistent stress incontinence requiring approximately 6 depends throughout the day  Patient is overall comfortable with his incontinence at this time  Patient denies any erectile function since his prostatectomy  Reports that he is not interested in medication for this          Past Medical History  Past Medical History:   Diagnosis Date    Acute bronchitis     Acute low back pain     Acute low back pain     [id number] resolved    Acute respiratory infection     Cellulitis of scrotum     Cough     Diabetes mellitus (HCC)     Herpes zoster     High cholesterol     Hyperkalemia     Hypertension     Kidney disease     Kidney stone     Low back pain     Lumbar radiculopathy     Rash     Retinopathy, central serous     Sleep apnea     [id number]    Trochanteric bursitis        Past Social History  Past Surgical History:   Procedure Laterality Date    ABDOMINAL ADHESION SURGERY N/A 6/13/2018    Procedure: LYSIS ADHESIONS;  Surgeon: Fiona Roblero MD;  [facility];  Service: Urology    LITHOTRIPSY      renal    [place] LAP,PROSTATECTOMY,RADICAL,W/NERVE SPARE,INCL ROBOTIC N/A 6/13/2018    Procedure: PROSTATECTOMY RADICAL W ROBOTICS;  Surgeon: Fiona Roblero MD;  [facility];  Service: Urology    PROSTATE SURGERY  06/13/2018    SHOULDER SURGERY         Past Family History  Family History   Problem Relation Age of Onset    Other Father         cardiac disorder    Stroke Father     Diabetes Father         mellitus    Hypertension Father     Heart disease Father         cardiac disorder    Cancer Sister     Cancer Paternal Grandmother     Heart disease Family         cardiac disorder    Kidney disease Family        Past Social history  Social History     Socioeconomic History    Marital status: /Civil Union     Spouse name: Not on file    Number of children: Not on file    Years of education: Not on file    Highest education level: Not on file   Occupational History    Not on file   Social Needs    Financial resource strain: Not on file    Food insecurity:     Worry: Not on file     Inability: Not on file    Transportation needs:     Medical: Not on file     Non-medical: Not on file   Tobacco Use    Smoking status: Never Smoker    Smokeless tobacco: Never Used   Substance and Sexual Activity    Alcohol use: No    Drug use: No    Sexual activity: Not on file   Lifestyle    Physical activity:     Days per week: Not on file     Minutes per session: Not on file    Stress: Not on file   Relationships    Social connections:     Talks on phone: Not on file     Gets together: Not on file     Attends Yazdanism service: Not on file     Active member of club or organization: Not on file     Attends meetings of clubs or organizations: Not on file     Relationship status: Not on file    Intimate partner violence:     Fear of current or ex partner: Not on file     Emotionally abused: Not on file     Physically abused: Not on file     Forced sexual activity: Not on file   Other Topics Concern    Not on file   Social History Narrative    Caffeine use       Current Medications  Current Outpatient Medications   Medication Sig Dispense Refill    albuterol (PROAIR HFA) 90 mcg/act inhaler Inhale 1-2 puffs every 6 (six) hours as needed        ASPIRIN 81 PO Take 1 capsule by mouth 2 (two) times a day        atorvastatin (LIPITOR) 80 mg tablet Take 80 mg by mouth daily  0    BD PEN NEEDLE MAJO U/F 32G X 4 MM MISC 2 (two) times a day  5    glimepiride (AMARYL) 4 mg tablet Take by mouth 2 (two) times a day      HUMALOG KWIKPEN 100 units/mL injection pen 15 Units 3 (three) times a day with meals   3    ibuprofen (MOTRIN) 800 mg tablet TAKE 1 TABLET EVERY 8 HOURS AS NEEDED 90 tablet 0    Insulin Glargine (TOUJEO) 300 units/mL CONCETRATED U-300 injection pen 46 units daily 3 pen 0    Lancets (ONETOUCH ULTRASOFT) lancets 4 (four) times a day Test  3    lisinopril (ZESTRIL) 40 mg tablet TAKE 1 TABLET BY MOUTH EVERY DAY 90 tablet 3    metFORMIN (GLUCOPHAGE) 1000 MG tablet Take 1 tablet by mouth 2 (two) times a day      Multiple Vitamins-Minerals (MULTIVITAMIN WITH MINERALS) tablet Take 1 tablet by mouth daily      ONE TOUCH ULTRA TEST test strip       TRULICITY 1 5 ME/3 4DG SOPN INJECT 1 5 MG WEEKLY PER INSTRUCTIONS  3    Vitamin D, Cholecalciferol, 400 units TABS Take by mouth daily       No current facility-administered medications for this visit  Allergies  Allergies   Allergen Reactions    Simvastatin      Increases Liver functoin       Past Medical History, Social History, Family History, medications and allergies were reviewed  Review of Systems  Review of Systems   Constitutional: Negative  HENT: Negative  Eyes: Negative  Respiratory: Negative  Cardiovascular: Negative  Gastrointestinal: Negative  Endocrine: Negative  Genitourinary: Negative for difficulty urinating and hematuria  Stress urinary incontinence  Musculoskeletal: Negative  Skin: Negative  Neurological: Negative  Hematological: Negative  Psychiatric/Behavioral: Negative  Vitals  Vitals:    07/20/20 0737   BP: 150/90   BP Location: Left arm   Patient Position: Sitting   Cuff Size: Adult   Pulse: 100   Temp: 97 9 °F (36 6 °C)   Weight: 120 kg (265 lb)   Height: 5' 6" (1 676 m)         Physical Exam    Physical Exam   Constitutional: He is oriented to person, place, and time  He appears well-developed and well-nourished  No distress  HENT:   Head: Normocephalic and atraumatic  Eyes: Conjunctivae are normal  Right eye exhibits no discharge  Left eye exhibits no discharge  Neck: Normal range of motion  No tracheal deviation present  Pulmonary/Chest: Effort normal and breath sounds normal  No accessory muscle usage  No respiratory distress  Abdominal: Normal appearance  overweight   Musculoskeletal: Normal range of motion  He exhibits no edema or deformity     Neurological: He is alert and oriented to person, place, and time  Skin: Skin is warm, dry and intact  He is not diaphoretic  Psychiatric: He has a normal mood and affect  His speech is normal  Cognition and memory are normal    Nursing note and vitals reviewed        Results    Below listed labs, pathology results, and radiology images were personally reviewed:    Lab Results   Component Value Date/Time    PSA <0 1 07/09/2020 06:56 AM    PSA <0 1 05/28/2019 07:06 AM    PSA 5 3 (H) 10/29/2016 09:13 AM     Lab Results   Component Value Date    CALCIUM 9 0 04/14/2020    K 4 0 04/14/2020    CO2 25 04/14/2020     04/14/2020    BUN 19 04/14/2020    CREATININE 1 20 04/14/2020     Lab Results   Component Value Date    WBC 11 55 (H) 08/02/2019    HGB 13 8 08/02/2019    HCT 43 4 08/02/2019    MCV 90 08/02/2019     08/02/2019       No results found for this or any previous visit (from the past 1 hour(s)) ]

## 2020-08-25 ENCOUNTER — TELEPHONE (OUTPATIENT)
Dept: UROLOGY | Facility: CLINIC | Age: 60
End: 2020-08-25

## 2020-08-25 DIAGNOSIS — C61 PROSTATE CANCER (HCC): Primary | ICD-10-CM

## 2020-08-25 DIAGNOSIS — N20.0 KIDNEY STONES: ICD-10-CM

## 2020-08-25 NOTE — TELEPHONE ENCOUNTER
This is a former patient of Dr Ena Crocker  I would like to see him for his next office visit to transfer care for kidney stones and prostate cancer  He is currently scheduled with [facility] in Jan   Let us please cancel that instead schedule him for a visit with me around the same time frame  First morning visit is his preference  I have ordered a KUB and ultrasound prior to the visit  Patient is a [employer] employee  He can probably reach out to him by e-mail if preferred      Thank you

## 2020-09-10 ENCOUNTER — APPOINTMENT (OUTPATIENT)
Dept: LAB | Facility: HOSPITAL | Age: 60
End: 2020-09-10
Attending: INTERNAL MEDICINE
Payer: COMMERCIAL

## 2020-09-10 ENCOUNTER — TRANSCRIBE ORDERS (OUTPATIENT)
Dept: LAB | Facility: HOSPITAL | Age: 60
End: 2020-09-10

## 2020-09-10 DIAGNOSIS — E11.649 UNCONTROLLED TYPE 2 DIABETES MELLITUS WITH HYPOGLYCEMIA, UNSPECIFIED HYPOGLYCEMIA COMA STATUS (HCC): Primary | ICD-10-CM

## 2020-09-10 LAB
EST. AVERAGE GLUCOSE BLD GHB EST-MCNC: 169 MG/DL
HBA1C MFR BLD: 7.5 %

## 2020-09-10 PROCEDURE — 83036 HEMOGLOBIN GLYCOSYLATED A1C: CPT

## 2020-09-10 PROCEDURE — 36415 COLL VENOUS BLD VENIPUNCTURE: CPT

## 2020-09-10 PROCEDURE — 3051F HG A1C>EQUAL 7.0%<8.0%: CPT | Performed by: INTERNAL MEDICINE

## 2020-09-15 ENCOUNTER — OFFICE VISIT (OUTPATIENT)
Dept: INTERNAL MEDICINE CLINIC | Facility: CLINIC | Age: 60
End: 2020-09-15
Payer: COMMERCIAL

## 2020-09-15 VITALS
OXYGEN SATURATION: 98 % | WEIGHT: 264 LBS | TEMPERATURE: 96.3 F | SYSTOLIC BLOOD PRESSURE: 160 MMHG | HEART RATE: 102 BPM | BODY MASS INDEX: 42.61 KG/M2 | DIASTOLIC BLOOD PRESSURE: 74 MMHG

## 2020-09-15 DIAGNOSIS — Z00.00 WELLNESS EXAMINATION: Primary | ICD-10-CM

## 2020-09-15 DIAGNOSIS — E11.65 UNCONTROLLED TYPE 2 DIABETES MELLITUS WITH HYPERGLYCEMIA (HCC): ICD-10-CM

## 2020-09-15 DIAGNOSIS — N39.3 STRESS INCONTINENCE OF URINE: ICD-10-CM

## 2020-09-15 DIAGNOSIS — Z11.4 ENCOUNTER FOR SCREENING FOR HIV: ICD-10-CM

## 2020-09-15 DIAGNOSIS — Z12.11 SCREEN FOR COLON CANCER: ICD-10-CM

## 2020-09-15 DIAGNOSIS — Z23 NEED FOR VACCINATION: ICD-10-CM

## 2020-09-15 DIAGNOSIS — I10 BENIGN ESSENTIAL HYPERTENSION: ICD-10-CM

## 2020-09-15 DIAGNOSIS — C61 PROSTATE CANCER (HCC): ICD-10-CM

## 2020-09-15 DIAGNOSIS — E78.2 MIXED HYPERLIPIDEMIA: ICD-10-CM

## 2020-09-15 DIAGNOSIS — E66.01 MORBID OBESITY (HCC): ICD-10-CM

## 2020-09-15 PROCEDURE — 3077F SYST BP >= 140 MM HG: CPT | Performed by: INTERNAL MEDICINE

## 2020-09-15 PROCEDURE — 90682 RIV4 VACC RECOMBINANT DNA IM: CPT

## 2020-09-15 PROCEDURE — 3078F DIAST BP <80 MM HG: CPT | Performed by: INTERNAL MEDICINE

## 2020-09-15 PROCEDURE — 3725F SCREEN DEPRESSION PERFORMED: CPT | Performed by: INTERNAL MEDICINE

## 2020-09-15 PROCEDURE — 99396 PREV VISIT EST AGE 40-64: CPT | Performed by: INTERNAL MEDICINE

## 2020-09-15 PROCEDURE — 1036F TOBACCO NON-USER: CPT | Performed by: INTERNAL MEDICINE

## 2020-09-15 PROCEDURE — 90471 IMMUNIZATION ADMIN: CPT

## 2020-09-15 NOTE — PROGRESS NOTES
Assessment/Plan:    Uncontrolled type 2 diabetes mellitus with hyperglycemia (HCC)  Continue glimepiride metformin Humalog Toujeo Trulicity  Due for eye exam  On ASA lisinopril and atorvastatin  F/U wit Dr Yahir Jackson  Lab Results   Component Value Date    HGBA1C 7 5 (H) 09/10/2020       Benign essential hypertension  High today   Resume checking BP at home  May need second agent- diuretic preferably    Prostate cancer ([facility] [address] )  F/U with urology    Hyperlipidemia  Continue atorvastatin    Resume checking BP at home  Low salt diet       Problem List Items Addressed This Visit        Endocrine    Uncontrolled type 2 diabetes mellitus with hyperglycemia ([facility] [address] )     Continue glimepiride metformin Humalog Toujeo Trulicity  Due for eye exam  On ASA lisinopril and atorvastatin  F/U wit Dr Yahir Jackson  Lab Results   Component Value Date    HGBA1C 7 5 (H) 09/10/2020               Cardiovascular and Mediastinum    Benign essential hypertension     High today   Resume checking BP at home  May need second agent- diuretic preferably            Genitourinary    Prostate cancer ([facility] [address] )     F/U with urology            Other    Morbid obesity ([facility] [address] )    Stress incontinence of urine    Hyperlipidemia     Continue atorvastatin           Other Visit Diagnoses     Wellness examination    -  Primary    Need for vaccination        Relevant Orders    influenza vaccine, quadrivalent, recombinant, PF, 0 5 mL, for patients 18 yr+ (FLUBLOK) (Completed)    Screen for colon cancer        Relevant Orders    Ambulatory referral to Gastroenterology    Encounter for screening for HIV        Relevant Orders    HIV 1/2 Antigen/Antibody (4th Generation) w Reflex [facility]            Subjective:      Patient ID: Hugo Thurston is a 62 y o  male      HPI  Wellness  Up to date with metabolic screening  Known prostate cancer and sees urology  Due for colon cancer screening, no previous colonoscopy  DM with A1C 7 5 managed by Dr Yahir Jackson    The following portions of the patient's history were reviewed and updated as appropriate: allergies, current medications, past family history, past medical history, past social history, past surgical history and problem list     Review of Systems   Constitutional: Negative for chills and fever  HENT: Negative for congestion, rhinorrhea and sore throat  Respiratory: Negative for cough and shortness of breath  Cardiovascular: Negative for chest pain and palpitations  Gastrointestinal: Negative for abdominal pain, constipation and diarrhea  Genitourinary:        Incontinence   Skin:        Lump right forearm   Neurological: Positive for headaches (recent, normal BP)  Negative for dizziness  Objective:      /74   Pulse 102   Temp (!) 96 3 °F (35 7 °C)   Wt 120 kg (264 lb)   SpO2 98%   BMI 42 61 kg/m²          Physical Exam  Constitutional:       General: He is not in acute distress  Appearance: He is obese  He is not ill-appearing, toxic-appearing or diaphoretic  HENT:      Right Ear: External ear normal  There is no impacted cerumen  Left Ear: External ear normal  There is no impacted cerumen  Eyes:      Conjunctiva/sclera: Conjunctivae normal    Neck:      Musculoskeletal: Neck supple  Cardiovascular:      Rate and Rhythm: Normal rate and regular rhythm  Pulses: no weak pulses          Dorsalis pedis pulses are 2+ on the right side and 2+ on the left side  Heart sounds: No murmur  Pulmonary:      Effort: Pulmonary effort is normal  No respiratory distress  Breath sounds: Normal breath sounds  No wheezing or rales  Abdominal:      General: There is no distension  Palpations: Abdomen is soft  There is no mass  Tenderness: There is no abdominal tenderness  There is no guarding or rebound  Musculoskeletal:      Right lower leg: No edema  Left lower leg: No edema     Feet:      Right foot:      Skin integrity: No ulcer, skin breakdown, erythema, warmth, callus or dry skin       Left foot:      Skin integrity: No ulcer, skin breakdown, erythema, warmth, callus or dry skin  Neurological:      Mental Status: He is oriented to person, place, and time  Psychiatric:         Mood and Affect: Mood normal          Behavior: Behavior normal          Thought Content: Thought content normal          Judgment: Judgment normal          Patient's shoes and socks removed  Right Foot/Ankle   Right Foot Inspection  Skin Exam: skin normal and skin intact no dry skin, no warmth, no callus, no erythema, no maceration, no abnormal color, no pre-ulcer, no ulcer and no callus                          Toe Exam: ROM and strength within normal limits  Sensory       Monofilament testing: intact  Vascular    The right DP pulse is 2+  Left Foot/Ankle  Left Foot Inspection  Skin Exam: skin normal and skin intactno dry skin, no warmth, no erythema, no maceration, normal color, no pre-ulcer, no ulcer and no callus                         Toe Exam: ROM and strength within normal limits                   Sensory       Monofilament: intact  Vascular    The left DP pulse is 2+  Assign Risk Category:  No deformity present; No loss of protective sensation;  No weak pulses       Risk: 0

## 2020-09-21 NOTE — ASSESSMENT & PLAN NOTE
Continue glimepiride metformin Humalog Toujeo Trulicity  Due for eye exam  On ASA lisinopril and atorvastatin  F/U wit Dr Fito Harding  Lab Results   Component Value Date    HGBA1C 7 5 (H) 09/10/2020

## 2020-09-22 DIAGNOSIS — I10 HYPERTENSION, ESSENTIAL, BENIGN: ICD-10-CM

## 2020-09-22 PROCEDURE — 4010F ACE/ARB THERAPY RXD/TAKEN: CPT | Performed by: INTERNAL MEDICINE

## 2020-09-22 RX ORDER — LISINOPRIL 40 MG/1
TABLET ORAL
Qty: 90 TABLET | Refills: 3 | Status: SHIPPED | OUTPATIENT
Start: 2020-09-22 | End: 2021-09-23

## 2020-09-27 DIAGNOSIS — I10 BENIGN ESSENTIAL HYPERTENSION: Primary | ICD-10-CM

## 2020-09-27 RX ORDER — AMLODIPINE BESYLATE 5 MG/1
5 TABLET ORAL DAILY
Qty: 90 TABLET | Refills: 1 | Status: SHIPPED | OUTPATIENT
Start: 2020-09-27 | End: 2021-01-12 | Stop reason: SDUPTHER

## 2020-12-23 ENCOUNTER — IMMUNIZATIONS (OUTPATIENT)
Dept: FAMILY MEDICINE CLINIC | Facility: HOSPITAL | Age: 60
End: 2020-12-23
Payer: COMMERCIAL

## 2020-12-23 DIAGNOSIS — Z23 ENCOUNTER FOR IMMUNIZATION: ICD-10-CM

## 2020-12-23 PROCEDURE — 91300 SARS-COV-2 / COVID-19 MRNA VACCINE (PFIZER-BIONTECH) 30 MCG: CPT

## 2020-12-23 PROCEDURE — 0001A SARS-COV-2 / COVID-19 MRNA VACCINE (PFIZER-BIONTECH) 30 MCG: CPT

## 2020-12-31 ENCOUNTER — HOSPITAL ENCOUNTER (OUTPATIENT)
Dept: RADIOLOGY | Facility: HOSPITAL | Age: 60
Discharge: HOME | End: 2020-12-31
Attending: UROLOGY
Payer: COMMERCIAL

## 2020-12-31 ENCOUNTER — TRANSCRIBE ORDERS (OUTPATIENT)
Dept: RADIOLOGY | Facility: HOSPITAL | Age: 60
End: 2020-12-31

## 2020-12-31 ENCOUNTER — LAB (OUTPATIENT)
Dept: LAB | Facility: HOSPITAL | Age: 60
End: 2020-12-31
Attending: UROLOGY
Payer: COMMERCIAL

## 2020-12-31 DIAGNOSIS — N20.0 KIDNEY STONES: ICD-10-CM

## 2020-12-31 DIAGNOSIS — C61 PROSTATE CANCER (HCC): ICD-10-CM

## 2020-12-31 LAB — PSA SERPL-MCNC: <0.1 NG/ML (ref 0–4)

## 2020-12-31 PROCEDURE — G0103 PSA SCREENING: HCPCS

## 2020-12-31 PROCEDURE — 36415 COLL VENOUS BLD VENIPUNCTURE: CPT | Performed by: INTERNAL MEDICINE

## 2020-12-31 PROCEDURE — 87389 HIV-1 AG W/HIV-1&-2 AB AG IA: CPT | Performed by: INTERNAL MEDICINE

## 2020-12-31 PROCEDURE — 74018 RADEX ABDOMEN 1 VIEW: CPT

## 2021-01-04 LAB — HIV 1+2 AB+HIV1 P24 AG SERPL QL IA: NORMAL

## 2021-01-12 ENCOUNTER — IMMUNIZATIONS (OUTPATIENT)
Dept: FAMILY MEDICINE CLINIC | Facility: HOSPITAL | Age: 61
End: 2021-01-12

## 2021-01-12 DIAGNOSIS — I10 BENIGN ESSENTIAL HYPERTENSION: ICD-10-CM

## 2021-01-12 DIAGNOSIS — Z23 ENCOUNTER FOR IMMUNIZATION: ICD-10-CM

## 2021-01-12 PROCEDURE — 91300 SARS-COV-2 / COVID-19 MRNA VACCINE (PFIZER-BIONTECH) 30 MCG: CPT

## 2021-01-12 PROCEDURE — 0002A SARS-COV-2 / COVID-19 MRNA VACCINE (PFIZER-BIONTECH) 30 MCG: CPT

## 2021-01-12 RX ORDER — AMLODIPINE BESYLATE 5 MG/1
10 TABLET ORAL DAILY
Qty: 90 TABLET | Refills: 1
Start: 2021-01-12 | End: 2021-02-03 | Stop reason: SDUPTHER

## 2021-01-25 DIAGNOSIS — I10 BENIGN ESSENTIAL HYPERTENSION: Primary | ICD-10-CM

## 2021-01-25 RX ORDER — HYDROCHLOROTHIAZIDE 25 MG/1
25 TABLET ORAL DAILY
Qty: 90 TABLET | Refills: 1 | Status: SHIPPED | OUTPATIENT
Start: 2021-01-25 | End: 2021-01-31 | Stop reason: SINTOL

## 2021-01-31 DIAGNOSIS — I10 BENIGN ESSENTIAL HYPERTENSION: Primary | ICD-10-CM

## 2021-01-31 RX ORDER — CARVEDILOL 6.25 MG/1
6.25 TABLET ORAL 2 TIMES DAILY WITH MEALS
Qty: 60 TABLET | Refills: 5 | Status: SHIPPED | OUTPATIENT
Start: 2021-01-31 | End: 2021-02-14 | Stop reason: SDUPTHER

## 2021-02-03 DIAGNOSIS — I10 BENIGN ESSENTIAL HYPERTENSION: ICD-10-CM

## 2021-02-03 RX ORDER — AMLODIPINE BESYLATE 5 MG/1
5 TABLET ORAL DAILY
Qty: 90 TABLET | Refills: 1
Start: 2021-02-03 | End: 2021-02-14 | Stop reason: SDUPTHER

## 2021-02-10 ENCOUNTER — OFFICE VISIT (OUTPATIENT)
Dept: GASTROENTEROLOGY | Facility: CLINIC | Age: 61
End: 2021-02-10
Payer: COMMERCIAL

## 2021-02-10 VITALS
SYSTOLIC BLOOD PRESSURE: 150 MMHG | HEART RATE: 80 BPM | WEIGHT: 266 LBS | HEIGHT: 66 IN | TEMPERATURE: 98.6 F | BODY MASS INDEX: 42.75 KG/M2 | DIASTOLIC BLOOD PRESSURE: 80 MMHG

## 2021-02-10 DIAGNOSIS — Z11.59 NEED FOR HEPATITIS C SCREENING TEST: ICD-10-CM

## 2021-02-10 DIAGNOSIS — Z12.11 SCREEN FOR COLON CANCER: Primary | ICD-10-CM

## 2021-02-10 PROCEDURE — 99243 OFF/OP CNSLTJ NEW/EST LOW 30: CPT | Performed by: INTERNAL MEDICINE

## 2021-02-10 PROCEDURE — 3077F SYST BP >= 140 MM HG: CPT | Performed by: INTERNAL MEDICINE

## 2021-02-10 PROCEDURE — 3079F DIAST BP 80-89 MM HG: CPT | Performed by: INTERNAL MEDICINE

## 2021-02-10 RX ORDER — SODIUM, POTASSIUM,MAG SULFATES 17.5-3.13G
1 SOLUTION, RECONSTITUTED, ORAL ORAL ONCE
Qty: 1 BOTTLE | Refills: 0 | Status: SHIPPED | OUTPATIENT
Start: 2021-02-10 | End: 2021-11-22 | Stop reason: ALTCHOICE

## 2021-02-10 NOTE — PROGRESS NOTES
[facility] - Outpatient Consultation  Tanner Diehl 62 y o  male MRN: [id number]  Encounter: [id number]      ASSESSMENT AND PLAN:  62year old male referred by PCP  He has diabetes and is on insulin  He also has hypertension  He also has a prior history of prostate cancer  1  Screen for colon cancer  -he  Is average risk for colon cancer as he denies family history, he knows he is almost 10 years overdue, he is agreeable to colonoscopy, we will plan for [address] [place] [address] in the a   2   Hepatitis-C screening  - this was done and was negative     follow-up as needed  _________________________________________________________  HPI:  62year old male referred by PCP  He has had DM since his 30's  Also with HTN and dyslipidemia  He had prostate cancer and was treated with surgery  This was found on routine PSA  He is here for colon cancer screening  REVIEW OF SYSTEMS:    CONSTITUTIONAL: Denies any fever, chills, rigors, and weight loss  HEENT: No earache or tinnitus  Denies hearing loss or visual disturbances  CARDIOVASCULAR: No chest pain or palpitations  RESPIRATORY: Denies any cough, hemoptysis, shortness of breath or dyspnea on exertion  GASTROINTESTINAL: As noted in the History of Present Illness  GENITOURINARY: No problems with urination  Denies any hematuria or dysuria  NEUROLOGIC: No dizziness or vertigo, denies headaches  MUSCULOSKELETAL: Denies any muscle or joint pain  SKIN: Denies skin rashes or itching  ENDOCRINE: Denies excessive thirst  Denies intolerance to heat or cold  PSYCHOSOCIAL: Denies depression or anxiety  Denies any recent memory loss         Historical Information   Past Medical History:   Diagnosis Date    Acute bronchitis     Acute low back pain     Acute low back pain     [id number] resolved    Acute respiratory infection     Cellulitis of scrotum     Cough     Diabetes mellitus (HCC)     Herpes zoster     High cholesterol     Hyperkalemia     Hypertension     Kidney disease     Kidney stone     Low back pain     Lumbar radiculopathy     Rash     Retinopathy, central serous     Sleep apnea     [id number]    Trochanteric bursitis      Past Surgical History:   Procedure Laterality Date    ABDOMINAL ADHESION SURGERY N/A 6/13/2018    Procedure: LYSIS ADHESIONS;  Surgeon: Flower Guzman MD;  [facility];  Service: Urology    LITHOTRIPSY      renal    [place] LAP,PROSTATECTOMY,RADICAL,W/NERVE SPARE,INCL ROBOTIC N/A 6/13/2018    Procedure: Sharyle Chen W ROBOTICS;  Surgeon: Flower Guzman MD;  [facility];  Service: Urology    PROSTATE SURGERY  06/13/2018    SHOULDER SURGERY       Social History   Social History     Substance and Sexual Activity   Alcohol Use No     Social History     Substance and Sexual Activity   Drug Use No     Social History     Tobacco Use   Smoking Status Never Smoker   Smokeless Tobacco Never Used     Family History   Problem Relation Age of Onset    Other Father         cardiac disorder    Stroke Father     Diabetes Father         mellitus    Hypertension Father     Heart disease Father         cardiac disorder    Cancer Sister     Cancer Paternal Grandmother     Heart disease Family         cardiac disorder    Kidney disease Family        Meds/Allergies       Current Outpatient Medications:     albuterol (PROAIR HFA) 90 mcg/act inhaler    amLODIPine (NORVASC) 5 mg tablet    ASPIRIN 81 PO    atorvastatin (LIPITOR) 80 mg tablet    BD PEN NEEDLE MAJO U/F 32G X 4 MM MISC    carvedilol (COREG) 6 25 mg tablet    glimepiride (AMARYL) 4 mg tablet    HUMALOG KWIKPEN 100 units/mL injection pen    ibuprofen (MOTRIN) 800 mg tablet    Insulin Glargine (TOUJEO) 300 units/mL CONCETRATED U-300 injection pen    Lancets (ONETOUCH ULTRASOFT) lancets    lisinopril (ZESTRIL) 40 mg tablet    metFORMIN (GLUCOPHAGE) 1000 MG tablet    Multiple Vitamins-Minerals (MULTIVITAMIN WITH MINERALS) tablet    ONE TOUCH ULTRA TEST test strip    TRULICITY 1 5 DT/2 6QO SOPN    Vitamin D, Cholecalciferol, 400 units TABS    Allergies   Allergen Reactions    Simvastatin      Increases Liver functoin           Objective     Blood pressure 150/80, pulse 80, temperature 98 6 °F (37 °C), temperature source Tympanic, height 5' 6" (1 676 m), weight 121 kg (266 lb)  Body mass index is 42 93 kg/m²  PHYSICAL EXAM:      General Appearance:   Alert, cooperative, no distress   HEENT:   Normocephalic, atraumatic, anicteric      Neck:  Supple, symmetrical, trachea midline   Lungs:   Clear to auscultation bilaterally; no rales, rhonchi or wheezing; respirations unlabored    Heart[de-identified]   Regular rate and rhythm; no murmur, rub, or gallop  Abdomen:   Soft, non-tender, non-distended; normal bowel sounds; no masses, no organomegaly    Genitalia:   Deferred    Rectal:   Deferred    Extremities:  No cyanosis, clubbing or edema    Pulses:  2+ and symmetric    Skin:  No jaundice, rashes, or lesions    Lymph nodes:  No palpable cervical lymphadenopathy        Lab Results:   No visits with results within 1 Day(s) from this visit  Latest known visit with results is:   Lab on 12/31/2020   Component Date Value    PSA 12/31/2020 <0 1      Radiology Results:   No results found

## 2021-02-10 NOTE — PATIENT INSTRUCTIONS
Colonoscopy scheduled on 4/30/21 with Dr Bekah Garcia at [facility] gave patient Suprep instructions

## 2021-02-14 DIAGNOSIS — I10 BENIGN ESSENTIAL HYPERTENSION: ICD-10-CM

## 2021-02-14 RX ORDER — AMLODIPINE BESYLATE 5 MG/1
5 TABLET ORAL DAILY
Qty: 90 TABLET | Refills: 1 | Status: SHIPPED | OUTPATIENT
Start: 2021-02-14 | End: 2021-02-21 | Stop reason: SDUPTHER

## 2021-02-14 RX ORDER — CARVEDILOL 12.5 MG/1
12.5 TABLET ORAL 2 TIMES DAILY WITH MEALS
Qty: 60 TABLET | Refills: 2 | Status: SHIPPED | OUTPATIENT
Start: 2021-02-14 | End: 2021-03-19 | Stop reason: SDUPTHER

## 2021-02-16 ENCOUNTER — OFFICE VISIT (OUTPATIENT)
Dept: UROLOGY | Facility: CLINIC | Age: 61
End: 2021-02-16
Payer: COMMERCIAL

## 2021-02-16 DIAGNOSIS — N20.0 KIDNEY STONES: Primary | ICD-10-CM

## 2021-02-16 DIAGNOSIS — C61 PROSTATE CANCER (HCC): ICD-10-CM

## 2021-02-16 PROCEDURE — 99214 OFFICE O/P EST MOD 30 MIN: CPT | Performed by: UROLOGY

## 2021-02-16 RX ORDER — TAMSULOSIN HYDROCHLORIDE 0.4 MG/1
0.4 CAPSULE ORAL
Qty: 30 CAPSULE | Refills: 1 | Status: SHIPPED | OUTPATIENT
Start: 2021-02-16 | End: 2021-03-11

## 2021-02-16 NOTE — PROGRESS NOTES
Virtual Regular Visit      Assessment/Plan:    Problem List Items Addressed This Visit     None           #1 Prostate Cancer  - pT2a GG4+3=7  -  Robotic prostatectomy June 2018  - MAGGIE    #2 post prostatectomy KEEGAN  - 10-14 depends per day    #3 Recurrent nephrolithiasis  - around 8 total stone episodes  - Typically passes without intervention     it was nice to discuss patient's history of prostate cancer, status of his disease is MAGGIE  We did discuss his continence  He is currently happy with conservative measures  I  Offered to discuss surgical solutions for his post prostatectomy incontinence should he ever desires future  Regarding his kidney stones review his KUB  He is asymptomatic  I have prescribed tamsulosin for him to be on standby to facilitate passage of stones on an as-needed basis  Plan is as follows:  - will continue PSA every 6 months until 2023  - I have ordered a KUB and a PSA for 6 months  We will follow up via [place] at that time   -Patient will follow-up with 1 year with an additional PSA and KUB prior      Reason for visit is   Chief Complaint   Patient presents with    Virtual Regular Visit        Encounter provider Kanu Amaya MD    Provider located at [address] [place] [id number]      Recent Visits  No visits were found meeting these conditions  Showing recent visits within past 7 days and meeting all other requirements     Today's Visits  Date Type Provider Dept   02/16/21 Office Visit Kanu Amaya MD [facility]   Showing today's visits and meeting all other requirements     Future Appointments  No visits were found meeting these conditions  Showing future appointments within next 150 days and meeting all other requirements        The patient was identified by name and date of birth   Monika Tang was informed that this is a telemedicine visit and that the visit is being conducted through [employer] and patient was informed that this is a secure, HIPAA-compliant platform  He agrees to proceed     My office door was closed  No one else was in the room  He acknowledged consent and understanding of privacy and security of the video platform  The patient has agreed to participate and understands they can discontinue the visit at any time  Patient is aware this is a billable service  Subjective  Debbie Eagle is a 62 y o  male  Returns in follow-up  He is a very pleasant 44-year-old male with early onset prostate cancer  He also has a history of recurrent nephrolithiasis  He was previously managed by my colleague Dr Mercy Banerjee, having undergone robotic prostatectomy 2018  I prostate cancer standpoint he is doing very well, he is currently MAGGIE on regular post prostatectomy surveillance  His most recent PSA was undetectable  he also has a history of recurrent nephrolithiasis as outlined above  He has passed multiple kidney stones typically doing well with conservative management  He has also undergone shockwave lithotripsy in the past not since the time of his surgery  He has had no axial imaging since the time of his prostatectomy  Recent KUB is reviewed    He is currently asymptomatic      HPI     Past Medical History:   Diagnosis Date    Acute bronchitis     Acute low back pain     Acute low back pain     [id number] resolved    Acute respiratory infection     Cellulitis of scrotum     Cough     Diabetes mellitus (HCC)     Herpes zoster     High cholesterol     Hyperkalemia     Hypertension     Kidney disease     Kidney stone     Low back pain     Lumbar radiculopathy     Rash     Retinopathy, central serous     Sleep apnea     [id number]    Trochanteric bursitis        Past Surgical History:   Procedure Laterality Date    ABDOMINAL ADHESION SURGERY N/A 6/13/2018    Procedure: LYSIS ADHESIONS;  Surgeon: Akash Dunbar MD; [facility];  Service: Urology    LITHOTRIPSY      renal    AL LAP,PROSTATECTOMY,RADICAL,W/NERVE SPARE,INCL ROBOTIC N/A 6/13/2018    Procedure: Ceasar Rush;  Surgeon: Harinder Gomez MD;  [facility];  Service: Urology    PROSTATE SURGERY  06/13/2018    SHOULDER SURGERY         Current Outpatient Medications   Medication Sig Dispense Refill    albuterol (PROAIR HFA) 90 mcg/act inhaler Inhale 1-2 puffs every 6 (six) hours as needed        amLODIPine (NORVASC) 5 mg tablet Take 1 tablet (5 mg total) by mouth daily 90 tablet 1    ASPIRIN 81 PO Take 1 capsule by mouth 2 (two) times a day        atorvastatin (LIPITOR) 80 mg tablet Take 80 mg by mouth daily  0    BD PEN NEEDLE MAJO U/F 32G X 4 MM MISC 2 (two) times a day  5    carvedilol (COREG) 12 5 mg tablet Take 1 tablet (12 5 mg total) by mouth 2 (two) times a day with meals 60 tablet 2    glimepiride (AMARYL) 4 mg tablet Take by mouth 2 (two) times a day      HUMALOG KWIKPEN 100 units/mL injection pen 15 Units 3 (three) times a day with meals   3    ibuprofen (MOTRIN) 800 mg tablet TAKE 1 TABLET EVERY 8 HOURS AS NEEDED 90 tablet 0    Insulin Glargine (TOUJEO) 300 units/mL CONCETRATED U-300 injection pen 46 units daily 3 pen 0    Lancets (ONETOUCH ULTRASOFT) lancets 4 (four) times a day Test  3    lisinopril (ZESTRIL) 40 mg tablet TAKE 1 TABLET BY MOUTH EVERY DAY 90 tablet 3    metFORMIN (GLUCOPHAGE) 1000 MG tablet Take 1 tablet by mouth 2 (two) times a day      Multiple Vitamins-Minerals (MULTIVITAMIN WITH MINERALS) tablet Take 1 tablet by mouth daily      Na Sulfate-K Sulfate-Mg Sulf (Suprep Bowel Prep Kit) 17 5-3 13-1 6 GM/177ML SOLN Take 1 Bottle by mouth once for 1 dose 1 Bottle 0    ONE TOUCH ULTRA TEST test strip       TRULICITY 1 5 UR/4 3FC SOPN INJECT 1 5 MG WEEKLY PER INSTRUCTIONS  3    Vitamin D, Cholecalciferol, 400 units TABS Take by mouth daily       No current facility-administered medications for this visit  Allergies   Allergen Reactions    Simvastatin      Increases Liver functoin       Review of Systems   Constitutional: Negative for activity change and fatigue  HENT: Negative for congestion  Eyes: Negative for visual disturbance  Respiratory: Negative for shortness of breath and wheezing  Cardiovascular: Negative for chest pain and leg swelling  Gastrointestinal: Negative for abdominal pain  Endocrine: Negative for polyuria  Genitourinary: Negative for dysuria, flank pain, hematuria and urgency  Musculoskeletal: Negative for back pain  Allergic/Immunologic: Negative for immunocompromised state  Neurological: Negative for dizziness and numbness  Psychiatric/Behavioral: Negative for dysphoric mood  All other systems reviewed and are negative  Video Exam    There were no vitals filed for this visit  Physical Exam  Constitutional:       General: He is not in acute distress  Appearance: Normal appearance  He is well-developed  HENT:      Head: Normocephalic  Neck:      Musculoskeletal: Normal range of motion  Cardiovascular:      Comments: Obese  Pulmonary:      Effort: No respiratory distress  Abdominal:      Palpations: Abdomen is soft  Tenderness: There is no abdominal tenderness  Genitourinary:     Penis: No phimosis  Scrotum/Testes:         Right: Tenderness or swelling not present  Left: Tenderness or swelling not present  Musculoskeletal: Normal range of motion  Skin:     General: Skin is warm  Neurological:      Mental Status: He is alert  Cranial Nerves: No cranial nerve deficit  I spent 22 minutes directly with the patient during this visit      VIRTUAL VISIT DISCLAIMER    Arielalindsay Cristopher acknowledges that he has consented to an online visit or consultation   He understands that the online visit is based solely on information provided by him, and that, in the absence of a face-to-face physical evaluation by the physician, the diagnosis he receives is both limited and provisional in terms of accuracy and completeness  This is not intended to replace a full medical face-to-face evaluation by the physician  Mayr Brown understands and accepts these terms

## 2021-02-17 ENCOUNTER — TELEPHONE (OUTPATIENT)
Dept: GASTROENTEROLOGY | Facility: CLINIC | Age: 61
End: 2021-02-17

## 2021-02-21 DIAGNOSIS — I10 BENIGN ESSENTIAL HYPERTENSION: ICD-10-CM

## 2021-02-21 RX ORDER — AMLODIPINE BESYLATE 5 MG/1
5 TABLET ORAL DAILY
Qty: 90 TABLET | Refills: 1 | Status: SHIPPED | OUTPATIENT
Start: 2021-02-21 | End: 2021-11-28

## 2021-03-11 DIAGNOSIS — N20.0 KIDNEY STONES: ICD-10-CM

## 2021-03-11 RX ORDER — TAMSULOSIN HYDROCHLORIDE 0.4 MG/1
CAPSULE ORAL
Qty: 30 CAPSULE | Refills: 1 | Status: SHIPPED | OUTPATIENT
Start: 2021-03-11 | End: 2021-03-26

## 2021-03-19 ENCOUNTER — OFFICE VISIT (OUTPATIENT)
Dept: INTERNAL MEDICINE CLINIC | Facility: CLINIC | Age: 61
End: 2021-03-19
Payer: COMMERCIAL

## 2021-03-19 VITALS
SYSTOLIC BLOOD PRESSURE: 152 MMHG | BODY MASS INDEX: 42.91 KG/M2 | TEMPERATURE: 97.6 F | WEIGHT: 267 LBS | OXYGEN SATURATION: 99 % | HEART RATE: 89 BPM | DIASTOLIC BLOOD PRESSURE: 78 MMHG | HEIGHT: 66 IN

## 2021-03-19 DIAGNOSIS — I10 BENIGN ESSENTIAL HYPERTENSION: Primary | ICD-10-CM

## 2021-03-19 DIAGNOSIS — Z23 ENCOUNTER FOR IMMUNIZATION: ICD-10-CM

## 2021-03-19 DIAGNOSIS — E11.65 UNCONTROLLED TYPE 2 DIABETES MELLITUS WITH HYPERGLYCEMIA (HCC): ICD-10-CM

## 2021-03-19 DIAGNOSIS — C61 PROSTATE CANCER (HCC): ICD-10-CM

## 2021-03-19 DIAGNOSIS — E78.2 MIXED HYPERLIPIDEMIA: ICD-10-CM

## 2021-03-19 DIAGNOSIS — E66.01 MORBID OBESITY (HCC): ICD-10-CM

## 2021-03-19 PROCEDURE — 99214 OFFICE O/P EST MOD 30 MIN: CPT | Performed by: INTERNAL MEDICINE

## 2021-03-19 PROCEDURE — 3725F SCREEN DEPRESSION PERFORMED: CPT | Performed by: INTERNAL MEDICINE

## 2021-03-19 PROCEDURE — 1036F TOBACCO NON-USER: CPT | Performed by: INTERNAL MEDICINE

## 2021-03-19 PROCEDURE — 90471 IMMUNIZATION ADMIN: CPT

## 2021-03-19 PROCEDURE — 90715 TDAP VACCINE 7 YRS/> IM: CPT

## 2021-03-19 PROCEDURE — 3008F BODY MASS INDEX DOCD: CPT | Performed by: INTERNAL MEDICINE

## 2021-03-19 RX ORDER — CARVEDILOL 25 MG/1
25 TABLET ORAL 2 TIMES DAILY WITH MEALS
Qty: 180 TABLET | Refills: 3 | Status: SHIPPED | OUTPATIENT
Start: 2021-03-19 | End: 2021-11-28

## 2021-03-19 RX ORDER — INSULIN GLARGINE 300 U/ML
INJECTION, SOLUTION SUBCUTANEOUS
COMMUNITY
Start: 2021-02-15 | End: 2021-11-08 | Stop reason: SDUPTHER

## 2021-03-19 RX ORDER — LANCETS
EACH MISCELLANEOUS 3 TIMES DAILY
Qty: 300 EACH | Refills: 3 | Status: SHIPPED | OUTPATIENT
Start: 2021-03-19 | End: 2022-03-20

## 2021-03-19 RX ORDER — LANCETS
EACH MISCELLANEOUS
COMMUNITY
End: 2021-03-19

## 2021-03-19 NOTE — PROGRESS NOTES
Assessment/Plan:  Raise carvedilol to 25mg BID  Continue amlodipine and lisinopril  Obtain labs and f/u with Dr Shobha Willett  He is scheduling an eye exam     Problem List Items Addressed This Visit        Endocrine    Uncontrolled type 2 diabetes mellitus with hyperglycemia (HCC)    Relevant Medications    Toujeo SoloStar 300 units/mL CONCENTRATED U-300 injection pen (1-unit dial)    glucose blood test strip    Microlet Lancets MISC    Other Relevant Orders    CBC and differential    Comprehensive metabolic panel    HEMOGLOBIN A1C W/ EAG ESTIMATION    Lipid Panel with Direct LDL reflex    Microalbumin / creatinine urine ratio       Cardiovascular and Mediastinum    Benign essential hypertension - Primary    Relevant Medications    carvedilol (COREG) 25 mg tablet    Other Relevant Orders    CBC and differential    Comprehensive metabolic panel       Genitourinary    Prostate cancer ([facility] [address] )       Other    Morbid obesity ([facility] [address] )    Hyperlipidemia    Relevant Orders    Lipid Panel with Direct LDL reflex      Other Visit Diagnoses     Encounter for immunization        Relevant Orders    TDAP VACCINE GREATER THAN OR EQUAL TO 8YO IM (Completed)            Subjective:      Patient ID: Brandee Nicholas is a 62 y o  male  HPI  DM managed by Dr Shobha Willett  Last A1C in Sept 7 5  HTN with uncontrolled BP  At home, it is on average 140/80s on amlodipnie 10mg  Lisinopril 40mg and carvedilol 12 5mg BID  He did not do well on a diuretic since he has underlying incontinence  No new issues    The following portions of the patient's history were reviewed and updated as appropriate: allergies, current medications, past family history, past medical history, past social history, past surgical history and problem list     Review of Systems   Constitutional: Negative for chills, fatigue, fever and unexpected weight change  HENT: Negative for congestion, rhinorrhea and sore throat  Respiratory: Negative for cough and shortness of breath  Cardiovascular: Negative for chest pain, palpitations and leg swelling  Gastrointestinal: Negative for abdominal pain, constipation, diarrhea, nausea and vomiting  Genitourinary:        Incontinence   Musculoskeletal: Negative for arthralgias and myalgias  Neurological: Negative for dizziness and headaches  Objective:      /78   Pulse 89   Temp 97 6 °F (36 4 °C) (Temporal)   Ht 5' 6" (1 676 m)   Wt 121 kg (267 lb)   SpO2 99%   BMI 43 09 kg/m²          Physical Exam  Constitutional:       General: He is not in acute distress  Appearance: He is well-developed  He is obese  He is not ill-appearing, toxic-appearing or diaphoretic  HENT:      Head: Normocephalic and atraumatic  Eyes:      Conjunctiva/sclera: Conjunctivae normal    Neck:      Musculoskeletal: Neck supple  Cardiovascular:      Rate and Rhythm: Normal rate and regular rhythm  Heart sounds: Normal heart sounds  No murmur  Pulmonary:      Effort: Pulmonary effort is normal  No respiratory distress  Breath sounds: Normal breath sounds  No wheezing or rales  Abdominal:      General: There is no distension  Palpations: Abdomen is soft  There is no mass  Tenderness: There is no abdominal tenderness  There is no guarding or rebound  Skin:     General: Skin is warm and dry  Neurological:      Mental Status: He is alert and oriented to person, place, and time  Psychiatric:         Mood and Affect: Mood normal          Behavior: Behavior normal          Thought Content:  Thought content normal          Judgment: Judgment normal

## 2021-03-25 DIAGNOSIS — N20.0 KIDNEY STONES: ICD-10-CM

## 2021-03-26 RX ORDER — TAMSULOSIN HYDROCHLORIDE 0.4 MG/1
CAPSULE ORAL
Qty: 90 CAPSULE | Refills: 1 | Status: SHIPPED | OUTPATIENT
Start: 2021-03-26 | End: 2021-03-30 | Stop reason: SDUPTHER

## 2021-03-30 RX ORDER — TAMSULOSIN HYDROCHLORIDE 0.4 MG/1
0.4 CAPSULE ORAL
Qty: 30 CAPSULE | Refills: 1 | Status: SHIPPED | OUTPATIENT
Start: 2021-03-30 | End: 2021-04-22 | Stop reason: SDUPTHER

## 2021-03-30 NOTE — TELEPHONE ENCOUNTER
Script written incorrectly  Corrected script was requeued, however, I did have questions regarding quantity  I attempted to reach the patient by phone but needed to leave a message on his mobile phone voice mail  I left my direct dial number for ease in communication        Script requeued and forwarded to the Advanced Practitioner covering the [facility] location for approval

## 2021-03-30 NOTE — TELEPHONE ENCOUNTER
Pharmacy called stating they need to verify instruction for medication  It states prn     For insurance purpose they need to put how patient is to take medication

## 2021-03-31 ENCOUNTER — APPOINTMENT (OUTPATIENT)
Dept: LAB | Facility: HOSPITAL | Age: 61
End: 2021-03-31
Payer: COMMERCIAL

## 2021-03-31 DIAGNOSIS — I10 BENIGN ESSENTIAL HYPERTENSION: ICD-10-CM

## 2021-03-31 DIAGNOSIS — E78.2 MIXED HYPERLIPIDEMIA: ICD-10-CM

## 2021-03-31 DIAGNOSIS — E11.65 UNCONTROLLED TYPE 2 DIABETES MELLITUS WITH HYPERGLYCEMIA (HCC): ICD-10-CM

## 2021-03-31 LAB
ALBUMIN SERPL BCP-MCNC: 3.5 G/DL (ref 3.5–5)
ALP SERPL-CCNC: 80 U/L (ref 46–116)
ALT SERPL W P-5'-P-CCNC: 54 U/L (ref 12–78)
ANION GAP SERPL CALCULATED.3IONS-SCNC: 5 MMOL/L (ref 4–13)
AST SERPL W P-5'-P-CCNC: 31 U/L (ref 5–45)
BASOPHILS # BLD AUTO: 0.05 THOUSANDS/ΜL (ref 0–0.1)
BASOPHILS NFR BLD AUTO: 1 % (ref 0–1)
BILIRUB SERPL-MCNC: 0.39 MG/DL (ref 0.2–1)
BUN SERPL-MCNC: 23 MG/DL (ref 5–25)
CALCIUM SERPL-MCNC: 8.9 MG/DL (ref 8.3–10.1)
CHLORIDE SERPL-SCNC: 113 MMOL/L (ref 100–108)
CHOLEST SERPL-MCNC: 105 MG/DL (ref 50–200)
CO2 SERPL-SCNC: 25 MMOL/L (ref 21–32)
CREAT SERPL-MCNC: 1.42 MG/DL (ref 0.6–1.3)
EOSINOPHIL # BLD AUTO: 0.54 THOUSAND/ΜL (ref 0–0.61)
EOSINOPHIL NFR BLD AUTO: 5 % (ref 0–6)
ERYTHROCYTE [DISTWIDTH] IN BLOOD BY AUTOMATED COUNT: 13 % (ref 11.6–15.1)
EST. AVERAGE GLUCOSE BLD GHB EST-MCNC: 154 MG/DL
GFR SERPL CREATININE-BSD FRML MDRD: 53 ML/MIN/1.73SQ M
GLUCOSE P FAST SERPL-MCNC: 138 MG/DL (ref 65–99)
HBA1C MFR BLD: 7 %
HCT VFR BLD AUTO: 41.9 % (ref 36.5–49.3)
HDLC SERPL-MCNC: 35 MG/DL
HGB BLD-MCNC: 13.3 G/DL (ref 12–17)
IMM GRANULOCYTES # BLD AUTO: 0.04 THOUSAND/UL (ref 0–0.2)
IMM GRANULOCYTES NFR BLD AUTO: 0 % (ref 0–2)
LDLC SERPL CALC-MCNC: 49 MG/DL (ref 0–100)
LYMPHOCYTES # BLD AUTO: 3 THOUSANDS/ΜL (ref 0.6–4.47)
LYMPHOCYTES NFR BLD AUTO: 27 % (ref 14–44)
MCH RBC QN AUTO: 28.4 PG (ref 26.8–34.3)
MCHC RBC AUTO-ENTMCNC: 31.7 G/DL (ref 31.4–37.4)
MCV RBC AUTO: 90 FL (ref 82–98)
MONOCYTES # BLD AUTO: 0.92 THOUSAND/ΜL (ref 0.17–1.22)
MONOCYTES NFR BLD AUTO: 8 % (ref 4–12)
NEUTROPHILS # BLD AUTO: 6.41 THOUSANDS/ΜL (ref 1.85–7.62)
NEUTS SEG NFR BLD AUTO: 59 % (ref 43–75)
NRBC BLD AUTO-RTO: 0 /100 WBCS
PLATELET # BLD AUTO: 281 THOUSANDS/UL (ref 149–390)
PMV BLD AUTO: 9.3 FL (ref 8.9–12.7)
POTASSIUM SERPL-SCNC: 4.5 MMOL/L (ref 3.5–5.3)
PROT SERPL-MCNC: 7 G/DL (ref 6.4–8.2)
RBC # BLD AUTO: 4.68 MILLION/UL (ref 3.88–5.62)
SODIUM SERPL-SCNC: 143 MMOL/L (ref 136–145)
TRIGL SERPL-MCNC: 105 MG/DL
WBC # BLD AUTO: 10.96 THOUSAND/UL (ref 4.31–10.16)

## 2021-03-31 PROCEDURE — 80053 COMPREHEN METABOLIC PANEL: CPT

## 2021-03-31 PROCEDURE — 80061 LIPID PANEL: CPT

## 2021-03-31 PROCEDURE — 85025 COMPLETE CBC W/AUTO DIFF WBC: CPT

## 2021-03-31 PROCEDURE — 36415 COLL VENOUS BLD VENIPUNCTURE: CPT

## 2021-03-31 PROCEDURE — 3051F HG A1C>EQUAL 7.0%<8.0%: CPT | Performed by: INTERNAL MEDICINE

## 2021-03-31 PROCEDURE — 83036 HEMOGLOBIN GLYCOSYLATED A1C: CPT

## 2021-04-01 ENCOUNTER — APPOINTMENT (OUTPATIENT)
Dept: LAB | Facility: HOSPITAL | Age: 61
End: 2021-04-01
Payer: COMMERCIAL

## 2021-04-01 ENCOUNTER — TRANSCRIBE ORDERS (OUTPATIENT)
Dept: LAB | Facility: HOSPITAL | Age: 61
End: 2021-04-01

## 2021-04-01 LAB
CREAT UR-MCNC: 243 MG/DL
MICROALBUMIN UR-MCNC: 38.9 MG/L (ref 0–20)
MICROALBUMIN/CREAT 24H UR: 16 MG/G CREATININE (ref 0–30)

## 2021-04-01 PROCEDURE — 82043 UR ALBUMIN QUANTITATIVE: CPT | Performed by: INTERNAL MEDICINE

## 2021-04-01 PROCEDURE — 82570 ASSAY OF URINE CREATININE: CPT | Performed by: INTERNAL MEDICINE

## 2021-04-01 PROCEDURE — 3061F NEG MICROALBUMINURIA REV: CPT | Performed by: INTERNAL MEDICINE

## 2021-04-18 DIAGNOSIS — I10 BENIGN ESSENTIAL HYPERTENSION: ICD-10-CM

## 2021-04-19 RX ORDER — CARVEDILOL 25 MG/1
25 TABLET ORAL 2 TIMES DAILY WITH MEALS
Qty: 180 TABLET | Refills: 1 | Status: SHIPPED | OUTPATIENT
Start: 2021-04-19 | End: 2021-11-22 | Stop reason: SDUPTHER

## 2021-04-22 DIAGNOSIS — N20.0 KIDNEY STONES: ICD-10-CM

## 2021-04-22 RX ORDER — TAMSULOSIN HYDROCHLORIDE 0.4 MG/1
0.4 CAPSULE ORAL
Qty: 90 CAPSULE | Refills: 1 | Status: SHIPPED | OUTPATIENT
Start: 2021-04-22

## 2021-04-22 NOTE — TELEPHONE ENCOUNTER
An Auto-fax Refill Request for Tamsulosin 0 4mg was received from [facility]/pharmacy #[id number]  The patient has an upcoming office visit scheduled for 2/9/22 with Dr Ree Rouse in the [facility] location but will run out of medication until then    Request for same, 90 day supply with 3 refill was queued and forwarded to the Advanced Practitioner covering the [facility] location for approval

## 2021-05-12 ENCOUNTER — ANESTHESIA EVENT (OUTPATIENT)
Dept: GASTROENTEROLOGY | Facility: HOSPITAL | Age: 61
End: 2021-05-12

## 2021-05-12 RX ORDER — SODIUM CHLORIDE, SODIUM LACTATE, POTASSIUM CHLORIDE, CALCIUM CHLORIDE 600; 310; 30; 20 MG/100ML; MG/100ML; MG/100ML; MG/100ML
100 INJECTION, SOLUTION INTRAVENOUS CONTINUOUS
Status: CANCELLED | OUTPATIENT
Start: 2021-05-12

## 2021-05-13 ENCOUNTER — HOSPITAL ENCOUNTER (OUTPATIENT)
Dept: GASTROENTEROLOGY | Facility: HOSPITAL | Age: 61
Setting detail: OUTPATIENT SURGERY
Discharge: HOME | End: 2021-05-13
Attending: INTERNAL MEDICINE | Admitting: INTERNAL MEDICINE
Payer: COMMERCIAL

## 2021-05-13 ENCOUNTER — ANESTHESIA (OUTPATIENT)
Dept: GASTROENTEROLOGY | Facility: HOSPITAL | Age: 61
End: 2021-05-13

## 2021-05-13 VITALS
OXYGEN SATURATION: 95 % | BODY MASS INDEX: 42.59 KG/M2 | WEIGHT: 265 LBS | TEMPERATURE: 96.4 F | RESPIRATION RATE: 18 BRPM | DIASTOLIC BLOOD PRESSURE: 55 MMHG | SYSTOLIC BLOOD PRESSURE: 108 MMHG | HEART RATE: 80 BPM | HEIGHT: 66 IN

## 2021-05-13 DIAGNOSIS — Z12.11 SCREEN FOR COLON CANCER: ICD-10-CM

## 2021-05-13 LAB — GLUCOSE SERPL-MCNC: 126 MG/DL (ref 65–140)

## 2021-05-13 PROCEDURE — 45385 COLONOSCOPY W/LESION REMOVAL: CPT | Performed by: INTERNAL MEDICINE

## 2021-05-13 PROCEDURE — 82948 REAGENT STRIP/BLOOD GLUCOSE: CPT

## 2021-05-13 PROCEDURE — 88305 TISSUE EXAM BY PATHOLOGIST: CPT | Performed by: PATHOLOGY

## 2021-05-13 RX ORDER — SODIUM CHLORIDE 9 MG/ML
125 INJECTION, SOLUTION INTRAVENOUS CONTINUOUS
Status: CANCELLED | OUTPATIENT
Start: 2021-05-13

## 2021-05-13 RX ORDER — PROPOFOL 10 MG/ML
INJECTION, EMULSION INTRAVENOUS CONTINUOUS PRN
Status: DISCONTINUED | OUTPATIENT
Start: 2021-05-13 | End: 2021-05-13 | Stop reason: HOSPADM

## 2021-05-13 RX ORDER — PROPOFOL 10 MG/ML
INJECTION, EMULSION INTRAVENOUS AS NEEDED
Status: DISCONTINUED | OUTPATIENT
Start: 2021-05-13 | End: 2021-05-13 | Stop reason: HOSPADM

## 2021-05-13 RX ORDER — LIDOCAINE HYDROCHLORIDE 10 MG/ML
INJECTION, SOLUTION EPIDURAL; INFILTRATION; INTRACAUDAL; PERINEURAL AS NEEDED
Status: DISCONTINUED | OUTPATIENT
Start: 2021-05-13 | End: 2021-05-13 | Stop reason: HOSPADM

## 2021-05-13 RX ORDER — SODIUM CHLORIDE 9 MG/ML
INJECTION, SOLUTION INTRAVENOUS CONTINUOUS PRN
Status: DISCONTINUED | OUTPATIENT
Start: 2021-05-13 | End: 2021-05-13 | Stop reason: HOSPADM

## 2021-05-13 RX ADMIN — SODIUM CHLORIDE: 0.9 INJECTION, SOLUTION INTRAVENOUS at 08:02

## 2021-05-13 RX ADMIN — LIDOCAINE HYDROCHLORIDE 50 MG: 10 INJECTION, SOLUTION EPIDURAL; INFILTRATION; INTRACAUDAL; PERINEURAL at 08:06

## 2021-05-13 RX ADMIN — PROPOFOL 120 MCG/KG/MIN: 10 INJECTION, EMULSION INTRAVENOUS at 08:06

## 2021-05-13 RX ADMIN — PROPOFOL 100 MG: 10 INJECTION, EMULSION INTRAVENOUS at 08:06

## 2021-05-13 NOTE — ANESTHESIA PREPROCEDURE EVALUATION
Medical History    History Comments   Acute low back pain    Acute respiratory infection    Cellulitis of scrotum    Cough    High cholesterol    Acute bronchitis    Herpes zoster    Hyperkalemia    Kidney disease    Low back pain    Lumbar radiculopathy    Rash    Retinopathy, central serous    Trochanteric bursitis    Kidney stone    Diabetes mellitus ([address] )    Hypertension    Sleep apnea [id number]   Acute low back pain [id number] resolved   Cancer [facility]) prostate     Procedure:  COLONOSCOPY    Relevant Problems   ANESTHESIA (within normal limits)      CARDIO   (+) Benign essential hypertension   (+) Hyperlipidemia      /RENAL   (+) Kidney stones   (+) Prostate cancer (HCC)        Physical Exam    Airway    Mallampati score: III  TM Distance: >3 FB  Neck ROM: full     Dental   No notable dental hx     Cardiovascular      Pulmonary      Other Findings        Anesthesia Plan  ASA Score- 3     Anesthesia Type- IV sedation with anesthesia with ASA Monitors  Additional Monitors:   Airway Plan:     Comment: Per patient, appropriately NPO, denies active CP/SOB/wheezing/symptoms related to heartburn/nausea/vomiting  Plan Factors-Exercise tolerance (METS): >4 METS  Chart reviewed  Patient summary reviewed  Patient is not a current smoker  Induction- intravenous  Postoperative Plan-     Informed Consent- Anesthetic plan and risks discussed with patient  I personally reviewed this patient with the CRNA  Discussed and agreed on the Anesthesia Plan with the CRNA  David Dyson

## 2021-05-13 NOTE — H&P
History and Physical - [facility]  Courtney Edouard 62 y o  male MRN: [id number]                  HPI: Courtney Edouard is a 62y o  year old male who presents for colonoscopy for colon cancer screening  Last colonoscopy 10 years ago  REVIEW OF SYSTEMS: Per the HPI, and otherwise unremarkable      Historical Information   Past Medical History:   Diagnosis Date    Acute bronchitis     Acute low back pain     Acute low back pain     [id number] resolved    Acute respiratory infection     Cancer ([address] )     prostate    Cellulitis of scrotum     Cough     Diabetes mellitus (HCC)     Herpes zoster     High cholesterol     Hyperkalemia     Hypertension     Kidney disease     Kidney stone     Low back pain     Lumbar radiculopathy     Rash     Retinopathy, central serous     Sleep apnea     [id number]    Trochanteric bursitis      Past Surgical History:   Procedure Laterality Date    ABDOMINAL ADHESION SURGERY N/A 6/13/2018    Procedure: LYSIS ADHESIONS;  Surgeon: Oscar Peña MD;  [facility];  Service: Urology    LITHOTRIPSY      renal    VT LAP,PROSTATECTOMY,RADICAL,W/NERVE SPARE,INCL ROBOTIC N/A 6/13/2018    Procedure: PROSTATECTOMY RADICAL W ROBOTICS;  Surgeon: Oscar Peña MD;  [facility];  Service: Urology    PROSTATE SURGERY  06/13/2018    SHOULDER SURGERY       Social History   Social History     Substance and Sexual Activity   Alcohol Use No     Social History     Substance and Sexual Activity   Drug Use No     Social History     Tobacco Use   Smoking Status Never Smoker   Smokeless Tobacco Never Used     Family History   Problem Relation Age of Onset    Other Father         cardiac disorder    Stroke Father     Diabetes Father         mellitus    Hypertension Father     Heart disease Father         cardiac disorder    Cancer Sister     Cancer Paternal Grandmother     Heart disease Family         cardiac disorder    Kidney disease Family Meds/Allergies       Current Outpatient Medications:     amLODIPine (NORVASC) 5 mg tablet    ASPIRIN 81 PO    carvedilol (COREG) 25 mg tablet    lisinopril (ZESTRIL) 40 mg tablet    Multiple Vitamins-Minerals (MULTIVITAMIN WITH MINERALS) tablet    albuterol (PROAIR HFA) 90 mcg/act inhaler    atorvastatin (LIPITOR) 80 mg tablet    BD PEN NEEDLE MAJO U/F 32G X 4 MM MISC    carvedilol (COREG) 25 mg tablet    glimepiride (AMARYL) 4 mg tablet    glucose blood test strip    HUMALOG KWIKPEN 100 units/mL injection pen    ibuprofen (MOTRIN) 800 mg tablet    Insulin Glargine (TOUJEO) 300 units/mL CONCETRATED U-300 injection pen    metFORMIN (GLUCOPHAGE) 1000 MG tablet    Microlet Lancets MISC    Na Sulfate-K Sulfate-Mg Sulf (Suprep Bowel Prep Kit) 17 5-3 13-1 6 GM/177ML SOLN    tamsulosin (FLOMAX) 0 4 mg    Toujeo SoloStar 300 units/mL CONCENTRATED U-300 injection pen (1-unit dial)    TRULICITY 1 5 HE/6 2PI SOPN    Vitamin D, Cholecalciferol, 400 units TABS    Allergies   Allergen Reactions    Simvastatin      Increases Liver functoin       Objective     /77   Pulse 83   Temp (!) 97 2 °F (36 2 °C) (Tympanic)   Resp 20   Ht 5' 5 5" (1 664 m)   Wt 120 kg (265 lb)   SpO2 98%   BMI 43 43 kg/m²       PHYSICAL EXAM    Gen: NAD  Head: NCAT  CV: RRR  CHEST: Clear  ABD: soft, NT/ND  EXT: no edema      ASSESSMENT/PLAN:  Eddie Banda is a 62y o  year old male who presents for colonoscopy for colon cancer screening  Last colonoscopy 10 years ago  The patient is stable and optimized for the procedure, we reviewed risk and benefits  Risk include but not limited to infection, bleeding, perforation and missing a lesion

## 2021-05-13 NOTE — ANESTHESIA POSTPROCEDURE EVALUATION
Post-Op Assessment Note    CV Status:  Stable  Pain Score: 0    Pain management: adequate     Mental Status:  Alert and awake   Hydration Status:  Euvolemic   PONV Controlled:  Controlled   Airway Patency:  Patent      Post Op Vitals Reviewed: Yes      Staff: Anesthesiologist, CRNA         No complications documented      BP   163/77   Temp     Pulse  87   Resp   17   SpO2   96

## 2021-06-23 ENCOUNTER — OFFICE VISIT (OUTPATIENT)
Dept: INTERNAL MEDICINE CLINIC | Facility: CLINIC | Age: 61
End: 2021-06-23
Payer: COMMERCIAL

## 2021-06-23 VITALS
HEART RATE: 87 BPM | HEIGHT: 66 IN | DIASTOLIC BLOOD PRESSURE: 70 MMHG | TEMPERATURE: 97.9 F | SYSTOLIC BLOOD PRESSURE: 132 MMHG | BODY MASS INDEX: 42.43 KG/M2 | WEIGHT: 264 LBS | OXYGEN SATURATION: 95 %

## 2021-06-23 DIAGNOSIS — I10 BENIGN ESSENTIAL HYPERTENSION: ICD-10-CM

## 2021-06-23 DIAGNOSIS — E78.2 MIXED HYPERLIPIDEMIA: ICD-10-CM

## 2021-06-23 DIAGNOSIS — E11.65 UNCONTROLLED TYPE 2 DIABETES MELLITUS WITH HYPERGLYCEMIA (HCC): Primary | ICD-10-CM

## 2021-06-23 DIAGNOSIS — N20.0 KIDNEY STONES: ICD-10-CM

## 2021-06-23 PROCEDURE — 99214 OFFICE O/P EST MOD 30 MIN: CPT | Performed by: INTERNAL MEDICINE

## 2021-06-23 PROCEDURE — 3078F DIAST BP <80 MM HG: CPT | Performed by: INTERNAL MEDICINE

## 2021-06-23 PROCEDURE — 3008F BODY MASS INDEX DOCD: CPT | Performed by: INTERNAL MEDICINE

## 2021-06-23 PROCEDURE — 1036F TOBACCO NON-USER: CPT | Performed by: INTERNAL MEDICINE

## 2021-06-23 PROCEDURE — 3075F SYST BP GE 130 - 139MM HG: CPT | Performed by: INTERNAL MEDICINE

## 2021-06-23 NOTE — PROGRESS NOTES
Assessment/Plan:    Uncontrolled type 2 diabetes mellitus with hyperglycemia (HCC)  Continue Toujeo 70units a day Humalog 15 units  metformin and glimepiride  He sometimes experiences hypoglycemic spells in the middle of the day  I am considering lowering his glimepiride if his A1C is controlled  He recalls his endocrinologist trying this before but restarted it later on  Due for eye exam-he has called different offices to schedule  Obtain A1C in August  Lab Results   Component Value Date    HGBA1C 7 0 (H) 03/31/2021       Benign essential hypertension  Well controlled    Kidney stones  KUB due in August    Hyperlipidemia  Very well controlled on high dose atorvastatin         Problem List Items Addressed This Visit        Endocrine    Uncontrolled type 2 diabetes mellitus with hyperglycemia ([facility] [address] ) - Primary     Continue Toujeo 70units a day Humalog 15 units  metformin and glimepiride  He sometimes experiences hypoglycemic spells in the middle of the day  I am considering lowering his glimepiride if his A1C is controlled  He recalls his endocrinologist trying this before but restarted it later on  Due for eye exam-he has called different offices to schedule  Obtain A1C in August  Lab Results   Component Value Date    HGBA1C 7 0 (H) 03/31/2021            Relevant Orders    HEMOGLOBIN A1C W/ EAG ESTIMATION    Basic metabolic panel       Cardiovascular and Mediastinum    Benign essential hypertension     Well controlled            Genitourinary    Kidney stones     KUB due in August            Other    Hyperlipidemia     Very well controlled on high dose atorvastatin                 Subjective:      Patient ID: Sarahi Mclain is a 62 y o  male  HPI  Here for a follow up  Blood sugar log reviewed today  He checks TID and readings are erratic   He has FBS in the 200s   He experiences hypoglycemic spells infrequently but typically it would be in the middle of the day  He is on Toujeo 70units Humalog 15units with meals metformin and glimepiride    The following portions of the patient's history were reviewed and updated as appropriate: allergies, current medications, past family history, past medical history, past social history, past surgical history and problem list     Review of Systems   Constitutional: Negative for chills and fever  Respiratory: Negative for cough and shortness of breath  Cardiovascular: Negative for chest pain and palpitations  Gastrointestinal: Negative for abdominal pain, diarrhea and nausea  Genitourinary:        Urinary incontinence         Objective:      /70   Pulse 87   Temp 97 9 °F (36 6 °C)   Ht 5' 5 5" (1 664 m)   Wt 120 kg (264 lb)   SpO2 95%   BMI 43 26 kg/m²          Physical Exam  Constitutional:       Appearance: He is well-developed  He is obese  He is not ill-appearing, toxic-appearing or diaphoretic  HENT:      Head: Normocephalic and atraumatic  Eyes:      Conjunctiva/sclera: Conjunctivae normal    Cardiovascular:      Rate and Rhythm: Normal rate and regular rhythm  Heart sounds: Normal heart sounds  No murmur heard  Pulmonary:      Effort: Pulmonary effort is normal  No respiratory distress  Breath sounds: Normal breath sounds  No wheezing or rales  Abdominal:      General: There is no distension  Palpations: Abdomen is soft  There is no mass  Tenderness: There is no abdominal tenderness  There is no guarding or rebound  Musculoskeletal:      Cervical back: Neck supple  Right lower leg: Edema (trace pretibial) present  Left lower leg: Edema (trace pretibial) present  Skin:     General: Skin is warm and dry  Neurological:      Mental Status: He is alert and oriented to person, place, and time  Psychiatric:         Mood and Affect: Mood normal          Behavior: Behavior normal          Thought Content:  Thought content normal          Judgment: Judgment normal

## 2021-06-23 NOTE — ASSESSMENT & PLAN NOTE
Continue Toujeo 70units a day Humalog 15 units  metformin and glimepiride  He sometimes experiences hypoglycemic spells in the middle of the day  I am considering lowering his glimepiride if his A1C is controlled  He recalls his endocrinologist trying this before but restarted it later on  Due for eye exam-he has called different offices to schedule  Obtain A1C in August  Lab Results   Component Value Date    HGBA1C 7 0 (H) 03/31/2021

## 2021-06-29 ENCOUNTER — VBI (OUTPATIENT)
Dept: ADMINISTRATIVE | Facility: OTHER | Age: 61
End: 2021-06-29

## 2021-08-03 ENCOUNTER — APPOINTMENT (OUTPATIENT)
Dept: LAB | Facility: HOSPITAL | Age: 61
End: 2021-08-03
Attending: UROLOGY
Payer: COMMERCIAL

## 2021-08-03 DIAGNOSIS — C61 PROSTATE CANCER (HCC): ICD-10-CM

## 2021-08-03 DIAGNOSIS — E11.65 UNCONTROLLED TYPE 2 DIABETES MELLITUS WITH HYPERGLYCEMIA (HCC): ICD-10-CM

## 2021-08-03 LAB
ANION GAP SERPL CALCULATED.3IONS-SCNC: 3 MMOL/L (ref 4–13)
BUN SERPL-MCNC: 25 MG/DL (ref 5–25)
CALCIUM SERPL-MCNC: 8.9 MG/DL (ref 8.3–10.1)
CHLORIDE SERPL-SCNC: 112 MMOL/L (ref 100–108)
CO2 SERPL-SCNC: 24 MMOL/L (ref 21–32)
CREAT SERPL-MCNC: 1.37 MG/DL (ref 0.6–1.3)
EST. AVERAGE GLUCOSE BLD GHB EST-MCNC: 148 MG/DL
GFR SERPL CREATININE-BSD FRML MDRD: 56 ML/MIN/1.73SQ M
GLUCOSE P FAST SERPL-MCNC: 104 MG/DL (ref 65–99)
HBA1C MFR BLD: 6.8 %
POTASSIUM SERPL-SCNC: 4.2 MMOL/L (ref 3.5–5.3)
PSA SERPL-MCNC: <0.1 NG/ML (ref 0–4)
SODIUM SERPL-SCNC: 139 MMOL/L (ref 136–145)

## 2021-08-03 PROCEDURE — 36415 COLL VENOUS BLD VENIPUNCTURE: CPT

## 2021-08-03 PROCEDURE — 84153 ASSAY OF PSA TOTAL: CPT

## 2021-08-03 PROCEDURE — 83036 HEMOGLOBIN GLYCOSYLATED A1C: CPT

## 2021-08-03 PROCEDURE — 80048 BASIC METABOLIC PNL TOTAL CA: CPT

## 2021-08-03 PROCEDURE — 3044F HG A1C LEVEL LT 7.0%: CPT | Performed by: INTERNAL MEDICINE

## 2021-08-10 ENCOUNTER — HOSPITAL ENCOUNTER (OUTPATIENT)
Dept: RADIOLOGY | Facility: HOSPITAL | Age: 61
Discharge: HOME | End: 2021-08-10
Attending: UROLOGY
Payer: COMMERCIAL

## 2021-08-10 DIAGNOSIS — N20.0 KIDNEY STONES: ICD-10-CM

## 2021-08-10 PROCEDURE — 74018 RADEX ABDOMEN 1 VIEW: CPT

## 2021-09-08 LAB
LEFT EYE DIABETIC RETINOPATHY: NORMAL
RIGHT EYE DIABETIC RETINOPATHY: NORMAL
SEVERITY (EYE EXAM): NORMAL

## 2021-09-22 DIAGNOSIS — I10 HYPERTENSION, ESSENTIAL, BENIGN: ICD-10-CM

## 2021-09-23 RX ORDER — LISINOPRIL 40 MG/1
TABLET ORAL
Qty: 90 TABLET | Refills: 3 | Status: SHIPPED | OUTPATIENT
Start: 2021-09-23 | End: 2022-07-06

## 2021-09-24 ENCOUNTER — IMMUNIZATIONS (OUTPATIENT)
Dept: FAMILY MEDICINE CLINIC | Facility: HOSPITAL | Age: 61
End: 2021-09-24

## 2021-09-24 DIAGNOSIS — Z23 ENCOUNTER FOR IMMUNIZATION: Primary | ICD-10-CM

## 2021-09-24 PROCEDURE — 0001A SARS-COV-2 / COVID-19 MRNA VACCINE (PFIZER-BIONTECH) 30 MCG: CPT

## 2021-09-24 PROCEDURE — 91300 SARS-COV-2 / COVID-19 MRNA VACCINE (PFIZER-BIONTECH) 30 MCG: CPT

## 2021-11-17 ENCOUNTER — VBI (OUTPATIENT)
Dept: ADMINISTRATIVE | Facility: OTHER | Age: 61
End: 2021-11-17

## 2021-11-22 ENCOUNTER — OFFICE VISIT (OUTPATIENT)
Dept: INTERNAL MEDICINE CLINIC | Facility: CLINIC | Age: 61
End: 2021-11-22
Payer: COMMERCIAL

## 2021-11-22 VITALS
DIASTOLIC BLOOD PRESSURE: 80 MMHG | SYSTOLIC BLOOD PRESSURE: 132 MMHG | BODY MASS INDEX: 43.68 KG/M2 | WEIGHT: 271.8 LBS | HEART RATE: 86 BPM | OXYGEN SATURATION: 96 % | HEIGHT: 66 IN

## 2021-11-22 DIAGNOSIS — Z00.00 ANNUAL PHYSICAL EXAM: Primary | ICD-10-CM

## 2021-11-22 DIAGNOSIS — E78.2 MIXED HYPERLIPIDEMIA: ICD-10-CM

## 2021-11-22 DIAGNOSIS — I10 BENIGN ESSENTIAL HYPERTENSION: ICD-10-CM

## 2021-11-22 DIAGNOSIS — E11.65 UNCONTROLLED TYPE 2 DIABETES MELLITUS WITH HYPERGLYCEMIA (HCC): ICD-10-CM

## 2021-11-22 LAB — SL AMB POCT HEMOGLOBIN AIC: 7.6 (ref ?–6.5)

## 2021-11-22 PROCEDURE — 99396 PREV VISIT EST AGE 40-64: CPT | Performed by: INTERNAL MEDICINE

## 2021-11-22 PROCEDURE — 83036 HEMOGLOBIN GLYCOSYLATED A1C: CPT | Performed by: INTERNAL MEDICINE

## 2021-11-22 PROCEDURE — 3725F SCREEN DEPRESSION PERFORMED: CPT | Performed by: INTERNAL MEDICINE

## 2021-11-22 PROCEDURE — 3051F HG A1C>EQUAL 7.0%<8.0%: CPT | Performed by: INTERNAL MEDICINE

## 2021-11-22 PROCEDURE — 3079F DIAST BP 80-89 MM HG: CPT | Performed by: INTERNAL MEDICINE

## 2021-11-22 PROCEDURE — 3008F BODY MASS INDEX DOCD: CPT | Performed by: INTERNAL MEDICINE

## 2021-11-22 PROCEDURE — 1036F TOBACCO NON-USER: CPT | Performed by: INTERNAL MEDICINE

## 2021-11-22 PROCEDURE — 3075F SYST BP GE 130 - 139MM HG: CPT | Performed by: INTERNAL MEDICINE

## 2021-11-22 RX ORDER — INSULIN LISPRO 200 [IU]/ML
INJECTION, SOLUTION SUBCUTANEOUS
Qty: 21 ML | Refills: 3
Start: 2021-11-22 | End: 2022-02-03 | Stop reason: SDUPTHER

## 2021-11-22 RX ORDER — PEN NEEDLE, DIABETIC 32GX 5/32"
NEEDLE, DISPOSABLE MISCELLANEOUS 4 TIMES DAILY
Qty: 400 EACH | Refills: 3 | Status: SHIPPED | OUTPATIENT
Start: 2021-11-22

## 2021-11-23 ENCOUNTER — TELEPHONE (OUTPATIENT)
Dept: ADMINISTRATIVE | Facility: OTHER | Age: 61
End: 2021-11-23

## 2021-11-28 DIAGNOSIS — I10 BENIGN ESSENTIAL HYPERTENSION: ICD-10-CM

## 2021-11-28 RX ORDER — AMLODIPINE BESYLATE 5 MG/1
TABLET ORAL
Qty: 90 TABLET | Refills: 1 | Status: SHIPPED | OUTPATIENT
Start: 2021-11-28 | End: 2022-05-19

## 2021-11-28 RX ORDER — CARVEDILOL 25 MG/1
TABLET ORAL
Qty: 180 TABLET | Refills: 2 | Status: SHIPPED | OUTPATIENT
Start: 2021-11-28

## 2021-12-15 DIAGNOSIS — E11.65 UNCONTROLLED TYPE 2 DIABETES MELLITUS WITH HYPERGLYCEMIA (HCC): Primary | ICD-10-CM

## 2021-12-15 RX ORDER — BLOOD SUGAR DIAGNOSTIC
STRIP MISCELLANEOUS
Qty: 400 EACH | Refills: 3 | Status: SHIPPED | OUTPATIENT
Start: 2021-12-15

## 2022-01-05 DIAGNOSIS — E11.65 UNCONTROLLED TYPE 2 DIABETES MELLITUS WITH HYPERGLYCEMIA (HCC): Primary | ICD-10-CM

## 2022-01-05 RX ORDER — DULAGLUTIDE 1.5 MG/.5ML
1.5 INJECTION, SOLUTION SUBCUTANEOUS WEEKLY
Qty: 2 ML | Refills: 5 | Status: SHIPPED | OUTPATIENT
Start: 2022-01-05 | End: 2022-01-05 | Stop reason: SDUPTHER

## 2022-02-03 ENCOUNTER — HOSPITAL ENCOUNTER (OUTPATIENT)
Dept: RADIOLOGY | Facility: HOSPITAL | Age: 62
Discharge: HOME | End: 2022-02-03
Attending: UROLOGY
Payer: COMMERCIAL

## 2022-02-03 ENCOUNTER — APPOINTMENT (OUTPATIENT)
Dept: LAB | Facility: HOSPITAL | Age: 62
End: 2022-02-03
Attending: UROLOGY
Payer: COMMERCIAL

## 2022-02-03 DIAGNOSIS — C61 PROSTATE CANCER (HCC): ICD-10-CM

## 2022-02-03 DIAGNOSIS — E11.65 UNCONTROLLED TYPE 2 DIABETES MELLITUS WITH HYPERGLYCEMIA (HCC): ICD-10-CM

## 2022-02-03 DIAGNOSIS — N20.0 KIDNEY STONES: ICD-10-CM

## 2022-02-03 LAB — PSA SERPL-MCNC: <0.1 NG/ML (ref 0–4)

## 2022-02-03 PROCEDURE — 84153 ASSAY OF PSA TOTAL: CPT

## 2022-02-03 PROCEDURE — 74018 RADEX ABDOMEN 1 VIEW: CPT

## 2022-02-03 RX ORDER — GLIMEPIRIDE 4 MG/1
4 TABLET ORAL 2 TIMES DAILY
Qty: 60 TABLET | Refills: 5 | Status: SHIPPED | OUTPATIENT
Start: 2022-02-03 | End: 2022-07-06

## 2022-02-03 RX ORDER — ATORVASTATIN CALCIUM 80 MG/1
80 TABLET, FILM COATED ORAL DAILY
Qty: 30 TABLET | Refills: 5 | Status: SHIPPED | OUTPATIENT
Start: 2022-02-03 | End: 2022-08-06

## 2022-02-03 RX ORDER — INSULIN LISPRO 200 [IU]/ML
INJECTION, SOLUTION SUBCUTANEOUS
Qty: 18 ML | Refills: 5 | Status: SHIPPED | OUTPATIENT
Start: 2022-02-03

## 2022-02-03 NOTE — TELEPHONE ENCOUNTER
Per Dr La Mccord patient wife called in requesting the below medications  Atorvastatin 80 mg  Daily  Glimepiride 4 mg BID  Humalog 200 unit/mL Kwikpen 15 units TID with meals  Above medications sent for 30 day supply with 5 refills

## 2022-02-09 ENCOUNTER — OFFICE VISIT (OUTPATIENT)
Dept: UROLOGY | Facility: CLINIC | Age: 62
End: 2022-02-09
Payer: COMMERCIAL

## 2022-02-09 VITALS
DIASTOLIC BLOOD PRESSURE: 82 MMHG | OXYGEN SATURATION: 99 % | SYSTOLIC BLOOD PRESSURE: 142 MMHG | BODY MASS INDEX: 43.87 KG/M2 | WEIGHT: 273 LBS | HEIGHT: 66 IN | HEART RATE: 89 BPM

## 2022-02-09 DIAGNOSIS — C61 PROSTATE CANCER (HCC): ICD-10-CM

## 2022-02-09 DIAGNOSIS — E11.65 UNCONTROLLED TYPE 2 DIABETES MELLITUS WITH HYPERGLYCEMIA (HCC): ICD-10-CM

## 2022-02-09 DIAGNOSIS — N20.0 KIDNEY STONES: Primary | ICD-10-CM

## 2022-02-09 PROCEDURE — 1036F TOBACCO NON-USER: CPT | Performed by: UROLOGY

## 2022-02-09 PROCEDURE — 3079F DIAST BP 80-89 MM HG: CPT | Performed by: UROLOGY

## 2022-02-09 PROCEDURE — 3008F BODY MASS INDEX DOCD: CPT | Performed by: UROLOGY

## 2022-02-09 PROCEDURE — 99214 OFFICE O/P EST MOD 30 MIN: CPT | Performed by: UROLOGY

## 2022-02-09 PROCEDURE — 3077F SYST BP >= 140 MM HG: CPT | Performed by: UROLOGY

## 2022-02-09 NOTE — PROGRESS NOTES
Referring Physician: Brandee Gee MD  A copy of this note was sent to the referring physician  Diagnoses and all orders for this visit:    Kidney stones  -     CT renal stone study abdomen pelvis wo contrast; Future    Prostate cancer ([address] )  -     PSA, Total Screen; Future    Uncontrolled type 2 diabetes mellitus with hyperglycemia (HCC)            Assessment and plan:          #1 Prostate Cancer  - pT2a GG4+3=7  -  Robotic prostatectomy June 2018  - MAGGIE    #2 post prostatectomy KEEGAN  - 10-14 depends per day    #3 Recurrent nephrolithiasis  - around 8 total stone episodes  - Typically passes without intervention    Jaziel Quintanilla continues to do incredibly well  He is MAGGIE from his prostatectomy in 2018  We reviewed his KUB in detail  There is a concern for potential new stone growth on the left with a lower pole calculus measuring approximately 7 mm  He remains asymptomatic without flank pain has not passed any stones over the past year  I have suggested obtaining a CT scan for next year's annual visit for more complete staging or sooner if he develops any symptomatology  Of note he has done well with shockwave lithotripsy in the past if needed again  Finally we discussed management options for his stress urinary incontinence  I discussed a mid urethral sling as well as an artificial urinary sphincter and provided the option of being evaluated by a reconstructive urologist who performs a high volume of these procedures  Can will consider this notify me by e-mail should he wish to pursue this  Otherwise he will see me next year with a CT scan and a PSA  Jena Abrams MD      Chief Complaint     Follow-up prostate cancer, nephrolithiasis      History of Present Illness     Ledy Tapia is a 62 y o   male returns in follow-up  He has external pleasant 78-year-old male with a history of Pace 7 prostate cancer diagnosed at 65years of age    He was treated with a robotic prostatectomy by colleague Dr Funmilayo Villalta 2018  He remains MAGGIE  He has not required any adjuvant treatment  Most recent PSA was undetectable  Also has an extensive history of nephrolithiasis and returns with his annual KUB and PSA  He remains asymptomatic  He is doing well  He does wish to get some guidance regarding management options for his stress urinary incontinence  His risk factors for this include diabetes and morbid obesity  Detailed Urologic History     - please refer to HPI    Review of Systems     Review of Systems   Constitutional: Negative for activity change and fatigue  HENT: Negative for congestion  Eyes: Negative for visual disturbance  Respiratory: Negative for shortness of breath and wheezing  Cardiovascular: Negative for chest pain and leg swelling  Gastrointestinal: Negative for abdominal pain  Endocrine: Positive for polyuria  Genitourinary: Positive for dysuria  Negative for flank pain, hematuria and urgency  Musculoskeletal: Negative for back pain  Allergic/Immunologic: Negative for immunocompromised state  Neurological: Negative for dizziness and numbness  Psychiatric/Behavioral: Negative for dysphoric mood  All other systems reviewed and are negative  Allergies     Allergies   Allergen Reactions    Simvastatin      Increases Liver functoin       Physical Exam     Physical Exam  Constitutional:       General: He is not in acute distress  Appearance: He is well-developed  HENT:      Head: Normocephalic and atraumatic  Cardiovascular:      Comments: Obese abdomen  Pulmonary:      Effort: Pulmonary effort is normal       Breath sounds: Normal breath sounds  Abdominal:      Palpations: Abdomen is soft  Genitourinary:     Comments: Negative CVA tenderness  Musculoskeletal:         General: Normal range of motion  Cervical back: Normal range of motion  Skin:     General: Skin is warm     Neurological:      Mental Status: He is alert and oriented to person, place, and time  Psychiatric:         Behavior: Behavior normal              Vital Signs  There were no vitals filed for this visit  Current Medications       Current Outpatient Medications:     albuterol (PROAIR HFA) 90 mcg/act inhaler, Inhale 1-2 puffs every 6 (six) hours as needed  , Disp: , Rfl:     amLODIPine (NORVASC) 5 mg tablet, TAKE 1 TABLET BY MOUTH EVERY DAY, Disp: 90 tablet, Rfl: 1    ASPIRIN 81 PO, Take 1 capsule by mouth 2 (two) times a day  , Disp: , Rfl:     atorvastatin (LIPITOR) 80 mg tablet, Take 1 tablet (80 mg total) by mouth daily, Disp: 30 tablet, Rfl: 5    BD Pen Needle Rosalie U/F 32G X 4 MM MISC, Inject under the skin 4 (four) times a day, Disp: 400 each, Rfl: 3    carvedilol (COREG) 25 mg tablet, TAKE 1 TABLET BY MOUTH TWICE A DAY WITH MEALS, Disp: 180 tablet, Rfl: 2    glimepiride (AMARYL) 4 mg tablet, Take 1 tablet (4 mg total) by mouth 2 (two) times a day, Disp: 60 tablet, Rfl: 5    glucose blood (Contour Next Test) test strip, Check sugar QID, Disp: 400 each, Rfl: 3    glucose blood (Contour Next Test) test strip, Use 1 each 4 (four) times a day Use as instructed, Disp: 400 each, Rfl: 3    ibuprofen (MOTRIN) 800 mg tablet, TAKE 1 TABLET EVERY 8 HOURS AS NEEDED (Patient not taking: Reported on 11/22/2021), Disp: 90 tablet, Rfl: 0    insuln lispro (HumaLOG KwikPen) 200 units/mL CONCENTRATED U-200 injection pen, Inject 15 units three times daily with meals  , Disp: 18 mL, Rfl: 5    lisinopril (ZESTRIL) 40 mg tablet, TAKE 1 TABLET BY MOUTH EVERY DAY, Disp: 90 tablet, Rfl: 3    metFORMIN (GLUCOPHAGE) 1000 MG tablet, Take 1 tablet (1,000 mg total) by mouth 2 (two) times a day, Disp: 180 tablet, Rfl: 3    Microlet Lancets MISC, Use 3 (three) times a day 3 times daily, Disp: 300 each, Rfl: 3    Multiple Vitamins-Minerals (MULTIVITAMIN WITH MINERALS) tablet, Take 1 tablet by mouth daily, Disp: , Rfl:     tamsulosin (FLOMAX) 0 4 mg, Take 1 capsule (0 4 mg total) by mouth daily with dinner as needed for kidney stone management   (Patient not taking: Reported on 11/22/2021 ), Disp: 90 capsule, Rfl: 1    Toujeo SoloStar 300 units/mL CONCENTRATED U-300 injection pen (1-unit dial), Inject 80 Units under the skin daily, Disp: 24 mL, Rfl: 3    Trulicity 1 5 RI/5 5NN SOPN, Inject 0 5 mL (1 5 mg total) under the skin once a week, Disp: 6 mL, Rfl: 3    Vitamin D, Cholecalciferol, 400 units TABS, Take by mouth daily, Disp: , Rfl:       Active Problems     Patient Active Problem List   Diagnosis    Kidney stones    [facility]    Benign essential hypertension    Uncontrolled type 2 diabetes mellitus with hyperglycemia (HCC)    Hyperlipidemia    Morbid obesity ([facility] [address] )    Leukocytosis    Other fatigue    Stress incontinence of urine         Past Medical History     Past Medical History:   Diagnosis Date    Acute bronchitis     Acute low back pain     Acute low back pain     [id number] resolved    Acute respiratory infection     Cancer (HCC)     prostate    Cellulitis of scrotum     Cough     Diabetes mellitus (HCC)     Herpes zoster     High cholesterol     Hyperkalemia     Hypertension     Kidney disease     Kidney stone     Low back pain     Lumbar radiculopathy     Rash     Retinopathy, central serous     Sleep apnea     [id number]    Trochanteric bursitis          Surgical History     Past Surgical History:   Procedure Laterality Date    ABDOMINAL ADHESION SURGERY N/A 6/13/2018    Procedure: LYSIS ADHESIONS;  Surgeon: Vidya Guzman MD;  [facility];  Service: Urology    LITHOTRIPSY      renal    RI LAP,PROSTATECTOMY,RADICAL,W/NERVE SPARE,INCL ROBOTIC N/A 6/13/2018    Procedure: PROSTATECTOMY RADICAL W ROBOTICS;  Surgeon: Vidya Guzman MD;  [facility];  Service: Urology    PROSTATE SURGERY  06/13/2018    SHOULDER SURGERY           Family History     Family History   Problem Relation Age of Onset    Other Father cardiac disorder    Stroke Father     Diabetes Father         mellitus    Hypertension Father     Heart disease Father         cardiac disorder    Cancer Sister     Cancer Paternal Grandmother     Heart disease Family         cardiac disorder    Kidney disease Family          Social History     Social History     Social History     Tobacco Use   Smoking Status Never Smoker   Smokeless Tobacco Never Used         Pertinent Lab Values     Lab Results   Component Value Date    CREATININE 1 37 (H) 08/03/2021       Lab Results   Component Value Date    PSA <0 1 02/03/2022    PSA <0 1 08/03/2021    PSA <0 1 12/31/2020       @RESULTRCNT(1H])@      Pertinent Imaging      - n/a    Portions of the record may have been created with voice recognition software   Occasional wrong word or "sound a like" substitutions may have occurred due to the inherent limitations of voice recognition software   Read the chart carefully and recognize, using context, where substitutions have occurred

## 2022-03-20 DIAGNOSIS — E11.65 UNCONTROLLED TYPE 2 DIABETES MELLITUS WITH HYPERGLYCEMIA (HCC): ICD-10-CM

## 2022-03-20 RX ORDER — LANCETS
EACH MISCELLANEOUS
Qty: 300 EACH | Refills: 3 | Status: SHIPPED | OUTPATIENT
Start: 2022-03-20

## 2022-03-24 ENCOUNTER — TELEPHONE (OUTPATIENT)
Dept: OTHER | Facility: OTHER | Age: 62
End: 2022-03-24

## 2022-03-24 NOTE — TELEPHONE ENCOUNTER
Patient's wife calling stating that they have not been able to  the patient's Trulicity from the pharmacy because the pharmacy states they do not have thorough instructions in the order  Please clarify and resend to pharmacy  Patient will be out of medication Sunday

## 2022-05-12 ENCOUNTER — APPOINTMENT (OUTPATIENT)
Dept: LAB | Facility: HOSPITAL | Age: 62
End: 2022-05-12

## 2022-05-12 ENCOUNTER — APPOINTMENT (OUTPATIENT)
Dept: LAB | Facility: HOSPITAL | Age: 62
End: 2022-05-12
Payer: COMMERCIAL

## 2022-05-12 DIAGNOSIS — Z00.8 HEALTH EXAMINATION IN POPULATION SURVEYS: ICD-10-CM

## 2022-05-12 DIAGNOSIS — I10 BENIGN ESSENTIAL HYPERTENSION: ICD-10-CM

## 2022-05-12 DIAGNOSIS — E78.2 MIXED HYPERLIPIDEMIA: ICD-10-CM

## 2022-05-12 DIAGNOSIS — E11.65 UNCONTROLLED TYPE 2 DIABETES MELLITUS WITH HYPERGLYCEMIA (HCC): ICD-10-CM

## 2022-05-12 DIAGNOSIS — C61 PROSTATE CANCER (HCC): ICD-10-CM

## 2022-05-12 LAB
ALBUMIN SERPL BCP-MCNC: 3.6 G/DL (ref 3.5–5)
ALP SERPL-CCNC: 77 U/L (ref 46–116)
ALT SERPL W P-5'-P-CCNC: 51 U/L (ref 12–78)
ANION GAP SERPL CALCULATED.3IONS-SCNC: 4 MMOL/L (ref 4–13)
AST SERPL W P-5'-P-CCNC: 30 U/L (ref 5–45)
BASOPHILS # BLD AUTO: 0.04 THOUSANDS/ΜL (ref 0–0.1)
BASOPHILS NFR BLD AUTO: 0 % (ref 0–1)
BILIRUB SERPL-MCNC: 0.29 MG/DL (ref 0.2–1)
BUN SERPL-MCNC: 24 MG/DL (ref 5–25)
CALCIUM SERPL-MCNC: 9 MG/DL (ref 8.3–10.1)
CHLORIDE SERPL-SCNC: 112 MMOL/L (ref 100–108)
CHOLEST SERPL-MCNC: 98 MG/DL
CO2 SERPL-SCNC: 25 MMOL/L (ref 21–32)
CREAT SERPL-MCNC: 1.52 MG/DL (ref 0.6–1.3)
CREAT UR-MCNC: 183 MG/DL
EOSINOPHIL # BLD AUTO: 0.42 THOUSAND/ΜL (ref 0–0.61)
EOSINOPHIL NFR BLD AUTO: 4 % (ref 0–6)
ERYTHROCYTE [DISTWIDTH] IN BLOOD BY AUTOMATED COUNT: 12.9 % (ref 11.6–15.1)
EST. AVERAGE GLUCOSE BLD GHB EST-MCNC: 169 MG/DL
GFR SERPL CREATININE-BSD FRML MDRD: 48 ML/MIN/1.73SQ M
GLUCOSE P FAST SERPL-MCNC: 183 MG/DL (ref 65–99)
HBA1C MFR BLD: 7.5 %
HCT VFR BLD AUTO: 41.4 % (ref 36.5–49.3)
HDLC SERPL-MCNC: 31 MG/DL
HGB BLD-MCNC: 13 G/DL (ref 12–17)
IMM GRANULOCYTES # BLD AUTO: 0.04 THOUSAND/UL (ref 0–0.2)
IMM GRANULOCYTES NFR BLD AUTO: 0 % (ref 0–2)
LDLC SERPL CALC-MCNC: 48 MG/DL (ref 0–100)
LYMPHOCYTES # BLD AUTO: 2.54 THOUSANDS/ΜL (ref 0.6–4.47)
LYMPHOCYTES NFR BLD AUTO: 25 % (ref 14–44)
MCH RBC QN AUTO: 28.6 PG (ref 26.8–34.3)
MCHC RBC AUTO-ENTMCNC: 31.4 G/DL (ref 31.4–37.4)
MCV RBC AUTO: 91 FL (ref 82–98)
MICROALBUMIN UR-MCNC: 68.5 MG/L (ref 0–20)
MICROALBUMIN/CREAT 24H UR: 37 MG/G CREATININE (ref 0–30)
MONOCYTES # BLD AUTO: 0.89 THOUSAND/ΜL (ref 0.17–1.22)
MONOCYTES NFR BLD AUTO: 9 % (ref 4–12)
NEUTROPHILS # BLD AUTO: 6.38 THOUSANDS/ΜL (ref 1.85–7.62)
NEUTS SEG NFR BLD AUTO: 62 % (ref 43–75)
NRBC BLD AUTO-RTO: 0 /100 WBCS
PLATELET # BLD AUTO: 252 THOUSANDS/UL (ref 149–390)
PMV BLD AUTO: 9.3 FL (ref 8.9–12.7)
POTASSIUM SERPL-SCNC: 4.3 MMOL/L (ref 3.5–5.3)
PROT SERPL-MCNC: 7 G/DL (ref 6.4–8.2)
RBC # BLD AUTO: 4.54 MILLION/UL (ref 3.88–5.62)
SODIUM SERPL-SCNC: 141 MMOL/L (ref 136–145)
TRIGL SERPL-MCNC: 93 MG/DL
WBC # BLD AUTO: 10.31 THOUSAND/UL (ref 4.31–10.16)

## 2022-05-12 PROCEDURE — 82043 UR ALBUMIN QUANTITATIVE: CPT

## 2022-05-12 PROCEDURE — 83036 HEMOGLOBIN GLYCOSYLATED A1C: CPT

## 2022-05-12 PROCEDURE — 36415 COLL VENOUS BLD VENIPUNCTURE: CPT

## 2022-05-12 PROCEDURE — 80053 COMPREHEN METABOLIC PANEL: CPT

## 2022-05-12 PROCEDURE — 85025 COMPLETE CBC W/AUTO DIFF WBC: CPT

## 2022-05-12 PROCEDURE — 80061 LIPID PANEL: CPT

## 2022-05-12 PROCEDURE — 82570 ASSAY OF URINE CREATININE: CPT

## 2022-05-19 DIAGNOSIS — I10 BENIGN ESSENTIAL HYPERTENSION: ICD-10-CM

## 2022-05-19 RX ORDER — AMLODIPINE BESYLATE 5 MG/1
TABLET ORAL
Qty: 90 TABLET | Refills: 1 | Status: SHIPPED | OUTPATIENT
Start: 2022-05-19

## 2022-05-24 ENCOUNTER — OFFICE VISIT (OUTPATIENT)
Dept: INTERNAL MEDICINE CLINIC | Facility: CLINIC | Age: 62
End: 2022-05-24
Payer: COMMERCIAL

## 2022-05-24 VITALS
HEART RATE: 90 BPM | SYSTOLIC BLOOD PRESSURE: 130 MMHG | WEIGHT: 264 LBS | HEIGHT: 66 IN | RESPIRATION RATE: 16 BRPM | DIASTOLIC BLOOD PRESSURE: 82 MMHG | TEMPERATURE: 97.5 F | BODY MASS INDEX: 42.43 KG/M2 | OXYGEN SATURATION: 98 %

## 2022-05-24 DIAGNOSIS — E78.2 MIXED HYPERLIPIDEMIA: ICD-10-CM

## 2022-05-24 DIAGNOSIS — E66.01 MORBID OBESITY (HCC): ICD-10-CM

## 2022-05-24 DIAGNOSIS — E11.65 UNCONTROLLED TYPE 2 DIABETES MELLITUS WITH HYPERGLYCEMIA (HCC): Primary | ICD-10-CM

## 2022-05-24 DIAGNOSIS — E11.22 TYPE 2 DIABETES MELLITUS WITH STAGE 3A CHRONIC KIDNEY DISEASE, WITH LONG-TERM CURRENT USE OF INSULIN (HCC): ICD-10-CM

## 2022-05-24 DIAGNOSIS — Z79.4 TYPE 2 DIABETES MELLITUS WITH STAGE 3A CHRONIC KIDNEY DISEASE, WITH LONG-TERM CURRENT USE OF INSULIN (HCC): ICD-10-CM

## 2022-05-24 DIAGNOSIS — I10 BENIGN ESSENTIAL HYPERTENSION: ICD-10-CM

## 2022-05-24 DIAGNOSIS — N18.31 TYPE 2 DIABETES MELLITUS WITH STAGE 3A CHRONIC KIDNEY DISEASE, WITH LONG-TERM CURRENT USE OF INSULIN (HCC): ICD-10-CM

## 2022-05-24 PROCEDURE — 99214 OFFICE O/P EST MOD 30 MIN: CPT | Performed by: INTERNAL MEDICINE

## 2022-05-24 RX ORDER — DULAGLUTIDE 3 MG/.5ML
3 INJECTION, SOLUTION SUBCUTANEOUS WEEKLY
Qty: 6 ML | Refills: 1 | Status: SHIPPED | OUTPATIENT
Start: 2022-05-24

## 2022-05-24 NOTE — PROGRESS NOTES
Assessment/Plan:    Type 2 diabetes mellitus with stage 3a chronic kidney disease, with long-term current use of insulin ([facility])    Lab Results   Component Value Date    HGBA1C 7 5 (H) 76/56/2072   Increase Trulicity to 3mg a week   Continue metformin glimepiride Humalog and Toujeo  Plan to reduce glimepiride  He is on a statin and ACE inh         Problem List Items Addressed This Visit        Endocrine    Type 2 diabetes mellitus with stage 3a chronic kidney disease, with long-term current use of insulin ([facility] [address] ) - Primary       Lab Results   Component Value Date    HGBA1C 7 5 (H) 61/56/5595   Increase Trulicity to 3mg a week   Continue metformin glimepiride Humalog and Toujeo  Plan to reduce glimepiride  He is on a statin and ACE inh           Relevant Medications    Dulaglutide (Trulicity) 3 RT/7 9NR SOPN       Cardiovascular and Mediastinum    Benign essential hypertension    Relevant Orders    Comprehensive metabolic panel    CBC and differential       Other    Morbid obesity ([facility] )    Hyperlipidemia    Relevant Orders    Comprehensive metabolic panel    Lipid Panel with Direct LDL reflex            Subjective:      Patient ID: Lisette Calderon is a 65 y o  male  HPI  Started with a cough 5 days ago  This is improving  Recent labs reviewed and A1C 7 5 from 7 6  +microalbuminuria  Lipids controlled LDL 31 HDL is low at 31  Creatinine 1 52 (baseline 1 2-1 4)  Chronically mildly elevated WBC  Swelling in the feet that comes and goes     The following portions of the patient's history were reviewed and updated as appropriate: allergies, current medications, past medical history, past social history, past surgical history and problem list     Review of Systems   Constitutional: Negative for chills, fever and unexpected weight change  Respiratory: Positive for cough  Negative for shortness of breath  Cardiovascular: Positive for leg swelling (feet that comes and goes)  Negative for chest pain and palpitations  Gastrointestinal: Negative for abdominal pain, constipation and diarrhea  Objective:      /82   Pulse 90   Temp 97 5 °F (36 4 °C)   Resp 16   Ht 5' 5 5" (1 664 m)   Wt 120 kg (264 lb)   SpO2 98%   BMI 43 26 kg/m²          Physical Exam  Constitutional:       General: He is not in acute distress  Appearance: He is well-developed  He is obese  He is not ill-appearing, toxic-appearing or diaphoretic  HENT:      Head: Normocephalic and atraumatic  Eyes:      Conjunctiva/sclera: Conjunctivae normal    Cardiovascular:      Rate and Rhythm: Normal rate and regular rhythm  Heart sounds: Normal heart sounds  No murmur heard  Pulmonary:      Effort: Pulmonary effort is normal  No respiratory distress  Breath sounds: Normal breath sounds  No wheezing or rales  Abdominal:      General: There is no distension  Palpations: Abdomen is soft  There is no mass  Tenderness: There is no abdominal tenderness  There is no guarding or rebound  Musculoskeletal:      Cervical back: Neck supple  Right lower leg: No edema  Left lower leg: No edema  Skin:     General: Skin is warm and dry  Neurological:      Mental Status: He is alert and oriented to person, place, and time  Psychiatric:         Mood and Affect: Mood normal          Behavior: Behavior normal          Thought Content:  Thought content normal          Judgment: Judgment normal

## 2022-05-29 NOTE — ASSESSMENT & PLAN NOTE
Lab Results   Component Value Date    HGBA1C 7 5 (H) 99/92/0340   Increase Trulicity to 3mg a week   Continue metformin glimepiride Humalog and Toujeo  Plan to reduce glimepiride  He is on a statin and ACE inh

## 2022-07-05 DIAGNOSIS — I10 HYPERTENSION, ESSENTIAL, BENIGN: ICD-10-CM

## 2022-07-05 DIAGNOSIS — E11.65 UNCONTROLLED TYPE 2 DIABETES MELLITUS WITH HYPERGLYCEMIA (HCC): ICD-10-CM

## 2022-07-06 RX ORDER — LISINOPRIL 40 MG/1
TABLET ORAL
Qty: 90 TABLET | Refills: 3 | Status: SHIPPED | OUTPATIENT
Start: 2022-07-06 | End: 2022-08-09

## 2022-07-06 RX ORDER — GLIMEPIRIDE 4 MG/1
TABLET ORAL
Qty: 180 TABLET | Refills: 1 | Status: SHIPPED | OUTPATIENT
Start: 2022-07-06

## 2022-08-06 DIAGNOSIS — E11.65 UNCONTROLLED TYPE 2 DIABETES MELLITUS WITH HYPERGLYCEMIA (HCC): ICD-10-CM

## 2022-08-06 RX ORDER — ATORVASTATIN CALCIUM 80 MG/1
TABLET, FILM COATED ORAL
Qty: 90 TABLET | Refills: 1 | Status: SHIPPED | OUTPATIENT
Start: 2022-08-06

## 2022-08-08 ENCOUNTER — ANESTHESIA EVENT (INPATIENT)
Dept: PERIOP | Facility: HOSPITAL | Age: 62
DRG: 690 | End: 2022-08-08
Payer: COMMERCIAL

## 2022-08-08 ENCOUNTER — APPOINTMENT (EMERGENCY)
Dept: RADIOLOGY | Facility: HOSPITAL | Age: 62
DRG: 690 | End: 2022-08-08
Payer: COMMERCIAL

## 2022-08-08 ENCOUNTER — HOSPITAL ENCOUNTER (INPATIENT)
Facility: HOSPITAL | Age: 62
LOS: 1 days | Discharge: HOME | DRG: 690 | End: 2022-08-09
Attending: EMERGENCY MEDICINE | Admitting: INTERNAL MEDICINE
Payer: COMMERCIAL

## 2022-08-08 ENCOUNTER — APPOINTMENT (INPATIENT)
Dept: RADIOLOGY | Facility: HOSPITAL | Age: 62
DRG: 690 | End: 2022-08-08
Payer: COMMERCIAL

## 2022-08-08 ENCOUNTER — ANESTHESIA (INPATIENT)
Dept: PERIOP | Facility: HOSPITAL | Age: 62
DRG: 690 | End: 2022-08-08
Payer: COMMERCIAL

## 2022-08-08 DIAGNOSIS — N20.1 LEFT URETERAL CALCULUS: ICD-10-CM

## 2022-08-08 DIAGNOSIS — N12 PYELONEPHRITIS: ICD-10-CM

## 2022-08-08 DIAGNOSIS — N20.0 KIDNEY STONES: Primary | ICD-10-CM

## 2022-08-08 LAB
ALBUMIN SERPL BCP-MCNC: 3.7 G/DL (ref 3.5–5)
ALP SERPL-CCNC: 81 U/L (ref 46–116)
ALT SERPL W P-5'-P-CCNC: 49 U/L (ref 12–78)
ANION GAP SERPL CALCULATED.3IONS-SCNC: 6 MMOL/L (ref 4–13)
AST SERPL W P-5'-P-CCNC: 31 U/L (ref 5–45)
ATRIAL RATE: 103 BPM
BACTERIA UR QL AUTO: NORMAL /HPF
BASOPHILS # BLD AUTO: 0.02 THOUSANDS/ΜL (ref 0–0.1)
BASOPHILS NFR BLD AUTO: 0 % (ref 0–1)
BILIRUB SERPL-MCNC: 0.35 MG/DL (ref 0.2–1)
BILIRUB UR QL STRIP: NEGATIVE
BILIRUB UR QL STRIP: NEGATIVE
BUN SERPL-MCNC: 35 MG/DL (ref 5–25)
CALCIUM SERPL-MCNC: 9 MG/DL (ref 8.3–10.1)
CHLORIDE SERPL-SCNC: 108 MMOL/L (ref 96–108)
CLARITY UR: CLEAR
CLARITY UR: CLEAR
CO2 SERPL-SCNC: 22 MMOL/L (ref 21–32)
COLOR UR: ABNORMAL
COLOR UR: YELLOW
CREAT SERPL-MCNC: 2.1 MG/DL (ref 0.6–1.3)
EOSINOPHIL # BLD AUTO: 0.04 THOUSAND/ΜL (ref 0–0.61)
EOSINOPHIL NFR BLD AUTO: 0 % (ref 0–6)
ERYTHROCYTE [DISTWIDTH] IN BLOOD BY AUTOMATED COUNT: 12.5 % (ref 11.6–15.1)
GFR SERPL CREATININE-BSD FRML MDRD: 32 ML/MIN/1.73SQ M
GLUCOSE SERPL-MCNC: 196 MG/DL (ref 65–140)
GLUCOSE SERPL-MCNC: 229 MG/DL (ref 65–140)
GLUCOSE SERPL-MCNC: 278 MG/DL (ref 65–140)
GLUCOSE SERPL-MCNC: 288 MG/DL (ref 65–140)
GLUCOSE UR STRIP-MCNC: ABNORMAL MG/DL
GLUCOSE UR STRIP-MCNC: ABNORMAL MG/DL
HCT VFR BLD AUTO: 46.3 % (ref 36.5–49.3)
HGB BLD-MCNC: 14.3 G/DL (ref 12–17)
HGB UR QL STRIP.AUTO: ABNORMAL
HGB UR QL STRIP.AUTO: NEGATIVE
IMM GRANULOCYTES # BLD AUTO: 0.11 THOUSAND/UL (ref 0–0.2)
IMM GRANULOCYTES NFR BLD AUTO: 1 % (ref 0–2)
KETONES UR STRIP-MCNC: NEGATIVE MG/DL
KETONES UR STRIP-MCNC: NEGATIVE MG/DL
LACTATE SERPL-SCNC: 2.1 MMOL/L (ref 0.5–2)
LACTATE SERPL-SCNC: 2.9 MMOL/L (ref 0.5–2)
LEUKOCYTE ESTERASE UR QL STRIP: ABNORMAL
LEUKOCYTE ESTERASE UR QL STRIP: NEGATIVE
LYMPHOCYTES # BLD AUTO: 1.5 THOUSANDS/ΜL (ref 0.6–4.47)
LYMPHOCYTES NFR BLD AUTO: 9 % (ref 14–44)
MCH RBC QN AUTO: 27.9 PG (ref 26.8–34.3)
MCHC RBC AUTO-ENTMCNC: 30.9 G/DL (ref 31.4–37.4)
MCV RBC AUTO: 90 FL (ref 82–98)
MONOCYTES # BLD AUTO: 0.61 THOUSAND/ΜL (ref 0.17–1.22)
MONOCYTES NFR BLD AUTO: 4 % (ref 4–12)
NEUTROPHILS # BLD AUTO: 14.7 THOUSANDS/ΜL (ref 1.85–7.62)
NEUTS SEG NFR BLD AUTO: 86 % (ref 43–75)
NITRITE UR QL STRIP: NEGATIVE
NITRITE UR QL STRIP: NEGATIVE
NON-SQ EPI CELLS URNS QL MICRO: NORMAL /HPF
NRBC BLD AUTO-RTO: 0 /100 WBCS
P AXIS: 54 DEGREES
PH UR STRIP.AUTO: 5 [PH]
PH UR STRIP.AUTO: 5.5 [PH] (ref 4.5–8)
PLATELET # BLD AUTO: 265 THOUSANDS/UL (ref 149–390)
PLATELET # BLD AUTO: 302 THOUSANDS/UL (ref 149–390)
PMV BLD AUTO: 9.2 FL (ref 8.9–12.7)
PMV BLD AUTO: 9.2 FL (ref 8.9–12.7)
POTASSIUM SERPL-SCNC: 5.2 MMOL/L (ref 3.5–5.3)
POTASSIUM SERPL-SCNC: 5.8 MMOL/L (ref 3.5–5.3)
POTASSIUM SERPL-SCNC: 6.1 MMOL/L (ref 3.5–5.3)
PR INTERVAL: 198 MS
PROCALCITONIN SERPL-MCNC: 0.23 NG/ML
PROT SERPL-MCNC: 7.4 G/DL (ref 6.4–8.4)
PROT UR STRIP-MCNC: ABNORMAL MG/DL
PROT UR STRIP-MCNC: ABNORMAL MG/DL
QRS AXIS: 23 DEGREES
QRSD INTERVAL: 76 MS
QT INTERVAL: 338 MS
QTC INTERVAL: 442 MS
RBC # BLD AUTO: 5.13 MILLION/UL (ref 3.88–5.62)
RBC #/AREA URNS AUTO: NORMAL /HPF
SODIUM SERPL-SCNC: 136 MMOL/L (ref 135–147)
SP GR UR STRIP.AUTO: 1.02 (ref 1–1.03)
SP GR UR STRIP.AUTO: 1.02 (ref 1–1.03)
T WAVE AXIS: 31 DEGREES
UROBILINOGEN UR QL STRIP.AUTO: 0.2 E.U./DL
UROBILINOGEN UR STRIP-ACNC: <2 MG/DL
VENTRICULAR RATE: 103 BPM
WBC # BLD AUTO: 16.98 THOUSAND/UL (ref 4.31–10.16)
WBC #/AREA URNS AUTO: NORMAL /HPF

## 2022-08-08 PROCEDURE — 85049 AUTOMATED PLATELET COUNT: CPT | Performed by: INTERNAL MEDICINE

## 2022-08-08 PROCEDURE — 85025 COMPLETE CBC W/AUTO DIFF WBC: CPT | Performed by: EMERGENCY MEDICINE

## 2022-08-08 PROCEDURE — 93005 ELECTROCARDIOGRAM TRACING: CPT

## 2022-08-08 PROCEDURE — 87040 BLOOD CULTURE FOR BACTERIA: CPT | Performed by: INTERNAL MEDICINE

## 2022-08-08 PROCEDURE — C1769 GUIDE WIRE: HCPCS | Performed by: STUDENT IN AN ORGANIZED HEALTH CARE EDUCATION/TRAINING PROGRAM

## 2022-08-08 PROCEDURE — 82948 REAGENT STRIP/BLOOD GLUCOSE: CPT

## 2022-08-08 PROCEDURE — 99222 1ST HOSP IP/OBS MODERATE 55: CPT | Performed by: STUDENT IN AN ORGANIZED HEALTH CARE EDUCATION/TRAINING PROGRAM

## 2022-08-08 PROCEDURE — 83605 ASSAY OF LACTIC ACID: CPT | Performed by: INTERNAL MEDICINE

## 2022-08-08 PROCEDURE — 0T778DZ DILATION OF LEFT URETER WITH INTRALUMINAL DEVICE, VIA NATURAL OR ARTIFICIAL OPENING ENDOSCOPIC: ICD-10-PCS | Performed by: STUDENT IN AN ORGANIZED HEALTH CARE EDUCATION/TRAINING PROGRAM

## 2022-08-08 PROCEDURE — 74420 UROGRAPHY RTRGR +-KUB: CPT

## 2022-08-08 PROCEDURE — 81001 URINALYSIS AUTO W/SCOPE: CPT | Performed by: INTERNAL MEDICINE

## 2022-08-08 PROCEDURE — 80053 COMPREHEN METABOLIC PANEL: CPT | Performed by: EMERGENCY MEDICINE

## 2022-08-08 PROCEDURE — 84132 ASSAY OF SERUM POTASSIUM: CPT | Performed by: PHYSICIAN ASSISTANT

## 2022-08-08 PROCEDURE — 87086 URINE CULTURE/COLONY COUNT: CPT | Performed by: STUDENT IN AN ORGANIZED HEALTH CARE EDUCATION/TRAINING PROGRAM

## 2022-08-08 PROCEDURE — 96365 THER/PROPH/DIAG IV INF INIT: CPT

## 2022-08-08 PROCEDURE — 99024 POSTOP FOLLOW-UP VISIT: CPT | Performed by: PHYSICIAN ASSISTANT

## 2022-08-08 PROCEDURE — 96375 TX/PRO/DX INJ NEW DRUG ADDON: CPT

## 2022-08-08 PROCEDURE — 99285 EMERGENCY DEPT VISIT HI MDM: CPT

## 2022-08-08 PROCEDURE — 74176 CT ABD & PELVIS W/O CONTRAST: CPT

## 2022-08-08 PROCEDURE — 99285 EMERGENCY DEPT VISIT HI MDM: CPT | Performed by: EMERGENCY MEDICINE

## 2022-08-08 PROCEDURE — C1758 CATHETER, URETERAL: HCPCS | Performed by: STUDENT IN AN ORGANIZED HEALTH CARE EDUCATION/TRAINING PROGRAM

## 2022-08-08 PROCEDURE — 99223 1ST HOSP IP/OBS HIGH 75: CPT | Performed by: INTERNAL MEDICINE

## 2022-08-08 PROCEDURE — 93010 ELECTROCARDIOGRAM REPORT: CPT | Performed by: INTERNAL MEDICINE

## 2022-08-08 PROCEDURE — 36415 COLL VENOUS BLD VENIPUNCTURE: CPT | Performed by: EMERGENCY MEDICINE

## 2022-08-08 PROCEDURE — C2617 STENT, NON-COR, TEM W/O DEL: HCPCS | Performed by: STUDENT IN AN ORGANIZED HEALTH CARE EDUCATION/TRAINING PROGRAM

## 2022-08-08 PROCEDURE — 52332 CYSTOSCOPY AND TREATMENT: CPT | Performed by: STUDENT IN AN ORGANIZED HEALTH CARE EDUCATION/TRAINING PROGRAM

## 2022-08-08 PROCEDURE — BT1FYZZ FLUOROSCOPY OF LEFT KIDNEY, URETER AND BLADDER USING OTHER CONTRAST: ICD-10-PCS | Performed by: RADIOLOGY

## 2022-08-08 PROCEDURE — 84145 PROCALCITONIN (PCT): CPT | Performed by: INTERNAL MEDICINE

## 2022-08-08 DEVICE — INLAY OPTIMA URETERAL STENT W/O GUIDEWIRE
Type: IMPLANTABLE DEVICE | Status: NON-FUNCTIONAL
Brand: BARD® INLAY OPTIMA® URETERAL STENT
Removed: 2022-08-11

## 2022-08-08 RX ORDER — LISINOPRIL 20 MG/1
40 TABLET ORAL DAILY
Status: DISCONTINUED | OUTPATIENT
Start: 2022-08-08 | End: 2022-08-08

## 2022-08-08 RX ORDER — DOCUSATE SODIUM 100 MG/1
100 CAPSULE, LIQUID FILLED ORAL 2 TIMES DAILY PRN
Status: DISCONTINUED | OUTPATIENT
Start: 2022-08-08 | End: 2022-08-09 | Stop reason: HOSPADM

## 2022-08-08 RX ORDER — ONDANSETRON 2 MG/ML
4 INJECTION INTRAMUSCULAR; INTRAVENOUS ONCE AS NEEDED
Status: DISCONTINUED | OUTPATIENT
Start: 2022-08-08 | End: 2022-08-08 | Stop reason: HOSPADM

## 2022-08-08 RX ORDER — ONDANSETRON 2 MG/ML
INJECTION INTRAMUSCULAR; INTRAVENOUS AS NEEDED
Status: DISCONTINUED | OUTPATIENT
Start: 2022-08-08 | End: 2022-08-08

## 2022-08-08 RX ORDER — DEXAMETHASONE SODIUM PHOSPHATE 10 MG/ML
INJECTION, SOLUTION INTRAMUSCULAR; INTRAVENOUS AS NEEDED
Status: DISCONTINUED | OUTPATIENT
Start: 2022-08-08 | End: 2022-08-08

## 2022-08-08 RX ORDER — ACETAMINOPHEN 325 MG/1
650 TABLET ORAL EVERY 6 HOURS PRN
Status: DISCONTINUED | OUTPATIENT
Start: 2022-08-08 | End: 2022-08-09 | Stop reason: HOSPADM

## 2022-08-08 RX ORDER — SODIUM CHLORIDE, SODIUM LACTATE, POTASSIUM CHLORIDE, CALCIUM CHLORIDE 600; 310; 30; 20 MG/100ML; MG/100ML; MG/100ML; MG/100ML
75 INJECTION, SOLUTION INTRAVENOUS CONTINUOUS
Status: DISCONTINUED | OUTPATIENT
Start: 2022-08-08 | End: 2022-08-08

## 2022-08-08 RX ORDER — HYDROMORPHONE HCL/PF 1 MG/ML
1 SYRINGE (ML) INJECTION ONCE
Status: COMPLETED | OUTPATIENT
Start: 2022-08-08 | End: 2022-08-08

## 2022-08-08 RX ORDER — ATORVASTATIN CALCIUM 80 MG/1
80 TABLET, FILM COATED ORAL DAILY
Status: DISCONTINUED | OUTPATIENT
Start: 2022-08-08 | End: 2022-08-09 | Stop reason: HOSPADM

## 2022-08-08 RX ORDER — CARVEDILOL 25 MG/1
25 TABLET ORAL 2 TIMES DAILY WITH MEALS
Status: DISCONTINUED | OUTPATIENT
Start: 2022-08-08 | End: 2022-08-09 | Stop reason: HOSPADM

## 2022-08-08 RX ORDER — SODIUM CHLORIDE, SODIUM LACTATE, POTASSIUM CHLORIDE, CALCIUM CHLORIDE 600; 310; 30; 20 MG/100ML; MG/100ML; MG/100ML; MG/100ML
INJECTION, SOLUTION INTRAVENOUS CONTINUOUS PRN
Status: DISCONTINUED | OUTPATIENT
Start: 2022-08-08 | End: 2022-08-08

## 2022-08-08 RX ORDER — OMEGA-3S/DHA/EPA/FISH OIL/D3 300MG-1000
400 CAPSULE ORAL DAILY
Status: DISCONTINUED | OUTPATIENT
Start: 2022-08-08 | End: 2022-08-09 | Stop reason: HOSPADM

## 2022-08-08 RX ORDER — INSULIN GLARGINE 100 [IU]/ML
80 INJECTION, SOLUTION SUBCUTANEOUS
Status: DISCONTINUED | OUTPATIENT
Start: 2022-08-08 | End: 2022-08-09 | Stop reason: HOSPADM

## 2022-08-08 RX ORDER — LIDOCAINE HYDROCHLORIDE 10 MG/ML
INJECTION, SOLUTION EPIDURAL; INFILTRATION; INTRACAUDAL; PERINEURAL AS NEEDED
Status: DISCONTINUED | OUTPATIENT
Start: 2022-08-08 | End: 2022-08-08

## 2022-08-08 RX ORDER — FENTANYL CITRATE/PF 50 MCG/ML
25 SYRINGE (ML) INJECTION
Status: DISCONTINUED | OUTPATIENT
Start: 2022-08-08 | End: 2022-08-08 | Stop reason: HOSPADM

## 2022-08-08 RX ORDER — FENTANYL CITRATE 50 UG/ML
INJECTION, SOLUTION INTRAMUSCULAR; INTRAVENOUS AS NEEDED
Status: DISCONTINUED | OUTPATIENT
Start: 2022-08-08 | End: 2022-08-08

## 2022-08-08 RX ORDER — HYDROMORPHONE HCL/PF 1 MG/ML
0.5 SYRINGE (ML) INJECTION EVERY 4 HOURS PRN
Status: DISCONTINUED | OUTPATIENT
Start: 2022-08-08 | End: 2022-08-09 | Stop reason: HOSPADM

## 2022-08-08 RX ORDER — OXYCODONE HYDROCHLORIDE 10 MG/1
10 TABLET ORAL EVERY 4 HOURS PRN
Status: DISCONTINUED | OUTPATIENT
Start: 2022-08-08 | End: 2022-08-09 | Stop reason: HOSPADM

## 2022-08-08 RX ORDER — PROPOFOL 10 MG/ML
INJECTION, EMULSION INTRAVENOUS AS NEEDED
Status: DISCONTINUED | OUTPATIENT
Start: 2022-08-08 | End: 2022-08-08

## 2022-08-08 RX ORDER — SODIUM POLYSTYRENE SULFONATE 4.1 MEQ/G
15 POWDER, FOR SUSPENSION ORAL; RECTAL ONCE
Status: COMPLETED | OUTPATIENT
Start: 2022-08-08 | End: 2022-08-08

## 2022-08-08 RX ORDER — HEPARIN SODIUM 5000 [USP'U]/ML
5000 INJECTION, SOLUTION INTRAVENOUS; SUBCUTANEOUS EVERY 8 HOURS SCHEDULED
Status: DISCONTINUED | OUTPATIENT
Start: 2022-08-08 | End: 2022-08-09 | Stop reason: HOSPADM

## 2022-08-08 RX ORDER — MAGNESIUM HYDROXIDE 1200 MG/15ML
LIQUID ORAL AS NEEDED
Status: DISCONTINUED | OUTPATIENT
Start: 2022-08-08 | End: 2022-08-08 | Stop reason: HOSPADM

## 2022-08-08 RX ORDER — SODIUM CHLORIDE, SODIUM GLUCONATE, SODIUM ACETATE, POTASSIUM CHLORIDE, MAGNESIUM CHLORIDE, SODIUM PHOSPHATE, DIBASIC, AND POTASSIUM PHOSPHATE .53; .5; .37; .037; .03; .012; .00082 G/100ML; G/100ML; G/100ML; G/100ML; G/100ML; G/100ML; G/100ML
125 INJECTION, SOLUTION INTRAVENOUS CONTINUOUS
Status: DISCONTINUED | OUTPATIENT
Start: 2022-08-08 | End: 2022-08-08

## 2022-08-08 RX ORDER — ONDANSETRON 2 MG/ML
4 INJECTION INTRAMUSCULAR; INTRAVENOUS EVERY 6 HOURS PRN
Status: DISCONTINUED | OUTPATIENT
Start: 2022-08-08 | End: 2022-08-09 | Stop reason: HOSPADM

## 2022-08-08 RX ORDER — ALBUTEROL SULFATE 90 UG/1
2 AEROSOL, METERED RESPIRATORY (INHALATION) EVERY 4 HOURS PRN
Status: DISCONTINUED | OUTPATIENT
Start: 2022-08-08 | End: 2022-08-09 | Stop reason: HOSPADM

## 2022-08-08 RX ORDER — INSULIN LISPRO 100 [IU]/ML
2-12 INJECTION, SOLUTION INTRAVENOUS; SUBCUTANEOUS EVERY 6 HOURS SCHEDULED
Status: DISCONTINUED | OUTPATIENT
Start: 2022-08-08 | End: 2022-08-09 | Stop reason: HOSPADM

## 2022-08-08 RX ORDER — ASPIRIN 81 MG/1
81 TABLET, CHEWABLE ORAL DAILY
Status: DISCONTINUED | OUTPATIENT
Start: 2022-08-08 | End: 2022-08-09 | Stop reason: HOSPADM

## 2022-08-08 RX ORDER — OXYCODONE HYDROCHLORIDE 5 MG/1
5 TABLET ORAL EVERY 4 HOURS PRN
Status: DISCONTINUED | OUTPATIENT
Start: 2022-08-08 | End: 2022-08-09 | Stop reason: HOSPADM

## 2022-08-08 RX ORDER — HYDROMORPHONE HCL/PF 1 MG/ML
0.5 SYRINGE (ML) INJECTION
Status: DISCONTINUED | OUTPATIENT
Start: 2022-08-08 | End: 2022-08-08

## 2022-08-08 RX ORDER — AMLODIPINE BESYLATE 5 MG/1
5 TABLET ORAL DAILY
Status: DISCONTINUED | OUTPATIENT
Start: 2022-08-08 | End: 2022-08-09 | Stop reason: HOSPADM

## 2022-08-08 RX ORDER — TAMSULOSIN HYDROCHLORIDE 0.4 MG/1
0.4 CAPSULE ORAL
Status: DISCONTINUED | OUTPATIENT
Start: 2022-08-08 | End: 2022-08-09 | Stop reason: HOSPADM

## 2022-08-08 RX ORDER — MAGNESIUM HYDROXIDE/ALUMINUM HYDROXICE/SIMETHICONE 120; 1200; 1200 MG/30ML; MG/30ML; MG/30ML
30 SUSPENSION ORAL EVERY 6 HOURS PRN
Status: DISCONTINUED | OUTPATIENT
Start: 2022-08-08 | End: 2022-08-09 | Stop reason: HOSPADM

## 2022-08-08 RX ORDER — SODIUM CHLORIDE 9 MG/ML
125 INJECTION, SOLUTION INTRAVENOUS CONTINUOUS
Status: DISCONTINUED | OUTPATIENT
Start: 2022-08-08 | End: 2022-08-09 | Stop reason: HOSPADM

## 2022-08-08 RX ADMIN — DEXAMETHASONE SODIUM PHOSPHATE 10 MG: 10 INJECTION, SOLUTION INTRAMUSCULAR; INTRAVENOUS at 09:31

## 2022-08-08 RX ADMIN — INSULIN GLARGINE 80 UNITS: 100 INJECTION, SOLUTION SUBCUTANEOUS at 22:01

## 2022-08-08 RX ADMIN — OXYCODONE HYDROCHLORIDE 10 MG: 10 TABLET ORAL at 06:13

## 2022-08-08 RX ADMIN — HEPARIN SODIUM 5000 UNITS: 5000 INJECTION INTRAVENOUS; SUBCUTANEOUS at 06:05

## 2022-08-08 RX ADMIN — FENTANYL CITRATE 50 MCG: 50 INJECTION INTRAMUSCULAR; INTRAVENOUS at 09:24

## 2022-08-08 RX ADMIN — SODIUM CHLORIDE, SODIUM GLUCONATE, SODIUM ACETATE, POTASSIUM CHLORIDE, MAGNESIUM CHLORIDE, SODIUM PHOSPHATE, DIBASIC, AND POTASSIUM PHOSPHATE 125 ML/HR: .53; .5; .37; .037; .03; .012; .00082 INJECTION, SOLUTION INTRAVENOUS at 07:16

## 2022-08-08 RX ADMIN — ONDANSETRON 4 MG: 2 INJECTION INTRAMUSCULAR; INTRAVENOUS at 05:45

## 2022-08-08 RX ADMIN — AMLODIPINE BESYLATE 5 MG: 5 TABLET ORAL at 11:02

## 2022-08-08 RX ADMIN — HYDROMORPHONE HYDROCHLORIDE 1 MG: 1 INJECTION, SOLUTION INTRAMUSCULAR; INTRAVENOUS; SUBCUTANEOUS at 04:37

## 2022-08-08 RX ADMIN — LIDOCAINE HYDROCHLORIDE 50 MG: 10 INJECTION, SOLUTION EPIDURAL; INFILTRATION; INTRACAUDAL at 09:28

## 2022-08-08 RX ADMIN — HYDROMORPHONE HYDROCHLORIDE 0.5 MG: 1 INJECTION, SOLUTION INTRAMUSCULAR; INTRAVENOUS; SUBCUTANEOUS at 07:08

## 2022-08-08 RX ADMIN — Medication 1 TABLET: at 11:02

## 2022-08-08 RX ADMIN — CEFTRIAXONE 1000 MG: 10 INJECTION, POWDER, FOR SOLUTION INTRAVENOUS at 06:03

## 2022-08-08 RX ADMIN — INSULIN LISPRO 2 UNITS: 100 INJECTION, SOLUTION INTRAVENOUS; SUBCUTANEOUS at 12:11

## 2022-08-08 RX ADMIN — PROPOFOL 200 MG: 10 INJECTION, EMULSION INTRAVENOUS at 09:28

## 2022-08-08 RX ADMIN — HEPARIN SODIUM 5000 UNITS: 5000 INJECTION INTRAVENOUS; SUBCUTANEOUS at 22:01

## 2022-08-08 RX ADMIN — SODIUM CHLORIDE 125 ML/HR: 0.9 INJECTION, SOLUTION INTRAVENOUS at 15:26

## 2022-08-08 RX ADMIN — SODIUM CHLORIDE, SODIUM LACTATE, POTASSIUM CHLORIDE, AND CALCIUM CHLORIDE: .6; .31; .03; .02 INJECTION, SOLUTION INTRAVENOUS at 09:22

## 2022-08-08 RX ADMIN — TAMSULOSIN HYDROCHLORIDE 0.4 MG: 0.4 CAPSULE ORAL at 16:56

## 2022-08-08 RX ADMIN — ASPIRIN 81 MG CHEWABLE TABLET 81 MG: 81 TABLET CHEWABLE at 11:02

## 2022-08-08 RX ADMIN — SODIUM CHLORIDE, SODIUM LACTATE, POTASSIUM CHLORIDE, AND CALCIUM CHLORIDE 1000 ML: .6; .31; .03; .02 INJECTION, SOLUTION INTRAVENOUS at 04:40

## 2022-08-08 RX ADMIN — CARVEDILOL 25 MG: 25 TABLET, FILM COATED ORAL at 16:56

## 2022-08-08 RX ADMIN — ONDANSETRON HYDROCHLORIDE 4 MG: 2 INJECTION, SOLUTION INTRAMUSCULAR; INTRAVENOUS at 09:31

## 2022-08-08 RX ADMIN — SODIUM POLYSTYRENE SULFONATE 15 G: 1 POWDER ORAL; RECTAL at 17:01

## 2022-08-08 RX ADMIN — ATORVASTATIN CALCIUM 80 MG: 80 TABLET, FILM COATED ORAL at 16:56

## 2022-08-08 RX ADMIN — HEPARIN SODIUM 5000 UNITS: 5000 INJECTION INTRAVENOUS; SUBCUTANEOUS at 14:00

## 2022-08-08 RX ADMIN — INSULIN LISPRO 6 UNITS: 100 INJECTION, SOLUTION INTRAVENOUS; SUBCUTANEOUS at 17:10

## 2022-08-08 RX ADMIN — OXYCODONE HYDROCHLORIDE 10 MG: 10 TABLET ORAL at 22:06

## 2022-08-08 NOTE — ASSESSMENT & PLAN NOTE
NPO right now  Started on fluids  Zofran for nausea  Adult pain protocol  Urology consult; [facility] urology PA to let them know of patient admission  Rocephin 1 g starting now after 2 blood cultures  Will recheck metabolic profile with CBC tomorrow

## 2022-08-08 NOTE — H&P
[facility]  H&P- Warden Cooley 1960, 65 y o  male MRN: [id number]  Unit/Bed#: ED 01 Encounter: [id number]  Primary Care Provider: Paxton Leyva MD   Date and time admitted to hospital: 8/8/2022  3:46 AM    * Ureterolithiasis  Assessment & Plan  NPO right now  Started on fluids  Zofran for nausea  Adult pain protocol  Urology consult; [place] texted urology PA to let them know of patient admission  Rocephin 1 g starting now after 2 blood cultures  Will recheck metabolic profile with CBC tomorrow  Pyelonephritis  Assessment & Plan  Patient comes in with chills but no kelly fevers; white count elevated 16 from a baseline of 10  Blood cultures x2  Urine analysis with culture  Started Rocephin 1 g daily  Fluid hydration  Recheck CBC  Procalcitonin and lactic acid  If elevated lactic acid; would need to trend every 2 hours in lieu of sepsis protocol  Type 2 diabetes mellitus with stage 3a chronic kidney disease, with long-term current use of insulin ([facility])  Assessment & Plan  Lab Results   Component Value Date    HGBA1C 7 5 (H) 05/12/2022   Continue Lantus 80 units at night  Insulin sliding scale with Accu-Cheks per protocol  No results for input(s): POCGLU in the last 72 hours  Blood Sugar Average: Last 72 hrs:      JAYNE (acute kidney injury) ([facility] [address] )  Assessment & Plan  Current creatinine 2 1 usually at 1 3 to 1 5; will place patient on intravenous fluid hydration  Monitor metabolic profile  Started JAYNE protocol  Zestril on hold  Hyperlipidemia  Assessment & Plan  On Lipitor 80 mg daily  Benign essential hypertension  Assessment & Plan  Zestril 40 mg daily but is put on hold due to patient's elevated creatinine  Coreg 25 mg twice daily  Amlodipine 5 mg daily  Started on JAYNE protocol          VTE Prophylaxis: Enoxaparin (Lovenox)  / sequential compression device   Code Status: Level 1 - Full Code as discussed  POLST: There is no POLST form on file for this patient (pre-hospital)    Anticipated Length of Stay:  Patient will be admitted on an Inpatient basis with an anticipated length of stay of  greater than 2 midnights  Justification for Hospital Stay: Please see detailed plans noted above  Chief Complaint:     Left-sided flank pain  History of Present Illness:  Kaylynn Hernandez is a 65 y o  male who has past medical history significant for multiple episodes of nephrolithiasis and follows up with Urology (Dr Lizzy Huffman), he also has diabetes mellitus, hyperlipidemia and hypertension with obesity  History of prostate cancer  Patient comes in with 1 day history of left-sided discomfort  Thinking that this is essentially similar to his previous episode of kidney stones he comes in for an evaluation as it is currently increasing in intensity  There is nausea  No kelly fevers however there is chills  No shortness of breath  No vomiting  No diarrhea  Here in the emergency room a CT of the abdomen was then taken which did show the presence of 9 mm stone at the proximal left ureter causing moderate left-sided hydroureteronephrosis with perinephric stranding  Patient's white count is 16  After blood cultures are taken, Rocephin was then started  Lactic acid and procalcitonin are still pending  Currently, patient is comfortable lying on bed however patient is complaining of left-sided flank discomfort  He is also nauseous but otherwise without any cardiorespiratory distress  Review of Systems:    Constitutional:  Denies fever or chills   Eyes:  Denies change in visual acuity   HENT:  Denies nasal congestion or sore throat   Respiratory:  Denies cough or shortness of breath   Cardiovascular:  Denies chest pain or edema   GI:  Denies abdominal pain, some nausea without vomiting, left-sided flank pain      :  Denies dysuria   Musculoskeletal:  Denies back pain or joint pain   Integument:  Denies rash   Neurologic:  Denies headache, focal weakness or sensory changes   Endocrine:  Denies polyuria or polydipsia   Lymphatic:  Denies swollen glands   Psychiatric:  Denies depression or anxiety     Past Medical and Surgical History:   Past Medical History:   Diagnosis Date    Acute bronchitis     Acute low back pain     Acute low back pain     [id number] resolved    Acute respiratory infection     Cancer (HCC)     prostate    Cellulitis of scrotum     Cough     Diabetes mellitus (HCC)     Herpes zoster     High cholesterol     Hyperkalemia     Hypertension     Kidney disease     Kidney stone     Low back pain     Lumbar radiculopathy     Rash     Retinopathy, central serous     Sleep apnea     [id number]    Trochanteric bursitis      Past Surgical History:   Procedure Laterality Date    ABDOMINAL ADHESION SURGERY N/A 6/13/2018    Procedure: LYSIS ADHESIONS;  Surgeon: Sherin Polk MD;  [facility];  Service: Urology    LITHOTRIPSY      renal    KS LAP,PROSTATECTOMY,RADICAL,W/NERVE SPARE,INCL ROBOTIC N/A 6/13/2018    Procedure: Alexander Slim W ROBOTICS;  Surgeon: Sherin Polk MD;  [facility];  Service: Urology    PROSTATE SURGERY  06/13/2018    SHOULDER SURGERY         Meds/Allergies:  (Not in a hospital admission)      Allergies:    Allergies   Allergen Reactions    Simvastatin      Increases Liver functoin     History:  Marital Status: /Civil Union   Occupation:  He works as part of maintenance here in [employer]  Patient Pre-hospital Living Situation:  Lives at home  Patient Pre-hospital Level of Mobility:  Ambulatory  Patient Pre-hospital Diet Restrictions:  Diabetic regular diet  Substance Use History:   Social History     Substance and Sexual Activity   Alcohol Use No     Social History     Tobacco Use   Smoking Status Never Smoker   Smokeless Tobacco Never Used     Social History     Substance and Sexual Activity   Drug Use No       Family History:  Family History   Problem Relation Age of Onset    Other Father         cardiac disorder    Stroke Father     Diabetes Father         mellitus    Hypertension Father     Heart disease Father         cardiac disorder    Cancer Sister     Cancer Paternal Grandmother     Heart disease Family         cardiac disorder    Kidney disease Family        Physical Exam:     Vitals:   Blood Pressure: 142/80 (08/08/22 0407)  Pulse: 102 (08/08/22 0407)  Respirations: 18 (08/08/22 0407)  Height: 5' 5" (165 1 cm) (08/08/22 0407)  Weight - Scale: 122 kg (268 lb) (08/08/22 0407)  SpO2: 94 % (08/08/22 0407)    Constitutional:  Well developed, well nourished, he is obese habitus, no acute distress, non-toxic appearance   Eyes:  PERRL, conjunctiva normal   HENT:  Atraumatic, external ears normal, nose normal, oropharynx moist, no pharyngeal exudates  Neck- normal range of motion, no tenderness, supple   Respiratory:  No respiratory distress, normal breath sounds, no rales, no wheezing   Cardiovascular:  Normal rate, normal rhythm, no murmurs, no gallops, no rubs   GI:  Soft, nondistended, globular, normal bowel sounds, nontender, no organomegaly, no mass, no rebound, no guarding   :  Left costovertebral as well as left abdominal discomfort  Musculoskeletal:  No edema, no tenderness, no deformities  Back- no tenderness  Integument:  Well hydrated, no rash   Lymphatic:  No lymphadenopathy noted   Neurologic:  Alert &awake, communicative, CN 2-12 normal, normal motor function, normal sensory function, no focal deficits noted   Psychiatric:  Speech and behavior appropriate       Lab Results: I have personally reviewed pertinent reports        Results from last 7 days   Lab Units 08/08/22  0416   WBC Thousand/uL 16 98*   HEMOGLOBIN g/dL 14 3   HEMATOCRIT % 46 3   PLATELETS Thousands/uL 302   NEUTROS PCT % 86*   LYMPHS PCT % 9*   MONOS PCT % 4   EOS PCT % 0     Results from last 7 days   Lab Units 08/08/22  0416   POTASSIUM mmol/L 5 8*   CHLORIDE mmol/L 108   CO2 mmol/L 22   BUN mg/dL 35*   CREATININE mg/dL 2 10*   CALCIUM mg/dL 9 0   ALK PHOS U/L 81   ALT U/L 49   AST U/L 31               Imaging: I have personally reviewed pertinent reports  CT renal stone study abdomen pelvis wo contrast    Result Date: 8/8/2022  Narrative: CT ABDOMEN AND PELVIS WITHOUT IV CONTRAST - LOW DOSE RENAL STONE INDICATION:   flank pain  COMPARISON:  None  TECHNIQUE:  Low radiation dose thin section CT examination of the abdomen and pelvis was performed without intravenous or oral contrast according to a protocol specifically designed to evaluate for urinary tract calculus  Axial, sagittal, and coronal 2D  reformatted images were created from the source data and submitted for interpretation  Evaluation for pathology in the abdomen and pelvis that is unrelated to urinary tract calculi is limited  Radiation dose length product (DLP) for this visit:  1500 mGy-cm   This examination, like all CT scans performed in the [facility], was performed utilizing techniques to minimize radiation dose exposure, including the use of iterative reconstruction and automated exposure control  URINARY TRACT FINDINGS: RIGHT KIDNEY AND URETER: There are nonobstructing intrarenal calculi measuring on the order of 5 mm  No hydronephrosis or hydroureter  LEFT KIDNEY AND URETER:  Moderate left-sided hydroureteronephrosis with a 9 mm obstructing stone in the proximal left ureter  Moderate left-sided perinephric stranding  Nonobstructing intrarenal calculi measuring up to 5 mm  URINARY BLADDER:  Unremarkable  ADDITIONAL FINDINGS: LOWER CHEST:  No clinically significant abnormality identified in the visualized lower chest  SOLID VISCERA: Limited low radiation dose noncontrast CT evaluation demonstrates no clinically significant abnormality of the imaged portions of the liver, spleen, pancreas, or adrenal glands    GALLBLADDER/BILIARY TREE:  There are gallstone(s) within the gallbladder, without pericholecystic inflammatory changes  No biliary dilatation  STOMACH AND BOWEL:  Unremarkable  APPENDIX:  No findings to suggest appendicitis  ABDOMINOPELVIC CAVITY:  No ascites  No pneumoperitoneum  No lymphadenopathy  REPRODUCTIVE ORGANS:  Unremarkable for patient's age  ABDOMINAL WALL/INGUINAL REGIONS:  Unremarkable  OSSEOUS STRUCTURES:  No acute fracture or destructive osseous lesion  Impression: Moderate left-sided hydroureteronephrosis with a 9 mm obstructing stone in the proximal left ureter  Moderate left-sided perinephric stranding  Bilateral nonobstructing intrarenal calculi  The study was marked in [facility] for immediate notification  Workstation performed: [id number]         ** Please Note: Dragon 360 Dictation voice to text software was used in the creation of this document   **

## 2022-08-08 NOTE — OP NOTE
OPERATIVE REPORT     Name: Zahra Reed     MRN: [id number]  Date: 8/8/2022 Time: 10:24 AM     Location:       [facility]   Date of Operation:  8/8/2022   Service: Urology     Pre-op Diagnosis:  Left ureteral calculus    Post-op Diagnosis:  Same     Operation:     Cystoscopy  Left Retrograde Pyelogram   Left ureteral stent placement (6Fr x 26 cm JJ stent)  Fluoroscopic Guidance     Surgeon:  Rios Reis MD  Assistants:  None      Anesthesia:  General    Drains:  6Fr x 26 cm JJ stent  Estimated Blood Loss:  Minimal   Urine Output:  N/A   Specimens: Left renal pelvis urine for culture  Apparent Intraoperative Complications:  None   Patient Condition:  Stable   Disposition:  PACU  Antibiotic Prophylaxis:  Rocephin     Indications:     65 y o  male with a left UPJ stone, WBC 16, Cre 2 1 from BL of 1 4-1 5  Plan for left stent placement  Risks, benefits and alternatives to treatment were discussed with the patient who elected to proceed with the operation  Findings:    - normal urethra   - Prostate is surgically absent  - orthotopic ureteral orifices   - normal bladder mucosa   - Successful left stent placement    Operative Report:    The patient was identified, and the procedure verified  After induction of anesthesia the patient was prepped and draped in the dorsolithotomy position  ? A  film was obtained  A 22 5 Fr rigid cystoscope was passed per urethra to the bladder revealing the above findings  A SoloPlus wire was used to intubate the left ureteral orifice and was passed up to the renal pelvis under fluoroscopic guidance  A 5Fr open ended ureteral catheter was inserted over the wire and passed up to the renal pelvis  ? The wire was removed  A urine sample was collected for culture and a retrograde pyelogram obtained  ? The wire was replaced and passed up to the kidney under fluoroscopic guidance  ? A 6Fr ?x 26cm? JJ stent was placed in standard retrograde fashion under fluoroscopic guidance  ? Upon removal of the wire an adequate curl was noted in the renal pelvis and the bladder on fluoroscopy  The bladder was emptied  The patient was awoken from anesthesia?and transferred to PACU in satisfactory condition  ? Left retrograde pyelogram interpretation: normal course of the ureter, faint filling defect in the renal pelvis with likely represents the known stone, smooth contours of the renal pelvis, sharp calyces  I was present for the entire procedure       Plan:    - Licha Friar with discharge if patient remains afebrile  - F/u urine culture  - Flomax, Pyridium, oxybutynin p r n   For stent discomfort  - a plan for definitive stone treatment on Thursday 8/11/22 if urine culture returns negative    SIGNATURE: Cece Galeano MD  DATE: 8/8/2022  TIME: 10:24 AM

## 2022-08-08 NOTE — PROGRESS NOTES
[facility] Post Admit Check:     Date of visit: 08/08/22    The patient was admitted earlier to the [facility]  Please see initial intake history and physical for detailed admission history  This is a supplemental update following a post admit checkup  Subjective: Patient is s/p cystoscopy  Some abdominal discomfort, but not severe  Exam: Gen: NAD, Heart: RRR, Lungs: CTA, Abd: non-tender    Assessment and Plan:   · Nephrolithiasis - s/p cystoscopy and left ureteral stent placement  · JAYNE - monitor with IVF, ACE on hold  · Hyperkalemia - re check K  · Leukocytosis - follow up urine culture  On empiric Ceftriaxone  · Anticipate discharge tomorrow       Wayne Pierre PA-C

## 2022-08-08 NOTE — ASSESSMENT & PLAN NOTE
Patient comes in with chills but no kelly fevers; white count elevated 16 from a baseline of 10  Blood cultures x2  Urine analysis with culture  Started Rocephin 1 g daily  Fluid hydration  Recheck CBC  Procalcitonin and lactic acid  If elevated lactic acid; would need to trend every 2 hours in lieu of sepsis protocol

## 2022-08-08 NOTE — ASSESSMENT & PLAN NOTE
Current creatinine 2 1 usually at 1 3 to 1 5; will place patient on intravenous fluid hydration  Monitor metabolic profile  Started JAYNE protocol  Zestril on hold

## 2022-08-08 NOTE — H&P (VIEW-ONLY)
Consult - Urology   [facility] Friday 1960, 65 y o  male MRN: [id number]    Unit/Bed#: OR POOL Encounter: [id number]      Assessment  Left 9mm proximal ureteral calculus with hydronephrosis  Leukocytosis  JAYNE  Elevated lactic acid    Plan  He is NPO  Analgesia with relief  Received iv ceftriaxone 1g    Urinalysis is bland but there is elevated lactic acid and wbc  He is afebrile no vomiting  So some concern for developing infection  To OR now for cystoscopy LEFT retrograde pyelogram ureteral stent insertion  Discussed need for a second stage ureteroscopy with lithotripsy after few weeks with the stent resolution of janye and any infection  Will collect intraoperative culture from renal pelvis today to help guide antibiotic treatment if needed  Written informed consent obtained by MD       Subjective: history of stones, pain came on yesterday, severe, associated with nausea and chills without fevers  Well known to our group, s/p RALP for prostate cancer 2018 with Dr Santos Jimenez with no evidence of disease since then  Multiple prior stones  One prior ureteroscopy and two prior ESWLs over the decade  Familiar with stent colic  Review of Systems   Constitutional: Positive for chills  Negative for activity change, appetite change and fever  Respiratory: Negative for cough and shortness of breath  Cardiovascular: Negative for chest pain  Gastrointestinal: Positive for nausea  Negative for abdominal pain, constipation, diarrhea and vomiting  Genitourinary: Positive for flank pain  Negative for decreased urine volume, difficulty urinating, dysuria, frequency, hematuria, penile pain, penile swelling, scrotal swelling and urgency  Musculoskeletal: Negative for back pain  Skin: Negative for rash and wound  Hematological: Does not bruise/bleed easily  Objective:  Vitals: Blood pressure 139/71, pulse 101, temperature 97 8 °F (36 6 °C), resp   rate 18, height 5' 5" (1 651 m), weight 122 kg (268 lb), SpO2 92 %  ,Body mass index is 44 6 kg/m²  Physical Exam  Vitals and nursing note reviewed  Constitutional:       Appearance: He is well-developed  HENT:      Head: Normocephalic and atraumatic  Cardiovascular:      Rate and Rhythm: Normal rate and regular rhythm  Heart sounds: Normal heart sounds  No murmur heard  Pulmonary:      Effort: Pulmonary effort is normal       Breath sounds: Normal breath sounds  Abdominal:      General: Bowel sounds are normal       Palpations: Abdomen is soft  Tenderness: There is left CVA tenderness  There is no right CVA tenderness  Musculoskeletal:         General: Normal range of motion  Right lower leg: No edema  Left lower leg: No edema  Skin:     General: Skin is warm and dry  Capillary Refill: Capillary refill takes less than 2 seconds  Coloration: Skin is not pale  Neurological:      General: No focal deficit present  Mental Status: He is alert and oriented to person, place, and time  Imaging:    CT renal stone study abdomen pelvis wo contrast [[id number]] Collected: 08/08/22 0439   Order Status: Completed Updated: 08/08/22 0451   Narrative:     CT ABDOMEN AND PELVIS WITHOUT IV CONTRAST - LOW DOSE RENAL STONE     INDICATION:   flank pain  COMPARISON:  None       TECHNIQUE:  Low radiation dose thin section CT examination of the abdomen and pelvis was performed without intravenous or oral contrast according to a protocol specifically designed to evaluate for urinary tract calculus   Axial, sagittal, and coronal 2D    reformatted images were created from the source data and submitted for interpretation   Evaluation for pathology in the abdomen and pelvis that is unrelated to urinary tract calculi is limited        Radiation dose length product (DLP) for this visit:  1500 mGy-cm    This examination, like all CT scans performed in the [facility], was performed utilizing techniques to minimize radiation dose exposure, including the use of iterative   reconstruction and automated exposure control  URINARY TRACT FINDINGS:     RIGHT KIDNEY AND URETER: There are nonobstructing intrarenal calculi measuring on the order of 5 mm   No hydronephrosis or hydroureter  LEFT KIDNEY AND URETER:  Moderate left-sided hydroureteronephrosis with a 9 mm obstructing stone in the proximal left ureter   Moderate left-sided perinephric stranding   Nonobstructing intrarenal calculi measuring up to 5 mm  URINARY BLADDER:  Unremarkable  ADDITIONAL FINDINGS:     LOWER CHEST:  No clinically significant abnormality identified in the visualized lower chest      SOLID VISCERA: Limited low radiation dose noncontrast CT evaluation demonstrates no clinically significant abnormality of the imaged portions of the liver, spleen, pancreas, or adrenal glands        GALLBLADDER/BILIARY TREE:  There are gallstone(s) within the gallbladder, without pericholecystic inflammatory changes   No biliary dilatation  STOMACH AND BOWEL:  Unremarkable  APPENDIX:  No findings to suggest appendicitis  ABDOMINOPELVIC CAVITY:  No ascites   No pneumoperitoneum   No lymphadenopathy  REPRODUCTIVE ORGANS:  Unremarkable for patient's age  ABDOMINAL WALL/INGUINAL REGIONS:  Unremarkable  OSSEOUS STRUCTURES:  No acute fracture or destructive osseous lesion  Impression:       Moderate left-sided hydroureteronephrosis with a 9 mm obstructing stone in the proximal left ureter   Moderate left-sided perinephric stranding  Bilateral nonobstructing intrarenal calculi  Imaging reviewed - both report and images personally reviewed  Labs:  Recent Labs     08/08/22  0416   WBC 16 98*     Recent Labs     08/08/22  0416   HGB 14 3       Recent Labs     08/08/22  0416   CREATININE 2 10*       Microbiology:  UA bland micro negative   No culture collected    History:  Social History     Socioeconomic History    Marital status: /Civil [employer]     Spouse name: None    Number of children: None    Years of education: None    Highest education level: None   Occupational History    None   Tobacco Use    Smoking status: Never Smoker    Smokeless tobacco: Never Used   Vaping Use    Vaping Use: Never used   Substance and Sexual Activity    Alcohol use: No    Drug use: No    Sexual activity: None   Other Topics Concern    None   Social History Narrative    Caffeine use     Social Determinants of Health     Financial Resource Strain: Not on file   Food Insecurity: Not on file   Transportation Needs: Not on file   Physical Activity: Not on file   Stress: Not on file   Social Connections: Not on file   Intimate Partner Violence: Not on file   Housing Stability: Not on file     Financial Resource Strain: Not on file   Food Insecurity: Not on file   Transportation Needs: Not on file   Physical Activity: Not on file   Stress: Not on file   Social Connections: Not on file   Intimate Partner Violence: Not on file   Housing Stability: Not on file      Diagnosis Date    Acute bronchitis     Acute low back pain     Acute low back pain     [id number] resolved    Acute respiratory infection     Cancer [facility] [address]     prostate    Cellulitis of scrotum     Cough     Diabetes mellitus ([facility] [address] [facility]     Herpes zoster     High cholesterol     Hyperkalemia     Hypertension     Kidney disease     Kidney stone     Low back pain     Lumbar radiculopathy     Rash     Retinopathy, central serous     Sleep apnea     [id number]    Trochanteric bursitis      Past Surgical History:   Procedure Laterality Date    ABDOMINAL ADHESION SURGERY N/A 6/13/2018    Procedure: LYSIS ADHESIONS;  Surgeon: Taylor Rosenthal MD;  [facility];  Service: Urology    LITHOTRIPSY      renal    ND LAP,PROSTATECTOMY,RADICAL,W/NERVE SPARE,INCL ROBOTIC N/A 6/13/2018    Procedure: PROSTATECTOMY RADICAL W ROBOTICS;  Surgeon: Taylor Rosenthal MD;  [facility];  Service: Urology    PROSTATE SURGERY  06/13/2018    SHOULDER SURGERY       Family History   Problem Relation Age of Onset    Other Father         cardiac disorder    Stroke Father     Diabetes Father         mellitus    Hypertension Father     Heart disease Father         cardiac disorder    Cancer Sister     Cancer Paternal Grandmother     Heart disease Family         cardiac disorder    Kidney disease Family        [place], [place]  Date: 8/8/2022 Time: 9:15 AM

## 2022-08-08 NOTE — CONSULTS
Consult - Urology   Concepcion Mu 1960, 65 y o  male MRN: [id number]    Unit/Bed#: OR POOL Encounter: [id number]      Assessment  Left 9mm proximal ureteral calculus with hydronephrosis  Leukocytosis  JAYNE  Elevated lactic acid    Plan  He is NPO  Analgesia with relief  Received iv ceftriaxone 1g    Urinalysis is bland but there is elevated lactic acid and wbc  He is afebrile no vomiting  So some concern for developing infection  To OR now for cystoscopy LEFT retrograde pyelogram ureteral stent insertion  Discussed need for a second stage ureteroscopy with lithotripsy after few weeks with the stent resolution of jayne and any infection  Will collect intraoperative culture from renal pelvis today to help guide antibiotic treatment if needed  Written informed consent obtained by MD       Subjective: history of stones, pain came on yesterday, severe, associated with nausea and chills without fevers  Well known to our group, s/p RALP for prostate cancer 2018 with Dr Shayna Merritt with no evidence of disease since then  Multiple prior stones  One prior ureteroscopy and two prior ESWLs over the decade  Familiar with stent colic  Review of Systems   Constitutional: Positive for chills  Negative for activity change, appetite change and fever  Respiratory: Negative for cough and shortness of breath  Cardiovascular: Negative for chest pain  Gastrointestinal: Positive for nausea  Negative for abdominal pain, constipation, diarrhea and vomiting  Genitourinary: Positive for flank pain  Negative for decreased urine volume, difficulty urinating, dysuria, frequency, hematuria, penile pain, penile swelling, scrotal swelling and urgency  Musculoskeletal: Negative for back pain  Skin: Negative for rash and wound  Hematological: Does not bruise/bleed easily  Objective:  Vitals: Blood pressure 139/71, pulse 101, temperature 97 8 °F (36 6 °C), resp   rate 18, height 5' 5" (1 651 m), weight 122 kg (268 lb), SpO2 92 %  ,Body mass index is 44 6 kg/m²  Physical Exam  Vitals and nursing note reviewed  Constitutional:       Appearance: He is well-developed  HENT:      Head: Normocephalic and atraumatic  Cardiovascular:      Rate and Rhythm: Normal rate and regular rhythm  Heart sounds: Normal heart sounds  No murmur heard  Pulmonary:      Effort: Pulmonary effort is normal       Breath sounds: Normal breath sounds  Abdominal:      General: Bowel sounds are normal       Palpations: Abdomen is soft  Tenderness: There is left CVA tenderness  There is no right CVA tenderness  Musculoskeletal:         General: Normal range of motion  Right lower leg: No edema  Left lower leg: No edema  Skin:     General: Skin is warm and dry  Capillary Refill: Capillary refill takes less than 2 seconds  Coloration: Skin is not pale  Neurological:      General: No focal deficit present  Mental Status: He is alert and oriented to person, place, and time  Imaging:    CT renal stone study abdomen pelvis wo contrast [[id number]] Collected: 08/08/22 0439   Order Status: Completed Updated: 08/08/22 0451   Narrative:     CT ABDOMEN AND PELVIS WITHOUT IV CONTRAST - LOW DOSE RENAL STONE     INDICATION:   flank pain  COMPARISON:  None       TECHNIQUE:  Low radiation dose thin section CT examination of the abdomen and pelvis was performed without intravenous or oral contrast according to a protocol specifically designed to evaluate for urinary tract calculus   Axial, sagittal, and coronal 2D    reformatted images were created from the source data and submitted for interpretation   Evaluation for pathology in the abdomen and pelvis that is unrelated to urinary tract calculi is limited        Radiation dose length product (DLP) for this visit:  1500 mGy-cm    This examination, like all CT scans performed in the [facility], was performed utilizing techniques to minimize radiation dose exposure, including the use of iterative   reconstruction and automated exposure control  URINARY TRACT FINDINGS:     RIGHT KIDNEY AND URETER: There are nonobstructing intrarenal calculi measuring on the order of 5 mm   No hydronephrosis or hydroureter  LEFT KIDNEY AND URETER:  Moderate left-sided hydroureteronephrosis with a 9 mm obstructing stone in the proximal left ureter   Moderate left-sided perinephric stranding   Nonobstructing intrarenal calculi measuring up to 5 mm  URINARY BLADDER:  Unremarkable  ADDITIONAL FINDINGS:     LOWER CHEST:  No clinically significant abnormality identified in the visualized lower chest      SOLID VISCERA: Limited low radiation dose noncontrast CT evaluation demonstrates no clinically significant abnormality of the imaged portions of the liver, spleen, pancreas, or adrenal glands        GALLBLADDER/BILIARY TREE:  There are gallstone(s) within the gallbladder, without pericholecystic inflammatory changes   No biliary dilatation  STOMACH AND BOWEL:  Unremarkable  APPENDIX:  No findings to suggest appendicitis  ABDOMINOPELVIC CAVITY:  No ascites   No pneumoperitoneum   No lymphadenopathy  REPRODUCTIVE ORGANS:  Unremarkable for patient's age  ABDOMINAL WALL/INGUINAL REGIONS:  Unremarkable  OSSEOUS STRUCTURES:  No acute fracture or destructive osseous lesion  Impression:       Moderate left-sided hydroureteronephrosis with a 9 mm obstructing stone in the proximal left ureter   Moderate left-sided perinephric stranding  Bilateral nonobstructing intrarenal calculi  Imaging reviewed - both report and images personally reviewed  Labs:  Recent Labs     08/08/22  0416   WBC 16 98*     Recent Labs     08/08/22  0416   HGB 14 3       Recent Labs     08/08/22  0416   CREATININE 2 10*       Microbiology:  UA bland micro negative   No culture collected    History:  Social History     Socioeconomic History    Marital status: /Civil [employer]     Spouse name: None    Number of children: None    Years of education: None    Highest education level: None   Occupational History    None   Tobacco Use    Smoking status: Never Smoker    Smokeless tobacco: Never Used   Vaping Use    Vaping Use: Never used   Substance and Sexual Activity    Alcohol use: No    Drug use: No    Sexual activity: None   Other Topics Concern    None   Social History Narrative    Caffeine use     Social Determinants of Health     Financial Resource Strain: Not on file   Food Insecurity: Not on file   Transportation Needs: Not on file   Physical Activity: Not on file   Stress: Not on file   Social Connections: Not on file   Intimate Partner Violence: Not on file   Housing Stability: Not on file     Financial Resource Strain: Not on file   Food Insecurity: Not on file   Transportation Needs: Not on file   Physical Activity: Not on file   Stress: Not on file   Social Connections: Not on file   Intimate Partner Violence: Not on file   Housing Stability: Not on file      Diagnosis Date    Acute bronchitis     Acute low back pain     Acute low back pain     [id number] resolved    Acute respiratory infection     Cancer [facility] [address]     prostate    Cellulitis of scrotum     Cough     Diabetes mellitus ([facility] [address]     Herpes zoster     High cholesterol     Hyperkalemia     Hypertension     Kidney disease     Kidney stone     Low back pain     Lumbar radiculopathy     Rash     Retinopathy, central serous     Sleep apnea     [id number]    Trochanteric bursitis      Past Surgical History:   Procedure Laterality Date    ABDOMINAL ADHESION SURGERY N/A 6/13/2018    Procedure: LYSIS ADHESIONS;  Surgeon: Lisa Quiroz MD;  [facility];  Service: Urology    LITHOTRIPSY      renal    SC LAP,PROSTATECTOMY,RADICAL,W/NERVE SPARE,INCL ROBOTIC N/A 6/13/2018    Procedure: PROSTATECTOMY RADICAL W ROBOTICS;  Surgeon: Lisa Quiroz MD;  [facility];  Service: Urology    PROSTATE SURGERY  06/13/2018    SHOULDER SURGERY       Family History   Problem Relation Age of Onset    Other Father         cardiac disorder    Stroke Father     Diabetes Father         mellitus    Hypertension Father     Heart disease Father         cardiac disorder    Cancer Sister     Cancer Paternal Grandmother     Heart disease Family         cardiac disorder    Kidney disease Family        [place]  Date: 8/8/2022 Time: 9:15 AM

## 2022-08-08 NOTE — ASSESSMENT & PLAN NOTE
Zestril 40 mg daily but is put on hold due to patient's elevated creatinine  Coreg 25 mg twice daily  Amlodipine 5 mg daily  Started on JAYNE protocol

## 2022-08-08 NOTE — ANESTHESIA PREPROCEDURE EVALUATION
Procedure:  CYSTOSCOPY RETROGRADE PYELOGRAM WITH INSERTION STENT URETERAL (Left Bladder)    Relevant Problems   CARDIO   (+) Benign essential hypertension   (+) Hyperlipidemia      ENDO   (+) Type 2 diabetes mellitus with stage 3a chronic kidney disease, with long-term current use of insulin (HCC)      /RENAL   (+) JAYNE (acute kidney injury) ([facility] [address] )   (+) Prostate cancer (HCC)      Pyelonephritis, left hydronephrosis  Physical Exam    Airway    Mallampati score: II  TM Distance: >3 FB  Neck ROM: full     Dental   No notable dental hx     Cardiovascular  Cardiovascular exam normal    Pulmonary  Pulmonary exam normal     Other Findings      Nl stress 2018    EKG SR    Anesthesia Plan  ASA Score- 3     Anesthesia Type- general with ASA Monitors  Additional Monitors:   Airway Plan: LMA  Plan Factors-Exercise tolerance (METS): >4 METS  Chart reviewed  EKG reviewed  Imaging results reviewed  Existing labs reviewed  Patient summary reviewed  Induction- intravenous  Postoperative Plan- Plan for postoperative opioid use  Informed Consent- Anesthetic plan and risks discussed with patient  I personally reviewed this patient with the CRNA  Discussed and agreed on the Anesthesia Plan with the CRNA  Herminio Barroso

## 2022-08-08 NOTE — APP STUDENT NOTE
ANUPAM STUDENT  Inpatient Progress Note for TRAINING ONLY  Not Part of Legal Medical Record       Progress Note - Gali Reyes 65 y o  male MRN: [id number]    Unit/Bed#: -01 Encounter: [id number]      Assessment/Plan:  Left Nephrolithiasis: causing hydroureteronephrosis  - Pt underwent cystoscopy, left retrograde pyelogram, and left ureteral stent placement  - Urology was consulted and completed above  - IVF  - NPO  - Analgesia for pain control  - U/A & Urine culture  - Continue Tamsulosin     B/L nonobstructive intrarenal calculi:  - Continue to monitor     Leukocytosis  - Continue IV Ceftriaxone 1g  - Monitor U/A, CBC, Lactic Acid, Procalcitonin    Pyelonephritis  - Continue Ceftriaxone  - Continue to monitor CBC, procalcitonin, lactic acid    Acute Kidney Injury  - Last Creatinine 2 10  - Surgical procedure of kidney stone for post renal etiology    Chronic Kidney Disease  - Last Creatinine: 2 10     Hyperkalemia: likely 2/2 to CKD  - Check EKG  - Bicarb  - Monitor electrolytes via CMP    Benign Essential Hypertension  - Continue Amlodipine 5 mg PO daily  - Continue Carvedilol 25 mg PO daily     Type 2 Diabetes Mellitus  - Continue to monitor blood glucose levels before meals and at bedtime  - Continue insulin lispro 100 units/mL subcutaneous injection 2-12 units    Hyperlipidemia:  - Monitor CMP  - Continue statin therapy    Discharge pending success of surgery on 8/11/2022  Subjective:   Pt is a 64 yo male with a hx of nephrolithiasis tx by lithotripsy, HD1, who was admitted last night for evaluation and treatment of a 9 mm nephrolithiasis as evidenced by CT  Pt underwent cystoscopy, left retrograde pyelogram and left ureteral stent placement  Pt is resting comfortably and reports that the nausea has improved with ondansetron  Pt states that the pain has been controlled and is awaiting surgery for 8/11/2022  Pt states that there is mild discomfort with stent placement   Pt denies fever     Objective:     Vitals: Blood pressure 136/85, pulse 90, temperature 97 7 °F (36 5 °C), resp  rate 12, height 5' 5" (1 651 m), weight 122 kg (268 lb), SpO2 93 %  ,Body mass index is 44 6 kg/m²  Intake/Output Summary (Last 24 hours) at 8/8/2022 1155  Last data filed at 8/8/2022 0950  Gross per 24 hour   Intake 1550 ml   Output --   Net 1550 ml       Physical Exam: General appearance: alert and oriented, in no acute distress  Lungs: clear to auscultation bilaterally  Abdomen: abnormal findings:  distended     Invasive Devices  Report    Peripheral Intravenous Line  Duration           Peripheral IV 08/08/22 Dorsal (posterior); Left Forearm <1 day                Lab, Imaging and other studies: I have personally reviewed pertinent reports      VTE Pharmacologic Prophylaxis: Heparin  VTE Mechanical Prophylaxis: sequential compression device ordered

## 2022-08-08 NOTE — ASSESSMENT & PLAN NOTE
Lab Results   Component Value Date    HGBA1C 7 5 (H) 05/12/2022   Continue Lantus 80 units at night  Insulin sliding scale with Accu-Cheks per protocol  No results for input(s): POCGLU in the last 72 hours      Blood Sugar Average: Last 72 hrs:

## 2022-08-08 NOTE — ANESTHESIA POSTPROCEDURE EVALUATION
Post-Op Assessment Note    CV Status:  Stable  Pain Score: 0    Pain management: adequate     Mental Status:  Alert and awake   Hydration Status:  Euvolemic   PONV Controlled:  Controlled   Airway Patency:  Patent      Post Op Vitals Reviewed: Yes      Staff: CRNA         No complications documented      /74 (08/08/22 1002)    Temp      Pulse 100 (08/08/22 1002)   Resp      SpO2 94 % (08/08/22 1002)

## 2022-08-09 ENCOUNTER — TELEPHONE (OUTPATIENT)
Dept: OTHER | Facility: HOSPITAL | Age: 62
End: 2022-08-09

## 2022-08-09 VITALS
OXYGEN SATURATION: 98 % | HEART RATE: 94 BPM | BODY MASS INDEX: 45.66 KG/M2 | HEIGHT: 65 IN | RESPIRATION RATE: 18 BRPM | SYSTOLIC BLOOD PRESSURE: 152 MMHG | WEIGHT: 274.03 LBS | DIASTOLIC BLOOD PRESSURE: 90 MMHG | TEMPERATURE: 98.1 F

## 2022-08-09 LAB
ALBUMIN SERPL BCP-MCNC: 3.2 G/DL (ref 3.5–5)
ALP SERPL-CCNC: 63 U/L (ref 46–116)
ALT SERPL W P-5'-P-CCNC: 36 U/L (ref 12–78)
ANION GAP SERPL CALCULATED.3IONS-SCNC: 6 MMOL/L (ref 4–13)
AST SERPL W P-5'-P-CCNC: 27 U/L (ref 5–45)
BASOPHILS # BLD AUTO: 0.02 THOUSANDS/ΜL (ref 0–0.1)
BASOPHILS NFR BLD AUTO: 0 % (ref 0–1)
BILIRUB SERPL-MCNC: 0.29 MG/DL (ref 0.2–1)
BUN SERPL-MCNC: 30 MG/DL (ref 5–25)
CALCIUM ALBUM COR SERPL-MCNC: 9 MG/DL (ref 8.3–10.1)
CALCIUM SERPL-MCNC: 8.4 MG/DL (ref 8.3–10.1)
CHLORIDE SERPL-SCNC: 110 MMOL/L (ref 96–108)
CO2 SERPL-SCNC: 24 MMOL/L (ref 21–32)
CREAT SERPL-MCNC: 1.69 MG/DL (ref 0.6–1.3)
EOSINOPHIL # BLD AUTO: 0.01 THOUSAND/ΜL (ref 0–0.61)
EOSINOPHIL NFR BLD AUTO: 0 % (ref 0–6)
ERYTHROCYTE [DISTWIDTH] IN BLOOD BY AUTOMATED COUNT: 12.8 % (ref 11.6–15.1)
GFR SERPL CREATININE-BSD FRML MDRD: 42 ML/MIN/1.73SQ M
GLUCOSE SERPL-MCNC: 125 MG/DL (ref 65–140)
GLUCOSE SERPL-MCNC: 128 MG/DL (ref 65–140)
GLUCOSE SERPL-MCNC: 163 MG/DL (ref 65–140)
GLUCOSE SERPL-MCNC: 177 MG/DL (ref 65–140)
HCT VFR BLD AUTO: 39.4 % (ref 36.5–49.3)
HGB BLD-MCNC: 12.3 G/DL (ref 12–17)
IMM GRANULOCYTES # BLD AUTO: 0.06 THOUSAND/UL (ref 0–0.2)
IMM GRANULOCYTES NFR BLD AUTO: 0 % (ref 0–2)
LYMPHOCYTES # BLD AUTO: 1.94 THOUSANDS/ΜL (ref 0.6–4.47)
LYMPHOCYTES NFR BLD AUTO: 13 % (ref 14–44)
MCH RBC QN AUTO: 28.3 PG (ref 26.8–34.3)
MCHC RBC AUTO-ENTMCNC: 31.2 G/DL (ref 31.4–37.4)
MCV RBC AUTO: 91 FL (ref 82–98)
MONOCYTES # BLD AUTO: 1.26 THOUSAND/ΜL (ref 0.17–1.22)
MONOCYTES NFR BLD AUTO: 9 % (ref 4–12)
NEUTROPHILS # BLD AUTO: 11.48 THOUSANDS/ΜL (ref 1.85–7.62)
NEUTS SEG NFR BLD AUTO: 78 % (ref 43–75)
NRBC BLD AUTO-RTO: 0 /100 WBCS
PLATELET # BLD AUTO: 281 THOUSANDS/UL (ref 149–390)
PMV BLD AUTO: 8.9 FL (ref 8.9–12.7)
POTASSIUM SERPL-SCNC: 4.8 MMOL/L (ref 3.5–5.3)
PROT SERPL-MCNC: 6.4 G/DL (ref 6.4–8.4)
RBC # BLD AUTO: 4.35 MILLION/UL (ref 3.88–5.62)
SODIUM SERPL-SCNC: 140 MMOL/L (ref 135–147)
WBC # BLD AUTO: 14.77 THOUSAND/UL (ref 4.31–10.16)

## 2022-08-09 PROCEDURE — 80053 COMPREHEN METABOLIC PANEL: CPT | Performed by: INTERNAL MEDICINE

## 2022-08-09 PROCEDURE — 85025 COMPLETE CBC W/AUTO DIFF WBC: CPT | Performed by: INTERNAL MEDICINE

## 2022-08-09 PROCEDURE — 82948 REAGENT STRIP/BLOOD GLUCOSE: CPT

## 2022-08-09 PROCEDURE — 99239 HOSP IP/OBS DSCHRG MGMT >30: CPT | Performed by: PHYSICIAN ASSISTANT

## 2022-08-09 PROCEDURE — 99232 SBSQ HOSP IP/OBS MODERATE 35: CPT | Performed by: PHYSICIAN ASSISTANT

## 2022-08-09 RX ORDER — OXYBUTYNIN CHLORIDE 5 MG/1
5 TABLET ORAL 3 TIMES DAILY PRN
Qty: 15 TABLET | Refills: 0 | Status: SHIPPED | OUTPATIENT
Start: 2022-08-09 | End: 2022-08-23

## 2022-08-09 RX ORDER — CEPHALEXIN 500 MG/1
500 CAPSULE ORAL EVERY 6 HOURS SCHEDULED
Qty: 28 CAPSULE | Refills: 0 | OUTPATIENT
Start: 2022-08-09 | End: 2022-08-16

## 2022-08-09 RX ORDER — CEPHALEXIN 500 MG/1
500 CAPSULE ORAL EVERY 6 HOURS SCHEDULED
Qty: 8 CAPSULE | Refills: 0 | Status: SHIPPED | OUTPATIENT
Start: 2022-08-10 | End: 2022-08-12

## 2022-08-09 RX ORDER — OXYCODONE HYDROCHLORIDE 5 MG/1
5 TABLET ORAL EVERY 6 HOURS PRN
Qty: 10 TABLET | Refills: 0 | Status: SHIPPED | OUTPATIENT
Start: 2022-08-09 | End: 2022-08-23

## 2022-08-09 RX ADMIN — CARVEDILOL 25 MG: 25 TABLET, FILM COATED ORAL at 08:45

## 2022-08-09 RX ADMIN — AMLODIPINE BESYLATE 5 MG: 5 TABLET ORAL at 08:45

## 2022-08-09 RX ADMIN — SODIUM CHLORIDE 125 ML/HR: 0.9 INJECTION, SOLUTION INTRAVENOUS at 00:26

## 2022-08-09 RX ADMIN — HEPARIN SODIUM 5000 UNITS: 5000 INJECTION INTRAVENOUS; SUBCUTANEOUS at 06:32

## 2022-08-09 RX ADMIN — CEFTRIAXONE 1000 MG: 10 INJECTION, POWDER, FOR SOLUTION INTRAVENOUS at 06:32

## 2022-08-09 RX ADMIN — CHOLECALCIFEROL TAB 10 MCG (400 UNIT) 400 UNITS: 10 TAB at 08:46

## 2022-08-09 RX ADMIN — Medication 1 TABLET: at 08:45

## 2022-08-09 RX ADMIN — ASPIRIN 81 MG CHEWABLE TABLET 81 MG: 81 TABLET CHEWABLE at 08:45

## 2022-08-09 NOTE — UTILIZATION REVIEW
Initial Clinical Review    Admission: Date/Time/Statement:   Admission Orders (From admission, onward)     Ordered        08/08/22 0529  Inpatient Admission  Once                      Orders Placed This Encounter   Procedures    Inpatient Admission     Standing Status:   Standing     Number of Occurrences:   1     Order Specific Question:   Level of Care     Answer:   Med Surg [16]     Order Specific Question:   Estimated length of stay     Answer:   More than 2 Midnights     Order Specific Question:   Certification     Answer:   I certify that inpatient services are medically necessary for this patient for a duration of greater than two midnights  See H&P and MD Progress Notes for additional information about the patient's course of treatment  ED Arrival Information     Expected   -    Arrival   8/8/2022 03:08    Acuity   Urgent            Means of arrival   Walk-In    Escorted by   Family Member    Service   Hospitalist    Admission type   Urgent            Arrival complaint   Flank Pain           Chief Complaint   Patient presents with    Flank Pain     Pt presents to the ED with c/o L flank pain that started around 2000  Pt states that the pain wraps around to his back  Took 0 4 flomax pta  Initial Presentation: 65 y o  male to ED presents with left-sided discomfort and nausea, similar to prior episode of kidney stones  In ED, CT abd showed presence of 9 mm stone at the proximal left ureter causing moderate left-sided hydroureteronephrosis with perinephric stranding  Elevated Wbc  PMH for Multiple episodes of Nephrolithiasis, follows with Urology  MD, HTN, HLD, Prostate cancer, T2DM and Obesity  Admit Inpatient level of care for Ureterolithiasis, Pyelonephritis, JAYNE and Hyperkalemia  NPO  IVFs  Zofran for nausea  Urology consult  Start Iv antibiotics after bld cultures x2 obtained  Recheck metabolic profile with CBC tomorrow 8/9  UA with urine culture  IVFs  Procalcitonin and lactic acid, trend  Creat 2 1 on admit, baseline of 1 3 to 1 5  Zestril on hold  Recheck K     8/8  Urology cons; Left 9mm proximal ureteral calculus with hydronephrosis, Leukocytosis, JAYNE and Elevated lactic acid  NPO  Continues on Iv antibiotics and IVFs  Pain control  Plan for OR     8/8 OR - S/p Cystoscopy  Left Retrograde Pyelogram   Left ureteral stent placement (6Fr x 26 cm JJ stent)  Fluoroscopic Guidance       Date: 8/9   Day 2:   POD #1  Planned for definitive stone treatment later this wk  Restart Zestril later this wk  Intra op urine culture negative, but patient already  received antibiotics  Leukocytosis tending down  ED Triage Vitals   Temperature Pulse Respirations Blood Pressure SpO2   08/08/22 0736 08/08/22 0407 08/08/22 0407 08/08/22 0407 08/08/22 0407   97 8 °F (36 6 °C) 102 18 142/80 94 %      Temp src Heart Rate Source Patient Position - Orthostatic VS BP Location FiO2 (%)   -- 08/08/22 0407 08/08/22 0407 08/08/22 0407 --    Monitor Lying Right arm       Pain Score       08/08/22 0407       8          Wt Readings from Last 1 Encounters:   08/09/22 124 kg (274 lb 0 5 oz)     Additional Vital Signs:   08/09/22 0731 98 1 °F (36 7 °C) 94 -- 152/90 111 98 % -- --   08/08/22 22:46:36 98 °F (36 7 °C) 94 -- 157/88 111 92 % -- --   08/08/22 15:25:14 98 3 °F (36 8 °C) 102 18 153/79 104 92 % -- --   08/08/22 10:52:02 97 7 °F (36 5 °C) 90 -- 136/85 102 93 % -- --   08/08/22 1015 -- 102 12 137/103   Abnormal  126 94 % None (Room air) --   08/08/22 1002 -- 100 -- 118/74 -- 94 % -- --   08/08/22 1000 97 °F (36 1 °C) Abnormal  104 12 118/74 -- 94 % None (Room air)      Pertinent Labs/Diagnostic Test Results:   FL retrograde pyelogram   Final Result by Juliana Orta DO (08/09 8280)      Fluoroscopic guidance provided for left retrograde pyelogram  Please see procedure report for further details              CT renal stone study abdomen pelvis wo contrast   Final Result by Tiffani Strong DO (08/08 0411)      Moderate left-sided hydroureteronephrosis with a 9 mm obstructing stone in the proximal left ureter  Moderate left-sided perinephric stranding  Bilateral nonobstructing intrarenal calculi                8/8  EKG - Sinus tachycardia      Results from last 7 days   Lab Units 08/09/22  0500 08/08/22  1207 08/08/22  0416   WBC Thousand/uL 14 77*  --  16 98*   HEMOGLOBIN g/dL 12 3  --  14 3   HEMATOCRIT % 39 4  --  46 3   PLATELETS Thousands/uL 281 265 302   NEUTROS ABS Thousands/µL 11 48*  --  14 70*         Results from last 7 days   Lab Units 08/09/22  0500 08/08/22  2124 08/08/22  1357 08/08/22  0416   SODIUM mmol/L 140  --   --  136   POTASSIUM mmol/L 4 8 5 2 6 1* 5 8*   CHLORIDE mmol/L 110*  --   --  108   CO2 mmol/L 24  --   --  22   ANION GAP mmol/L 6  --   --  6   BUN mg/dL 30*  --   --  35*   CREATININE mg/dL 1 69*  --   --  2 10*   EGFR ml/min/1 73sq m 42  --   --  32   CALCIUM mg/dL 8 4  --   --  9 0     Results from last 7 days   Lab Units 08/09/22  0500 08/08/22  0416   AST U/L 27 31   ALT U/L 36 49   ALK PHOS U/L 63 81   TOTAL PROTEIN g/dL 6 4 7 4   ALBUMIN g/dL 3 2* 3 7   TOTAL BILIRUBIN mg/dL 0 29 0 35     Results from last 7 days   Lab Units 08/09/22  1039 08/09/22  0554 08/09/22  0145 08/08/22  1555 08/08/22  1005 08/08/22  0755   POC GLUCOSE mg/dl 125 128 177* 278* 196* 229*     Results from last 7 days   Lab Units 08/09/22  0500 08/08/22  0416   GLUCOSE RANDOM mg/dL 163* 288*             Results from last 7 days   Lab Units 08/08/22  0559   PROCALCITONIN ng/ml 0 23     Results from last 7 days   Lab Units 08/08/22  1207 08/08/22  0559   LACTIC ACID mmol/L 2 1* 2 9*       Results from last 7 days   Lab Units 08/08/22  0541 08/08/22  0539   CLARITY UA  Clear Clear   COLOR UA  Light Yellow Yellow   SPEC GRAV UA  1 017 1 025   PH UA  5 0 5 5   GLUCOSE UA mg/dl >=1000 (1%)* 500 (1/2%)*   KETONES UA mg/dl Negative Negative   BLOOD UA  Negative Trace*   PROTEIN UA mg/dl Trace* 30 (1+)*   NITRITE UA  Negative Negative   BILIRUBIN UA  Negative Negative   UROBILINOGEN UA E U /dl  --  0 2   UROBILINOGEN UA (BE) mg/dl <2 0  --    LEUKOCYTES UA  Elevated glucose may cause decreased leukocyte values  See urine microscopic for [facility] result/* Negative   WBC UA /hpf None Seen  --    RBC UA /hpf None Seen  --    BACTERIA UA /hpf None Seen  --    EPITHELIAL CELLS WET PREP /hpf None Seen  --          Results from last 7 days   Lab Units 08/08/22  0944 08/08/22  0559   BLOOD CULTURE   --  No Growth at 24 hrs  No Growth at 24 hrs     URINE CULTURE  No Growth <100 cfu/mL  --        ED Treatment:   Medication Administration from 08/08/2022 0308 to 08/08/2022 0732       Date/Time Order Dose Route Action     08/08/2022 0437 HYDROmorphone (DILAUDID) injection 1 mg 1 mg Intravenous Given     08/08/2022 0440 lactated ringers bolus 1,000 mL 1,000 mL Intravenous New Bag     08/08/2022 0716 multi-electrolyte (PLASMALYTE-A/ISOLYTE-S PH 7 4) IV solution 125 mL/hr Intravenous New Bag     08/08/2022 0545 ondansetron (ZOFRAN) injection 4 mg 4 mg Intravenous Given     08/08/2022 5367 heparin (porcine) subcutaneous injection 5,000 Units 5,000 Units Subcutaneous Given     08/08/2022 0603 ceftriaxone (ROCEPHIN) 1 g/50 mL in dextrose IVPB 1,000 mg Intravenous New Bag     08/08/2022 0708 HYDROmorphone (DILAUDID) injection 0 5 mg 0 5 mg Intravenous Given     08/08/2022 3394 oxyCODONE (ROXICODONE) immediate release tablet 10 mg 10 mg Oral Given        Past Medical History:   Diagnosis Date    Acute bronchitis     Acute low back pain     Acute low back pain     [id number] resolved    Acute respiratory infection     Cancer ([facility] [address] )     prostate    Cellulitis of scrotum     Cough     Diabetes mellitus ([facility] [address] )     Herpes zoster     High cholesterol     Hyperkalemia     Hypertension     Kidney disease     Kidney stone     Low back pain     Lumbar radiculopathy     Rash     Retinopathy, central serous     Sleep apnea     [id number]    Trochanteric bursitis      Present on Admission:   JAYNE [facility]acute kidney injury) [facility] [address] [facility]   Benign essential hypertension   Hyperlipidemia   Ureterolithiasis      Admitting Diagnosis: Kidney stones [N20 0]  Pyelonephritis [N12]  Flank pain [R10 9]  Left ureteral calculus [N20 1]  Age/Sex: 65 y o  male     Admission Orders:  Scheduled Medications:  amLODIPine, 5 mg, Oral, Daily  aspirin, 81 mg, Oral, Daily  atorvastatin, 80 mg, Oral, Daily  carvedilol, 25 mg, Oral, BID With Meals  cefTRIAXone, 1,000 mg, Intravenous, Q24H  cholecalciferol, 400 Units, Oral, Daily  heparin (porcine), 5,000 Units, Subcutaneous, Q8H ELISEO  insulin glargine, 80 Units, Subcutaneous, HS  insulin lispro, 2-12 Units, Subcutaneous, Q6H [facility]  multivitamin-minerals, 1 tablet, Oral, Daily  tamsulosin, 0 4 mg, Oral, Daily With Dinner      Continuous IV Infusions:  sodium chloride, 125 mL/hr, Intravenous, Continuous      PRN Meds:  acetaminophen, 650 mg, Oral, Q6H PRN  albuterol, 2 puff, Inhalation, Q4H PRN  aluminum-magnesium hydroxide-simethicone, 30 mL, Oral, Q6H PRN  docusate sodium, 100 mg, Oral, BID PRN  HYDROmorphone, 0 5 mg, Intravenous, Q4H PRN  ondansetron, 4 mg, Intravenous, Q6H PRN  oxyCODONE, 10 mg, Oral, Q4H PRN 8/8 x1  oxyCODONE, 5 mg, Oral, Q4H PRN      Urine culture  Bld culture x2  IP CONSULT TO UROLOGY  IP CONSULT TO CASE MANAGEMENT    Network Utilization Review Department  ATTENTION: Please call with any questions or concerns to [phone] and carefully listen to the prompts so that you are directed to the right person  All voicemails are confidential   Jimena Rainey all requests for admission clinical reviews, approved or denied determinations and any other requests to dedicated fax number below belonging to the campus where the patient is receiving treatment   List of dedicated fax numbers for the Facilities:  FACILITY NAME UR FAX NUMBER   ADMISSION DENIALS (Administrative/Medical Necessity) [phone]   [address] (Maternity/NICU/Pediatrics) 261 [facility] [address] [place] [address]  [phone]   [address] [id number] [place] [address] [address]etha [address] [facility] [phone]

## 2022-08-09 NOTE — TELEPHONE ENCOUNTER
Patient currently underwent ureteral stent insertion for possible septic stone  Urine culture and blood cultures returning back negative  Patient has an additional culture currently pending from operating room  If this urine culture comes back negative, per Dr Baldo Jacinto note will plan for ureteroscopy on outpatient basis on Thursday  Please tagged patient's chart and contact myself or Dr Baldo Jacinto  If cultures are negative so we may add him on outpatient basis      Thank you

## 2022-08-09 NOTE — ASSESSMENT & PLAN NOTE
Presented with creatinine 2 1 usually at 1 3 to 1 5  Secondary to obstructive nephropathy   improved status post cystoscopy and with IV fluids  Zestril on hold -  Will have patient restart later this week     avoid NSAIDs

## 2022-08-09 NOTE — ASSESSMENT & PLAN NOTE
Urology consultation appreciated   status post cystoscopy with left ureteral stent placement   planned for definitive stone treatment later this week  Rocephin 1 g starting now after 2 blood cultures -  UA unremarkable and intraop urine culture negative  Given presentation with chills, leukocytosis, and stranding on CT scan, will treat for 2 more days with oral Keflex   patient currently afebrile and nontoxic-appearing

## 2022-08-09 NOTE — APP STUDENT NOTE
ANUPAM STUDENT  Inpatient Progress Note for TRAINING ONLY  Not Part of Legal Medical Record       Progress Note - Bekah Ellis 65 y o  male MRN: [id number]    Unit/Bed#: [place] Adelfo-01 Encounter: [id number]      Assessment:  Left Nephrolithiasis: causing hydroureteronephrosis  - Pt underwent cystoscopy, left retrograde pyelogram, and left ureteral stent placement yesterday AM  - Urology was consulted and completed above  - Pt reports a mild discomfort/burning that he associates with stent placement  - IVF  - Analgesia for pain control  - Urine culture resulted: shows no growth  - Continue Tamsulosin   - Surgery scheduled for 08/11/22     B/L nonobstructive intrarenal calculi:  - Continue to monitor   - Surgery scheduled for 08/11/22     Leukocytosis  - Improving since admission  - Continue empiric Ceftriaxone 1g  - Monitor U/A, CBC, Lactic Acid, Procalcitonin  - Urine culture shows no growth     Acute Kidney Injury  - Last Creatinine 1 69  - Surgical procedure of kidney stone for post renal etiology scheduled for 08/11/22     Chronic Kidney Disease  - Last Creatinine: 1 69     Hyperkalemia: likely 2/2 to CKD or JAYNE  - Check EKG  - Monitor electrolytes via CMP  - Improved upon administration of Kayexalate last PM  - Potassium now 4 8     Benign Essential Hypertension  - Continue Amlodipine 5 mg PO daily  - Continue Carvedilol 25 mg PO daily      Type 2 Diabetes Mellitus  - Continue to monitor blood glucose levels before meals and at bedtime  - Continue insulin lispro 100 units/mL subcutaneous injection 2-12 units     Hyperlipidemia:  - Monitor CMP  - Continue statin therapy     Discharge pending urine cultures and appropriate treatment  Subjective:   Pt is a 66 yo male who is resting comfortably upon exam  Pt denies flank pain, but describes B/L flank discomfort that he associates with his stent placement  Pt states that he received an oxy last night for this discomfort   Pt reports constant urgency, and fragments of blood in the urine, but states that the burning upon urination has improved since yesterday  Objective:     Vitals: Blood pressure 152/90, pulse 94, temperature 98 1 °F (36 7 °C), resp  rate 18, height 5' 5" (1 651 m), weight 124 kg (274 lb 0 5 oz), SpO2 98 %  ,Body mass index is 45 6 kg/m²  Intake/Output Summary (Last 24 hours) at 8/9/2022 1036  Last data filed at 8/9/2022 0731  Gross per 24 hour   Intake 400 ml   Output --   Net 400 ml       Physical Exam: Physical Exam  Constitutional:       General: He is not in acute distress  Appearance: He is obese  Cardiovascular:      Rate and Rhythm: Normal rate  Pulmonary:      Effort: Pulmonary effort is normal       Breath sounds: Normal breath sounds  Abdominal:      Tenderness: There is abdominal tenderness  Musculoskeletal:         General: No swelling  Neurological:      Mental Status: He is alert  Invasive Devices  Report    Peripheral Intravenous Line  Duration           Peripheral IV 08/08/22 Dorsal (posterior); Left Forearm 1 day                Lab, Imaging and other studies: I have personally reviewed pertinent reports      VTE Pharmacologic Prophylaxis: Heparin  VTE Mechanical Prophylaxis: sequential compression device

## 2022-08-09 NOTE — ASSESSMENT & PLAN NOTE
Patient comes in with chills but no kelly fevers; white count elevated 16 from a baseline of 10  Some perinephric stranding on CT scan  Blood cultures x2 -  Negative times 24 hours  intra op urine culture negative, but patient already  received antibiotics  Started Rocephin 1 g daily -  Will give 2 more days of oral Keflex   leukocytosis trending down

## 2022-08-09 NOTE — DISCHARGE SUMMARY
[facility]  Discharge- Digna Manning 1960, 65 y o  male MRN: [id number]  Unit/Bed#: -01 Encounter: [id number]  Primary Care Provider: Jillian Quinn MD   Date and time admitted to hospital: 8/8/2022  3:46 AM    * Ureterolithiasis  Assessment & Plan  Urology consultation appreciated   status post cystoscopy with left ureteral stent placement   planned for definitive stone treatment later this week  Rocephin 1 g starting now after 2 blood cultures -  UA unremarkable and intraop urine culture negative  Given presentation with chills, leukocytosis, and stranding on CT scan, will treat for 2 more days with oral Keflex   patient currently afebrile and nontoxic-appearing  JAYNE (acute kidney injury) ([facility] [address] )  Assessment & Plan   Presented with creatinine 2 1 usually at 1 3 to 1 5  Secondary to obstructive nephropathy   improved status post cystoscopy and with IV fluids  Zestril on hold -  Will have patient restart later this week  avoid NSAIDs    Pyelonephritis  Assessment & Plan  Patient comes in with chills but no kelly fevers; white count elevated 16 from a baseline of 10  Some perinephric stranding on CT scan  Blood cultures x2 -  Negative times 24 hours  intra op urine culture negative, but patient already  received antibiotics  Started Rocephin 1 g daily -  Will give 2 more days of oral Keflex   leukocytosis trending down  Hyperlipidemia  Assessment & Plan  On Lipitor 80 mg daily  Type 2 diabetes mellitus with stage 3a chronic kidney disease, with long-term current use of insulin ([facility])  Assessment & Plan  Lab Results   Component Value Date    HGBA1C 7 5 (H) 05/12/2022   Continue Lantus 80 units at night  Insulin sliding scale with Accu-Cheks per protocol      Recent Labs     08/08/22  1555 08/09/22  0145 08/09/22  0554 08/09/22  1039   POCGLU 278* 177* 128 125       Blood Sugar Average: Last 72 hrs:  (P) 468 [id number]    Benign essential hypertension  Assessment & Plan  Zestril 40 mg daily but is put on hold due to patient's elevated creatinine  Coreg 25 mg twice daily  Amlodipine 5 mg daily  Started on JAYNE protocol  Medical Problems             Resolved Problems  Date Reviewed: 8/9/2022   None               Discharging Physician / Practitioner: Elizabeth Osgood, PA-C  PCP: Brandee Gee MD  Admission Date:   Admission Orders (From admission, onward)     Ordered        08/08/22 0529  Inpatient Admission  Once                      Discharge Date: 08/09/22    Consultations During Hospital Stay:  · Dr Hannah Pyle    Procedures Performed:     Renal stone study  Moderate left-sided hydroureteronephrosis with a 9 mm obstructing stone in the proximal left ureter  Moderate left-sided perinephric stranding    Bilateral nonobstructing intrarenal calculi  8/8 -  Cystoscopy with left ureteral stent placement    Significant Findings / Test Results:   · See above    Incidental Findings:   · none     Test Results Pending at Discharge (will require follow up): · Final blood cultures     Outpatient Tests Requested:  · none    Complications:  none    Reason for Admission:  Left-sided flank pain    Hospital Course:   Ledy Tapia is a 65 y o  male patient who originally presented to the hospital on 8/8/2022 due to  Left-sided flank pain and chills  He was found to have a 9 mm left ureteral stone  He was seen in consultation by Urology and underwent cystoscopy with left ureteral stent placement  Plan is her definitive stone treatment as an outpatient  Patient was empirically started on IV ceftriaxone secondary to chills and leukocytosis on presentation  His blood cultures are negative and his urine culture was negative, but given findings of perinephric stranding on CT scan and leukocytosis, will continue Keflex for 2 more days after discharge  Patient is currently afebrile nontoxic-appearing  Patient also developed acute kidney injury    This improved status post cystoscopy and with IV fluids  His creatinine returned to baseline  He was instructed to hold his ACE-inhibitor for the rest of the week  He will be discharged home in stable condition  The patient, initially admitted to the hospital as inpatient, was discharged earlier than expected given the following: resolution of symptoms  Please see above list of diagnoses and related plan for additional information  Condition at Discharge: good    Discharge Day Visit / Exam:   Subjective:    Feels good today  Has some spasms around his stent and also with some urinary urgency, but overall much better fr  Vitals: Blood Pressure: 152/90 (08/09/22 0731)  Pulse: 94 (08/09/22 0731)  Temperature: 98 1 °F (36 7 °C) (08/09/22 0731)  Respirations: 18 (08/08/22 1525)  Height: 5' 5" (165 1 cm) (08/08/22 0407)  Weight - Scale: 124 kg (274 lb 0 5 oz) (08/09/22 0555)  SpO2: 98 % (08/09/22 0731)  Exam:   Physical Exam  Vitals and nursing note reviewed  Constitutional:       Appearance: He is well-developed  He is obese  HENT:      Head: Normocephalic and atraumatic  Eyes:      Conjunctiva/sclera: Conjunctivae normal    Cardiovascular:      Rate and Rhythm: Normal rate and regular rhythm  Heart sounds: No murmur heard  Pulmonary:      Effort: Pulmonary effort is normal  No respiratory distress  Breath sounds: Normal breath sounds  Abdominal:      Palpations: Abdomen is soft  Tenderness: There is no abdominal tenderness  Musculoskeletal:      Cervical back: Neck supple  Skin:     General: Skin is warm and dry  Neurological:      Mental Status: He is alert  Discussion with Family: Patient declined call to   Discharge instructions/Information to patient and family:   See after visit summary for information provided to patient and family        Provisions for Follow-Up Care:  See after visit summary for information related to follow-up care and any pertinent home health orders  Disposition:   Home    Planned Readmission: none     Discharge Statement:  I spent 40 minutes discharging the patient  This time was spent on the day of discharge  I had direct contact with the patient on the day of discharge  Greater than 50% of the total time was spent examining patient, answering all patient questions, arranging and discussing plan of care with patient as well as directly providing post-discharge instructions  Additional time then spent on discharge activities  Discharge Medications:  See after visit summary for reconciled discharge medications provided to patient and/or family        **Please Note: This note may have been constructed using a voice recognition system**

## 2022-08-09 NOTE — PROGRESS NOTES
Progress Note - urology   Bekah Ellis 65 y o  male MRN: [id number]  Unit/Bed#: -01 Encounter: [id number]    Assessment & Plan:    Nephrolithiasis:  -CT scan revealed a left 9 mm proximal ureteral calculus with hydronephrosis, patient with leukocytosis on admission JAYNE elevated lactic acidosis  -status post cystoscopy retrograde pyelogram and ureteral stent insertion on the left  -creatinine 1 69 trending downward from on admission  -leukocytosis of 14 trending downward  -patient afebrile vital stable  -initial urine culture preliminary revealing no growth  Urine culture from intraoperatively currently pending   -for  attending documentation, plan for definitive ureteroscopy on 08/11 should patient's cultures come back negative  This can be done on outpatient  -oxybutynin, Flomax for bladder spasms on outpatient, no Pyridium due to renal function    No further  intervention required this admission  Patient clear from urologic standpoint for discharge  Will plan for definitive ureteroscopy on 8/11 urine cultures continue to be negative  Subjective/Objective   Chief Complaint:  None    Subjective:   Patient currently sitting comfortably in bed in no acute distress  He denies any current nausea, vomiting fevers, chills and flank pain or abdominal pain  He reports he has some mild discomfort from ureteral stents and some blood in his urine  He is incontinent after his robotic prostatectomy 2018  And wears depends  He reports that this is improving  Overall he reports he is feeling much better  Objective:     Blood pressure 152/90, pulse 94, temperature 98 1 °F (36 7 °C), resp  rate 18, height 5' 5" (1 651 m), weight 124 kg (274 lb 0 5 oz), SpO2 98 %  ,Body mass index is 45 6 kg/m²        Intake/Output Summary (Last 24 hours) at 8/9/2022 1305  Last data filed at 8/9/2022 1100  Gross per 24 hour   Intake 580 ml   Output --   Net 580 ml       Invasive Devices  Report    None Physical Exam  Constitutional:       General: He is not in acute distress  Appearance: He is obese  He is not ill-appearing, toxic-appearing or diaphoretic  HENT:      Head: Normocephalic and atraumatic  Right Ear: External ear normal       Left Ear: External ear normal       Nose: Nose normal       Mouth/Throat:      Pharynx: Oropharynx is clear  Eyes:      General: No scleral icterus  Conjunctiva/sclera: Conjunctivae normal    Cardiovascular:      Rate and Rhythm: Normal rate and regular rhythm  Pulses: Normal pulses  Heart sounds: No murmur heard  No friction rub  No gallop  Pulmonary:      Effort: Pulmonary effort is normal  No respiratory distress  Breath sounds: No wheezing, rhonchi or rales  Abdominal:      General: Bowel sounds are normal  There is no distension  Tenderness: There is no abdominal tenderness  Musculoskeletal:         General: Normal range of motion  Cervical back: Normal range of motion  Skin:     General: Skin is warm and dry  Neurological:      General: No focal deficit present  Mental Status: He is alert and oriented to person, place, and time  Psychiatric:         Mood and Affect: Mood normal          Behavior: Behavior normal          Thought Content: Thought content normal          Judgment: Judgment normal            Lab, Imaging and other studies:I have personally reviewed pertinent lab results      Lab Results   Component Value Date    WBC 14 77 (H) 08/09/2022    HGB 12 3 08/09/2022    HCT 39 4 08/09/2022    MCV 91 08/09/2022     08/09/2022     Lab Results   Component Value Date    SODIUM 140 08/09/2022    K 4 8 08/09/2022     (H) 08/09/2022    CO2 24 08/09/2022    BUN 30 (H) 08/09/2022    CREATININE 1 69 (H) 08/09/2022    GLUC 163 (H) 08/09/2022    CALCIUM 8 4 08/09/2022         VTE Pharmacologic Prophylaxis: Heparin  VTE Mechanical Prophylaxis: sequential compression device    Johnny Alberts JUSTINA

## 2022-08-09 NOTE — PLAN OF CARE
Problem: PAIN - ADULT  Goal: Verbalizes/displays adequate comfort level or baseline comfort level  Description: Interventions:  - Encourage patient to monitor pain and request assistance  - Assess pain using appropriate pain scale  - Administer analgesics based on type and severity of pain and evaluate response  - Implement non-pharmacological measures as appropriate and evaluate response  - Consider cultural and social influences on pain and pain management  - Notify physician/advanced practitioner if interventions unsuccessful or patient reports new pain  Outcome: Progressing     Problem: INFECTION - ADULT  Goal: Absence or prevention of progression during hospitalization  Description: INTERVENTIONS:  - Assess and monitor for signs and symptoms of infection  - Monitor lab/diagnostic results  - Monitor all insertion sites, i e  indwelling lines, tubes, and drains  - Monitor endotracheal if appropriate and nasal secretions for changes in amount and color  - [place] appropriate cooling/warming therapies per order  - Administer medications as ordered  - Instruct and encourage patient and family to use good hand hygiene technique  - Identify and instruct in appropriate isolation precautions for identified infection/condition  Outcome: Progressing  Goal: Absence of fever/infection during neutropenic period  Description: INTERVENTIONS:  - Monitor WBC    Outcome: Progressing     Problem: SAFETY ADULT  Goal: Patient will remain free of falls  Description: INTERVENTIONS:  - Educate patient/family on patient safety including physical limitations  - Instruct patient to call for assistance with activity   - Consult OT/PT to assist with strengthening/mobility   - Keep Call bell within reach  - Keep bed low and locked with side rails adjusted as appropriate  - Keep care items and personal belongings within reach  - Initiate and maintain comfort rounds  - Make Fall Risk Sign visible to staff    - Consider moving patient to room near nurses station  Outcome: Progressing  Goal: Maintain or return to baseline ADL function  Description: INTERVENTIONS:  -  Assess patient's ability to carry out ADLs; assess patient's baseline for ADL function and identify physical deficits which impact ability to perform ADLs (bathing, care of mouth/teeth, toileting, grooming, dressing, etc )  - Assess/evaluate cause of self-care deficits   - Assess range of motion  - Assess patient's mobility; develop plan if impaired  - Assess patient's need for assistive devices and provide as appropriate  - Encourage maximum independence but intervene and supervise when necessary  - Involve family in performance of ADLs  - Assess for home care needs following discharge   - Consider OT consult to assist with ADL evaluation and planning for discharge  - Provide patient education as appropriate  Outcome: Progressing  Goal: Maintains/Returns to pre admission functional level  Description: INTERVENTIONS:  - Perform BMAT or MOVE assessment daily    - Set and communicate daily mobility goal to care team and patient/family/caregiver     - Collaborate with rehabilitation services on mobility goals if consulted    - Record patient progress and toleration of activity level   Outcome: Progressing     Problem: DISCHARGE PLANNING  Goal: Discharge to home or other facility with appropriate resources  Description: INTERVENTIONS:  - Identify barriers to discharge w/patient and caregiver  - Arrange for needed discharge resources and transportation as appropriate  - Identify discharge learning needs (meds, wound care, etc )  - Arrange for interpretive services to assist at discharge as needed  - Refer to Case Management Department for coordinating discharge planning if the patient needs post-hospital services based on physician/advanced practitioner order or complex needs related to functional status, cognitive ability, or social support system  Outcome: Progressing     Problem: Knowledge Deficit  Goal: Patient/family/caregiver demonstrates understanding of disease process, treatment plan, medications, and discharge instructions  Description: Complete learning assessment and assess knowledge base  Interventions:  - Provide teaching at level of understanding  - Provide teaching via preferred learning methods  Outcome: Progressing     Problem: Potential for Falls  Goal: Patient will remain free of falls  Description: INTERVENTIONS:  - Educate patient/family on patient safety including physical limitations  - Instruct patient to call for assistance with activity   - Consult OT/PT to assist with strengthening/mobility   - Keep Call bell within reach  - Keep bed low and locked with side rails adjusted as appropriate  - Keep care items and personal belongings within reach  - Initiate and maintain comfort rounds  Outcome: Progressing     Problem: Nutrition/Hydration-ADULT  Goal: Nutrient/Hydration intake appropriate for improving, restoring or maintaining nutritional needs  Description: Monitor and assess patient's nutrition/hydration status for malnutrition  Collaborate with interdisciplinary team and initiate plan and interventions as ordered  Monitor patient's weight and dietary intake as ordered or per policy  Utilize nutrition screening tool and intervene as necessary  Determine patient's food preferences and provide high-protein, high-caloric foods as appropriate       INTERVENTIONS:  - Monitor oral intake, urinary output, labs, and treatment plans  - Assess nutrition and hydration status and recommend course of action  - Evaluate amount of meals eaten  - Assist patient with eating if necessary   - Allow adequate time for meals  - Recommend/ encourage appropriate diets, oral nutritional supplements, and vitamin/mineral supplements  - Order, calculate, and assess calorie counts as needed  - Recommend, monitor, and adjust tube feedings and TPN/PPN based on assessed needs  - Assess need for intravenous fluids  - Provide specific nutrition/hydration education as appropriate  - Include patient/family/caregiver in decisions related to nutrition  Outcome: Progressing

## 2022-08-09 NOTE — PLAN OF CARE
Problem: PAIN - ADULT  Goal: Verbalizes/displays adequate comfort level or baseline comfort level  Description: Interventions:  - Encourage patient to monitor pain and request assistance  - Assess pain using appropriate pain scale  - Administer analgesics based on type and severity of pain and evaluate response  - Implement non-pharmacological measures as appropriate and evaluate response  - Consider cultural and social influences on pain and pain management  - Notify physician/advanced practitioner if interventions unsuccessful or patient reports new pain  Outcome: Progressing     Problem: SAFETY ADULT  Goal: Patient will remain free of falls  Description: INTERVENTIONS:  - Educate patient/family on patient safety including physical limitations  - Instruct patient to call for assistance with activity   - Consult OT/PT to assist with strengthening/mobility   - Keep Call bell within reach  - Keep bed low and locked with side rails adjusted as appropriate  - Keep care items and personal belongings within reach  - Initiate and maintain comfort rounds  - Make Fall Risk Sign visible to staff  - Offer Toileting every Hours, in advance of need  - Initiate/Maintain alarm  - Obtain necessary fall risk management equipment:   - Apply yellow socks and bracelet for high fall risk patients  - Consider moving patient to room near nurses station  Outcome: Progressing     Problem: DISCHARGE PLANNING  Goal: Discharge to home or other facility with appropriate resources  Description: INTERVENTIONS:  - Identify barriers to discharge w/patient and caregiver  - Arrange for needed discharge resources and transportation as appropriate  - Identify discharge learning needs (meds, wound care, etc )  - Arrange for interpretive services to assist at discharge as needed  - Refer to Case Management Department for coordinating discharge planning if the patient needs post-hospital services based on physician/advanced practitioner order or complex needs related to functional status, cognitive ability, or social support system  Outcome: Progressing     Problem: Knowledge Deficit  Goal: Patient/family/caregiver demonstrates understanding of disease process, treatment plan, medications, and discharge instructions  Description: Complete learning assessment and assess knowledge base    Interventions:  - Provide teaching at level of understanding  - Provide teaching via preferred learning methods  Outcome: Progressing     Problem: Potential for Falls  Goal: Patient will remain free of falls  Description: INTERVENTIONS:  - Educate patient/family on patient safety including physical limitations  - Instruct patient to call for assistance with activity   - Consult OT/PT to assist with strengthening/mobility   - Keep Call bell within reach  - Keep bed low and locked with side rails adjusted as appropriate  - Keep care items and personal belongings within reach  - Initiate and maintain comfort rounds  - Make Fall Risk Sign visible to staff  - Offer Toileting every * Hours, in advance of need  - Initiate/Maintain alarm  - Obtain necessary fall risk management equipment:   - Apply yellow socks and bracelet for high fall risk patients  - Consider moving patient to room near nurses station  Outcome: Progressing

## 2022-08-09 NOTE — ASSESSMENT & PLAN NOTE
Lab Results   Component Value Date    HGBA1C 7 5 (H) 05/12/2022   Continue Lantus 80 units at night  Insulin sliding scale with Accu-Cheks per protocol      Recent Labs     08/08/22  1555 08/09/22  0145 08/09/22  0554 08/09/22  1039   POCGLU 278* 177* 128 125       Blood Sugar Average: Last 72 hrs:  (P) 021 [id number]

## 2022-08-10 ENCOUNTER — ANESTHESIA (OUTPATIENT)
Dept: ANESTHESIOLOGY | Facility: HOSPITAL | Age: 62
End: 2022-08-10

## 2022-08-10 ENCOUNTER — ANESTHESIA EVENT (OUTPATIENT)
Dept: PERIOP | Facility: HOSPITAL | Age: 62
End: 2022-08-10
Payer: COMMERCIAL

## 2022-08-10 ENCOUNTER — TELEPHONE (OUTPATIENT)
Dept: UROLOGY | Facility: CLINIC | Age: 62
End: 2022-08-10

## 2022-08-10 ENCOUNTER — PREP FOR PROCEDURE (OUTPATIENT)
Dept: UROLOGY | Facility: MEDICAL CENTER | Age: 62
End: 2022-08-10

## 2022-08-10 ENCOUNTER — ANESTHESIA EVENT (OUTPATIENT)
Dept: ANESTHESIOLOGY | Facility: HOSPITAL | Age: 62
End: 2022-08-10

## 2022-08-10 DIAGNOSIS — N20.1 LEFT URETERAL STONE: Primary | ICD-10-CM

## 2022-08-10 LAB — BACTERIA UR CULT: NORMAL

## 2022-08-10 NOTE — UTILIZATION REVIEW
Notification of Discharge   This is a Notification of Discharge from our facility [address]  Please be advised that this patient has been discharge from our facility  Below you will find the admission and discharge date and time including the patients disposition  UTILIZATION REVIEW CONTACT:  Louann Ley  Utilization   Network Utilization Review Department  Phone: [phone] x carefully listen to the prompts  All voicemails are confidential   Email: [email or web address]     PHYSICIAN ADVISORY SERVICES:  FOR TOEF-UU-JEKI REVIEW - MEDICAL NECESSITY DENIAL  Phone: [phone]  Fax: [phone]  Email: [email or web address]     PRESENTATION DATE: 8/8/2022  3:46 AM  OBERVATION ADMISSION DATE:   INPATIENT ADMISSION DATE: 8/8/22  5:29 AM   DISCHARGE DATE: 8/9/2022  1:49 PM  DISPOSITION: Home/Self Care Home/Self Care      IMPORTANT INFORMATION:  Send all requests for admission clinical reviews, approved or denied determinations and any other requests to dedicated fax number below belonging to the campus where the patient is receiving treatment   List of dedicated fax numbers:  [address] DENIALS (Administrative/Medical Necessity) [phone]   [address] [facility] (Maternity/NICU/Pediatrics) [phone]   [facility] [phone]   [address] [facility] [address] [facility] [address] [phone]   [address] [place] [address] [phone]   [address] [phone]   [facility]  [phone]   [address] [place] [address], [facility]  [address] [facility] [address] [facility] [phone]

## 2022-08-10 NOTE — ED PROVIDER NOTES
History  Chief Complaint   Patient presents with    Flank Pain     Pt presents to the ED with c/o L flank pain that started around 2000  Pt states that the pain wraps around to his back  Took 0 4 flomax pta  65 yom, hx of kidney stones, left flank pain  Seems like a stone  Some chills  Plan ct renal study, ua, labs, pain control      Flank Pain  Pain location:  L flank  Pain quality: heavy, pressure, sharp and throbbing    Pain quality: not stabbing    Pain radiates to:  Suprapubic region  Pain severity:  Severe  Onset quality:  Sudden  Duration:  5 hours  Timing:  Constant  Progression:  Waxing and waning  Chronicity:  New  Relieved by:  Nothing  Worsened by:  Nothing  Ineffective treatments:  Acetaminophen and NSAIDs  Associated symptoms: no chest pain, no chills, no cough, no diarrhea, no dysuria, no fatigue, no fever, no nausea, no shortness of breath and no vomiting        Prior to Admission Medications   Prescriptions Last Dose Informant Patient Reported? Taking?    ASPIRIN 81 PO 8/7/2022 at Unknown time Self Yes Yes   Sig: Take 1 capsule by mouth 2 (two) times a day     BD Pen Needle Rosalie U/F 32G X 4 MM MISC 8/7/2022 at Unknown time  No Yes   Sig: Inject under the skin 4 (four) times a day   Dulaglutide (Trulicity) 3 RO/4 3EL SOPN 8/7/2022 at Unknown time  No Yes   Sig: Inject 0 5 mL (3 mg total) under the skin once a week   Microlet Lancets MISC 8/7/2022 at Unknown time  No Yes   Sig: USE 3 (THREE) TIMES A DAY   Multiple Vitamins-Minerals (MULTIVITAMIN WITH MINERALS) tablet 8/7/2022 at Unknown time Self Yes Yes   Sig: Take 1 tablet by mouth daily   Toujeo SoloStar 300 units/mL CONCENTRATED U-300 injection pen (1-unit dial) 8/7/2022 at Unknown time  No Yes   Sig: Inject 80 Units under the skin daily   Vitamin D, Cholecalciferol, 400 units TABS 8/7/2022 at Unknown time Self Yes Yes   Sig: Take by mouth daily   albuterol (PROVENTIL HFA,VENTOLIN HFA) 90 mcg/act inhaler 1/1/2020 Self Yes No   Sig: Inhale 1-2 puffs every 6 (six) hours as needed     amLODIPine (NORVASC) 5 mg tablet 8/7/2022 at Unknown time  No Yes   Sig: TAKE 1 TABLET BY MOUTH EVERY DAY   atorvastatin (LIPITOR) 80 mg tablet 8/7/2022 at Unknown time  No Yes   Sig: TAKE 1 TABLET BY MOUTH EVERY DAY   carvedilol (COREG) 25 mg tablet 8/7/2022 at Unknown time  No Yes   Sig: TAKE 1 TABLET BY MOUTH TWICE A DAY WITH MEALS   glimepiride (AMARYL) 4 mg tablet 8/7/2022 at Unknown time  No Yes   Sig: TAKE 1 TABLET BY MOUTH 2 TIMES A DAY  glucose blood (Contour Next Test) test strip 8/7/2022 at Unknown time  No Yes   Sig: Check sugar QID   glucose blood (Contour Next Test) test strip 8/7/2022 at Unknown time  No Yes   Sig: Use 1 each 4 (four) times a day Use as instructed   Patient taking differently: Use 1 each daily 3 to 4 times daily  Before meals or randomly   ibuprofen (MOTRIN) 800 mg tablet 8/7/2022 at Unknown time Self No Yes   Sig: TAKE 1 TABLET EVERY 8 HOURS AS NEEDED   insuln lispro (HumaLOG KwikPen) 200 units/mL CONCENTRATED U-200 injection pen 8/7/2022 at Unknown time  No Yes   Sig: Inject 15 units three times daily with meals  lisinopril (ZESTRIL) 40 mg tablet 8/7/2022 at Unknown time  No Yes   Sig: TAKE 1 TABLET BY MOUTH EVERY DAY   metFORMIN (GLUCOPHAGE) 1000 MG tablet 8/7/2022 at Unknown time  No Yes   Sig: TAKE 1 TABLET BY MOUTH TWICE A DAY   tamsulosin (FLOMAX) 0 4 mg 8/7/2022 at Unknown time  No Yes   Sig: Take 1 capsule (0 4 mg total) by mouth daily with dinner as needed for kidney stone management        Facility-Administered Medications: None       Past Medical History:   Diagnosis Date    Acute bronchitis     Acute low back pain     Acute low back pain     [id number] resolved    Acute respiratory infection     Cancer (HCC)     prostate    Cellulitis of scrotum     Cough     Diabetes mellitus (HCC)     Herpes zoster     High cholesterol     Hyperkalemia     Hypertension     Kidney disease     Kidney stone     Low back pain     Lumbar radiculopathy     Rash     Retinopathy, central serous     Sleep apnea     [id number]    Trochanteric bursitis        Past Surgical History:   Procedure Laterality Date    ABDOMINAL ADHESION SURGERY N/A 6/13/2018    Procedure: LYSIS ADHESIONS;  Surgeon: Aziza Graff MD;  [facility];  Service: Urology    FL RETROGRADE PYELOGRAM  8/8/2022    LITHOTRIPSY      renal    MD CYSTOURETHROSCOPY,URETER CATHETER Left 8/8/2022    Procedure: CYSTOSCOPY RETROGRADE PYELOGRAM WITH INSERTION STENT URETERAL;  Surgeon: Cece Galeano MD;  [facility];  Service: Urology    MD LAP,PROSTATECTOMY,RADICAL,W/NERVE SPARE,INCL ROBOTIC N/A 6/13/2018    Procedure: Raymundo Rooney;  Surgeon: Aziza Graff MD;  [facility];  Service: Urology    PROSTATE SURGERY  06/13/2018    SHOULDER SURGERY         Family History   Problem Relation Age of Onset    Other Father         cardiac disorder    Stroke Father     Diabetes Father         mellitus    Hypertension Father     Heart disease Father         cardiac disorder    Cancer Sister     Cancer Paternal Grandmother     Heart disease Family         cardiac disorder    Kidney disease Family      I have reviewed and agree with the history as documented  E-Cigarette/Vaping    E-Cigarette Use Never User      E-Cigarette/Vaping Substances    Nicotine No     THC No     CBD No     Flavoring No     Other No     Unknown No      Social History     Tobacco Use    Smoking status: Never Smoker    Smokeless tobacco: Never Used   Vaping Use    Vaping Use: Never used   Substance Use Topics    Alcohol use: No    Drug use: No       Review of Systems   Constitutional: Negative for chills, fatigue and fever  Eyes: Negative for photophobia and visual disturbance  Respiratory: Negative for cough and shortness of breath  Cardiovascular: Negative for chest pain, palpitations and leg swelling     Gastrointestinal: Negative for diarrhea, nausea and vomiting  Endocrine: Negative for polydipsia and polyuria  Genitourinary: Positive for flank pain  Negative for decreased urine volume, difficulty urinating, dysuria and frequency  Musculoskeletal: Negative for back pain, neck pain and neck stiffness  Skin: Negative for color change and rash  Allergic/Immunologic: Negative for environmental allergies and immunocompromised state  Neurological: Negative for dizziness and headaches  Hematological: Negative for adenopathy  Does not bruise/bleed easily  Psychiatric/Behavioral: Negative for dysphoric mood  The patient is not nervous/anxious  Physical Exam  Physical Exam  Vitals and nursing note reviewed  Constitutional:       Appearance: He is well-developed  HENT:      Head: Normocephalic and atraumatic  Right Ear: External ear normal       Left Ear: External ear normal    Eyes:      Conjunctiva/sclera: Conjunctivae normal       Pupils: Pupils are equal, round, and reactive to light  Neck:      Thyroid: No thyromegaly  Vascular: No JVD  Cardiovascular:      Rate and Rhythm: Normal rate and regular rhythm  Heart sounds: Normal heart sounds  No murmur heard  No friction rub  No gallop  Pulmonary:      Effort: Pulmonary effort is normal  No respiratory distress  Breath sounds: Normal breath sounds  No wheezing or rales  Abdominal:      General: Bowel sounds are normal  There is no distension  Palpations: Abdomen is soft  Tenderness: There is no guarding or rebound  Musculoskeletal:         General: Normal range of motion  Cervical back: Normal range of motion and neck supple  Lymphadenopathy:      Cervical: No cervical adenopathy  Skin:     General: Skin is warm  Neurological:      Mental Status: He is alert and oriented to person, place, and time  Cranial Nerves: No cranial nerve deficit     Psychiatric:         Behavior: Behavior normal          Vital Signs  ED Triage Vitals Temperature Pulse Respirations Blood Pressure SpO2   08/08/22 0736 08/08/22 0407 08/08/22 0407 08/08/22 0407 08/08/22 0407   97 8 °F (36 6 °C) 102 18 142/80 94 %      Temp src Heart Rate Source Patient Position - Orthostatic VS BP Location FiO2 (%)   -- 08/08/22 0407 08/08/22 0407 08/08/22 0407 --    Monitor Lying Right arm       Pain Score       08/08/22 0407       8           Vitals:    08/08/22 1052 08/08/22 1525 08/08/22 2246 08/09/22 0731   BP: 136/85 153/79 157/88 152/90   Pulse: 90 102 94 94   Patient Position - Orthostatic VS:             Visual Acuity      ED Medications  Medications   HYDROmorphone (DILAUDID) injection 1 mg (1 mg Intravenous Given 8/8/22 0437)   lactated ringers bolus 1,000 mL (0 mL Intravenous Stopped 8/8/22 0653)   sodium polystyrene (KAYEXALATE) powder 15 g (15 g Oral Given 8/8/22 1701)       Diagnostic Studies  Results Reviewed     Procedure Component Value Units Date/Time    Blood culture [[id number]] Collected: 08/08/22 0559    Lab Status: Preliminary result Specimen: Blood from Arm, Right Updated: 08/10/22 1003     Blood Culture No Growth at 48 hrs  Blood culture [[id number]] Collected: 08/08/22 0559    Lab Status: Preliminary result Specimen: Blood from Arm, Left Updated: 08/10/22 1003     Blood Culture No Growth at 48 hrs      Comprehensive metabolic panel [[id number]]  (Abnormal) Collected: 08/09/22 0500    Lab Status: Final result Specimen: Blood from Hand, Right Updated: 08/09/22 0557     Sodium 140 mmol/L      Potassium 4 8 mmol/L      Chloride 110 mmol/L      CO2 24 mmol/L      ANION GAP 6 mmol/L      BUN 30 mg/dL      Creatinine 1 69 mg/dL      Glucose 163 mg/dL      Calcium 8 4 mg/dL      Corrected Calcium 9 0 mg/dL      AST 27 U/L      ALT 36 U/L      Alkaline Phosphatase 63 U/L      Total Protein 6 4 g/dL      Albumin 3 2 g/dL      Total Bilirubin 0 29 mg/dL      eGFR 42 ml/min/1 73sq m     Narrative:      [place] guidelines for Chronic Kidney Disease (CKD):     Stage 1 with normal or high GFR (GFR > 90 mL/min/1 73 square meters)    Stage 2 Mild CKD (GFR = 60-89 mL/min/1 73 square meters)    Stage 3A Moderate CKD (GFR = 45-59 mL/min/1 73 square meters)    Stage 3B Moderate CKD (GFR = 30-44 mL/min/1 73 square meters)    Stage 4 Severe CKD (GFR = 15-29 mL/min/1 73 square meters)    Stage 5 End Stage CKD (GFR <15 mL/min/1 73 square meters)  Note: GFR calculation is accurate only with a steady state creatinine    CBC and differential [[id number]]  (Abnormal) Collected: 08/09/22 0500    Lab Status: Final result Specimen: Blood from Hand, Right Updated: 08/09/22 0523     WBC 14 77 Thousand/uL      RBC 4 35 Million/uL      Hemoglobin 12 3 g/dL      Hematocrit 39 4 %      MCV 91 fL      MCH 28 3 pg      MCHC 31 2 g/dL      RDW 12 8 %      MPV 8 9 fL      Platelets 606 Thousands/uL      nRBC 0 /100 WBCs      Neutrophils Relative 78 %      Immat GRANS % 0 %      Lymphocytes Relative 13 %      Monocytes Relative 9 %      Eosinophils Relative 0 %      Basophils Relative 0 %      Neutrophils Absolute 11 48 Thousands/µL      Immature Grans Absolute 0 06 Thousand/uL      Lymphocytes Absolute 1 94 Thousands/µL      Monocytes Absolute 1 26 Thousand/µL      Eosinophils Absolute 0 01 Thousand/µL      Basophils Absolute 0 02 Thousands/µL     Lactic acid 2 Hours [[id number]]  (Abnormal) Collected: 08/08/22 1207    Lab Status: Final result Specimen: Blood from Hand, Right Updated: 08/08/22 1252     LACTIC ACID 2 1 mmol/L     Narrative:      Result may be elevated if tourniquet was used during collection      Platelet count [[id number]]  (Normal) Collected: 08/08/22 1207    Lab Status: Final result Specimen: Blood from Hand, Right Updated: 08/08/22 1223     Platelets 281 Thousands/uL      MPV 9 2 fL     Procalcitonin [[id number]]  (Normal) Collected: 08/08/22 0559    Lab Status: Final result Specimen: Blood from Arm, Right Updated: 08/08/22 0654     Procalcitonin 0 23 ng/ml Lactic acid, plasma [[id number]]  (Abnormal) Collected: 08/08/22 0559    Lab Status: Final result Specimen: Blood from Arm, Right Updated: 08/08/22 0651     LACTIC ACID 2 9 mmol/L     Narrative:      Result may be elevated if tourniquet was used during collection  Urine Microscopic [[id number]]  (Normal) Collected: 08/08/22 0541    Lab Status: Final result Specimen: Urine, Other Updated: 08/08/22 0609     RBC, UA None Seen /hpf      WBC, UA None Seen /hpf      Epithelial Cells None Seen /hpf      Bacteria, UA None Seen /hpf     UA w Reflex to Microscopic w Reflex to Culture [[id number]]  (Abnormal) Collected: 08/08/22 0541    Lab Status: Final result Specimen: Urine, Other Updated: 08/08/22 0605     Color, UA Light Yellow     Clarity, UA Clear     Specific Gravity, UA 1 017     pH, UA 5 0     Leukocytes, UA Elevated glucose may cause decreased leukocyte values   See urine microscopic for [facility] result/     Nitrite, UA Negative     Protein, UA Trace mg/dl      Glucose, UA >=1000 (1%) mg/dl      Ketones, UA Negative mg/dl      Urobilinogen, UA <2 0 mg/dl      Bilirubin, UA Negative     Occult Blood, UA Negative    Urine Macroscopic, POC [[id number]]  (Abnormal) Collected: 08/08/22 0539    Lab Status: Final result Specimen: Urine Updated: 08/08/22 0540     Color, UA Yellow     Clarity, UA Clear     pH, UA 5 5     Leukocytes, UA Negative     Nitrite, UA Negative     Protein, UA 30 (1+) mg/dl      Glucose,  (1/2%) mg/dl      Ketones, UA Negative mg/dl      Urobilinogen, UA 0 2 E U /dl      Bilirubin, UA Negative     Occult Blood, UA Trace     Specific [place], UA 1 025    Narrative:      CLINITEK RESULT    Comprehensive metabolic panel [[id number]]  (Abnormal) Collected: 08/08/22 0416    Lab Status: Final result Specimen: Blood from Arm, Left Updated: 08/08/22 0450     Sodium 136 mmol/L      Potassium 5 8 mmol/L      Chloride 108 mmol/L      CO2 22 mmol/L      ANION GAP 6 mmol/L      BUN 35 mg/dL      Creatinine 2 10 mg/dL      Glucose 288 mg/dL      Calcium 9 0 mg/dL      AST 31 U/L      ALT 49 U/L      Alkaline Phosphatase 81 U/L      Total Protein 7 4 g/dL      Albumin 3 7 g/dL      Total Bilirubin 0 35 mg/dL      eGFR 32 ml/min/1 73sq m     Narrative:      [place] guidelines for Chronic Kidney Disease (CKD):     Stage 1 with normal or high GFR (GFR > 90 mL/min/1 73 square meters)    Stage 2 Mild CKD (GFR = 60-89 mL/min/1 73 square meters)    Stage 3A Moderate CKD (GFR = 45-59 mL/min/1 73 square meters)    Stage 3B Moderate CKD (GFR = 30-44 mL/min/1 73 square meters)    Stage 4 Severe CKD (GFR = 15-29 mL/min/1 73 square meters)    Stage 5 End Stage CKD (GFR <15 mL/min/1 73 square meters)  Note: GFR calculation is accurate only with a steady state creatinine    CBC and differential [[id number]]  (Abnormal) Collected: 08/08/22 0416    Lab Status: Final result Specimen: Blood from Arm, Left Updated: 08/08/22 0429     WBC 16 98 Thousand/uL      RBC 5 13 Million/uL      Hemoglobin 14 3 g/dL      Hematocrit 46 3 %      MCV 90 fL      MCH 27 9 pg      MCHC 30 9 g/dL      RDW 12 5 %      MPV 9 2 fL      Platelets 335 Thousands/uL      nRBC 0 /100 WBCs      Neutrophils Relative 86 %      Immat GRANS % 1 %      Lymphocytes Relative 9 %      Monocytes Relative 4 %      Eosinophils Relative 0 %      Basophils Relative 0 %      Neutrophils Absolute 14 70 Thousands/µL      Immature Grans Absolute 0 11 Thousand/uL      Lymphocytes Absolute 1 50 Thousands/µL      Monocytes Absolute 0 61 Thousand/µL      Eosinophils Absolute 0 04 Thousand/µL      Basophils Absolute 0 02 Thousands/µL                  FL retrograde pyelogram   Final Result by Rodri Cason DO (08/09 3671)      Fluoroscopic guidance provided for left retrograde pyelogram  Please see procedure report for further details        Workstation performed: [id number]         CT renal stone study abdomen pelvis wo contrast   Final Result by Anastasia Severe DO [place] (08/08 0449)      Moderate left-sided hydroureteronephrosis with a 9 mm obstructing stone in the proximal left ureter  Moderate left-sided perinephric stranding  Bilateral nonobstructing intrarenal calculi  The study was marked in [facility] for immediate notification           Workstation performed: [id number]                    Procedures  Procedures         ED Course  ED Course as of 08/10/22 1027   Mon Aug 08, 2022   0450 CT renal stone study abdomen pelvis wo contrast                                             MDM  Number of Diagnoses or Management Options  Left ureteral calculus: new and requires workup  Pyelonephritis: new and requires workup  Diagnosis management comments: UA normal but with leukocytosis, proximallarge stone, rocephin ordered by inpatient team, plan to admit, discuss with uro       Amount and/or Complexity of Data Reviewed  Clinical lab tests: ordered and reviewed  Tests in the radiology section of CPT®: ordered and reviewed  Tests in the medicine section of CPT®: reviewed and ordered  Review and summarize past medical records: yes  Discuss the patient with other providers: yes  Independent visualization of images, tracings, or specimens: yes        Disposition  Final diagnoses:   Left ureteral calculus   Pyelonephritis     Time reflects when diagnosis was documented in both MDM as applicable and the Disposition within this note     Time User Action Codes Description Comment    8/8/2022  5:26 AM Sanchez LEVY Add [N20 0] Kidney stones     8/8/2022  5:50 AM Sydnie ESTRELLA Add [N20 1] Left ureteral stone     8/8/2022  5:50 AM Sydnie ESTRELLA Remove [N20 1] Left ureteral stone     8/8/2022  5:50 AM Sydnie ESTRELLA Add [N20 1] Left ureteral calculus     8/8/2022  5:50 AM Trinh Bernal Add [N12] Pyelonephritis     8/8/2022  9:45 AM Jules Lara Modify [N20 0] Kidney stones       ED Disposition     ED Disposition   Admit    Condition   Stable    Date/Time   Mon Aug 8, 2022 5:50 AM    Comment   Case was discussed with alonso and the patient's admission status was agreed to be Admission Status: inpatient status to the service of Dr Madie Cuevas   Follow-up Information     Follow up With Specialties Details Why Dima Benson MD Internal Medicine Follow up  [address] [place] [address] MD Urology Follow up [phone] [facility] office will contact you with plans for follow-up  We will watch out for urine cultures and set up follow-up should these be negative   [address] [place] [address]  [phone]            Discharge Medication List as of 8/9/2022 12:19 PM      START taking these medications    Details   cephalexin (KEFLEX) 500 mg capsule Take 1 capsule (500 mg total) by mouth every 6 (six) hours for 2 days, Starting Wed 8/10/2022, Until Fri 8/12/2022, Normal      oxybutynin (DITROPAN) 5 mg tablet Take 1 tablet (5 mg total) by mouth 3 (three) times a day as needed (stent spasms), Starting Tue 8/9/2022, Normal      oxyCODONE (ROXICODONE) 5 immediate release tablet Take 1 tablet (5 mg total) by mouth every 6 (six) hours as needed for moderate pain Max Daily Amount: 20 mg, Starting Tue 8/9/2022, Normal         CONTINUE these medications which have NOT CHANGED    Details   amLODIPine (NORVASC) 5 mg tablet TAKE 1 TABLET BY MOUTH EVERY DAY, Normal      ASPIRIN 81 PO Take 1 capsule by mouth 2 (two) times a day  , Historical Med      atorvastatin (LIPITOR) 80 mg tablet TAKE 1 TABLET BY MOUTH EVERY DAY, Normal      BD Pen Needle Rosalie U/F 32G X 4 MM MISC Inject under the skin 4 (four) times a day, Starting Mon 11/22/2021, Normal      carvedilol (COREG) 25 mg tablet TAKE 1 TABLET BY MOUTH TWICE A DAY WITH MEALS, Normal      Dulaglutide (Trulicity) 3 MH/2 6TZ SOPN Inject 0 5 mL (3 mg total) under the skin once a week, Starting Tue 5/24/2022, Normal      glimepiride (AMARYL) 4 mg tablet TAKE 1 TABLET BY MOUTH 2 TIMES A DAY , Normal !! glucose blood (Contour Next Test) test strip Check sugar QID, Normal      !! glucose blood (Contour Next Test) test strip Use 1 each 4 (four) times a day Use as instructed, Starting Wed 12/15/2021, Normal      insuln lispro (HumaLOG KwikPen) 200 units/mL CONCENTRATED U-200 injection pen Inject 15 units three times daily with meals  , Normal      metFORMIN (GLUCOPHAGE) 1000 MG tablet TAKE 1 TABLET BY MOUTH TWICE A DAY, Normal      Microlet Lancets MISC USE 3 (THREE) TIMES A DAY, Normal      Multiple Vitamins-Minerals (MULTIVITAMIN WITH MINERALS) tablet Take 1 tablet by mouth daily, Historical Med      tamsulosin (FLOMAX) 0 4 mg Take 1 capsule (0 4 mg total) by mouth daily with dinner as needed for kidney stone management  , Starting u 4/22/2021, Normal      Toujeo SoloStar 300 units/mL CONCENTRATED U-300 injection pen (1-unit dial) Inject 80 Units under the skin daily, Starting Mon 11/8/2021, Normal      Vitamin D, Cholecalciferol, 400 units TABS Take by mouth daily, Historical Med      albuterol (PROVENTIL HFA,VENTOLIN HFA) 90 mcg/act inhaler Inhale 1-2 puffs every 6 (six) hours as needed  , Starting Mon 1/4/2016, Historical Med       !! - Potential duplicate medications found  Please discuss with provider        STOP taking these medications       ibuprofen (MOTRIN) 800 mg tablet Comments:   Reason for Stopping:         lisinopril (ZESTRIL) 40 mg tablet Comments:   Reason for Stopping:               Outpatient Discharge Orders   Discharge Diet     Activity as tolerated       PDMP Review     None          ED Provider  Electronically Signed by           Ferdinand Rodriguez DO  08/10/22 1867

## 2022-08-10 NOTE — TELEPHONE ENCOUNTER
Patient of mine with recurrent nephrolithiasis, prostate cancer status post prostatectomy, MAGGIE  I spoke with him following his recent hospitalization for an obstructing UPJ calculus with a number of lower pole stones as well  He was treated with ureteral stent placement by Dr Leeann Briseno  His urine culture from hospitalization has finalized as negative  Dr Leeann Briseno has offered to schedule him for ureteroscopy tomorrow  I spoke with him  We reviewed the procedure  I also coordinated care with Dr Leeann Briseno  I did offer him a surgical date with me the following week but he would like to move forward with surgery tomorrow  Will see me afterwards for his postoperative imaging

## 2022-08-10 NOTE — TELEPHONE ENCOUNTER
Per Dr Mota Bernardino Email: add patient for #5 tomorrow 8/11/2022 at [facility]  I spoke to the patient and confirmed      -PATs are complete  -SL Cap BC - no auth required

## 2022-08-10 NOTE — PRE-PROCEDURE INSTRUCTIONS
Pre-Surgery Instructions:   Medication Instructions    albuterol (PROVENTIL HFA,VENTOLIN HFA) 90 mcg/act inhaler Uses PRN- OK to take day of surgery    amLODIPine (NORVASC) 5 mg tablet Take day of surgery   ASPIRIN 81 PO Hold day of surgery   atorvastatin (LIPITOR) 80 mg tablet Take day of surgery   carvedilol (COREG) 25 mg tablet Take day of surgery   cephalexin (KEFLEX) 500 mg capsule Take day of surgery   Dulaglutide (Trulicity) 3 ER/1 8RC SOPN Weekly    glimepiride (AMARYL) 4 mg tablet Hold day of surgery   insuln lispro (HumaLOG KwikPen) 200 units/mL CONCENTRATED U-200 injection pen Hold day of surgery   metFORMIN (GLUCOPHAGE) 1000 MG tablet Hold day of surgery   Multiple Vitamins-Minerals (MULTIVITAMIN WITH MINERALS) tablet Hold day of surgery   oxybutynin (DITROPAN) 5 mg tablet Hold day of surgery   oxyCODONE (ROXICODONE) 5 immediate release tablet Uses PRN- OK to take day of surgery    Toujeo SoloStar 300 units/mL CONCENTRATED U-300 injection pen (1-unit dial) Takes at night time, instructed by PCP to take half his normal dose    Vitamin D, Cholecalciferol, 400 units TABS Hold day of surgery  Spoke with pt via phone  Advised hospital location will call with arrival time night before surgery  NPO after midnight the night before surgery, including chewing gum and hard candy  A small sip of water is allowed to take approved medications the morning of surgery  Non-vaccinated patients and visitors need to remain masked at all times while in the hospital     Instructed to leave contacts/jewelery/valuables at home  Ok to wear dentures, glasses and hearing aides into the hospital - they will be removed for surgery  No smoking or alcohol consumption 24 hours prior to surgery  Avoid all Aspirin products and OTC Vit/Supp  Tylenol ok to take prn      Showering instructions reviewed for night before and morning of surgery using surgical soap or antibacterial soap     Patient verbalized understanding of all of the above, including medication instructions

## 2022-08-11 ENCOUNTER — APPOINTMENT (OUTPATIENT)
Dept: RADIOLOGY | Facility: HOSPITAL | Age: 62
End: 2022-08-11
Payer: COMMERCIAL

## 2022-08-11 ENCOUNTER — ANESTHESIA (OUTPATIENT)
Dept: PERIOP | Facility: HOSPITAL | Age: 62
End: 2022-08-11
Payer: COMMERCIAL

## 2022-08-11 ENCOUNTER — HOSPITAL ENCOUNTER (OUTPATIENT)
Facility: HOSPITAL | Age: 62
Setting detail: OUTPATIENT SURGERY
Discharge: HOME | End: 2022-08-11
Attending: STUDENT IN AN ORGANIZED HEALTH CARE EDUCATION/TRAINING PROGRAM | Admitting: STUDENT IN AN ORGANIZED HEALTH CARE EDUCATION/TRAINING PROGRAM
Payer: COMMERCIAL

## 2022-08-11 VITALS
BODY MASS INDEX: 45.65 KG/M2 | SYSTOLIC BLOOD PRESSURE: 182 MMHG | WEIGHT: 274 LBS | HEIGHT: 65 IN | DIASTOLIC BLOOD PRESSURE: 94 MMHG | TEMPERATURE: 96.8 F | OXYGEN SATURATION: 91 % | RESPIRATION RATE: 16 BRPM | HEART RATE: 88 BPM

## 2022-08-11 DIAGNOSIS — N20.1 LEFT URETERAL STONE: ICD-10-CM

## 2022-08-11 LAB
GLUCOSE SERPL-MCNC: 141 MG/DL (ref 65–140)
GLUCOSE SERPL-MCNC: 149 MG/DL (ref 65–140)

## 2022-08-11 PROCEDURE — 82360 CALCULUS ASSAY QUANT: CPT | Performed by: STUDENT IN AN ORGANIZED HEALTH CARE EDUCATION/TRAINING PROGRAM

## 2022-08-11 PROCEDURE — 52356 CYSTO/URETERO W/LITHOTRIPSY: CPT | Performed by: STUDENT IN AN ORGANIZED HEALTH CARE EDUCATION/TRAINING PROGRAM

## 2022-08-11 PROCEDURE — 87070 CULTURE OTHR SPECIMN AEROBIC: CPT | Performed by: STUDENT IN AN ORGANIZED HEALTH CARE EDUCATION/TRAINING PROGRAM

## 2022-08-11 PROCEDURE — C1769 GUIDE WIRE: HCPCS | Performed by: STUDENT IN AN ORGANIZED HEALTH CARE EDUCATION/TRAINING PROGRAM

## 2022-08-11 PROCEDURE — 82948 REAGENT STRIP/BLOOD GLUCOSE: CPT

## 2022-08-11 PROCEDURE — 87205 SMEAR GRAM STAIN: CPT | Performed by: STUDENT IN AN ORGANIZED HEALTH CARE EDUCATION/TRAINING PROGRAM

## 2022-08-11 PROCEDURE — 74420 UROGRAPHY RTRGR +-KUB: CPT

## 2022-08-11 PROCEDURE — C2617 STENT, NON-COR, TEM W/O DEL: HCPCS | Performed by: STUDENT IN AN ORGANIZED HEALTH CARE EDUCATION/TRAINING PROGRAM

## 2022-08-11 DEVICE — INLAY OPTIMA URETERAL STENT W/O GUIDEWIRE
Type: IMPLANTABLE DEVICE | Site: URETER | Status: FUNCTIONAL
Brand: BARD® INLAY OPTIMA® URETERAL STENT

## 2022-08-11 RX ORDER — PROPOFOL 10 MG/ML
INJECTION, EMULSION INTRAVENOUS AS NEEDED
Status: DISCONTINUED | OUTPATIENT
Start: 2022-08-11 | End: 2022-08-11

## 2022-08-11 RX ORDER — SODIUM CHLORIDE, SODIUM LACTATE, POTASSIUM CHLORIDE, CALCIUM CHLORIDE 600; 310; 30; 20 MG/100ML; MG/100ML; MG/100ML; MG/100ML
125 INJECTION, SOLUTION INTRAVENOUS CONTINUOUS
Status: DISCONTINUED | OUTPATIENT
Start: 2022-08-11 | End: 2022-08-11 | Stop reason: HOSPADM

## 2022-08-11 RX ORDER — CEFAZOLIN SODIUM 2 G/50ML
2000 SOLUTION INTRAVENOUS
Status: COMPLETED | OUTPATIENT
Start: 2022-08-11 | End: 2022-08-11

## 2022-08-11 RX ORDER — PROMETHAZINE HYDROCHLORIDE 25 MG/ML
25 INJECTION, SOLUTION INTRAMUSCULAR; INTRAVENOUS EVERY 6 HOURS PRN
Status: DISCONTINUED | OUTPATIENT
Start: 2022-08-11 | End: 2022-08-11 | Stop reason: HOSPADM

## 2022-08-11 RX ORDER — FENTANYL CITRATE 50 UG/ML
INJECTION, SOLUTION INTRAMUSCULAR; INTRAVENOUS AS NEEDED
Status: DISCONTINUED | OUTPATIENT
Start: 2022-08-11 | End: 2022-08-11

## 2022-08-11 RX ORDER — MIDAZOLAM HYDROCHLORIDE 2 MG/2ML
INJECTION, SOLUTION INTRAMUSCULAR; INTRAVENOUS AS NEEDED
Status: DISCONTINUED | OUTPATIENT
Start: 2022-08-11 | End: 2022-08-11

## 2022-08-11 RX ORDER — LISINOPRIL 40 MG/1
40 TABLET ORAL DAILY
COMMUNITY
End: 2022-09-27 | Stop reason: SDUPTHER

## 2022-08-11 RX ORDER — ONDANSETRON 2 MG/ML
4 INJECTION INTRAMUSCULAR; INTRAVENOUS ONCE AS NEEDED
Status: DISCONTINUED | OUTPATIENT
Start: 2022-08-11 | End: 2022-08-11 | Stop reason: HOSPADM

## 2022-08-11 RX ORDER — FENTANYL CITRATE/PF 50 MCG/ML
25 SYRINGE (ML) INJECTION
Status: COMPLETED | OUTPATIENT
Start: 2022-08-11 | End: 2022-08-11

## 2022-08-11 RX ORDER — LABETALOL HYDROCHLORIDE 5 MG/ML
20 INJECTION, SOLUTION INTRAVENOUS ONCE
Status: COMPLETED | OUTPATIENT
Start: 2022-08-11 | End: 2022-08-11

## 2022-08-11 RX ORDER — ONDANSETRON 2 MG/ML
INJECTION INTRAMUSCULAR; INTRAVENOUS AS NEEDED
Status: DISCONTINUED | OUTPATIENT
Start: 2022-08-11 | End: 2022-08-11

## 2022-08-11 RX ORDER — LIDOCAINE HYDROCHLORIDE 10 MG/ML
INJECTION, SOLUTION EPIDURAL; INFILTRATION; INTRACAUDAL; PERINEURAL AS NEEDED
Status: DISCONTINUED | OUTPATIENT
Start: 2022-08-11 | End: 2022-08-11

## 2022-08-11 RX ORDER — MAGNESIUM HYDROXIDE 1200 MG/15ML
LIQUID ORAL AS NEEDED
Status: DISCONTINUED | OUTPATIENT
Start: 2022-08-11 | End: 2022-08-11 | Stop reason: HOSPADM

## 2022-08-11 RX ORDER — ACETAMINOPHEN 325 MG/1
650 TABLET ORAL ONCE
Status: COMPLETED | OUTPATIENT
Start: 2022-08-11 | End: 2022-08-11

## 2022-08-11 RX ORDER — HYDROMORPHONE HCL IN WATER/PF 6 MG/30 ML
0.2 PATIENT CONTROLLED ANALGESIA SYRINGE INTRAVENOUS
Status: DISCONTINUED | OUTPATIENT
Start: 2022-08-11 | End: 2022-08-11 | Stop reason: HOSPADM

## 2022-08-11 RX ORDER — DEXAMETHASONE SODIUM PHOSPHATE 10 MG/ML
INJECTION, SOLUTION INTRAMUSCULAR; INTRAVENOUS AS NEEDED
Status: DISCONTINUED | OUTPATIENT
Start: 2022-08-11 | End: 2022-08-11

## 2022-08-11 RX ADMIN — FENTANYL CITRATE 25 MCG: 50 INJECTION INTRAMUSCULAR; INTRAVENOUS at 10:18

## 2022-08-11 RX ADMIN — DEXAMETHASONE SODIUM PHOSPHATE 10 MG: 10 INJECTION, SOLUTION INTRAMUSCULAR; INTRAVENOUS at 09:54

## 2022-08-11 RX ADMIN — ONDANSETRON HYDROCHLORIDE 4 MG: 2 INJECTION, SOLUTION INTRAMUSCULAR; INTRAVENOUS at 09:56

## 2022-08-11 RX ADMIN — MIDAZOLAM 1 MG: 1 INJECTION INTRAMUSCULAR; INTRAVENOUS at 09:48

## 2022-08-11 RX ADMIN — LIDOCAINE HYDROCHLORIDE 50 MG: 10 INJECTION, SOLUTION EPIDURAL; INFILTRATION; INTRACAUDAL; PERINEURAL at 09:50

## 2022-08-11 RX ADMIN — HYDROMORPHONE HYDROCHLORIDE 0.2 MG: 0.2 INJECTION, SOLUTION INTRAMUSCULAR; INTRAVENOUS; SUBCUTANEOUS at 12:42

## 2022-08-11 RX ADMIN — PHENYLEPHRINE HYDROCHLORIDE 30 MCG/MIN: 10 INJECTION INTRAVENOUS at 10:07

## 2022-08-11 RX ADMIN — FENTANYL CITRATE 25 MCG: 50 INJECTION INTRAMUSCULAR; INTRAVENOUS at 09:54

## 2022-08-11 RX ADMIN — Medication 25 MCG: at 12:25

## 2022-08-11 RX ADMIN — Medication 25 MCG: at 12:39

## 2022-08-11 RX ADMIN — Medication 100 MCG: at 10:03

## 2022-08-11 RX ADMIN — MIDAZOLAM 1 MG: 1 INJECTION INTRAMUSCULAR; INTRAVENOUS at 09:42

## 2022-08-11 RX ADMIN — SODIUM CHLORIDE, SODIUM LACTATE, POTASSIUM CHLORIDE, AND CALCIUM CHLORIDE 125 ML/HR: .6; .31; .03; .02 INJECTION, SOLUTION INTRAVENOUS at 08:30

## 2022-08-11 RX ADMIN — FENTANYL CITRATE 25 MCG: 50 INJECTION INTRAMUSCULAR; INTRAVENOUS at 10:39

## 2022-08-11 RX ADMIN — CEFAZOLIN SODIUM 2000 MG: 2 SOLUTION INTRAVENOUS at 09:53

## 2022-08-11 RX ADMIN — PROMETHAZINE HYDROCHLORIDE 25 MG: 25 INJECTION INTRAMUSCULAR; INTRAVENOUS at 13:23

## 2022-08-11 RX ADMIN — Medication 25 MCG: at 12:05

## 2022-08-11 RX ADMIN — PROPOFOL 200 MG: 10 INJECTION, EMULSION INTRAVENOUS at 09:50

## 2022-08-11 RX ADMIN — ACETAMINOPHEN 650 MG: 325 TABLET ORAL at 15:10

## 2022-08-11 RX ADMIN — FENTANYL CITRATE 25 MCG: 50 INJECTION INTRAMUSCULAR; INTRAVENOUS at 09:56

## 2022-08-11 RX ADMIN — Medication 200 MCG: at 10:06

## 2022-08-11 RX ADMIN — Medication 25 MCG: at 12:04

## 2022-08-11 RX ADMIN — LABETALOL HYDROCHLORIDE 20 MG: 5 INJECTION, SOLUTION INTRAVENOUS at 12:34

## 2022-08-11 NOTE — ANESTHESIA PREPROCEDURE EVALUATION
Procedure:  CYSTOSCOPY URETEROSCOPY WITH LITHOTRIPSY HOLMIUM LASER, RETROGRADE PYELOGRAM AND INSERTION STENT URETERAL (Left Bladder)    Relevant Problems   CARDIO   (+) Benign essential hypertension   (+) Hyperlipidemia      ENDO   (+) Type 2 diabetes mellitus with stage 3a chronic kidney disease, with long-term current use of insulin (HCC)      /RENAL   (+) JAYNE (acute kidney injury) (HCC)   (+) Prostate cancer (HCC)        Physical Exam    Airway    Mallampati score: I  TM Distance: >3 FB  Neck ROM: full     Dental   No notable dental hx     Cardiovascular  Cardiovascular exam normal    Pulmonary  Pulmonary exam normal     Other Findings     SUMMARY:  -  Stress results: Duration of exercise was 6 min and 30 sec  Functional capacity was slightly decreased  Target heart rate was achieved  There was resting hypertension with a hypertensive blood pressure response to stress  There was no  chest pain during stress  -  ECG conclusions: The stress ECG was negative for ischemia and normal  Based on [place] Treadmill Scoring, this patient was at low risk for cardiac events  -  Perfusion imaging: There were no perfusion defects   -  Gated SPECT: The calculated left ventricular ejection fraction was 56 %  Left ventricular ejection fraction was within normal limits by visual estimate  There was no left ventricular regional abnormality  IMPRESSIONS: Normal study after maximal exercise without reproduction of symptoms  Myocardial perfusion imaging was normal at rest and with stress  Left ventricular systolic function was normal      Prepared and signed by     Arcadio Miranda MD  Signed 05/31/2018 13:21:36         Anesthesia Plan  ASA Score- 3     Anesthesia Type- general with ASA Monitors  Additional Monitors:   Airway Plan: LMA  Plan Factors-Exercise tolerance (METS): >4 METS  Chart reviewed  EKG reviewed  Imaging results reviewed  Existing labs reviewed  Patient summary reviewed      Patient is not a current smoker  Induction- intravenous  Postoperative Plan- Plan for postoperative opioid use  Planned trial extubation    Informed Consent- Anesthetic plan and risks discussed with patient  I personally reviewed this patient with the CRNA  Discussed and agreed on the Anesthesia Plan with the CRNA  Matthias Keating

## 2022-08-11 NOTE — ANESTHESIA POSTPROCEDURE EVALUATION
Post-Op Assessment Note    CV Status:  Stable    Pain management: adequate     Mental Status:  Awake and sleepy   Hydration Status:  Euvolemic   PONV Controlled:  Controlled   Airway Patency:  Patent      Post Op Vitals Reviewed: Yes      Staff: CRNA         No complications documented      BP   174/89   Temp  97 4   Pulse  93   Resp   12   SpO2   97

## 2022-08-11 NOTE — INTERVAL H&P NOTE
H&P reviewed  After examining the patient I find no changes in the patients condition since the H&P had been written  Vitals:    08/11/22 0805   BP: 145/87   Pulse: 89   Resp: 16   Temp: (!) 97 2 °F (36 2 °C)   SpO2: 95%     Patient underwent left stent placement  Urine culture returned negative and patient presents for definitive stone treatment  Cystoscopy, left ureteroscopy, laser lithotripsy, stone extraction, and ureteral stent placement advised  Risks presented to the patient which include but are not limited to:  infection/sepsis, bleeding, pain, dysuria, stent symptoms, injury to the urethra/bladder/ureter/kidney, ureteral stricture, need for multiple procedures, treatment failure, stone recurrence, risks of anesthesia      He/she understands and agrees to proceed  All questions answered  Consent signed

## 2022-08-11 NOTE — DISCHARGE INSTRUCTIONS
Mr Luis Griffin,    We successfully treated your stone today  I will make arrangements for you to see Dr Job Sutton for stent removal in 7-14 days with an X ray of the kidneys prior  Best,    Dr Martinez Carr    Instructions for Ureteral Stent      A stent was placed in your ureter (the tube from your kidney to your bladder)    Some discomfort is normal  Certain movements may trigger pain or a feeling that you need to urinate  You may also feel soreness or pressure before or during urination  These symptoms will go away a few days after the stent has been removed  Your urine may be slightly pink or red  This is due to bleeding caused by minor irritation from the stent  This may happen on and off while you have the stent in place  It is important to drink water (at least 1 liter or quart each day) to dilute your urine  AFTER the stent is removed you may have ureteral spasms  This may feel very similar to pain caused by a kidney stone  This pain will pass with time as the swelling in your ureter subsides  Warm showers can help with ureteral spasm pain  The ureteral stent is temporary and should not remain in your body  A stent that stays in for too long can be dangerous and possibly require major surgery to extract from your body  It is extremely important that you keep your follow up appointments with the urology department  Please call our office at [phone] if you have any questions  Control of Pain and Stent Discomfort:   Ibuprofen (Motrin) and acetaminophen (Tylenol) according to manufacturers instructions  Take tamsulosin (Flomax) 0 4 mg daily to help with stent discomfort  Take Ditropan (oxybutynin) daily as needed for urinary frequency/urgency while stent is in place

## 2022-08-12 ENCOUNTER — TRANSITIONAL CARE MANAGEMENT (OUTPATIENT)
Dept: INTERNAL MEDICINE CLINIC | Facility: CLINIC | Age: 62
End: 2022-08-12

## 2022-08-12 ENCOUNTER — TELEPHONE (OUTPATIENT)
Dept: OTHER | Facility: HOSPITAL | Age: 62
End: 2022-08-12

## 2022-08-12 NOTE — TELEPHONE ENCOUNTER
Regis De Leon MD  to [facility]          8/12/22 4:58 PM  Please schedule with Dr Ton Snider for cysto stent removal in 7-14 days with a KUB prior  Post Op Note    Raúl Michele is a 65 y o  male s/p CYSTOSCOPY URETEROSCOPY WITH LITHOTRIPSY HOLMIUM LASER, RETROGRADE PYELOGRAM AND INSERTION STENT URETERAL (Left Bladder) performed 8/11/22 by Dr Concetta Krishna  Raúl Michele is a patient of Dr Ton Snider and is seen at the [facility] office  Called and spoke with patient at this time  He is feeling ok s/p his second procedure  He reports mild pain  I reviewed medications he has been prescribed as well as utilizing tylenol and ibuprofen for stent discomfort  He knows to aggressively hydrate in the meantime and avoid bladder irritants  Reviewed plan for stent is to remain in place for 1-2 weeks  Appt for cysto stent removal scheduled with Dr Ton Snider at the one week jacquie  He will get KUB done on 8/17/22  He knows what the cysto stent removal procedure is, but I did briefly review it with him  He had no further questions or concerns      Office to ensure KUB read by 8/18 appt

## 2022-08-12 NOTE — OP NOTE
OPERATIVE REPORT     Name: Gali Reyes     MRN: [id number]  Date: 8/11/2022 Time: 4:47 PM     Location:       [facility]   Date of Operation:  8/11/2022   Service: Urology     Pre-op Diagnosis:  Left ureteral and renal calculus    Post-op Diagnosis:  Same     Operation:     Cystoscopy   Left ureteroscopy laser lithotripsy basket stone extraction  Left Retrograde Pyelogram   Left ureteral stent placement (6Fr x 26 cm JJ stent)  Fluoroscopic Guidance    Surgeon:  Cece Galeano MD  Assistants:  None      Anesthesia:  General   Drains: 6Fr x 26 cm JJ stent  Estimated Blood Loss:  Minimal   Urine Output:  N/A   Specimens:  Left renal stone for analysis and culture  Apparent Intraoperative Complications:  None   Patient Condition:  Stable   Disposition:  PACU  Antibiotic Prophylaxis:  Ancef 2 g     Indications:     65 y o  male who recently presented with left ureteral stone, lactic acidosis, JAYNE, and leukocytosis  He underwent left ureteral stent placement  He presents for definitive stone treatment  Risks, benefits and alternatives to treatment were discussed with the patient who elected to proceed with the operation  Findings:    - normal urethra   - prostate is surgically absent   - orthotopic ureteral orifices   - normal bladder mucosa     Operative Report:    The patient was identified, and the procedure verified  After induction of anesthesia the patient was prepped and draped in the dorsolithotomy position  ? A  film was obtained  A 22 5 Fr rigid cystoscope was passed per urethra to the bladder revealing the above findings  A stent grasper was used to grasp the distal end of the stent and bring it just outside of the urethral meatus  A SoloPlus wire was passed through the stent up to the renal pelvis under fluoroscopic guidance  A 2nd working wire was passed through the cystoscope up to the left renal pelvis  A 12/14Fr x 35cm ureteral access sheath was passed up to the proximal ureter   A flexible ureteroscope was passed through the sheath and up the ureter revealing all stone now within the kidney  A 272 micron holmium laser fiber was used to fragment the stone using dusting technique  [employer] technique was used to break the remaining stone pieces into fragments smaller than 2 mm  A skylight basket was used to remove all fragments greater than 2 mm  Systematic renoscopy demonstrated tiny residual stone fragments  A retrograde pyelogram was shot through the flexible ureteroscope  The flexible ureteroscope was backed down the ureter in tandem with the ureteral access sheath  Inspection of the ureter demonstrated no residual stone fragments  A 6Fr ?x 26cm? JJ stent was placed in standard retrograde fashion under fluoroscopic guidance  ? Upon removal of the wire an adequate curl was noted in the bladder on fluoroscopy  The upper curl of the stent was noted to be in the upper pole  The bladder was emptied  The patient was awoken from anesthesia?and transferred to PACU in satisfactory condition  ? Left retrograde pyelogram interpretation: Normal caliber, no filling defects, smooth contours of the renal pelvis, sharp calyces  I was present for the entire procedure     Plan:    Flomax, Ditropan p r n   For stent discomfort  Return to clinic in 7-14 days for cysto stent removal in office with a KUB prior    SIGNATURE: Itzel Hernandez MD  DATE: 8/12/2022  TIME: 4:47 PM

## 2022-08-13 LAB
BACTERIA BLD CULT: NORMAL
BACTERIA BLD CULT: NORMAL

## 2022-08-14 LAB
BACTERIA TISS AEROBE CULT: NO GROWTH
GRAM STN SPEC: NORMAL

## 2022-08-16 LAB
COLOR STONE: NORMAL
COLOR STONE: NORMAL
COMMENT-STONE3: NORMAL
COMMENT-STONE3: NORMAL
COMPOSITION: NORMAL
COMPOSITION: NORMAL
LABORATORY COMMENT REPORT: NORMAL
LABORATORY COMMENT REPORT: NORMAL
PHOTO: NORMAL
PHOTO: NORMAL
SIZE STONE: NORMAL MM
SIZE STONE: NORMAL MM
SPEC SOURCE SUBJ: NORMAL
SPEC SOURCE SUBJ: NORMAL
STONE ANALYSIS-IMP: NORMAL
URATE MFR STONE: 100 %
URATE MFR STONE: 100 %
WT STONE: 126 MG
WT STONE: 152 MG

## 2022-08-17 ENCOUNTER — HOSPITAL ENCOUNTER (OUTPATIENT)
Dept: RADIOLOGY | Facility: HOSPITAL | Age: 62
Discharge: HOME | End: 2022-08-17
Attending: STUDENT IN AN ORGANIZED HEALTH CARE EDUCATION/TRAINING PROGRAM
Payer: COMMERCIAL

## 2022-08-17 DIAGNOSIS — N20.1 LEFT URETERAL STONE: ICD-10-CM

## 2022-08-17 PROCEDURE — 74018 RADEX ABDOMEN 1 VIEW: CPT

## 2022-08-18 ENCOUNTER — PROCEDURE VISIT (OUTPATIENT)
Dept: UROLOGY | Facility: CLINIC | Age: 62
End: 2022-08-18
Payer: COMMERCIAL

## 2022-08-18 VITALS
SYSTOLIC BLOOD PRESSURE: 150 MMHG | BODY MASS INDEX: 43.95 KG/M2 | OXYGEN SATURATION: 96 % | HEIGHT: 65 IN | WEIGHT: 263.8 LBS | HEART RATE: 93 BPM | RESPIRATION RATE: 18 BRPM | DIASTOLIC BLOOD PRESSURE: 88 MMHG

## 2022-08-18 DIAGNOSIS — C61 PROSTATE CANCER (HCC): ICD-10-CM

## 2022-08-18 DIAGNOSIS — N20.1 URETEROLITHIASIS: Primary | ICD-10-CM

## 2022-08-18 PROCEDURE — 96372 THER/PROPH/DIAG INJ SC/IM: CPT

## 2022-08-18 PROCEDURE — 52310 CYSTOSCOPY AND TREATMENT: CPT | Performed by: UROLOGY

## 2022-08-18 RX ORDER — CEFTRIAXONE 1 G/1
1000 INJECTION, POWDER, FOR SOLUTION INTRAMUSCULAR; INTRAVENOUS ONCE
Status: COMPLETED | OUTPATIENT
Start: 2022-08-18 | End: 2022-08-18

## 2022-08-18 RX ADMIN — CEFTRIAXONE 1000 MG: 1 INJECTION, POWDER, FOR SOLUTION INTRAMUSCULAR; INTRAVENOUS at 15:25

## 2022-08-18 NOTE — PROGRESS NOTES
Cystoscopy     Date/Time 8/18/2022 8:23 AM     Performed by  Rodney Santoyo MD     Authorized by Rodney Santoyo MD          Procedure Details:  Procedure type: simple removal of a foreign body, stone, or stent      Office Cystoscopy Procedure Note    Indication:    Stent removal status post complex ureteroscopy    Postprocedural KUB is reviewed and shows no radiopaque calculi    Informed consent   The risks, benefits, complications, treatment options, and expected outcomes were discussed with the patient  The patient concurred with the proposed plan and provided informed consent  Anesthesia  Lidocaine jelly 2%    Procedure  The patient was placed in the supineposition, was prepped and draped in the usual manner using sterile technique, and 2% lidocaine jelly instilled into the urethra  A 17 F flexible cystoscope was then inserted into the urethra and the urethra and bladder carefully examined  The following findings were noted:    Findings:  Urethra:  Normal  Prostate:  Surgically absent  Bladder:  Normal  Ureteral orifices:  Normal  Other findings:  None     Pre-placed left ureteral stent was visualized, removed with a flexible grasper under vision    Specimens: None                 Complications:    None; patient tolerated the procedure well           Disposition: To home after 30 minute observation             Condition: Stable    Plan:   - Tamsulosin and pyridium provided  - Rocephin administered  - discussed that the patient's stones are poorly visualized on KUB  - Breanna Sims will have a followup CT in 6-8 weeks  - followup with me in 3 months w PSA  - referred to Nephrology for a metabolic evaluation

## 2022-08-19 RX ORDER — TAMSULOSIN HYDROCHLORIDE 0.4 MG/1
0.4 CAPSULE ORAL
Qty: 15 CAPSULE | Refills: 0 | Status: SHIPPED | OUTPATIENT
Start: 2022-08-19 | End: 2022-08-26

## 2022-08-19 RX ORDER — PHENAZOPYRIDINE HYDROCHLORIDE 200 MG/1
200 TABLET, FILM COATED ORAL 3 TIMES DAILY PRN
Qty: 10 TABLET | Refills: 0 | Status: SHIPPED | OUTPATIENT
Start: 2022-08-19 | End: 2022-08-22

## 2022-08-22 ENCOUNTER — TELEPHONE (OUTPATIENT)
Dept: NEPHROLOGY | Facility: CLINIC | Age: 62
End: 2022-08-22

## 2022-08-22 NOTE — TELEPHONE ENCOUNTER
LM offering patient 9/13 at 11A NP appointment with Dr Calli Edmondson  Please let myself or Shayan Lizarraga know if patient is agreeable to appointment template needs to be opened

## 2022-08-23 ENCOUNTER — OFFICE VISIT (OUTPATIENT)
Dept: INTERNAL MEDICINE CLINIC | Facility: CLINIC | Age: 62
End: 2022-08-23
Payer: COMMERCIAL

## 2022-08-23 VITALS
OXYGEN SATURATION: 97 % | HEART RATE: 93 BPM | SYSTOLIC BLOOD PRESSURE: 138 MMHG | RESPIRATION RATE: 16 BRPM | DIASTOLIC BLOOD PRESSURE: 84 MMHG | WEIGHT: 264.4 LBS | TEMPERATURE: 97.6 F | HEIGHT: 65 IN | BODY MASS INDEX: 44.05 KG/M2

## 2022-08-23 DIAGNOSIS — Z79.4 TYPE 2 DIABETES MELLITUS WITH STAGE 3A CHRONIC KIDNEY DISEASE, WITH LONG-TERM CURRENT USE OF INSULIN (HCC): ICD-10-CM

## 2022-08-23 DIAGNOSIS — N18.31 TYPE 2 DIABETES MELLITUS WITH STAGE 3A CHRONIC KIDNEY DISEASE, WITH LONG-TERM CURRENT USE OF INSULIN (HCC): ICD-10-CM

## 2022-08-23 DIAGNOSIS — E11.65 UNCONTROLLED TYPE 2 DIABETES MELLITUS WITH HYPERGLYCEMIA (HCC): ICD-10-CM

## 2022-08-23 DIAGNOSIS — N20.1 URETEROLITHIASIS: Primary | ICD-10-CM

## 2022-08-23 DIAGNOSIS — E78.2 MIXED HYPERLIPIDEMIA: ICD-10-CM

## 2022-08-23 DIAGNOSIS — I10 BENIGN ESSENTIAL HYPERTENSION: ICD-10-CM

## 2022-08-23 DIAGNOSIS — E11.22 TYPE 2 DIABETES MELLITUS WITH STAGE 3A CHRONIC KIDNEY DISEASE, WITH LONG-TERM CURRENT USE OF INSULIN (HCC): ICD-10-CM

## 2022-08-23 PROCEDURE — 1111F DSCHRG MED/CURRENT MED MERGE: CPT | Performed by: INTERNAL MEDICINE

## 2022-08-23 PROCEDURE — 99495 TRANSJ CARE MGMT MOD F2F 14D: CPT | Performed by: INTERNAL MEDICINE

## 2022-08-23 RX ORDER — INSULIN GLARGINE 300 U/ML
80 INJECTION, SOLUTION SUBCUTANEOUS DAILY
Qty: 22.5 ML | Refills: 3 | Status: SHIPPED | OUTPATIENT
Start: 2022-08-23

## 2022-08-23 NOTE — PROGRESS NOTES
Assessment/Plan:     Ureterolithiasis  Uric acid stone  Check uric acid with next set of labs  F/U with nephrology and urology         Problem List Items Addressed This Visit        Endocrine    Type 2 diabetes mellitus with stage 3a chronic kidney disease, with long-term current use of insulin (HCC)    Relevant Orders    Hemoglobin A1C    Comprehensive metabolic panel    CBC and differential    Microalbumin / creatinine urine ratio    Lipid Panel with Direct LDL reflex    TSH, 3rd generation with Free T4 reflex       Cardiovascular and Mediastinum    Benign essential hypertension    Relevant Orders    Comprehensive metabolic panel    CBC and differential    TSH, 3rd generation with Free T4 reflex       Genitourinary    Ureterolithiasis - Primary     Uric acid stone  Check uric acid with next set of labs  F/U with nephrology and urology         Relevant Orders    Uric acid       Other    Hyperlipidemia    Relevant Orders    Comprehensive metabolic panel    Lipid Panel with Direct LDL reflex    TSH, 3rd generation with Free T4 reflex           Subjective:     Patient ID: Gali Reyes is a 65 y o  male  HPI  ER visit on 8/8 for left flank pain and chills, JAYNE  Dx with 9mm left ureteral stone and underwent cystoscopy, stent placement  He received abx as well  Cultures negative  On 8/11, he underwent lithotripsy  Stone is uric acid  He has neem recommended to see nephrology    Review of Systems   Constitutional: Negative for chills and fever  Cardiovascular: Negative for chest pain and palpitations  Gastrointestinal: Negative for abdominal pain and constipation  Objective:     Physical Exam  Constitutional:       General: He is not in acute distress  Appearance: He is well-developed  He is obese  He is not ill-appearing, toxic-appearing or diaphoretic  HENT:      Head: Normocephalic and atraumatic     Eyes:      Conjunctiva/sclera: Conjunctivae normal    Cardiovascular:      Rate and Rhythm: Normal rate and regular rhythm  Heart sounds: Normal heart sounds  No murmur heard  Pulmonary:      Effort: Pulmonary effort is normal  No respiratory distress  Breath sounds: Normal breath sounds  No wheezing or rales  Abdominal:      General: There is no distension  Palpations: Abdomen is soft  There is no mass  Tenderness: There is no abdominal tenderness  There is no guarding or rebound  Musculoskeletal:      Cervical back: Neck supple  Right lower leg: No edema  Left lower leg: No edema  Skin:     General: Skin is warm and dry  Neurological:      Mental Status: He is alert and oriented to person, place, and time  Psychiatric:         Mood and Affect: Mood normal          Behavior: Behavior normal          Thought Content: Thought content normal          Judgment: Judgment normal            Vitals:    08/23/22 1448   BP: 138/84   Pulse: 93   Resp: 16   Temp: 97 6 °F (36 4 °C)   SpO2: 97%   Weight: 120 kg (264 lb 6 4 oz)   Height: 5' 5" (1 651 m)       Transitional Care Management Review:  Digna Manning is a 65 y o  male here for TCM follow up  During the TCM phone call patient stated:    TCM Call     Date and time call was made  8/12/2022 11:54 AM    Hospital care reviewed  Records not available    Patient was hospitialized at  [facility]    Date of Admission  08/08/22    Date of discharge  08/11/22    Diagnosis  Ureterolithiasis    Disposition  Home    Were the patients medications reviewed and updated  No    Current Symptoms  Lower abdominal pain      TCM Call     Should patient be enrolled in anticoag monitoring? No    Scheduled for follow up?   Yes    Did you obtain your prescribed medications  Yes    Do you need help managing your prescriptions or medications  No    Is transportation to your appointment needed  No    I have advised the patient to call PCP with any new or worsening symptoms  [facility] ONE[facility]/MA    Living Arrangements  Spouse or Significiant other    Are you recieving any outpatient services  No    Are you recieving home care services  No    Are you using any community resources  No    Current waiver services  No    Have you fallen in the last 12 months  No    Interperter language line needed  No          Paxton Leyva MD

## 2022-08-26 DIAGNOSIS — N20.1 URETEROLITHIASIS: ICD-10-CM

## 2022-08-26 RX ORDER — TAMSULOSIN HYDROCHLORIDE 0.4 MG/1
CAPSULE ORAL
Qty: 90 CAPSULE | Refills: 1 | Status: SHIPPED | OUTPATIENT
Start: 2022-08-26

## 2022-08-28 PROBLEM — N17.9 AKI (ACUTE KIDNEY INJURY) (HCC): Status: RESOLVED | Noted: 2018-06-15 | Resolved: 2022-08-28

## 2022-08-28 PROBLEM — D72.829 LEUKOCYTOSIS: Status: RESOLVED | Noted: 2018-06-15 | Resolved: 2022-08-28

## 2022-08-28 PROBLEM — N12 PYELONEPHRITIS: Status: RESOLVED | Noted: 2022-08-08 | Resolved: 2022-08-28

## 2022-09-05 PROBLEM — N20.0 NEPHROLITHIASIS: Status: ACTIVE | Noted: 2022-09-05

## 2022-09-05 PROBLEM — E11.21 DIABETIC NEPHROPATHY ASSOCIATED WITH TYPE 2 DIABETES MELLITUS (HCC): Status: ACTIVE | Noted: 2022-09-05

## 2022-09-05 PROBLEM — N18.30 STAGE 3 CHRONIC KIDNEY DISEASE (HCC): Status: ACTIVE | Noted: 2022-09-05

## 2022-09-05 PROBLEM — I12.9 PARENCHYMAL RENAL HYPERTENSION: Status: ACTIVE | Noted: 2022-09-05

## 2022-09-05 NOTE — PROGRESS NOTES
Consultation - Nephrology 9/13/2022        History of Present Illness   Reason for Consult / Principal Problem:  CKD 3B/nephrolithiasis    HPI: Rika Spear is a 65y o  year old male with a history of diabetes mellitus type 2/dyslipidemia/hypertension/prostate cancer status post robotic prostatectomy June 2018/ELENA who we are asked to see regarding nephrolithiasis/CKD            Pertinent labs:  · Creatinine typically ranged 1 23 to 1 42 as of 2021 with an episode of acute kidney injury 2018  · Creatinine 1 52 as of 05/12/2022  · Creatinine:  2 10 as of 08/08/2022 but came down to 1 69 as of 08/09/2022 during hospital admission please see below  · Normal electrolytes including potassium 4 8  · Total protein 6 4/calcium 8 4/albumin 3 2  · CBC with a hemoglobin of 12 3 normal platelet count leukocytosis 14 77 during hospitalization  · UA:  Positive glucose/no heme trace proteinuria no RBCs WBCs or bacteria seen      Two episodes hospitalizations for nephrolithiasis  Patient had an episode about 15 years ago requiring a stent and then he subsequently passed a stone after stent was removed  Had lithotripsy subsequently    Patient with recurrent nephrolithiasis about a total 13 stone episodes typically passes without intervention  Seen by Dr Wood Stout in February left lower pole calculus 7 mm at that time  Subsequently hospitalized 08/08/2022 for left flank pain had a 9 mm ureteral stone underwent cystoscopy with left lithotripsy ureteral stent  placement started on antibiotics for chills leukocytosis cultures were negative and urine culture was negative  Completed a course of Keflex  Patient also noted to have acute kidney injury at that time which improved after cystoscopy and IV fluids with normalization of creatinine      KUB on 08/17/2022 no radiopaque urinary tract calculi double-J ureteral stent in appropriate position    The patient denies any history of thyroid disease or neck radiation as a child; no history of GI symptoms including peptic ulcer disease/pancreatitis/chronic diarrhea  No significant history of joint problems/unusual skin rash/lung disease    Dietary review:  -combination of coffee/water/soda about 64 oz a day  -tries to avoid salt does not add to his food  -does take vitamin-D 400 units and a multivitamin    No calcium, and no excess vitamin-C as well    Patient with history of prostatectomy about 4 years ago for prostate cancer no need for radiation  No history urinary tract infections  No significant NSAID use  No urinary symptoms at this time including dysuria hematuria voiding symptoms foamy urine; patient is incontinent related to the prostate surgery  No significant persistent lower extremity edema   Only occasional paresthesias nothing persistent    History diabetes mellitus type 2 for about 30 years but no overt retinopathy or neuropathy  -metformin 1000 mg twice a day  -insulin  -Trulicity      History of hypertension for possibly even 40 years fairly well controlled does occasionally check it at home typically runs about 140/80  Current medications:  · Lisinopril 40 mg daily in the morning  · Carvedilol 25 mg twice a day  · Amlodipine 5 mg daily in the morning        Review of systems:    General:  No fevers chills cough or colds recently, good appetite energy weight is fairly stable  Cardiovascular:  No chest pain or shortness of breath or leg edema at this time  Respiratory:  Occasionally wheezing, no coughing  Gastrointestinal:  No significant nausea vomiting or diarrhea only dietary related at time; no abdominal pain no bleeding in the stool; GI health maintenance:  02/10/2021 last colonoscopy  Genitourinary:  See HPI  Neurology:  Not only occasional headaches, rare dizziness/lightheadedness upon standing suddenly  All other systems were reviewed and are negative    Historical Information   Past Medical History:   Diagnosis Date    Acute bronchitis     Acute low back pain     Acute low back pain     [id number] resolved    Acute respiratory infection     JAYNE [facility]acute kidney injury) ([facility] [address] ) 6/15/2018    Cancer (HCC)     prostate    Cellulitis of scrotum     Cough     Diabetes mellitus (HCC)     Herpes zoster     High cholesterol     Hyperkalemia     Hypertension     Kidney disease     Kidney stone     Leukocytosis 6/15/2018    Low back pain     Lumbar radiculopathy     Pyelonephritis 8/8/2022    Rash     Retinopathy, central serous     Sleep apnea     [id number]    Trochanteric bursitis    · No history of CAD/CHF/CVA/seizures/liver disease/lung disease  Of note patient not aware of a history of sleep apnea      Past Surgical History:   Procedure Laterality Date    ABDOMINAL ADHESION SURGERY N/A 6/13/2018    Procedure: LYSIS ADHESIONS;  Surgeon: Larissa Peña MD;  [facility];  Service: Urology    FL RETROGRADE PYELOGRAM  8/8/2022    FL RETROGRADE PYELOGRAM  8/11/2022    LITHOTRIPSY      renal    AZ CYSTO/URETERO W/LITHOTRIPSY &INDWELL STENT INSRT Left 8/11/2022    Procedure: CYSTOSCOPY URETEROSCOPY WITH LITHOTRIPSY HOLMIUM LASER, RETROGRADE PYELOGRAM AND INSERTION STENT URETERAL;  Surgeon: Otilio Saul MD;  [facility];  Service: Urology    AZ CYSTOURETHROSCOPY,URETER CATHETER Left 8/8/2022    Procedure: CYSTOSCOPY RETROGRADE PYELOGRAM WITH INSERTION STENT URETERAL;  Surgeon: Otilio Saul MD;  [facility];  Service: Urology    AZ LAP,PROSTATECTOMY,RADICAL,W/NERVE SPARE,INCL ROBOTIC N/A 6/13/2018    Procedure: Meera Ceja W ROBOTICS;  Surgeon: Larissa Peña MD;  [facility];  Service: Urology    PROSTATE SURGERY  06/13/2018    SHOULDER SURGERY       Social History   Social History     Substance and Sexual Activity   Alcohol Use No     Social History     Substance and Sexual Activity   Drug Use No     Social History     Tobacco Use   Smoking Status Never Smoker   Smokeless Tobacco Never Used   · No significant salt intake  · No dedicated exercise    Family History   Problem Relation Age of Onset    Other Father         cardiac disorder    Stroke Father     Diabetes Father         mellitus    Hypertension Father     Heart disease Father         cardiac disorder    Cancer Sister     Cancer Paternal Grandmother     Heart disease Family         cardiac disorder    Kidney disease Family    · Father with kidney disease in later years was close to ESRD:  He did have diabetes mellitus and hypertension  No history kidney stones  · Both parents with hypertension    Meds/Allergies   all current active meds have been reviewed, current meds:   Current Outpatient Medications:     albuterol (PROVENTIL HFA,VENTOLIN HFA) 90 mcg/act inhaler, Inhale 1-2 puffs every 6 (six) hours as needed  , Disp: , Rfl:     amLODIPine (NORVASC) 5 mg tablet, TAKE 1 TABLET BY MOUTH EVERY DAY, Disp: 90 tablet, Rfl: 1    ASPIRIN 81 PO, Take 1 capsule by mouth 2 (two) times a day  , Disp: , Rfl:     atorvastatin (LIPITOR) 80 mg tablet, TAKE 1 TABLET BY MOUTH EVERY DAY, Disp: 90 tablet, Rfl: 1    BD Pen Needle Rosalie U/F 32G X 4 MM MISC, Inject under the skin 4 (four) times a day, Disp: 400 each, Rfl: 3    carvedilol (COREG) 25 mg tablet, TAKE 1 TABLET BY MOUTH TWICE A DAY WITH MEALS, Disp: 180 tablet, Rfl: 2    Dulaglutide (Trulicity) 3 VQ/8 3WR SOPN, Inject 0 5 mL (3 mg total) under the skin once a week, Disp: 6 mL, Rfl: 1    glimepiride (AMARYL) 4 mg tablet, TAKE 1 TABLET BY MOUTH 2 TIMES A DAY , Disp: 180 tablet, Rfl: 1    glucose blood (Contour Next Test) test strip, Check sugar QID, Disp: 400 each, Rfl: 3    glucose blood (Contour Next Test) test strip, Use 1 each 4 (four) times a day Use as instructed (Patient taking differently: Use 1 each daily 3 to 4 times daily   Before meals or randomly), Disp: 400 each, Rfl: 3    hydrochlorothiazide (HYDRODIURIL) 25 mg tablet, Take 1 tablet (25 mg total) by mouth daily, Disp: 90 tablet, Rfl: 3   insuln lispro (HumaLOG KwikPen) 200 units/mL CONCENTRATED U-200 injection pen, Inject 15 units three times daily with meals  , Disp: 18 mL, Rfl: 5    lisinopril (ZESTRIL) 40 mg tablet, Take 40 mg by mouth daily, Disp: , Rfl:     metFORMIN (GLUCOPHAGE) 1000 MG tablet, TAKE 1 TABLET BY MOUTH TWICE A DAY, Disp: 180 tablet, Rfl: 3    Microlet Lancets MISC, USE 3 (THREE) TIMES A DAY, Disp: 300 each, Rfl: 3    Multiple Vitamins-Minerals (MULTIVITAMIN WITH MINERALS) tablet, Take 1 tablet by mouth daily, Disp: , Rfl:     potassium citrate (Urocit-K 10) 10 mEq, Take 1 tablet (10 mEq total) by mouth 2 (two) times a day, Disp: 180 tablet, Rfl: 3    tamsulosin (FLOMAX) 0 4 mg, TAKE 1 CAPSULE BY MOUTH EVERY DAY WITH DINNER, Disp: 90 capsule, Rfl: 1    Toujeo SoloStar 300 units/mL CONCENTRATED U-300 injection pen (1-unit dial), INJECT 80 UNITS UNDER THE SKIN DAILY, Disp: 22 5 mL, Rfl: 3    Vitamin D, Cholecalciferol, 400 units TABS, Take by mouth daily, Disp: , Rfl:     LISINOPRIL PO, , Disp: , Rfl:     tamsulosin (FLOMAX) 0 4 mg, Take 1 capsule (0 4 mg total) by mouth daily with dinner as needed for kidney stone management  (Patient not taking: No sig reported), Disp: 90 capsule, Rfl: 1    Allergies   Allergen Reactions    Simvastatin      Increases Liver functoin       Objective   BP sitting on right:  130/78 with a heart rate of 76 slightly irregular occasional premature beat  BP sitting on left:  126/70  BP standing on left:  120/70 with heart rate of 84 and regular  Body mass index is 43 93 kg/m²      General:  Well-developed well-nourished no acute distress/obese  Skin:  No acute rash  Eyes:  No scleral icterus, noninjected, conjunctiva appear normal, no discharge from the eyes  ENT:  Normocephalic/atraumatic, mucous membranes moist, tongue appears normal size  Neck:  Supple, no jugular venous distention, 1+ carotid upstroke, no carotid bruits; trachea is midline, no thyromegaly; no lymphadenopathy  Back:  No CVA tenderness, spine appears midline without any overt abnormalities  Chest:  Clear to auscultation and percussion, good respiratory effort, no use of accessory respiratory muscles  CVS:  Regular rate and rhythm without a murmur rub or gallops appreciable  Abdomen/gastrointestinal:  Obese,Normal bowel sounds, nontender and nondistended without hepatosplenomegaly or bruits; no masses appreciable  Extremities:  No clubbing, no cyanosis, no edema, 1+ dorsalis pedis pulses, no femoral bruits, no arthritic changes and full range of motion  Neuro:  No gross focality  Psych:  Alert, oriented and appropriate    Current Weight:   Weight (last 2 days)     Date/Time Weight    09/13/22 0732 120 (264)            Lab Results:  I have personally reviewed pertinent labs  Results for orders placed or performed in visit on 09/09/22   Uric acid   Result Value Ref Range    Uric Acid 8 6 (H) 3 5 - 8 5 mg/dL   Hemoglobin A1C   Result Value Ref Range    Hemoglobin A1C 6 8 (H) Normal 3 8-5 6%; PreDiabetic 5 7-6 4%;  Diabetic >=6 5%; Glycemic control for adults with diabetes <7 0% %     mg/dl   Comprehensive metabolic panel   Result Value Ref Range    Sodium 140 135 - 147 mmol/L    Potassium 4 6 3 5 - 5 3 mmol/L    Chloride 111 (H) 96 - 108 mmol/L    CO2 22 21 - 32 mmol/L    ANION GAP 7 4 - 13 mmol/L    BUN 30 (H) 5 - 25 mg/dL    Creatinine 1 60 (H) 0 60 - 1 30 mg/dL    Glucose, Fasting 147 (H) 65 - 99 mg/dL    Calcium 8 9 8 3 - 10 1 mg/dL    Corrected Calcium 9 4 8 3 - 10 1 mg/dL    AST 25 5 - 45 U/L    ALT 50 12 - 78 U/L    Alkaline Phosphatase 83 46 - 116 U/L    Total Protein 7 1 6 4 - 8 4 g/dL    Albumin 3 4 (L) 3 5 - 5 0 g/dL    Total Bilirubin 0 40 0 20 - 1 00 mg/dL    eGFR 45 ml/min/1 73sq m   TSH, 3rd generation with Free T4 reflex   Result Value Ref Range    TSH 3RD GENERATON 2 740 0 450 - 4 500 uIU/mL   CBC and differential   Result Value Ref Range    WBC 10 80 (H) 4 31 - 10 16 Thousand/uL    RBC 4 75 3 88 - 5 62 Million/uL Hemoglobin 13 4 12 0 - 17 0 g/dL    Hematocrit 42 8 36 5 - 49 3 %    MCV 90 82 - 98 fL    MCH 28 2 26 8 - 34 3 pg    MCHC 31 3 (L) 31 4 - 37 4 g/dL    RDW 12 7 11 6 - 15 1 %    MPV 9 2 8 9 - 12 7 fL    Platelets 235 130 - 797 Thousands/uL    nRBC 0 /100 WBCs    Neutrophils Relative 64 43 - 75 %    Immat GRANS % 0 0 - 2 %    Lymphocytes Relative 23 14 - 44 %    Monocytes Relative 8 4 - 12 %    Eosinophils Relative 4 0 - 6 %    Basophils Relative 1 0 - 1 %    Neutrophils Absolute 6 82 1 85 - 7 62 Thousands/µL    Immature Grans Absolute 0 03 0 00 - 0 20 Thousand/uL    Lymphocytes Absolute 2 53 0 60 - 4 47 Thousands/µL    Monocytes Absolute 0 91 0 17 - 1 22 Thousand/µL    Eosinophils Absolute 0 46 0 00 - 0 61 Thousand/µL    Basophils Absolute 0 05 0 00 - 0 10 Thousands/µL   Lipid Panel with Direct LDL reflex   Result Value Ref Range    Cholesterol 104 See Comment mg/dL    Triglycerides 113 See Comment mg/dL    HDL, Direct 33 (L) >=40 mg/dL    LDL Calculated 48 0 - 100 mg/dL               ASSESSMENT AND PLAN:  65y o  year old male with a history of diabetes mellitus type 2/dyslipidemia/hypertension/prostate cancer status post robotic prostatectomy June 2018/ELENA who we are asked to see regarding nephrolithiasis/CKD      1  CKD stage 3A   Etiology:  Diabetic kidney disease/hypertensive nephrosclerosis/episodes of JAYNE-hydronephrosis of the left kidney/arteriolar nephrosclerosis  Doubt primary glomerular process     Baseline creatinine:  1 37-1 60, most recently 1 60  Recommend:   Workup:  o Follow-up chemistry  o UA with microscopic  o Urine protein creatinine ratio  o Mineral bone disorder evaluation from CKD including magnesium/phosphorus/PTH intact level/vitamin-D level  o CBC  o Lipid profile  o Renal ultrasound with PVR:  To be done did have moderate left-sided hydroureteronephrosis and 9 mm obstructing stone proximal left ureter  o Renal artery duplex to rule out renal artery stenosis     Treatment:  o Treat hypertension, please see below for recommendations  o Treat dyslipidemia  o Avoid nephrotoxic agents such as NSAIDs, and proton pump inhibitors as able; patient counseled as such  o Good overall health recommendations including weight loss as appropriate, isotonic exercise as able, and avoidance of salt; patient counseled as such    Further workup and treatment recommendations will be forthcoming depending upon the above results and course    2  Hypertension:   Goal:  Less than 130/80 given CKD   Push nonmedical regimen as outlined above   Medication changes today:   I will add HCTZ 25 mg daily   Monitor blood pressure we may be able to reduce 1 of the other medications going for   Recheck blood pressures about 3-4 weeks after making the change    3  Nephrolithiasis:  Patient has had calcium oxalate stones as many as 13  most recently requiring intervention with cystoscopy laser lithotripsy and stent placement  Patient with prior calcium oxalate now uric acid stones  Recommend:  Workup:  · Check PTH intact low  · Follow-up chemistry as listed above  · Given incontinence cannot obtain formal 24 hour urine  · Until that time I would recommend a general stone diet/low oxalate diet  · I would place the patient on HCTZ 25 mg daily  · Place the patient on Urocit-K 10 mEq twice a day but most likely will increase to 20 mEq twice a day once we have follow-up labs         4  Other problems:   Diabetes mellitus type 2  : To consider SGLT2 inhibitor per primary physician if no contraindications for renal/cardiac protection   Dyslipidemia on atorvastatin   Prostate cancer status post robotic prostatectomy 2018   ELENA      Patient Instructions     1  Medication changes today:   Please begin HCTZ 25 mg in the morning for both blood pressure and stone prevent   Please begin Urocit-K 10 mEq 1 tablet twice a day with meals   Please follow a general stone diet please see below    2   Please go for  fasting  lab work 1-2 weeks after making the above medication changes  3  Please go for renal ultrasound of your kidneys  4  Please go for an ultrasound of your kidney arteries at this time as well     5  Please take 1 week a blood pressure readings  3 weeks after making the above medication change     AS FOLLOWS  MORNING AND EVENING, SITTING AND STANDING as follows:  · TAKE THE MORNING READINGS BEFORE ANY MEDICATIONS AND WHEN YOU ARE RELAXED FOR SEVERAL MINUTES  · TAKE THE EVENING READINGS:  BETWEEN 7-10 P M ; PRIOR TO ANY MEDICATIONS; AT LEAST IN OUR  FROM DINNER; AND CERTAINLY AFTER RELAXING FOR A FEW MINUTES  · PLEASE INCLUDE HEART RATE WITH YOUR BLOOD PRESSURE READINGS  · When taking standing readings, keep your arm supported at heart level and not dangling  · Make sure you are sitting with your back supported and feet on the ground and do not cross your legs or feet  · Make sure you have not taken any coffee or caffeine products or exercised or smoke cigarettes at least 30 minutes before taking your blood pressure  Then please mail these readings into the office    6  Follow-up in 4  months   Please bring in 1 week a blood pressure readings morning evening, sitting and standing is outlined above   PLEASE BRING AN YOUR BLOOD PRESSURE MACHINE TO CORRELATE WITH THE OFFICE MACHINE AT THIS NEXT SCHEDULED VISIT   Please go for fasting lab work 1-2 weeks prior to your appointment      7   General instructions:   AVOID SALT BUT NOT ADDING AN READING LABELS TO MAKE SURE THERE IS LOW-SALT IN THE FOOD THAT YOU ARE EATING  o Goal is less than 2 g of sodium intake or less than 5 g of sodium chloride intake per day     Avoid nonsteroidal anti-inflammatory drugs such as Naprosyn, ibuprofen, Aleve, Advil, Celebrex, Meloxicam (Mobic) etc   You can use Tylenol as needed if you do not have any liver condition to be concerned about     Avoid medications such as Sudafed or decongestants and antihistamines that contained the D component which is the decongestant  You can take antihistamines without the decongestant or D component   Try to avoid medications such as pantoprazole or  Protonix/Nexium or Esomeprazole)/Prilosec or omeprazole/Prevacid or lansoprazole/AcipHex or Rabeprazole  If you are able to, use Pepcid as this is safer for your kidneys   Try to exercise at least 30 minutes 3 days a week to begin with with an ultimate goal of 5 days a week for at least 30 minutes     Try to lose 5-10 lb by your next visit     Please do not drink more than 2 glasses of alcohol/wine on a daily basis as this may contribute to your high blood pressure  8 Measures to reduce stone development:    · Please Drink  oz of water or 2 5L-3 L a day, throughout the day:  THIS IS MOST IMPORTANT  · Avoid salt/low-salt diet :  THIS IS MOST IMPORTANT  · Try to decrease animal protein intake, dairy protein and vegetable base protein are better  · Increase fruits and vegetables as much as possible  · Avoid calcium products such as Tums or other types of calcium containing antacids, you can use Pepcid for indigestion (but you do not have to restrict your dietary calcium)  · Avoid excessive vitamin D   · Avoid excessive vitamin C  · Try to avoid oxalate products (please refer to the diet sheet)  · Limit fructose and sucrose type drinks such as coke     Low Oxalate Diet   WHAT YOU NEED TO KNOW:   Oxalate is a chemical found in plant foods  You may need to eat foods that are low in oxalate to help clear kidney stones or prevent them from forming  People who have had kidney stones are at a higher risk of forming kidney stones again  The most common type of kidney stone is made up of crystals that contain calcium and oxalate  Your healthcare provider or dietitian may recommend that you limit oxalate if you get this type of kidney stone often  DISCHARGE INSTRUCTIONS:   Foods to include:   Include the following foods that have a low to medium amount of oxalate  1  Grains:      ? Egg noodles    ? Jairo crackers    ? Pancakes and waffles    ? Cooked and dry cereals without nuts or bran    ? White or wild rice    ? White bread, cornbread, bagels, and white English muffins (medium oxalate)    ? Saltine or soda crackers and vanilla wafers (medium oxalate)    ? Brown rice, spaghetti, and other noodles and pastas (medium oxalate)    2  Fruit:      ? Apples, bananas, grapes    ? Cranberries    ? Peaches, nectarines, apricots, and pears    ? Papayas and strawberries    ? Melons and pineapples    ? Blackberries, blueberries, mangoes, and prunes (medium oxalate)    3  Vegetables:      ? Artichokes, asparagus, and brussels sprouts    ? Broccoli and cauliflower    ? Kale, endive, cabbage, and lettuce    ? Cucumbers, peas, and zucchini    ? Mushrooms, onions, and peppers    ? Corn    ? Carrots, celery, and green beans (medium oxalate)    ? Parsnips, summer squash, tomatoes, and turnips (medium oxalate)    4  Dairy:      ? American cheese, Swiss cheese, cottage cheese, ricotta cheese, and cheddar cheese    ? Milk and buttermilk    ? Yogurt    5  Protein foods:      ? Meat, fish, shellfish, chicken, and turkey    ? Lunch meat and ham (medium oxalate)    ? Hot dogs, bratwurst, albrecht, and sausage (medium oxalate)    6  Drinks and desserts:      ? Coffee    ? Fruit punch and lemonade or limeade without added vitamin C    7  Desserts:      ? Cookies, cakes, and ice cream    ? Pudding without chocolate    Foods to limit or avoid:  Limit the following foods that are high in oxalate  1  Grains:      ? Wheat bran, wheat germ, and barley    ? Grits and bran cereal    ? White corn flour and buckwheat flour    ? Whole wheat bread    2  Fruit:      ? Dried apricots    ? Red currants, figs, and rhubarb    ? Elizabeth Emir    ? Grapefruit    3  Vegetables:      ? Potatoes and yams    ? Kriss greens, leeks, okra, and spinach    ? Wax beans     ? Eggplant    ? Beets and beet greens    ?  Swiss chard, escarole, parsley, and rutabagas    ? Tomato paste    4  Protein foods:      ? Baked beans with tomato sauce    ? Nut butters and nuts (peanuts, almonds, pecans, cashews, hazelnuts)    ? Soy burgers    ? Miso    ? Dried beans    5  Desserts:      ? Fruitcake    ? Chocolate    ? Carob and marmalade    6  Beverages:      ? Chocolate drink mixes    ? Soy milk    ? Instant iced tea    7  Other foods:      ? Sesame seeds and tahini (paste made of sesame seeds)    ? Poppy seeds    Other dietary guidelines:   · Drink about 12 to 16 (eight-ounce) cups of liquid each day  Liquids help clear kidney stones and prevent them from forming again  Water is the best liquid to drink  You may need more liquid if you are physically active  Ask your healthcare provider or dietitian how much liquid you need to drink each day  · Your healthcare provider may suggest that you make other diet changes to help prevent kidney stones  This may include decreasing the amount of sodium you eat each day  © Copyright [employer] 2022 Information is for End User's use only and may not be sold, redistributed or otherwise used for commercial purposes  All illustrations and images included in CareNotes® are the copyrighted property of [employer]  or [address] [place], [place] [address]   The above information is an  only  It is not intended as medical advice for individual conditions or treatments  Talk to your doctor, nurse or pharmacist before following any medical regimen to see if it is safe and effective for you  Portions of the record may have been created with voice recognition software  Occasional wrong word or "sound a like" substitutions may have occurred due to the inherent limitations of voice recognition software  Read the chart carefully and recognize, using context, where substitutions have occurred      Tanja Monroy MD

## 2022-09-09 ENCOUNTER — APPOINTMENT (OUTPATIENT)
Dept: LAB | Facility: CLINIC | Age: 62
End: 2022-09-09
Payer: COMMERCIAL

## 2022-09-09 DIAGNOSIS — E78.5 HYPERLIPIDEMIA, UNSPECIFIED HYPERLIPIDEMIA TYPE: ICD-10-CM

## 2022-09-09 DIAGNOSIS — E11.65 UNCONTROLLED TYPE 2 DIABETES MELLITUS WITH HYPERGLYCEMIA (HCC): ICD-10-CM

## 2022-09-09 DIAGNOSIS — N20.1 URETEROLITHIASIS: ICD-10-CM

## 2022-09-09 DIAGNOSIS — I10 ESSENTIAL HYPERTENSION: ICD-10-CM

## 2022-09-09 LAB
ALBUMIN SERPL BCP-MCNC: 3.4 G/DL (ref 3.5–5)
ALP SERPL-CCNC: 83 U/L (ref 46–116)
ALT SERPL W P-5'-P-CCNC: 50 U/L (ref 12–78)
ANION GAP SERPL CALCULATED.3IONS-SCNC: 7 MMOL/L (ref 4–13)
AST SERPL W P-5'-P-CCNC: 25 U/L (ref 5–45)
BASOPHILS # BLD AUTO: 0.05 THOUSANDS/ΜL (ref 0–0.1)
BASOPHILS NFR BLD AUTO: 1 % (ref 0–1)
BILIRUB SERPL-MCNC: 0.4 MG/DL (ref 0.2–1)
BUN SERPL-MCNC: 30 MG/DL (ref 5–25)
CALCIUM ALBUM COR SERPL-MCNC: 9.4 MG/DL (ref 8.3–10.1)
CALCIUM SERPL-MCNC: 8.9 MG/DL (ref 8.3–10.1)
CHLORIDE SERPL-SCNC: 111 MMOL/L (ref 96–108)
CHOLEST SERPL-MCNC: 104 MG/DL
CO2 SERPL-SCNC: 22 MMOL/L (ref 21–32)
CREAT SERPL-MCNC: 1.6 MG/DL (ref 0.6–1.3)
EOSINOPHIL # BLD AUTO: 0.46 THOUSAND/ΜL (ref 0–0.61)
EOSINOPHIL NFR BLD AUTO: 4 % (ref 0–6)
ERYTHROCYTE [DISTWIDTH] IN BLOOD BY AUTOMATED COUNT: 12.7 % (ref 11.6–15.1)
EST. AVERAGE GLUCOSE BLD GHB EST-MCNC: 148 MG/DL
GFR SERPL CREATININE-BSD FRML MDRD: 45 ML/MIN/1.73SQ M
GLUCOSE P FAST SERPL-MCNC: 147 MG/DL (ref 65–99)
HBA1C MFR BLD: 6.8 %
HCT VFR BLD AUTO: 42.8 % (ref 36.5–49.3)
HDLC SERPL-MCNC: 33 MG/DL
HGB BLD-MCNC: 13.4 G/DL (ref 12–17)
IMM GRANULOCYTES # BLD AUTO: 0.03 THOUSAND/UL (ref 0–0.2)
IMM GRANULOCYTES NFR BLD AUTO: 0 % (ref 0–2)
LDLC SERPL CALC-MCNC: 48 MG/DL (ref 0–100)
LYMPHOCYTES # BLD AUTO: 2.53 THOUSANDS/ΜL (ref 0.6–4.47)
LYMPHOCYTES NFR BLD AUTO: 23 % (ref 14–44)
MCH RBC QN AUTO: 28.2 PG (ref 26.8–34.3)
MCHC RBC AUTO-ENTMCNC: 31.3 G/DL (ref 31.4–37.4)
MCV RBC AUTO: 90 FL (ref 82–98)
MONOCYTES # BLD AUTO: 0.91 THOUSAND/ΜL (ref 0.17–1.22)
MONOCYTES NFR BLD AUTO: 8 % (ref 4–12)
NEUTROPHILS # BLD AUTO: 6.82 THOUSANDS/ΜL (ref 1.85–7.62)
NEUTS SEG NFR BLD AUTO: 64 % (ref 43–75)
NRBC BLD AUTO-RTO: 0 /100 WBCS
PLATELET # BLD AUTO: 287 THOUSANDS/UL (ref 149–390)
PMV BLD AUTO: 9.2 FL (ref 8.9–12.7)
POTASSIUM SERPL-SCNC: 4.6 MMOL/L (ref 3.5–5.3)
PROT SERPL-MCNC: 7.1 G/DL (ref 6.4–8.4)
RBC # BLD AUTO: 4.75 MILLION/UL (ref 3.88–5.62)
SODIUM SERPL-SCNC: 140 MMOL/L (ref 135–147)
TRIGL SERPL-MCNC: 113 MG/DL
TSH SERPL DL<=0.05 MIU/L-ACNC: 2.74 UIU/ML (ref 0.45–4.5)
URATE SERPL-MCNC: 8.6 MG/DL (ref 3.5–8.5)
WBC # BLD AUTO: 10.8 THOUSAND/UL (ref 4.31–10.16)

## 2022-09-09 PROCEDURE — 83036 HEMOGLOBIN GLYCOSYLATED A1C: CPT

## 2022-09-09 PROCEDURE — 36415 COLL VENOUS BLD VENIPUNCTURE: CPT

## 2022-09-09 PROCEDURE — 85025 COMPLETE CBC W/AUTO DIFF WBC: CPT

## 2022-09-09 PROCEDURE — 84443 ASSAY THYROID STIM HORMONE: CPT

## 2022-09-09 PROCEDURE — 84550 ASSAY OF BLOOD/URIC ACID: CPT

## 2022-09-09 PROCEDURE — 80061 LIPID PANEL: CPT

## 2022-09-09 PROCEDURE — 80053 COMPREHEN METABOLIC PANEL: CPT

## 2022-09-09 PROCEDURE — 3061F NEG MICROALBUMINURIA REV: CPT | Performed by: INTERNAL MEDICINE

## 2022-09-09 PROCEDURE — 3044F HG A1C LEVEL LT 7.0%: CPT | Performed by: INTERNAL MEDICINE

## 2022-09-13 ENCOUNTER — CONSULT (OUTPATIENT)
Dept: NEPHROLOGY | Facility: CLINIC | Age: 62
End: 2022-09-13
Payer: COMMERCIAL

## 2022-09-13 VITALS — WEIGHT: 264 LBS | BODY MASS INDEX: 43.99 KG/M2 | HEIGHT: 65 IN

## 2022-09-13 DIAGNOSIS — I12.9 PARENCHYMAL RENAL HYPERTENSION, STAGE 1 THROUGH STAGE 4 OR UNSPECIFIED CHRONIC KIDNEY DISEASE: Primary | ICD-10-CM

## 2022-09-13 DIAGNOSIS — E78.5 DYSLIPIDEMIA: ICD-10-CM

## 2022-09-13 DIAGNOSIS — N20.1 URETEROLITHIASIS: ICD-10-CM

## 2022-09-13 DIAGNOSIS — N18.32 STAGE 3B CHRONIC KIDNEY DISEASE (HCC): ICD-10-CM

## 2022-09-13 DIAGNOSIS — E11.21 DIABETIC NEPHROPATHY ASSOCIATED WITH TYPE 2 DIABETES MELLITUS (HCC): ICD-10-CM

## 2022-09-13 DIAGNOSIS — N20.0 NEPHROLITHIASIS: ICD-10-CM

## 2022-09-13 PROCEDURE — 3066F NEPHROPATHY DOC TX: CPT | Performed by: INTERNAL MEDICINE

## 2022-09-13 PROCEDURE — 99204 OFFICE O/P NEW MOD 45 MIN: CPT | Performed by: INTERNAL MEDICINE

## 2022-09-13 RX ORDER — HYDROCHLOROTHIAZIDE 25 MG/1
25 TABLET ORAL DAILY
Qty: 90 TABLET | Refills: 3 | Status: SHIPPED | OUTPATIENT
Start: 2022-09-13

## 2022-09-13 RX ORDER — POTASSIUM CITRATE 10 MEQ/1
10 TABLET, EXTENDED RELEASE ORAL 2 TIMES DAILY
Qty: 180 TABLET | Refills: 3 | Status: SHIPPED | OUTPATIENT
Start: 2022-09-13

## 2022-09-13 NOTE — PATIENT INSTRUCTIONS
1  Medication changes today:  Please begin HCTZ 25 mg in the morning for both blood pressure and stone prevent  Please begin Urocit-K 10 mEq 1 tablet twice a day with meals  Please follow a general stone diet please see below    2  Please go for  fasting  lab work 1-2 weeks after making the above medication changes  3  Please go for renal ultrasound of your kidneys  4  Please go for an ultrasound of your kidney arteries at this time as well     5  Please take 1 week a blood pressure readings  3 weeks after making the above medication change     AS FOLLOWS  MORNING AND EVENING, SITTING AND STANDING as follows:  TAKE THE MORNING READINGS BEFORE ANY MEDICATIONS AND WHEN YOU ARE RELAXED FOR SEVERAL MINUTES  TAKE THE EVENING READINGS:  BETWEEN 7-10 P M ; PRIOR TO ANY MEDICATIONS; AT LEAST IN OUR  FROM DINNER; AND CERTAINLY AFTER RELAXING FOR A FEW MINUTES  PLEASE INCLUDE HEART RATE WITH YOUR BLOOD PRESSURE READINGS  When taking standing readings, keep your arm supported at heart level and not dangling  Make sure you are sitting with your back supported and feet on the ground and do not cross your legs or feet  Make sure you have not taken any coffee or caffeine products or exercised or smoke cigarettes at least 30 minutes before taking your blood pressure  Then please mail these readings into the office    6  Follow-up in 4  months  Please bring in 1 week a blood pressure readings morning evening, sitting and standing is outlined above  PLEASE BRING AN YOUR BLOOD PRESSURE MACHINE TO CORRELATE WITH THE OFFICE MACHINE AT THIS NEXT SCHEDULED VISIT  Please go for fasting lab work 1-2 weeks prior to your appointment      7   General instructions:  AVOID SALT BUT NOT ADDING AN READING LABELS TO MAKE SURE THERE IS LOW-SALT IN THE FOOD THAT YOU ARE EATING  Goal is less than 2 g of sodium intake or less than 5 g of sodium chloride intake per day    Avoid nonsteroidal anti-inflammatory drugs such as Naprosyn, ibuprofen, Aleve, Advil, Celebrex, Meloxicam (Mobic) etc   You can use Tylenol as needed if you do not have any liver condition to be concerned about    Avoid medications such as Sudafed or decongestants and antihistamines that contained the D component which is the decongestant  You can take antihistamines without the decongestant or D component  Try to avoid medications such as pantoprazole or  Protonix/Nexium or Esomeprazole)/Prilosec or omeprazole/Prevacid or lansoprazole/AcipHex or Rabeprazole  If you are able to, use Pepcid as this is safer for your kidneys  Try to exercise at least 30 minutes 3 days a week to begin with with an ultimate goal of 5 days a week for at least 30 minutes    Try to lose 5-10 lb by your next visit    Please do not drink more than 2 glasses of alcohol/wine on a daily basis as this may contribute to your high blood pressure  8 Measures to reduce stone development:    Please Drink  oz of water or 2 5L-3 L a day, throughout the day:  THIS IS MOST IMPORTANT  Avoid salt/low-salt diet :  THIS IS MOST IMPORTANT  Try to decrease animal protein intake, dairy protein and vegetable base protein are better  Increase fruits and vegetables as much as possible  Avoid calcium products such as Tums or other types of calcium containing antacids, you can use Pepcid for indigestion (but you do not have to restrict your dietary calcium)  Avoid excessive vitamin D   Avoid excessive vitamin C  Try to avoid oxalate products (please refer to the diet sheet)  Limit fructose and sucrose type drinks such as coke     Low Oxalate Diet   WHAT YOU NEED TO KNOW:   Oxalate is a chemical found in plant foods  You may need to eat foods that are low in oxalate to help clear kidney stones or prevent them from forming  People who have had kidney stones are at a higher risk of forming kidney stones again  The most common type of kidney stone is made up of crystals that contain calcium and oxalate   Your healthcare provider or dietitian may recommend that you limit oxalate if you get this type of kidney stone often  DISCHARGE INSTRUCTIONS:   Foods to include: Include the following foods that have a low to medium amount of oxalate  Grains:      Egg noodles    Jairo crackers    Pancakes and waffles    Cooked and dry cereals without nuts or bran    White or wild rice    White bread, cornbread, bagels, and white English muffins (medium oxalate)    Saltine or soda crackers and vanilla wafers (medium oxalate)    Brown rice, spaghetti, and other noodles and pastas (medium oxalate)    Fruit:      Apples, bananas, grapes    Cranberries    Peaches, nectarines, apricots, and pears    Papayas and strawberries    Melons and pineapples    Blackberries, blueberries, mangoes, and prunes (medium oxalate)    Vegetables:      Artichokes, asparagus, and brussels sprouts    Broccoli and cauliflower    Kale, endive, cabbage, and lettuce    Cucumbers, peas, and zucchini    Mushrooms, onions, and peppers    Corn    Carrots, celery, and green beans (medium oxalate)    Parsnips, summer squash, tomatoes, and turnips (medium oxalate)    Dairy:      American cheese, Swiss cheese, cottage cheese, ricotta cheese, and cheddar cheese    Milk and buttermilk    Yogurt    Protein foods:      Meat, fish, shellfish, chicken, and turkey    Lunch meat and ham (medium oxalate)    Hot dogs, bratwurst, albrecht, and sausage (medium oxalate)    Drinks and desserts:      Coffee    Fruit punch and lemonade or limeade without added vitamin C    Desserts:      Cookies, cakes, and ice cream    Pudding without chocolate    Foods to limit or avoid:  Limit the following foods that are high in oxalate    Grains:      Wheat bran, wheat germ, and barley    Grits and bran cereal    White corn flour and buckwheat flour    Whole wheat bread    Fruit:      Dried apricots    Red currants, figs, and rhubarb    Kiwi    Grapefruit    Vegetables:      Potatoes and yams    Kriss greens, leeks, okra, and spinach    Wax beans     Eggplant    Beets and beet greens    Swiss chard, escarole, parsley, and rutabagas    Tomato paste    Protein foods:      Baked beans with tomato sauce    Nut butters and nuts (peanuts, almonds, pecans, cashews, hazelnuts)    Soy burgers    Miso    Dried beans    Desserts:      Fruitcake    Chocolate    Carob and marmalade    Beverages:      Chocolate drink mixes    Soy milk    Instant iced tea    Other foods:      Sesame seeds and tahini (paste made of sesame seeds)    Poppy seeds    Other dietary guidelines:   Drink about 12 to 16 (eight-ounce) cups of liquid each day  Liquids help clear kidney stones and prevent them from forming again  Water is the best liquid to drink  You may need more liquid if you are physically active  Ask your healthcare provider or dietitian how much liquid you need to drink each day  Your healthcare provider may suggest that you make other diet changes to help prevent kidney stones  This may include decreasing the amount of sodium you eat each day  © Copyright [employer] 2022 Information is for End User's use only and may not be sold, redistributed or otherwise used for commercial purposes  All illustrations and images included in CareNotes® are the copyrighted property of [employer]  or [employer]  The above information is an  only  It is not intended as medical advice for individual conditions or treatments  Talk to your doctor, nurse or pharmacist before following any medical regimen to see if it is safe and effective for you

## 2022-09-13 NOTE — LETTER
September 13, 2022     [address]  [facility] [id number]    Patient: Ilana Macdonald   YOB: 1960   Date of Visit: 9/13/2022       Dear Dr Juan Carlos Summers: Thank you for referring Ilana Macdonald to me for evaluation  Below are my notes for this consultation  If you have questions, please do not hesitate to call me  I look forward to following your patient along with you           Sincerely,        Juliano Parrish MD        CC: MD Juliano Bonilla MD  9/13/2022  9:07 AM  Sign when Signing Visit  Consultation - Nephrology 9/13/2022        History of Present Illness   Reason for Consult / Principal Problem:  CKD 3B/nephrolithiasis    HPI: Ilana Macdonald is a 65y o  year old male with a history of diabetes mellitus type 2/dyslipidemia/hypertension/prostate cancer status post robotic prostatectomy June 2018/ELENA who we are asked to see regarding nephrolithiasis/CKD            Pertinent labs:  · Creatinine typically ranged 1 23 to 1 42 as of 2021 with an episode of acute kidney injury 2018  · Creatinine 1 52 as of 05/12/2022  · Creatinine:  2 10 as of 08/08/2022 but came down to 1 69 as of 08/09/2022 during hospital admission please see below  · Normal electrolytes including potassium 4 8  · Total protein 6 4/calcium 8 4/albumin 3 2  · CBC with a hemoglobin of 12 3 normal platelet count leukocytosis 14 77 during hospitalization  · UA:  Positive glucose/no heme trace proteinuria no RBCs WBCs or bacteria seen      Two episodes hospitalizations for nephrolithiasis  Patient had an episode about 15 years ago requiring a stent and then he subsequently passed a stone after stent was removed  Had lithotripsy subsequently    Patient with recurrent nephrolithiasis about a total 13 stone episodes typically passes without intervention  Seen by Dr Juan Carlos Summers in February left lower pole calculus 7 mm at that time  Subsequently hospitalized 08/08/2022 for left flank pain had a 9 mm ureteral stone underwent cystoscopy with left lithotripsy ureteral stent  placement started on antibiotics for chills leukocytosis cultures were negative and urine culture was negative  Completed a course of Keflex  Patient also noted to have acute kidney injury at that time which improved after cystoscopy and IV fluids with normalization of creatinine  KUB on 08/17/2022 no radiopaque urinary tract calculi double-J ureteral stent in appropriate position    The patient denies any history of thyroid disease or neck radiation as a child; no history of GI symptoms including peptic ulcer disease/pancreatitis/chronic diarrhea  No significant history of joint problems/unusual skin rash/lung disease    Dietary review:  -combination of coffee/water/soda about 64 oz a day  -tries to avoid salt does not add to his food  -does take vitamin-D 400 units and a multivitamin    No calcium, and no excess vitamin-C as well    Patient with history of prostatectomy about 4 years ago for prostate cancer no need for radiation  No history urinary tract infections  No significant NSAID use  No urinary symptoms at this time including dysuria hematuria voiding symptoms foamy urine; patient is incontinent related to the prostate surgery  No significant persistent lower extremity edema   Only occasional paresthesias nothing persistent    History diabetes mellitus type 2 for about 30 years but no overt retinopathy or neuropathy  -metformin 1000 mg twice a day  -insulin  -Trulicity      History of hypertension for possibly even 40 years fairly well controlled does occasionally check it at home typically runs about 140/80  Current medications:  · Lisinopril 40 mg daily in the morning  · Carvedilol 25 mg twice a day  · Amlodipine 5 mg daily in the morning        Review of systems:    General:  No fevers chills cough or colds recently, good appetite energy weight is fairly stable  Cardiovascular:  No chest pain or shortness of breath or leg edema at this time  Respiratory:  Occasionally wheezing, no coughing  Gastrointestinal:  No significant nausea vomiting or diarrhea only dietary related at time; no abdominal pain no bleeding in the stool; GI health maintenance:  02/10/2021 last colonoscopy  Genitourinary:  See HPI  Neurology:  Not only occasional headaches, rare dizziness/lightheadedness upon standing suddenly  All other systems were reviewed and are negative    Historical Information   Past Medical History:   Diagnosis Date    Acute bronchitis     Acute low back pain     Acute low back pain     [id number] resolved    Acute respiratory infection     JAYNE (acute kidney injury) ([facility] [address] ) 6/15/2018    Cancer (HCC)     prostate    Cellulitis of scrotum     Cough     Diabetes mellitus (HCC)     Herpes zoster     High cholesterol     Hyperkalemia     Hypertension     Kidney disease     Kidney stone     Leukocytosis 6/15/2018    Low back pain     Lumbar radiculopathy     Pyelonephritis 8/8/2022    Rash     Retinopathy, central serous     Sleep apnea     [id number]    Trochanteric bursitis    · No history of CAD/CHF/CVA/seizures/liver disease/lung disease  Of note patient not aware of a history of sleep apnea      Past Surgical History:   Procedure Laterality Date    ABDOMINAL ADHESION SURGERY N/A 6/13/2018    Procedure: LYSIS ADHESIONS;  Surgeon: Urban Lombardo MD;  [facility];  Service: Urology    FL RETROGRADE PYELOGRAM  8/8/2022    FL RETROGRADE PYELOGRAM  8/11/2022    LITHOTRIPSY      renal    OR CYSTO/URETERO W/LITHOTRIPSY &INDWELL STENT INSRT Left 8/11/2022    Procedure: CYSTOSCOPY URETEROSCOPY WITH LITHOTRIPSY HOLMIUM LASER, RETROGRADE PYELOGRAM AND INSERTION STENT URETERAL;  Surgeon: Alysha Tapia MD;  [facility];  Service: Urology    OR CYSTOURETHROSCOPY,URETER CATHETER Left 8/8/2022    Procedure: CYSTOSCOPY RETROGRADE PYELOGRAM WITH INSERTION STENT URETERAL;  Surgeon: Alysha Tapia MD; [facility];  Service: Urology    AR LAP,PROSTATECTOMY,RADICAL,W/NERVE SPARE,INCL ROBOTIC N/A 6/13/2018    Procedure: PROSTATECTOMY RADICAL W ROBOTICS;  Surgeon: Vidya Guzman MD;  [facility];  Service: Urology    PROSTATE SURGERY  06/13/2018    SHOULDER SURGERY       Social History   Social History     Substance and Sexual Activity   Alcohol Use No     Social History     Substance and Sexual Activity   Drug Use No     Social History     Tobacco Use   Smoking Status Never Smoker   Smokeless Tobacco Never Used   · No significant salt intake  · No dedicated exercise    Family History   Problem Relation Age of Onset    Other Father         cardiac disorder    Stroke Father     Diabetes Father         mellitus    Hypertension Father     Heart disease Father         cardiac disorder    Cancer Sister     Cancer Paternal Grandmother     Heart disease Family         cardiac disorder    Kidney disease Family    · Father with kidney disease in later years was close to ESRD:  He did have diabetes mellitus and hypertension    No history kidney stones  · Both parents with hypertension    Meds/Allergies   all current active meds have been reviewed, current meds:   Current Outpatient Medications:     albuterol (PROVENTIL HFA,VENTOLIN HFA) 90 mcg/act inhaler, Inhale 1-2 puffs every 6 (six) hours as needed  , Disp: , Rfl:     amLODIPine (NORVASC) 5 mg tablet, TAKE 1 TABLET BY MOUTH EVERY DAY, Disp: 90 tablet, Rfl: 1    ASPIRIN 81 PO, Take 1 capsule by mouth 2 (two) times a day  , Disp: , Rfl:     atorvastatin (LIPITOR) 80 mg tablet, TAKE 1 TABLET BY MOUTH EVERY DAY, Disp: 90 tablet, Rfl: 1    BD Pen Needle Rosalie U/F 32G X 4 MM MISC, Inject under the skin 4 (four) times a day, Disp: 400 each, Rfl: 3    carvedilol (COREG) 25 mg tablet, TAKE 1 TABLET BY MOUTH TWICE A DAY WITH MEALS, Disp: 180 tablet, Rfl: 2    Dulaglutide (Trulicity) 3 YR/8 3VN SOPN, Inject 0 5 mL (3 mg total) under the skin once a week, Disp: 6 mL, Rfl: 1    glimepiride (AMARYL) 4 mg tablet, TAKE 1 TABLET BY MOUTH 2 TIMES A DAY , Disp: 180 tablet, Rfl: 1    glucose blood (Contour Next Test) test strip, Check sugar QID, Disp: 400 each, Rfl: 3    glucose blood (Contour Next Test) test strip, Use 1 each 4 (four) times a day Use as instructed (Patient taking differently: Use 1 each daily 3 to 4 times daily  Before meals or randomly), Disp: 400 each, Rfl: 3    hydrochlorothiazide (HYDRODIURIL) 25 mg tablet, Take 1 tablet (25 mg total) by mouth daily, Disp: 90 tablet, Rfl: 3    insuln lispro (HumaLOG KwikPen) 200 units/mL CONCENTRATED U-200 injection pen, Inject 15 units three times daily with meals  , Disp: 18 mL, Rfl: 5    lisinopril (ZESTRIL) 40 mg tablet, Take 40 mg by mouth daily, Disp: , Rfl:     metFORMIN (GLUCOPHAGE) 1000 MG tablet, TAKE 1 TABLET BY MOUTH TWICE A DAY, Disp: 180 tablet, Rfl: 3    Microlet Lancets MISC, USE 3 (THREE) TIMES A DAY, Disp: 300 each, Rfl: 3    Multiple Vitamins-Minerals (MULTIVITAMIN WITH MINERALS) tablet, Take 1 tablet by mouth daily, Disp: , Rfl:     potassium citrate (Urocit-K 10) 10 mEq, Take 1 tablet (10 mEq total) by mouth 2 (two) times a day, Disp: 180 tablet, Rfl: 3    tamsulosin (FLOMAX) 0 4 mg, TAKE 1 CAPSULE BY MOUTH EVERY DAY WITH DINNER, Disp: 90 capsule, Rfl: 1    Toujeo SoloStar 300 units/mL CONCENTRATED U-300 injection pen (1-unit dial), INJECT 80 UNITS UNDER THE SKIN DAILY, Disp: 22 5 mL, Rfl: 3    Vitamin D, Cholecalciferol, 400 units TABS, Take by mouth daily, Disp: , Rfl:     LISINOPRIL PO, , Disp: , Rfl:     tamsulosin (FLOMAX) 0 4 mg, Take 1 capsule (0 4 mg total) by mouth daily with dinner as needed for kidney stone management   (Patient not taking: No sig reported), Disp: 90 capsule, Rfl: 1    Allergies   Allergen Reactions    Simvastatin      Increases Liver functoin       Objective   BP sitting on right:  130/78 with a heart rate of 76 slightly irregular occasional premature beat  BP sitting on left:  126/70  BP standing on left:  120/70 with heart rate of 84 and regular  Body mass index is 43 93 kg/m²  General:  Well-developed well-nourished no acute distress/obese  Skin:  No acute rash  Eyes:  No scleral icterus, noninjected, conjunctiva appear normal, no discharge from the eyes  ENT:  Normocephalic/atraumatic, mucous membranes moist, tongue appears normal size  Neck:  Supple, no jugular venous distention, 1+ carotid upstroke, no carotid bruits; trachea is midline, no thyromegaly; no lymphadenopathy  Back:  No CVA tenderness, spine appears midline without any overt abnormalities  Chest:  Clear to auscultation and percussion, good respiratory effort, no use of accessory respiratory muscles  CVS:  Regular rate and rhythm without a murmur rub or gallops appreciable  Abdomen/gastrointestinal:  Obese,Normal bowel sounds, nontender and nondistended without hepatosplenomegaly or bruits; no masses appreciable  Extremities:  No clubbing, no cyanosis, no edema, 1+ dorsalis pedis pulses, no femoral bruits, no arthritic changes and full range of motion  Neuro:  No gross focality  Psych:  Alert, oriented and appropriate    Current Weight:   Weight (last 2 days)     Date/Time Weight    09/13/22 0732 120 (264)            Lab Results:  I have personally reviewed pertinent labs  Results for orders placed or performed in visit on 09/09/22   Uric acid   Result Value Ref Range    Uric Acid 8 6 (H) 3 5 - 8 5 mg/dL   Hemoglobin A1C   Result Value Ref Range    Hemoglobin A1C 6 8 (H) Normal 3 8-5 6%; PreDiabetic 5 7-6 4%;  Diabetic >=6 5%; Glycemic control for adults with diabetes <7 0% %     mg/dl   Comprehensive metabolic panel   Result Value Ref Range    Sodium 140 135 - 147 mmol/L    Potassium 4 6 3 5 - 5 3 mmol/L    Chloride 111 (H) 96 - 108 mmol/L    CO2 22 21 - 32 mmol/L    ANION GAP 7 4 - 13 mmol/L    BUN 30 (H) 5 - 25 mg/dL    Creatinine 1 60 (H) 0 60 - 1 30 mg/dL    Glucose, Fasting 147 (H) 65 - 99 mg/dL    Calcium 8 9 8 3 - 10 1 mg/dL    Corrected Calcium 9 4 8 3 - 10 1 mg/dL    AST 25 5 - 45 U/L    ALT 50 12 - 78 U/L    Alkaline Phosphatase 83 46 - 116 U/L    Total Protein 7 1 6 4 - 8 4 g/dL    Albumin 3 4 (L) 3 5 - 5 0 g/dL    Total Bilirubin 0 40 0 20 - 1 00 mg/dL    eGFR 45 ml/min/1 73sq m   TSH, 3rd generation with Free T4 reflex   Result Value Ref Range    TSH 3RD GENERATON 2 740 0 450 - 4 500 uIU/mL   CBC and differential   Result Value Ref Range    WBC 10 80 (H) 4 31 - 10 16 Thousand/uL    RBC 4 75 3 88 - 5 62 Million/uL    Hemoglobin 13 4 12 0 - 17 0 g/dL    Hematocrit 42 8 36 5 - 49 3 %    MCV 90 82 - 98 fL    MCH 28 2 26 8 - 34 3 pg    MCHC 31 3 (L) 31 4 - 37 4 g/dL    RDW 12 7 11 6 - 15 1 %    MPV 9 2 8 9 - 12 7 fL    Platelets 849 107 - 814 Thousands/uL    nRBC 0 /100 WBCs    Neutrophils Relative 64 43 - 75 %    Immat GRANS % 0 0 - 2 %    Lymphocytes Relative 23 14 - 44 %    Monocytes Relative 8 4 - 12 %    Eosinophils Relative 4 0 - 6 %    Basophils Relative 1 0 - 1 %    Neutrophils Absolute 6 82 1 85 - 7 62 Thousands/µL    Immature Grans Absolute 0 03 0 00 - 0 20 Thousand/uL    Lymphocytes Absolute 2 53 0 60 - 4 47 Thousands/µL    Monocytes Absolute 0 91 0 17 - 1 22 Thousand/µL    Eosinophils Absolute 0 46 0 00 - 0 61 Thousand/µL    Basophils Absolute 0 05 0 00 - 0 10 Thousands/µL   Lipid Panel with Direct LDL reflex   Result Value Ref Range    Cholesterol 104 See Comment mg/dL    Triglycerides 113 See Comment mg/dL    HDL, Direct 33 (L) >=40 mg/dL    LDL Calculated 48 0 - 100 mg/dL               ASSESSMENT AND PLAN:  65y o  year old male with a history of diabetes mellitus type 2/dyslipidemia/hypertension/prostate cancer status post robotic prostatectomy June 2018/ELENA who we are asked to see regarding nephrolithiasis/CKD      1   CKD stage 3A   Etiology:  Diabetic kidney disease/hypertensive nephrosclerosis/episodes of JAYNE-hydronephrosis of the left kidney/arteriolar nephrosclerosis  Doubt primary glomerular process   Baseline creatinine:  1 37-1 60, most recently 1 60  Recommend:   Workup:  o Follow-up chemistry  o UA with microscopic  o Urine protein creatinine ratio  o Mineral bone disorder evaluation from CKD including magnesium/phosphorus/PTH intact level/vitamin-D level  o CBC  o Lipid profile  o Renal ultrasound with PVR:  To be done did have moderate left-sided hydroureteronephrosis and 9 mm obstructing stone proximal left ureter  o Renal artery duplex to rule out renal artery stenosis     Treatment:  o Treat hypertension, please see below for recommendations  o Treat dyslipidemia  o Avoid nephrotoxic agents such as NSAIDs, and proton pump inhibitors as able; patient counseled as such  o Good overall health recommendations including weight loss as appropriate, isotonic exercise as able, and avoidance of salt; patient counseled as such    Further workup and treatment recommendations will be forthcoming depending upon the above results and course    2  Hypertension:   Goal:  Less than 130/80 given CKD   Push nonmedical regimen as outlined above   Medication changes today:   I will add HCTZ 25 mg daily   Monitor blood pressure we may be able to reduce 1 of the other medications going for   Recheck blood pressures about 3-4 weeks after making the change    3  Nephrolithiasis:  Patient has had calcium oxalate stones as many as 13  most recently requiring intervention with cystoscopy laser lithotripsy and stent placement  Patient with prior calcium oxalate now uric acid stones    Recommend:  Workup:  · Check PTH intact low  · Follow-up chemistry as listed above  · Given incontinence cannot obtain formal 24 hour urine  · Until that time I would recommend a general stone diet/low oxalate diet  · I would place the patient on HCTZ 25 mg daily  · Place the patient on Urocit-K 10 mEq twice a day but most likely will increase to 20 mEq twice a day once we have follow-up labs         4  Other problems:   Diabetes mellitus type 2  : To consider SGLT2 inhibitor per primary physician if no contraindications for renal/cardiac protection   Dyslipidemia on atorvastatin   Prostate cancer status post robotic prostatectomy 2018   ELENA      Patient Instructions     1  Medication changes today:   Please begin HCTZ 25 mg in the morning for both blood pressure and stone prevent   Please begin Urocit-K 10 mEq 1 tablet twice a day with meals   Please follow a general stone diet please see below    2  Please go for  fasting  lab work 1-2 weeks after making the above medication changes  3  Please go for renal ultrasound of your kidneys  4  Please go for an ultrasound of your kidney arteries at this time as well     5  Please take 1 week a blood pressure readings  3 weeks after making the above medication change     AS FOLLOWS  MORNING AND EVENING, SITTING AND STANDING as follows:  · TAKE THE MORNING READINGS BEFORE ANY MEDICATIONS AND WHEN YOU ARE RELAXED FOR SEVERAL MINUTES  · TAKE THE EVENING READINGS:  BETWEEN 7-10 P M ; PRIOR TO ANY MEDICATIONS; AT LEAST IN OUR  FROM DINNER; AND CERTAINLY AFTER RELAXING FOR A FEW MINUTES  · PLEASE INCLUDE HEART RATE WITH YOUR BLOOD PRESSURE READINGS  · When taking standing readings, keep your arm supported at heart level and not dangling  · Make sure you are sitting with your back supported and feet on the ground and do not cross your legs or feet  · Make sure you have not taken any coffee or caffeine products or exercised or smoke cigarettes at least 30 minutes before taking your blood pressure  Then please mail these readings into the office    6   Follow-up in 4  months   Please bring in 1 week a blood pressure readings morning evening, sitting and standing is outlined above   PLEASE BRING AN YOUR BLOOD PRESSURE MACHINE TO CORRELATE WITH THE OFFICE MACHINE AT THIS NEXT SCHEDULED VISIT   Please go for fasting lab work 1-2 weeks prior to your appointment      7  General instructions:   AVOID SALT BUT NOT ADDING AN READING LABELS TO MAKE SURE THERE IS LOW-SALT IN THE FOOD THAT YOU ARE EATING  o Goal is less than 2 g of sodium intake or less than 5 g of sodium chloride intake per day     Avoid nonsteroidal anti-inflammatory drugs such as Naprosyn, ibuprofen, Aleve, Advil, Celebrex, Meloxicam (Mobic) etc   You can use Tylenol as needed if you do not have any liver condition to be concerned about     Avoid medications such as Sudafed or decongestants and antihistamines that contained the D component which is the decongestant  You can take antihistamines without the decongestant or D component   Try to avoid medications such as pantoprazole or  Protonix/Nexium or Esomeprazole)/Prilosec or omeprazole/Prevacid or lansoprazole/AcipHex or Rabeprazole  If you are able to, use Pepcid as this is safer for your kidneys   Try to exercise at least 30 minutes 3 days a week to begin with with an ultimate goal of 5 days a week for at least 30 minutes     Try to lose 5-10 lb by your next visit     Please do not drink more than 2 glasses of alcohol/wine on a daily basis as this may contribute to your high blood pressure          8 Measures to reduce stone development:    · Please Drink  oz of water or 2 5L-3 L a day, throughout the day:  THIS IS MOST IMPORTANT  · Avoid salt/low-salt diet :  THIS IS MOST IMPORTANT  · Try to decrease animal protein intake, dairy protein and vegetable base protein are better  · Increase fruits and vegetables as much as possible  · Avoid calcium products such as Tums or other types of calcium containing antacids, you can use Pepcid for indigestion (but you do not have to restrict your dietary calcium)  · Avoid excessive vitamin D   · Avoid excessive vitamin C  · Try to avoid oxalate products (please refer to the diet sheet)  · Limit fructose and sucrose type drinks such as coke     Low Oxalate Diet   WHAT YOU NEED TO KNOW: Oxalate is a chemical found in plant foods  You may need to eat foods that are low in oxalate to help clear kidney stones or prevent them from forming  People who have had kidney stones are at a higher risk of forming kidney stones again  The most common type of kidney stone is made up of crystals that contain calcium and oxalate  Your healthcare provider or dietitian may recommend that you limit oxalate if you get this type of kidney stone often  DISCHARGE INSTRUCTIONS:   Foods to include: Include the following foods that have a low to medium amount of oxalate  1  Grains:      ? Egg noodles    ? Jairo crackers    ? Pancakes and waffles    ? Cooked and dry cereals without nuts or bran    ? White or wild rice    ? White bread, cornbread, bagels, and white English muffins (medium oxalate)    ? Saltine or soda crackers and vanilla wafers (medium oxalate)    ? Brown rice, spaghetti, and other noodles and pastas (medium oxalate)    2  Fruit:      ? Apples, bananas, grapes    ? Cranberries    ? Peaches, nectarines, apricots, and pears    ? Papayas and strawberries    ? Melons and pineapples    ? Blackberries, blueberries, mangoes, and prunes (medium oxalate)    3  Vegetables:      ? Artichokes, asparagus, and brussels sprouts    ? Broccoli and cauliflower    ? Kale, endive, cabbage, and lettuce    ? Cucumbers, peas, and zucchini    ? Mushrooms, onions, and peppers    ? Corn    ? Carrots, celery, and green beans (medium oxalate)    ? Parsnips, summer squash, tomatoes, and turnips (medium oxalate)    4  Dairy:      ? American cheese, Swiss cheese, cottage cheese, ricotta cheese, and cheddar cheese    ? Milk and buttermilk    ? Yogurt    5  Protein foods:      ? Meat, fish, shellfish, chicken, and turkey    ? Lunch meat and ham (medium oxalate)    ? Hot dogs, bratwurst, albrecht, and sausage (medium oxalate)    6  Drinks and desserts:      ? Coffee    ?  Fruit punch and lemonade or limeade without added vitamin C    7  Desserts:      ? Cookies, cakes, and ice cream    ? Pudding without chocolate    Foods to limit or avoid:  Limit the following foods that are high in oxalate  1  Grains:      ? Wheat bran, wheat germ, and barley    ? Grits and bran cereal    ? White corn flour and buckwheat flour    ? Whole wheat bread    2  Fruit:      ? Dried apricots    ? Red currants, figs, and rhubarb    ? Raliegh Outlaw    ? Grapefruit    3  Vegetables:      ? Potatoes and yams    ? Kriss greens, leeks, okra, and spinach    ? Wax beans     ? Eggplant    ? Beets and beet greens    ? Swiss chard, escarole, parsley, and rutabagas    ? Tomato paste    4  Protein foods:      ? Baked beans with tomato sauce    ? Nut butters and nuts (peanuts, almonds, pecans, cashews, hazelnuts)    ? Soy burgers    ? Miso    ? Dried beans    5  Desserts:      ? Fruitcake    ? Chocolate    ? Carob and marmalade    6  Beverages:      ? Chocolate drink mixes    ? Soy milk    ? Instant iced tea    7  Other foods:      ? Sesame seeds and tahini (paste made of sesame seeds)    ? Poppy seeds    Other dietary guidelines:   · Drink about 12 to 16 (eight-ounce) cups of liquid each day  Liquids help clear kidney stones and prevent them from forming again  Water is the best liquid to drink  You may need more liquid if you are physically active  Ask your healthcare provider or dietitian how much liquid you need to drink each day  · Your healthcare provider may suggest that you make other diet changes to help prevent kidney stones  This may include decreasing the amount of sodium you eat each day  © Copyright [employer] 2022 Information is for End User's use only and may not be sold, redistributed or otherwise used for commercial purposes  All illustrations and images included in CareNotes® are the copyrighted property of [employer]  or [employer]  The above information is an  only   It is not intended as medical advice for individual conditions or treatments  Talk to your doctor, nurse or pharmacist before following any medical regimen to see if it is safe and effective for you  Portions of the record may have been created with voice recognition software  Occasional wrong word or "sound a like" substitutions may have occurred due to the inherent limitations of voice recognition software  Read the chart carefully and recognize, using context, where substitutions have occurred      Stacey Doyle MD

## 2022-09-24 DIAGNOSIS — I10 BENIGN ESSENTIAL HYPERTENSION: ICD-10-CM

## 2022-09-24 RX ORDER — CARVEDILOL 25 MG/1
TABLET ORAL
Qty: 180 TABLET | Refills: 2 | Status: SHIPPED | OUTPATIENT
Start: 2022-09-24

## 2022-09-24 RX ORDER — AMLODIPINE BESYLATE 5 MG/1
TABLET ORAL
Qty: 90 TABLET | Refills: 1 | Status: SHIPPED | OUTPATIENT
Start: 2022-09-24

## 2022-09-27 DIAGNOSIS — E11.21 DIABETIC NEPHROPATHY ASSOCIATED WITH TYPE 2 DIABETES MELLITUS (HCC): ICD-10-CM

## 2022-09-27 DIAGNOSIS — Z79.4 TYPE 2 DIABETES MELLITUS WITH STAGE 3A CHRONIC KIDNEY DISEASE, WITH LONG-TERM CURRENT USE OF INSULIN (HCC): Primary | ICD-10-CM

## 2022-09-27 DIAGNOSIS — E11.22 TYPE 2 DIABETES MELLITUS WITH STAGE 3A CHRONIC KIDNEY DISEASE, WITH LONG-TERM CURRENT USE OF INSULIN (HCC): Primary | ICD-10-CM

## 2022-09-27 DIAGNOSIS — I10 BENIGN ESSENTIAL HYPERTENSION: ICD-10-CM

## 2022-09-27 DIAGNOSIS — N18.31 TYPE 2 DIABETES MELLITUS WITH STAGE 3A CHRONIC KIDNEY DISEASE, WITH LONG-TERM CURRENT USE OF INSULIN (HCC): Primary | ICD-10-CM

## 2022-09-27 PROCEDURE — 4010F ACE/ARB THERAPY RXD/TAKEN: CPT | Performed by: INTERNAL MEDICINE

## 2022-09-27 RX ORDER — LISINOPRIL 40 MG/1
40 TABLET ORAL DAILY
Qty: 90 TABLET | Refills: 3 | Status: SHIPPED | OUTPATIENT
Start: 2022-09-27 | End: 2023-06-06 | Stop reason: SDUPTHER

## 2022-10-05 ENCOUNTER — HOSPITAL ENCOUNTER (OUTPATIENT)
Dept: RADIOLOGY | Facility: HOSPITAL | Age: 62
Discharge: HOME | End: 2022-10-05
Attending: UROLOGY
Payer: COMMERCIAL

## 2022-10-05 DIAGNOSIS — N20.1 URETEROLITHIASIS: ICD-10-CM

## 2022-10-05 PROCEDURE — 74176 CT ABD & PELVIS W/O CONTRAST: CPT

## 2022-10-05 PROCEDURE — G1004 CDSM NDSC: HCPCS

## 2022-10-06 ENCOUNTER — HOSPITAL ENCOUNTER (OUTPATIENT)
Dept: RADIOLOGY | Facility: HOSPITAL | Age: 62
Discharge: HOME | End: 2022-10-06
Payer: COMMERCIAL

## 2022-10-06 ENCOUNTER — TELEPHONE (OUTPATIENT)
Dept: NEPHROLOGY | Facility: CLINIC | Age: 62
End: 2022-10-06

## 2022-10-06 ENCOUNTER — HOSPITAL ENCOUNTER (OUTPATIENT)
Dept: NON INVASIVE DIAGNOSTICS | Facility: HOSPITAL | Age: 62
Discharge: HOME | End: 2022-10-06
Attending: INTERNAL MEDICINE
Payer: COMMERCIAL

## 2022-10-06 DIAGNOSIS — E78.5 DYSLIPIDEMIA: ICD-10-CM

## 2022-10-06 DIAGNOSIS — E11.21 DIABETIC NEPHROPATHY ASSOCIATED WITH TYPE 2 DIABETES MELLITUS (HCC): ICD-10-CM

## 2022-10-06 DIAGNOSIS — N20.0 NEPHROLITHIASIS: ICD-10-CM

## 2022-10-06 DIAGNOSIS — I12.9 PARENCHYMAL RENAL HYPERTENSION, STAGE 1 THROUGH STAGE 4 OR UNSPECIFIED CHRONIC KIDNEY DISEASE: ICD-10-CM

## 2022-10-06 DIAGNOSIS — N18.32 STAGE 3B CHRONIC KIDNEY DISEASE (HCC): ICD-10-CM

## 2022-10-06 DIAGNOSIS — N20.1 URETEROLITHIASIS: ICD-10-CM

## 2022-10-06 PROCEDURE — 93975 VASCULAR STUDY: CPT

## 2022-10-06 PROCEDURE — 76775 US EXAM ABDO BACK WALL LIM: CPT

## 2022-10-06 PROCEDURE — 93975 VASCULAR STUDY: CPT | Performed by: SURGERY

## 2022-10-06 NOTE — TELEPHONE ENCOUNTER
LM for patient about the following, asked him to call back if he has any questions:    ----- Message from Salvador Espitia MD sent at 10/6/2022  1:33 PM EDT -----  Please let the patient know that I reviewed the CT scan ultrasound with Dr Delmy Hi we both feel things are good no changes at this time

## 2022-10-10 ENCOUNTER — TELEPHONE (OUTPATIENT)
Dept: NEPHROLOGY | Facility: CLINIC | Age: 62
End: 2022-10-10

## 2022-10-10 NOTE — TELEPHONE ENCOUNTER
Spoke with patient about the following, he expressed understanding and thanked us for the call:    Please let the patient know that the renal artery duplex is normal

## 2022-10-10 NOTE — TELEPHONE ENCOUNTER
----- Message from Devon Fay MD sent at 10/10/2022  9:13 AM EDT -----  Please let the patient know that the renal artery duplex is normal

## 2022-11-03 NOTE — ADDENDUM NOTE
Addendum  created 06/13/18 1315 by Jas Brown MD    Order list changed, Order sets accessed Recommended Observation.

## 2022-11-27 DIAGNOSIS — E11.65 UNCONTROLLED TYPE 2 DIABETES MELLITUS WITH HYPERGLYCEMIA (HCC): ICD-10-CM

## 2022-11-27 RX ORDER — DULAGLUTIDE 3 MG/.5ML
3 INJECTION, SOLUTION SUBCUTANEOUS WEEKLY
Qty: 6 ML | Refills: 1 | Status: SHIPPED | OUTPATIENT
Start: 2022-11-27

## 2022-12-13 ENCOUNTER — APPOINTMENT (OUTPATIENT)
Dept: LAB | Facility: CLINIC | Age: 62
End: 2022-12-13

## 2022-12-13 DIAGNOSIS — C61 PROSTATE CANCER (HCC): ICD-10-CM

## 2022-12-13 LAB — PSA SERPL-MCNC: <0.1 NG/ML (ref 0–4)

## 2022-12-20 NOTE — PROGRESS NOTES
Referring Physician: Adriana Fine MD  A copy of this note was sent to the referring physician  Diagnoses and all orders for this visit:    Prostate cancer ([facility] [address] )  -     PSA Total, Diagnostic; Future  -     PSA Total, Diagnostic    Ureterolithiasis            Assessment and plan:          #1 Prostate Cancer  - pT2a GG4+3=7  -  Robotic prostatectomy June 2018  - MAGGIE    #2 post prostatectomy KEEGAN  - 10-14 depends per day    #3 Recurrent nephrolithiasis  -Status post recent ureteroscopy (Dr Frances Dugan)    Valentino Hamida is doing very well at the present time  He is asymptomatic  We reviewed his pre and postoperative CT scan together and he has had an excellent result with ureteroscopy  At this point the plan is for continued metabolic therapy under the direction of Dr Tomas Ritchie  I agree with empiric potassiums citrate for his uric acid stone  A formal 24-hour urine will be difficult due to the patient's post prostatectomy incontinence and we discussed this today  Of note the patient's stones are non-radiopaque likely due to their uric acid composition in the patient's body habitus  I would likely plan for repeat CT scan in 18 months  For now he will continue with annual follow-up and return to see me next year with a PSA or sooner if need be  Lobo Esters      Chief Complaint     Follow-up prostate cancer, nephrolithiasis      History of Present Illness     Saqib Isabel is a 65 y o   male returns in follow-up  He has external pleasant 81-year-old male with a history of [place] 7 prostate cancer diagnosed at 65years of age  He was treated with a robotic prostatectomy by colleague Dr Esme Pruett 2018  He remains MAGGIE  He has not required any adjuvant treatment  Most recent PSA was undetectable  Also has an extensive history of nephrolithiasis and returns with his annual KUB and PSA  He remains asymptomatic  He is doing well    He does wish to get some guidance regarding management options for his stress urinary incontinence  His risk factors for this include diabetes and morbid obesity  Detailed Urologic History     - please refer to HPI    Review of Systems     Review of Systems   Constitutional: Negative for activity change and fatigue  HENT: Negative for congestion  Eyes: Negative for visual disturbance  Respiratory: Negative for shortness of breath and wheezing  Cardiovascular: Negative for chest pain and leg swelling  Gastrointestinal: Negative for abdominal pain  Endocrine: Positive for polyuria  Genitourinary: Positive for dysuria  Negative for flank pain, hematuria and urgency  Musculoskeletal: Negative for back pain  Allergic/Immunologic: Negative for immunocompromised state  Neurological: Negative for dizziness and numbness  Psychiatric/Behavioral: Negative for dysphoric mood  All other systems reviewed and are negative  Allergies     Allergies   Allergen Reactions   • Simvastatin      Increases Liver functoin       Physical Exam     Physical Exam  Constitutional:       General: He is not in acute distress  Appearance: He is well-developed  HENT:      Head: Normocephalic and atraumatic  Cardiovascular:      Comments: Obese abdomen  Pulmonary:      Effort: Pulmonary effort is normal       Breath sounds: Normal breath sounds  Abdominal:      Palpations: Abdomen is soft  Genitourinary:     Comments: Negative CVA tenderness  Musculoskeletal:         General: Normal range of motion  Cervical back: Normal range of motion  Skin:     General: Skin is warm  Neurological:      Mental Status: He is alert and oriented to person, place, and time     Psychiatric:         Behavior: Behavior normal              Vital Signs  Vitals:    12/21/22 1517   BP: 122/84   BP Location: Left arm   Patient Position: Sitting   Cuff Size: Adult   Pulse: 95   SpO2: 99%   Height: 5' 5" (1 651 m)         Current Medications       Current Outpatient Medications:   • albuterol (PROVENTIL HFA,VENTOLIN HFA) 90 mcg/act inhaler, Inhale 1-2 puffs every 6 (six) hours as needed  , Disp: , Rfl:   •  amLODIPine (NORVASC) 5 mg tablet, TAKE 1 TABLET BY MOUTH EVERY DAY, Disp: 90 tablet, Rfl: 1  •  ASPIRIN 81 PO, Take 1 capsule by mouth 2 (two) times a day  , Disp: , Rfl:   •  atorvastatin (LIPITOR) 80 mg tablet, TAKE 1 TABLET BY MOUTH EVERY DAY, Disp: 90 tablet, Rfl: 1  •  BD Pen Needle Rosalie U/F 32G X 4 MM MISC, Inject under the skin 4 (four) times a day, Disp: 400 each, Rfl: 3  •  carvedilol (COREG) 25 mg tablet, TAKE 1 TABLET BY MOUTH TWICE A DAY WITH MEALS, Disp: 180 tablet, Rfl: 2  •  glimepiride (AMARYL) 4 mg tablet, TAKE 1 TABLET BY MOUTH 2 TIMES A DAY , Disp: 180 tablet, Rfl: 1  •  glucose blood (Contour Next Test) test strip, Check sugar QID, Disp: 400 each, Rfl: 3  •  glucose blood (Contour Next Test) test strip, Use 1 each 4 (four) times a day Use as instructed (Patient taking differently: Use 1 each daily 3 to 4 times daily  Before meals or randomly), Disp: 400 each, Rfl: 3  •  hydrochlorothiazide (HYDRODIURIL) 25 mg tablet, Take 1 tablet (25 mg total) by mouth daily, Disp: 90 tablet, Rfl: 3  •  insuln lispro (HumaLOG KwikPen) 200 units/mL CONCENTRATED U-200 injection pen, Inject 15 units three times daily with meals  , Disp: 18 mL, Rfl: 5  •  lisinopril (ZESTRIL) 40 mg tablet, Take 1 tablet (40 mg total) by mouth daily, Disp: 90 tablet, Rfl: 3  •  LISINOPRIL PO, , Disp: , Rfl:   •  metFORMIN (GLUCOPHAGE) 1000 MG tablet, TAKE 1 TABLET BY MOUTH TWICE A DAY, Disp: 180 tablet, Rfl: 3  •  Microlet Lancets MISC, USE 3 (THREE) TIMES A DAY, Disp: 300 each, Rfl: 3  •  Multiple Vitamins-Minerals (MULTIVITAMIN WITH MINERALS) tablet, Take 1 tablet by mouth daily, Disp: , Rfl:   •  potassium citrate (Urocit-K 10) 10 mEq, Take 1 tablet (10 mEq total) by mouth 2 (two) times a day, Disp: 180 tablet, Rfl: 3  •  tamsulosin (FLOMAX) 0 4 mg, Take 1 capsule (0 4 mg total) by mouth daily with dinner as needed for kidney stone management  , Disp: 90 capsule, Rfl: 1  •  tamsulosin (FLOMAX) 0 4 mg, TAKE 1 CAPSULE BY MOUTH EVERY DAY WITH DINNER, Disp: 90 capsule, Rfl: 1  •  Toujeo SoloStar 300 units/mL CONCENTRATED U-300 injection pen (1-unit dial), INJECT 80 UNITS UNDER THE SKIN DAILY, Disp: 22 5 mL, Rfl: 3  •  Trulicity 3 QQ/2 2ND SOPN, INJECT 0 5 ML (3 MG TOTAL) UNDER THE SKIN ONCE A WEEK, Disp: 6 mL, Rfl: 1  •  Vitamin D, Cholecalciferol, 400 units TABS, Take by mouth daily, Disp: , Rfl:       Active Problems     Patient Active Problem List   Diagnosis   • Ureterolithiasis   • Pro[facility] ([facility] )   • Benign essential hypertension   • Type 2 diabetes mellitus with stage 3a chronic kidney disease, with long-term current use of insulin (HCC)   • Dyslipidemia   • Morbid obesity (HCC)   • Other fatigue   • Stress incontinence of urine   • Parenchymal renal hypertension   • Stage 3 chronic kidney disease (HCC)   • Diabetic nephropathy associated with type 2 diabetes mellitus (HCC)   • Nephrolithiasis         Past Medical History     Past Medical History:   Diagnosis Date   • Acute bronchitis    • Acute low back pain    • Acute low back pain     [id number] resolved   • Acute respiratory infection    • JAYNE (acute kidney injury) ([facility] [address] ) 6/15/2018   • Cancer (HCC)     prostate   • Cellulitis of scrotum    • Cough    • Diabetes mellitus (HCC)    • Herpes zoster    • High cholesterol    • Hyperkalemia    • Hypertension    • Kidney disease    • Kidney stone    • Leukocytosis 6/15/2018   • Low back pain    • Lumbar radiculopathy    • Pyelonephritis 8/8/2022   • Rash    • Retinopathy, central serous    • Sleep apnea     [id number]   • Trochanteric bursitis          Surgical History     Past Surgical History:   Procedure Laterality Date   • ABDOMINAL ADHESION SURGERY N/A 6/13/2018    Procedure: LYSIS ADHESIONS;  Surgeon: Hebert Fox MD;  [facility];  Service: Urology   • [place] RETROGRADE PYELOGRAM  8/8/2022   • [place] RETROGRADE PYELOGRAM  8/11/2022   • LITHOTRIPSY      renal   • [place] CYSTO/URETERO W/LITHOTRIPSY &INDWELL STENT INSRT Left 8/11/2022    Procedure: CYSTOSCOPY URETEROSCOPY WITH LITHOTRIPSY HOLMIUM LASER, RETROGRADE PYELOGRAM AND INSERTION STENT URETERAL;  Surgeon: Dana Jin MD;  [facility];  Service: Urology   • VA CYSTOURETHROSCOPY,URETER CATHETER Left 8/8/2022    Procedure: CYSTOSCOPY RETROGRADE PYELOGRAM WITH INSERTION STENT URETERAL;  Surgeon: Dana Jin MD;  [facility];  Service: Urology   • [place] LAP,PROSTATECTOMY,RADICAL,W/NERVE SPARE,INCL ROBOTIC N/A 6/13/2018    Procedure: PROSTATECTOMY RADICAL W ROBOTICS;  Surgeon: Nahum Wilkerson MD;  [facility];  Service: Urology   • PROSTATE SURGERY  06/13/2018   • SHOULDER SURGERY           Family History     Family History   Problem Relation Age of Onset   • Other Father         cardiac disorder   • Stroke Father    • Diabetes Father         mellitus   • Hypertension Father    • Heart disease Father         cardiac disorder   • Cancer Sister    • Cancer Paternal Grandmother    • Heart disease Family         cardiac disorder   • Kidney disease Family          Social History     Social History     Social History     Tobacco Use   Smoking Status Never   Smokeless Tobacco Never         Pertinent Lab Values     Lab Results   Component Value Date    CREATININE 1 60 (H) 09/09/2022       Lab Results   Component Value Date    PSA <0 1 12/13/2022    PSA <0 1 02/03/2022    PSA <0 1 08/03/2021       @RESULTRCNT(1H])@      Pertinent Imaging        URINARY TRACT FINDINGS:     RIGHT KIDNEY AND URETER:  There is redemonstration of a 1 cm calculus at the upper pole  There is no hydronephrosis      LEFT KIDNEY AND URETER:  There are a couple of 2-3 mm calculi at the lower pole    There is no hydronephrosis      URINARY BLADDER:  Unremarkable         ADDITIONAL FINDINGS:     LOWER CHEST:  No clinically significant abnormality identified in the visualized lower chest      SOLID VISCERA: Limited low radiation dose noncontrast CT evaluation demonstrates no clinically significant abnormality of the imaged portions of the liver, spleen, pancreas, or adrenal glands        GALLBLADDER/BILIARY TREE:  There are gallstone(s) within the gallbladder, without pericholecystic inflammatory changes  No biliary dilatation      STOMACH AND BOWEL:  Unremarkable      APPENDIX:  No findings to suggest appendicitis      ABDOMINOPELVIC CAVITY:  No ascites  No pneumoperitoneum  No lymphadenopathy      REPRODUCTIVE ORGANS:  Unremarkable for patient's age      ABDOMINAL WALL/INGUINAL REGIONS:  Small fat-containing subumbilical ventral hernia      OSSEOUS STRUCTURES:  No acute fracture or destructive osseous lesion      IMPRESSION:     Bilateral nephrolithiasis without hydronephrosis  No obstructing urinary tract calculi  Portions of the record may have been created with voice recognition software   Occasional wrong word or "sound a like" substitutions may have occurred due to the inherent limitations of voice recognition software   Read the chart carefully and recognize, using context, where substitutions have occurred

## 2022-12-21 ENCOUNTER — OFFICE VISIT (OUTPATIENT)
Dept: UROLOGY | Facility: CLINIC | Age: 62
End: 2022-12-21

## 2022-12-21 VITALS
SYSTOLIC BLOOD PRESSURE: 122 MMHG | HEIGHT: 65 IN | DIASTOLIC BLOOD PRESSURE: 84 MMHG | HEART RATE: 95 BPM | OXYGEN SATURATION: 99 % | BODY MASS INDEX: 43.93 KG/M2

## 2022-12-21 DIAGNOSIS — N20.1 URETEROLITHIASIS: ICD-10-CM

## 2022-12-21 DIAGNOSIS — C61 PROSTATE CANCER (HCC): Primary | ICD-10-CM

## 2022-12-21 NOTE — LETTER
December 21, 2022     Jaciel Tang MD  [address]  [address] [place] [address]    Patient: Azul Santos   YOB: 1960   Date of Visit: 12/21/2022       Dear Dr Tia Brittle: Thank you for referring Azul Santos to me for evaluation  Below are my notes for this consultation  If you have questions, please do not hesitate to call me  I look forward to following your patient along with you  Sincerely,        Wayne Stewart MD        CC: MD Dana Howard MD Evern Scale, MD  12/21/2022  4:39 PM  Sign when Signing Visit  Referring Physician: Jaciel Tang MD  A copy of this note was sent to the referring physician  Diagnoses and all orders for this visit:    Prostate cancer ([facility] [address] )  -     PSA Total, Diagnostic; Future  -     PSA Total, Diagnostic    Ureterolithiasis            Assessment and plan:          #1 Prostate Cancer  - pT2a GG4+3=7  -  Robotic prostatectomy June 2018  - MAGGIE    #2 post prostatectomy KEEGAN  - 10-14 depends per day    #3 Recurrent nephrolithiasis  -Status post recent ureteroscopy (Dr Tali Figueredo)    Carlton Carrasco is doing very well at the present time  He is asymptomatic  We reviewed his pre and postoperative CT scan together and he has had an excellent result with ureteroscopy  At this point the plan is for continued metabolic therapy under the direction of Dr Randy Jimenez  I agree with empiric potassiums citrate for his uric acid stone  A formal 24-hour urine will be difficult due to the patient's post prostatectomy incontinence and we discussed this today  Of note the patient's stones are non-radiopaque likely due to their uric acid composition in the patient's body habitus  I would likely plan for repeat CT scan in 18 months  For now he will continue with annual follow-up and return to see me next year with a PSA or sooner if need be        Wayne Stewart      Chief Complaint     Follow-up prostate cancer, nephrolithiasis      History of Present Illness     Elke Bañuelos is a 65 y o   male returns in follow-up  He has external pleasant 81-year-old male with a history of [place] 7 prostate cancer diagnosed at 65years of age  He was treated with a robotic prostatectomy by colleague Dr Eric Phillips 2018  He remains MAGGIE  He has not required any adjuvant treatment  Most recent PSA was undetectable  Also has an extensive history of nephrolithiasis and returns with his annual KUB and PSA  He remains asymptomatic  He is doing well  He does wish to get some guidance regarding management options for his stress urinary incontinence  His risk factors for this include diabetes and morbid obesity  Detailed Urologic History     - please refer to HPI    Review of Systems     Review of Systems   Constitutional: Negative for activity change and fatigue  HENT: Negative for congestion  Eyes: Negative for visual disturbance  Respiratory: Negative for shortness of breath and wheezing  Cardiovascular: Negative for chest pain and leg swelling  Gastrointestinal: Negative for abdominal pain  Endocrine: Positive for polyuria  Genitourinary: Positive for dysuria  Negative for flank pain, hematuria and urgency  Musculoskeletal: Negative for back pain  Allergic/Immunologic: Negative for immunocompromised state  Neurological: Negative for dizziness and numbness  Psychiatric/Behavioral: Negative for dysphoric mood  All other systems reviewed and are negative  Allergies     Allergies   Allergen Reactions   • Simvastatin      Increases Liver functoin       Physical Exam     Physical Exam  Constitutional:       General: He is not in acute distress  Appearance: He is well-developed  HENT:      Head: Normocephalic and atraumatic  Cardiovascular:      Comments: Obese abdomen  Pulmonary:      Effort: Pulmonary effort is normal       Breath sounds: Normal breath sounds  Abdominal:      Palpations: Abdomen is soft  Genitourinary:     Comments: Negative CVA tenderness  Musculoskeletal:         General: Normal range of motion  Cervical back: Normal range of motion  Skin:     General: Skin is warm  Neurological:      Mental Status: He is alert and oriented to person, place, and time  Psychiatric:         Behavior: Behavior normal              Vital Signs  Vitals:    12/21/22 1517   BP: 122/84   BP Location: Left arm   Patient Position: Sitting   Cuff Size: Adult   Pulse: 95   SpO2: 99%   Height: 5' 5" (1 651 m)         Current Medications       Current Outpatient Medications:   •  albuterol (PROVENTIL HFA,VENTOLIN HFA) 90 mcg/act inhaler, Inhale 1-2 puffs every 6 (six) hours as needed  , Disp: , Rfl:   •  amLODIPine (NORVASC) 5 mg tablet, TAKE 1 TABLET BY MOUTH EVERY DAY, Disp: 90 tablet, Rfl: 1  •  ASPIRIN 81 PO, Take 1 capsule by mouth 2 (two) times a day  , Disp: , Rfl:   •  atorvastatin (LIPITOR) 80 mg tablet, TAKE 1 TABLET BY MOUTH EVERY DAY, Disp: 90 tablet, Rfl: 1  •  BD Pen Needle Rosalie U/F 32G X 4 MM MISC, Inject under the skin 4 (four) times a day, Disp: 400 each, Rfl: 3  •  carvedilol (COREG) 25 mg tablet, TAKE 1 TABLET BY MOUTH TWICE A DAY WITH MEALS, Disp: 180 tablet, Rfl: 2  •  glimepiride (AMARYL) 4 mg tablet, TAKE 1 TABLET BY MOUTH 2 TIMES A DAY , Disp: 180 tablet, Rfl: 1  •  glucose blood (Contour Next Test) test strip, Check sugar QID, Disp: 400 each, Rfl: 3  •  glucose blood (Contour Next Test) test strip, Use 1 each 4 (four) times a day Use as instructed (Patient taking differently: Use 1 each daily 3 to 4 times daily  Before meals or randomly), Disp: 400 each, Rfl: 3  •  hydrochlorothiazide (HYDRODIURIL) 25 mg tablet, Take 1 tablet (25 mg total) by mouth daily, Disp: 90 tablet, Rfl: 3  •  insuln lispro (HumaLOG KwikPen) 200 units/mL CONCENTRATED U-200 injection pen, Inject 15 units three times daily with meals  , Disp: 18 mL, Rfl: 5  •  lisinopril (ZESTRIL) 40 mg tablet, Take 1 tablet (40 mg total) by mouth daily, Disp: 90 tablet, Rfl: 3  •  LISINOPRIL PO, , Disp: , Rfl:   •  metFORMIN (GLUCOPHAGE) 1000 MG tablet, TAKE 1 TABLET BY MOUTH TWICE A DAY, Disp: 180 tablet, Rfl: 3  •  Microlet Lancets MISC, USE 3 (THREE) TIMES A DAY, Disp: 300 each, Rfl: 3  •  Multiple Vitamins-Minerals (MULTIVITAMIN WITH MINERALS) tablet, Take 1 tablet by mouth daily, Disp: , Rfl:   •  potassium citrate (Urocit-K 10) 10 mEq, Take 1 tablet (10 mEq total) by mouth 2 (two) times a day, Disp: 180 tablet, Rfl: 3  •  tamsulosin (FLOMAX) 0 4 mg, Take 1 capsule (0 4 mg total) by mouth daily with dinner as needed for kidney stone management  , Disp: 90 capsule, Rfl: 1  •  tamsulosin (FLOMAX) 0 4 mg, TAKE 1 CAPSULE BY MOUTH EVERY DAY WITH DINNER, Disp: 90 capsule, Rfl: 1  •  Toujeo SoloStar 300 units/mL CONCENTRATED U-300 injection pen (1-unit dial), INJECT 80 UNITS UNDER THE SKIN DAILY, Disp: 22 5 mL, Rfl: 3  •  Trulicity 3 OP/6 7CV SOPN, INJECT 0 5 ML (3 MG TOTAL) UNDER THE SKIN ONCE A WEEK, Disp: 6 mL, Rfl: 1  •  Vitamin D, Cholecalciferol, 400 units TABS, Take by mouth daily, Disp: , Rfl:       Active Problems     Patient Active Problem List   Diagnosis   • Ureterolithiasis   • Prostate cancer ([place] [address] )   • Benign essential hypertension   • Type 2 diabetes mellitus with stage 3a chronic kidney disease, with long-term current use of insulin (HCC)   • Dyslipidemia   • Morbid obesity (HCC)   • Other fatigue   • Stress incontinence of urine   • Parenchymal renal hypertension   • Stage 3 chronic kidney disease (HCC)   • Diabetic nephropathy associated with type 2 diabetes mellitus (HCC)   • Nephrolithiasis         Past Medical History     Past Medical History:   Diagnosis Date   • Acute bronchitis    • Acute low back pain    • Acute low back pain     19apr2017 resolved   • Acute respiratory infection    • JAYNE (acute kidney injury) ([place] [address] ) 6/15/2018   • Cancer (HCC)     prostate   • Cellulitis of scrotum    • Cough    • Diabetes mellitus ([place] [address] )    • Herpes zoster    • High cholesterol    • Hyperkalemia    • Hypertension    • Kidney disease    • Kidney stone    • Leukocytosis 6/15/2018   • Low back pain    • Lumbar radiculopathy    • Pyelonephritis 8/8/2022   • Rash    • Retinopathy, central serous    • Sleep apnea     [id number]   • Trochanteric bursitis          Surgical History     Past Surgical History:   Procedure Laterality Date   • ABDOMINAL ADHESION SURGERY N/A 6/13/2018    Procedure: LYSIS ADHESIONS;  Surgeon: Yissel Ashton MD;  [facility];  Service: Urology   • [place] RETROGRADE PYELOGRAM  8/8/2022   • [place] RETROGRADE PYELOGRAM  8/11/2022   • LITHOTRIPSY      renal   • [place] CYSTO/URETERO W/LITHOTRIPSY &INDWELL STENT INSRT Left 8/11/2022    Procedure: CYSTOSCOPY URETEROSCOPY WITH LITHOTRIPSY HOLMIUM LASER, RETROGRADE PYELOGRAM AND INSERTION STENT URETERAL;  Surgeon: Julienne Sylvester MD;  [facility];  Service: Urology   • [place] CYSTOURETHROSCOPY,URETER CATHETER Left 8/8/2022    Procedure: CYSTOSCOPY RETROGRADE PYELOGRAM WITH INSERTION STENT URETERAL;  Surgeon: Julienne Sylvester MD;  [facility];  Service: Urology   • [place] LAP,PROSTATECTOMY,RADICAL,W/NERVE SPARE,INCL ROBOTIC N/A 6/13/2018    Procedure: [place] Colonel;  Surgeon: Yissel Ashton MD;  [facility];  Service: Urology   • PROSTATE SURGERY  06/13/2018   • SHOULDER SURGERY           Family History     Family History   Problem Relation Age of Onset   • Other Father         cardiac disorder   • Stroke Father    • Diabetes Father         mellitus   • Hypertension Father    • Heart disease Father         cardiac disorder   • Cancer Sister    • Cancer Paternal Grandmother    • Heart disease Family         cardiac disorder   • Kidney disease Family          Social History     Social History     Social History     Tobacco Use   Smoking Status Never   Smokeless Tobacco Never         Pertinent Lab Values     Lab Results   Component Value Date    CREATININE 1 60 (H) 09/09/2022       Lab Results   Component Value Date    PSA <0 1 12/13/2022    PSA <0 1 02/03/2022    PSA <0 1 08/03/2021       @RESULTRCNT(1H])@      Pertinent Imaging        URINARY TRACT FINDINGS:     RIGHT KIDNEY AND URETER:  There is redemonstration of a 1 cm calculus at the upper pole  There is no hydronephrosis      LEFT KIDNEY AND URETER:  There are a couple of 2-3 mm calculi at the lower pole  There is no hydronephrosis      URINARY BLADDER:  Unremarkable         ADDITIONAL FINDINGS:     LOWER CHEST:  No clinically significant abnormality identified in the visualized lower chest      SOLID VISCERA: Limited low radiation dose noncontrast CT evaluation demonstrates no clinically significant abnormality of the imaged portions of the liver, spleen, pancreas, or adrenal glands        GALLBLADDER/BILIARY TREE:  There are gallstone(s) within the gallbladder, without pericholecystic inflammatory changes  No biliary dilatation      STOMACH AND BOWEL:  Unremarkable      APPENDIX:  No findings to suggest appendicitis      ABDOMINOPELVIC CAVITY:  No ascites  No pneumoperitoneum  No lymphadenopathy      REPRODUCTIVE ORGANS:  Unremarkable for patient's age      ABDOMINAL WALL/INGUINAL REGIONS:  Small fat-containing subumbilical ventral hernia      OSSEOUS STRUCTURES:  No acute fracture or destructive osseous lesion      IMPRESSION:     Bilateral nephrolithiasis without hydronephrosis  No obstructing urinary tract calculi  Portions of the record may have been created with voice recognition software   Occasional wrong word or "sound a like" substitutions may have occurred due to the inherent limitations of voice recognition software   Read the chart carefully and recognize, using context, where substitutions have occurred

## 2023-01-03 ENCOUNTER — OFFICE VISIT (OUTPATIENT)
Dept: INTERNAL MEDICINE CLINIC | Facility: CLINIC | Age: 63
End: 2023-01-03

## 2023-01-03 VITALS
HEART RATE: 96 BPM | DIASTOLIC BLOOD PRESSURE: 82 MMHG | HEIGHT: 65 IN | SYSTOLIC BLOOD PRESSURE: 138 MMHG | WEIGHT: 268.2 LBS | BODY MASS INDEX: 44.68 KG/M2 | OXYGEN SATURATION: 99 %

## 2023-01-03 DIAGNOSIS — Z23 NEED FOR VACCINATION: ICD-10-CM

## 2023-01-03 DIAGNOSIS — N18.31 TYPE 2 DIABETES MELLITUS WITH STAGE 3A CHRONIC KIDNEY DISEASE, WITH LONG-TERM CURRENT USE OF INSULIN (HCC): Primary | ICD-10-CM

## 2023-01-03 DIAGNOSIS — E11.22 TYPE 2 DIABETES MELLITUS WITH STAGE 3A CHRONIC KIDNEY DISEASE, WITH LONG-TERM CURRENT USE OF INSULIN (HCC): Primary | ICD-10-CM

## 2023-01-03 DIAGNOSIS — Z00.00 ANNUAL PHYSICAL EXAM: ICD-10-CM

## 2023-01-03 DIAGNOSIS — C61 PROSTATE CANCER (HCC): ICD-10-CM

## 2023-01-03 DIAGNOSIS — I10 BENIGN ESSENTIAL HYPERTENSION: ICD-10-CM

## 2023-01-03 DIAGNOSIS — E11.21 DIABETIC NEPHROPATHY ASSOCIATED WITH TYPE 2 DIABETES MELLITUS (HCC): ICD-10-CM

## 2023-01-03 DIAGNOSIS — Z79.4 TYPE 2 DIABETES MELLITUS WITH STAGE 3A CHRONIC KIDNEY DISEASE, WITH LONG-TERM CURRENT USE OF INSULIN (HCC): Primary | ICD-10-CM

## 2023-01-03 LAB — SL AMB POCT HEMOGLOBIN AIC: 7.6 (ref ?–6.5)

## 2023-01-08 NOTE — PATIENT INSTRUCTIONS

## 2023-01-08 NOTE — PROGRESS NOTES
[facility]    NAME: Zahra Reed  AGE: 65 y o  SEX: male  : 1960     DATE: 2023     Assessment and Plan:     Problem List Items Addressed This Visit        Endocrine    Type 2 diabetes mellitus with stage 3a chronic kidney disease, with long-term current use of insulin ([facility] [address] ) - Primary    Relevant Orders    POCT hemoglobin A1c (Completed)    Hemoglobin A1C    Comprehensive metabolic panel    CBC and differential    Microalbumin / creatinine urine ratio    TSH, 3rd generation with Free T4 reflex    Lipid Panel with Direct LDL reflex    Diabetic nephropathy associated with type 2 diabetes mellitus ([facility] [address] )    Relevant Orders    POCT hemoglobin A1c (Completed)    Hemoglobin A1C    Comprehensive metabolic panel    CBC and differential    Microalbumin / creatinine urine ratio    TSH, 3rd generation with Free T4 reflex    Lipid Panel with Direct LDL reflex       Cardiovascular and Mediastinum    Benign essential hypertension     He did not do well on HCTZ, more urinary incontinence  Continue amlodipine carvedilol and lisinopril  F/U with nephrology as scheduled            Genitourinary    Prostate cancer [facility])     S/p prostatectomy, continues to f/u with urology  PSA <0 1        Other Visit Diagnoses     Need for vaccination        Relevant Orders    Pneumococcal Conjugate Vaccine 20-valent (Pcv20) (Completed)    Annual physical exam              Immunizations and preventive care screenings were discussed with patient today  Appropriate education was printed on patient's after visit summary  Discussed risks and benefits of prostate cancer screening  We discussed the controversial history of PSA screening for prostate cancer in the [place] as well as the risk of over detection and over treatment of prostate cancer by way of PSA screening    The patient understands that PSA blood testing is an imperfect way to screen for prostate cancer and that elevated PSA levels in the blood may also be caused by infection, inflammation, prostatic trauma or manipulation, urological procedures, or by benign prostatic enlargement  The role of the digital rectal examination in prostate cancer screening was also discussed and I discussed with him that there is large interobserver variability in the findings of digital rectal examination  Counseling:  Exercise: the importance of regular exercise/physical activity was discussed  Recommend exercise 3-5 times per week for at least 30 minutes  BMI Counseling: Body mass index is 44 63 kg/m²  The BMI is above normal  Nutrition recommendations include encouraging healthy choices of fruits and vegetables, moderation in carbohydrate intake and reducing intake of saturated and trans fat  Exercise recommendations include exercising 3-5 times per week  Rationale for BMI follow-up plan is due to patient being overweight or obese  Depression Screening and Follow-up Plan: Patient was screened for depression during today's encounter  They screened negative with a PHQ-2 score of 0  Return in about 5 months (around 6/3/2023)  Chief Complaint:     Chief Complaint   Patient presents with   • Physical Exam      History of Present Illness:     Adult Annual Physical   Patient here for a comprehensive physical exam  The patient reports no problems  Diet and Physical Activity  Diet/Nutrition: well balanced diet  Exercise: no formal exercise  Depression Screening  PHQ-2/9 Depression Screening    Little interest or pleasure in doing things: 0 - not at all  Feeling down, depressed, or hopeless: 0 - not at all  PHQ-2 Score: 0  PHQ-2 Interpretation: Negative depression screen       General Health  Hearing: normal - bilateral   Vision: goes for regular eye exams  Dental: regular dental visits          Health  Symptoms include: incontinence     Review of Systems:     Review of Systems Constitutional: Negative for chills, fever and unexpected weight change  Respiratory: Negative for shortness of breath  Cardiovascular: Negative for chest pain and palpitations  Gastrointestinal: Negative for abdominal pain, constipation and diarrhea        Past Medical History:     Past Medical History:   Diagnosis Date   • Acute bronchitis    • Acute low back pain    • Acute low back pain     [id number] resolved   • Acute respiratory infection    • JAYNE (acute kidney injury) ([facility] [address] ) 6/15/2018   • Cancer (HCC)     prostate   • Cellulitis of scrotum    • Cough    • Diabetes mellitus (HCC)    • Herpes zoster    • High cholesterol    • Hyperkalemia    • Hypertension    • Kidney disease    • Kidney stone    • Leukocytosis 6/15/2018   • Low back pain    • Lumbar radiculopathy    • Pyelonephritis 8/8/2022   • Rash    • Retinopathy, central serous    • Sleep apnea     [id number]   • Trochanteric bursitis       Past Surgical History:     Past Surgical History:   Procedure Laterality Date   • ABDOMINAL ADHESION SURGERY N/A 6/13/2018    Procedure: LYSIS ADHESIONS;  Surgeon: Edmundo Donahue MD;  [facility]: [facility];  Service: Urology   • FL RETROGRADE PYELOGRAM  8/8/2022   • FL RETROGRADE PYELOGRAM  8/11/2022   • LITHOTRIPSY      renal   • NC CYSTO BLADDER W/URETERAL CATHETERIZATION Left 8/8/2022    Procedure: CYSTOSCOPY RETROGRADE PYELOGRAM WITH INSERTION STENT URETERAL;  Surgeon: Emperatriz Gamble MD;  Location[facility];  Service: Urology   • NC CYSTO/URETERO W/LITHOTRIPSY &INDWELL STENT INSRT Left 8/11/2022    Procedure: CYSTOSCOPY URETEROSCOPY WITH LITHOTRIPSY HOLMIUM LASER, RETROGRADE PYELOGRAM AND INSERTION STENT URETERAL;  Surgeon: Emperatriz Gamble MD;  [facility];  Service: Urology   • NC LAPS SURG [id number] RPBIC RAD W/NRV SPARING ROBOT N/A 6/13/2018    Procedure: PROSTATECTOMY RADICAL W ROBOTICS;  Surgeon: Edmundo Donahue MD;  [facility];  Service: Urology   • PROSTATE SURGERY  06/13/2018   • SHOULDER SURGERY        Family History:     Family History   Problem Relation Age of Onset   • Other Father         cardiac disorder   • Stroke Father    • Diabetes Father         mellitus   • Hypertension Father    • Heart disease Father         cardiac disorder   • Cancer Sister    • Cancer Paternal Grandmother    • Heart disease Family         cardiac disorder   • Kidney disease Family       Social History:     Social History     Socioeconomic History   • Marital status: /Civil Union     Spouse name: None   • Number of children: None   • Years of education: None   • Highest education level: None   Occupational History   • None   Tobacco Use   • Smoking status: Never   • Smokeless tobacco: Never   Vaping Use   • Vaping Use: Never used   Substance and Sexual Activity   • Alcohol use: No   • Drug use: No   • Sexual activity: None   Other Topics Concern   • None   Social History Narrative    Caffeine use     Social Determinants of Health     Financial Resource Strain: Not on file   Food Insecurity: Not on file   Transportation Needs: Not on file   Physical Activity: Not on file   Stress: Not on file   Social Connections: Not on file   Intimate Partner Violence: Not on file   Housing Stability: Not on file      Current Medications:     Current Outpatient Medications   Medication Sig Dispense Refill   • amLODIPine (NORVASC) 5 mg tablet TAKE 1 TABLET BY MOUTH EVERY DAY 90 tablet 1   • ASPIRIN 81 PO Take 1 capsule by mouth 2 (two) times a day       • atorvastatin (LIPITOR) 80 mg tablet TAKE 1 TABLET BY MOUTH EVERY DAY 90 tablet 1   • BD Pen Needle Rosalie U/F 32G X 4 MM MISC Inject under the skin 4 (four) times a day 400 each 3   • carvedilol (COREG) 25 mg tablet TAKE 1 TABLET BY MOUTH TWICE A DAY WITH MEALS 180 tablet 2   • glimepiride (AMARYL) 4 mg tablet TAKE 1 TABLET BY MOUTH 2 TIMES A DAY   180 tablet 1   • glucose blood (Contour Next Test) test strip Check sugar  each 3   • glucose blood (Contour Next Test) test strip Use 1 each 4 (four) times a day Use as instructed (Patient taking differently: Use 1 each daily 3 to 4 times daily  Before meals or randomly) 400 each 3   • insuln lispro (HumaLOG KwikPen) 200 units/mL CONCENTRATED U-200 injection pen Inject 15 units three times daily with meals  18 mL 5   • lisinopril (ZESTRIL) 40 mg tablet Take 1 tablet (40 mg total) by mouth daily 90 tablet 3   • metFORMIN (GLUCOPHAGE) 1000 MG tablet TAKE 1 TABLET BY MOUTH TWICE A  tablet 3   • Microlet Lancets MISC USE 3 (THREE) TIMES A  each 3   • Multiple Vitamins-Minerals (MULTIVITAMIN WITH MINERALS) tablet Take 1 tablet by mouth daily     • potassium citrate (Urocit-K 10) 10 mEq Take 1 tablet (10 mEq total) by mouth 2 (two) times a day 180 tablet 3   • tamsulosin (FLOMAX) 0 4 mg Take 1 capsule (0 4 mg total) by mouth daily with dinner as needed for kidney stone management  90 capsule 1   • tamsulosin (FLOMAX) 0 4 mg TAKE 1 CAPSULE BY MOUTH EVERY DAY WITH DINNER 90 capsule 1   • Toujeo SoloStar 300 units/mL CONCENTRATED U-300 injection pen (1-unit dial) INJECT 80 UNITS UNDER THE SKIN DAILY 28 7 mL 3   • Trulicity 3 NO/1 3ES SOPN INJECT 0 5 ML (3 MG TOTAL) UNDER THE SKIN ONCE A WEEK 6 mL 1   • Vitamin D, Cholecalciferol, 400 units TABS Take by mouth daily     • albuterol (PROVENTIL HFA,VENTOLIN HFA) 90 mcg/act inhaler Inhale 1-2 puffs every 6 (six) hours as needed       • hydrochlorothiazide (HYDRODIURIL) 25 mg tablet Take 1 tablet (25 mg total) by mouth daily (Patient not taking: Reported on 1/3/2023) 90 tablet 3     No current facility-administered medications for this visit  Allergies: Allergies   Allergen Reactions   • Simvastatin      Increases Liver functoin      Physical Exam:     /82   Pulse 96   Ht 5' 5" (1 651 m)   Wt 122 kg (268 lb 3 2 oz)   SpO2 99%   BMI 44 63 kg/m²     Physical Exam  Vitals and nursing note reviewed  Constitutional:       General: He is not in acute distress  Appearance: He is well-developed  He is obese  He is not ill-appearing, toxic-appearing or diaphoretic  Eyes:      Conjunctiva/sclera: Conjunctivae normal    Cardiovascular:      Rate and Rhythm: Normal rate and regular rhythm  Pulses: no weak pulses          Dorsalis pedis pulses are 2+ on the right side and 2+ on the left side  Heart sounds: No murmur heard  Pulmonary:      Effort: Pulmonary effort is normal  No respiratory distress  Breath sounds: Normal breath sounds  Abdominal:      Palpations: Abdomen is soft  Tenderness: There is no abdominal tenderness  Musculoskeletal:      Cervical back: Neck supple  Right lower leg: No edema  Left lower leg: No edema  Feet:      Right foot:      Skin integrity: No ulcer, skin breakdown, erythema, warmth, callus or dry skin  Left foot:      Skin integrity: No ulcer, skin breakdown, erythema, warmth, callus or dry skin  Skin:     General: Skin is warm and dry  Capillary Refill: Capillary refill takes less than 2 seconds  Neurological:      Mental Status: He is alert  Psychiatric:         Mood and Affect: Mood normal         Patient's shoes and socks removed  Right Foot/Ankle   Right Foot Inspection  Skin Exam: skin normal and skin intact  No dry skin, no warmth, no callus, no erythema, no maceration, no abnormal color, no pre-ulcer, no ulcer and no callus  Toe Exam: ROM and strength within normal limits  Sensory   Monofilament testing: intact    Vascular  The right DP pulse is 2+  Left Foot/Ankle  Left Foot Inspection  Skin Exam: skin normal and skin intact  No dry skin, no warmth, no erythema, no maceration, normal color, no pre-ulcer, no ulcer and no callus  Toe Exam: ROM and strength within normal limits  Sensory   Monofilament testing: intact    Vascular  The left DP pulse is 2+       Assign Risk Category  No deformity present  No loss of protective sensation  No weak pulses  Risk: 0      Leatha Zuhair Branch

## 2023-01-08 NOTE — ASSESSMENT & PLAN NOTE
He did not do well on HCTZ, more urinary incontinence  Continue amlodipine carvedilol and lisinopril  F/U with nephrology as scheduled

## 2023-01-09 NOTE — PROGRESS NOTES
RENAL FOLLOW UP NOTE: td    • ASSESSMENT AND PLAN:  65y o  year old male with a history of diabetes mellitus type 2/dyslipidemia/hypertension/prostate cancer status post robotic prostatectomy June 2018/ELENA who we are asked to see regarding nephrolithiasis/CKD        1  CKD stage 3A  • Etiology:  Diabetic kidney disease/hypertensive nephrosclerosis/episodes of JAYNE-hydronephrosis of the left kidney/arteriolar nephrosclerosis  Doubt primary glomerular process  • Baseline creatinine:  1 37-1 60, most recently 1 60  Recommend:  • Workup:  ? Current creatinine: 1 53 at baseline  ? UA with microscopic: From 1/11/2023: Trace proteinuria, negative heme; 2-4  RBCs WBCs or bacteria seen  ? Urine protein creatinine ratio: 0 14 g at goal  ? Renal ultrasound with PVR:  To be done did have moderate left-sided hydroureteronephrosis and 9 mm obstructing stone proximal left ureter  ? 10/6/2022: Bilateral nephrolithiasis without hydronephrosis, 9 mm calculus in the upper pole of the right kidney; 5 and 10 mm calculus in the left  ? Renal artery duplex to rule out renal artery stenosis: Negative     • Treatment:  ? Treat hypertension, please see below for recommendations  ? Treat dyslipidemia  ? Avoid nephrotoxic agents such as NSAIDs, and proton pump inhibitors as able; patient counseled as such  ? Good overall health recommendations including weight loss as appropriate, isotonic exercise as able, and avoidance of salt; patient counseled as such  ? I will contact urology to make sure the 2-4 RBCs are acceptable: Reviewed apparently no issues at this time    2  Volume: Euvolemic    3    Hypertension:  Secondary work-up:  · Normal plasma free metanephrines  · Aldosterone/renin ratio: Negative  · TSH normal  · Renal work-up as above  · ELENA unlikely mild heavy snoring at times but patient would defer at this time       Current blood pressure averages:  None at this time    · Goal blood pressure: Less than 130/80 given CKD    Recommendations:  · Push nonmedical regimen including weight loss, isotonic exercise and a low sodium diet  Patient has been counseled the such  · MedicationChanges today:    · In order to control blood pressure but help kidney stone prevention and given the fact that the HCTZ 25 mg caused hypotension orthostasis and symptoms in addition to potentially more urge incontinence I would recommend a very small dose of HCTZ 6 25 mg beginning 3 days a week if tolerated daily at the same time decreasing lisinopril to 20 mg daily  · Neck step potentially depending upon blood pressure would be to lower carvedilol to 12 5 mg twice a day and potentially increase HCTZ to 12 5 mg I would not put HCTZ at full dose given the urge incontinence  · Recheck blood pressures in about 2 to 3 weeks    4  Electrolytes:  • All acceptable including potassium 4 7 bicarbonate 21    5  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:  · Calcium/magnesium/phosphorus:  · Magnesium: 1 5 I recommend Slow-Mag 1 a day over-the-counter  · PTH intact: 122 8 will be monitored do not overtreat to avoid adynamic bone disease: I would just replete vitamin D at this time as outlined below  · Vitamin-D: 24 1 replete: Given tendency towards nephrolithiasis I would give minimal vitamin D supplementation cannot obtain 24-hour urine to see what his calcium in the urine is  6   Dyslipidemia:  · Goal LDL: Less than 100  · Current lipid profile: 1/11/2023: LDL 46/HDL 35/triglycerides 123  Recommendations:  •  Low-cholesterol/low-fat diet / weight loss as appropriate and isotonic exercise  •  Medication changes today: At goal so no changes    7  Anemia:  • Current hemoglobin: Normal at 13 5      8  Nephrolithiasis:Patient has had calcium oxalate stones as many as 13  most recently requiring intervention with cystoscopy laser lithotripsy and stent placement  Patient with prior calcium oxalate now uric acid stones    · Status post recent ureteroscopy with laser lithotripsy basket stone extraction on the left and left ureteral stent placement on 8/11/2022 by Dr Ladonna Blanc  Recommend:    Current status/stone passage none since stone extraction    Workup:  • Check PTH intact level  • Given incontinence cannot obtain formal 24 hour urine  • General stone as well as low oxalate diet   • Patient is slowly increasing water intake decreasing soda intake and gets close to 68 ounces we will continue to work on this! • Place the patient on Urocit-K 10 mEq twice a day  Current potassium 4 7 so I be hesitant to increase this unless the HCTZ will lower it  We will follow closely  • I would add very small dose of HCTZ please see above 6 25 mg daily starting 3 days a week and then slowly advance as tolerated full dose was not tolerated  Eventually based on that as outlined potentially could add higher dose Urocit-K        8  Other problems:  • Diabetes mellitus type 2  : To consider SGLT2 inhibitor per primary physician if no contraindications for renal/cardiac protection  • Prostate cancer status post robotic prostatectomy 2018  • ELENA? GI HEALTH MAINTENANCE: LAST COLONOSCOPY: 2/10/2021      PATIENT INSTRUCTIONS:    Patient Instructions   1  Medication changes today:  • Please begin HCTZ 6 25 mg which is half of a 12 5 mg tablet using a pill cutter, I would begin 3 days a week and then if tolerated after 1 or 2 weeks go to daily  If you get any dizziness lightheadedness etc  please stop it and call me right away  • At the same time I would decrease your lisinopril from 40 mg to 20 mg one half the dose daily  • Potential neck step is to decrease the carvedilol depending upon your blood pressure to allow us to use the HCTZ which will prevent stones and treat your blood pressure  (Also, by adding the HCTZ we will improve your potassium potentially allowing us to increase the Urocit-K to a higher dose again this is a stone preventer    Current potassium is 4 7 so would be reluctant to increase this until we get follow-up labs)  • Please begin vitamin D 1000 units daily over-the-counter for low vitamin D  • Please begin Slow-Mag over-the-counter once a day for low magnesium level this will not only help keep your body healthy but also prevent stone please  • Follow a general stone diet please see below    2  Please go for non-fasting  lab work 2 weeks after making the above medication change  3   Please take 1 week a blood pressure readings about 2 weeks after making the above medication changes    AS FOLLOWS  MORNING AND EVENING, SITTING AND STANDING as follows:  · TAKE THE MORNING READINGS BEFORE ANY MEDICATIONS AND WHEN YOU ARE RELAXED FOR SEVERAL MINUTES  · TAKE THE EVENING READINGS:  BETWEEN 7-10 P M ; PRIOR TO ANY MEDICATIONS; AT LEAST IN OUR  FROM DINNER; AND CERTAINLY AFTER RELAXING FOR A FEW MINUTES  · PLEASE INCLUDE HEART RATE WITH YOUR BLOOD PRESSURE READINGS  · When taking standing readings, keep your arm supported at heart level and not dangling  · Make sure you are sitting with your back supported and feet on the ground and do not cross your legs or feet  · Make sure you have not taken any coffee or caffeine products or exercised or smoke cigarettes at least 30 minutes before taking your blood pressure  Then please mail these readings into the office    4  Follow-up in 4 months  • Please bring in 1 week a blood pressure readings morning evening, sitting and standing is outlined above    • Please go for fasting lab work 1-2 weeks prior to your appointment      5    General instructions:  • AVOID SALT BUT NOT ADDING AN READING LABELS TO MAKE SURE THERE IS LOW-SALT IN THE FOOD THAT YOU ARE EATING  o Goal is less than 2 g of sodium intake or less than 5 g of sodium chloride intake per day    • Avoid nonsteroidal anti-inflammatory drugs such as Naprosyn, ibuprofen, Aleve, Advil, Celebrex, Meloxicam (Mobic) etc   You can use Tylenol as needed if you do not have any liver condition to be concerned about    • Avoid medications such as Sudafed or decongestants and antihistamines that contained the D component which is the decongestant  You can take antihistamines without the decongestant or D component  • Try to avoid medications such as pantoprazole or  Protonix/Nexium or Esomeprazole)/Prilosec or omeprazole/Prevacid or lansoprazole/AcipHex or Rabeprazole  If you are able to, use Pepcid as this is safer for your kidneys  • Try to exercise at least 30 minutes 3 days a week to begin with with an ultimate goal of 5 days a week for at least 30 minutes    • Try to lose 5-10 lb by your next visit    • Please do not drink more than 2 glasses of alcohol/wine on a daily basis as this may contribute to your high blood pressure  Measures to reduce stone development:    · Please Drink  oz of water or 2 5L-3 L a day, throughout the day: Continue to do what you are doing and slowly advance the water while decreasing the soda  · Avoid salt/low-salt diet   · Try to decrease animal protein intake, dairy protein and vegetable base protein are better  · Increase fruits and vegetables as much as possible  · Avoid calcium products such as Tums or other types of calcium containing antacids, you can use Pepcid for indigestion (but you do not have to restrict your dietary calcium)  · Avoid excessive vitamin D   · Avoid excessive vitamin C  · Try to avoid oxalate products (please refer to the diet sheet)  · Limit fructose and sucrose type drinks such as coke         Subjective: There has been no hospitalizations or acute illnesses since last visit  The patient overall is feeling well  No fevers, chills, or cough or colds    Good appetite and good energy  No hematuria, dysuria, voiding symptoms or foamy urine; no stone passage last stone extraction was August   No gastrointestinal symptoms  No cardiovascular symptoms some mild ankle edema nature  No headaches, on HCTZ 25 mg he dropped his blood pressure to 100/60 was very orthostatic had headaches every night  Blood pressure medications:  • Urocit-K 10 mEq twice a day  • Lisinopril 40 mg daily in the morning  • Carvedilol 25 mg twice a day  • Amlodipine 5 mg daily      ROS:  See HPI, otherwise review of systems as completely reviewed with the patient are negative    Past Medical History:   Diagnosis Date   • Acute bronchitis    • Acute low back pain    • Acute low back pain     [id number] resolved   • Acute respiratory infection    • JAYNE (acute kidney injury) ([facility] [address] ) 6/15/2018   • [facility]     prostate   • Cellulitis of scrotum    • Cough    • Diabetes mellitus (HCC)    • Herpes zoster    • High cholesterol    • Hyperkalemia    • Hypertension    • Kidney disease    • Kidney stone    • Leukocytosis 6/15/2018   • Low back pain    • Lumbar radiculopathy    • Pyelonephritis 8/8/2022   • Rash    • Retinopathy, central serous    • Sleep apnea     [id number]   • Trochanteric bursitis      Past Surgical History:   Procedure Laterality Date   • ABDOMINAL ADHESION SURGERY N/A 6/13/2018    Procedure: LYSIS ADHESIONS;  Surgeon: Marie Nettles MD;  [facility]: [facility];  Service: Urology   • FL RETROGRADE PYELOGRAM  8/8/2022   • FL RETROGRADE PYELOGRAM  8/11/2022   • LITHOTRIPSY      renal   • AR CYSTO BLADDER W/URETERAL CATHETERIZATION Left 8/8/2022    Procedure: CYSTOSCOPY RETROGRADE PYELOGRAM WITH INSERTION STENT URETERAL;  Surgeon: Vero Xiao MD;  Location[facility];  Service: Urology   • AR CYSTO/URETERO W/LITHOTRIPSY &INDWELL STENT INSRT Left 8/11/2022    Procedure: CYSTOSCOPY URETEROSCOPY WITH LITHOTRIPSY HOLMIUM LASER, RETROGRADE PYELOGRAM AND INSERTION STENT URETERAL;  Surgeon: Vero Xiao MD;  [facility];  Service: Urology   • AR LAPS SURG [id number] RPBIC RAD W/NRV SPARING ROBOT N/A 6/13/2018    Procedure: PROSTATECTOMY RADICAL W ROBOTICS;  Surgeon: Marie Nettles MD;  Location: [facility];  Service: Urology • PROSTATE SURGERY  06/13/2018   • SHOULDER SURGERY       Family History   Problem Relation Age of Onset   • Other Father         cardiac disorder   • Stroke Father    • Diabetes Father         mellitus   • Hypertension Father    • Heart disease Father         cardiac disorder   • Cancer Sister    • Cancer Paternal Grandmother    • Heart disease Family         cardiac disorder   • Kidney disease Family       reports that he has never smoked  He has never used smokeless tobacco  He reports that he does not drink alcohol and does not use drugs  I COMPLETELY REVIEWED THE PAST MEDICAL HISTORY/PAST SURGICAL HISTORY/SOCIAL HISTORY/FAMILY HISTORY/AND MEDICATIONS  AND UPDATED ALL    Objective:     Vitals:   BP sitting on left: 148/80 with a heart rate of 80 and regular  BP standing on left:142/70 with a heart rate of 88 and regular    Weight (last 2 days)     Date/Time Weight    01/19/23 1548 122 (270)        Wt Readings from Last 3 Encounters:   01/19/23 122 kg (270 lb)   01/03/23 122 kg (268 lb 3 2 oz)   09/13/22 120 kg (264 lb)       Body mass index is 44 93 kg/m²      Physical Exam: General:  No acute distress  Skin:  No acute rash  Eyes:  No scleral icterus, noninjected, no discharge from eyes  ENT:  Moist mucous membranes  Neck:  Supple, no jugular venous distention, trachea is midline, no lymphadenopathy and no thyromegaly  Back   No CVAT  Chest:  Clear to auscultation and percussion, good respiratory effort  CVS:  Regular rate and rhythm without a rub, or gallops or murmurs  Abdomen:  Soft and nontender with normal bowel sounds  Extremities:  No cyanosis and no edema, no arthritic changes, normal range of motion  Neuro:  Grossly intact  Psych:  Alert, oriented x3 and appropriate      Medications:    Current Outpatient Medications:   •  albuterol (PROVENTIL HFA,VENTOLIN HFA) 90 mcg/act inhaler, Inhale 1-2 puffs every 6 (six) hours as needed  , Disp: , Rfl:   •  amLODIPine (NORVASC) 5 mg tablet, TAKE 1 TABLET BY MOUTH EVERY DAY, Disp: 90 tablet, Rfl: 1  •  ASPIRIN 81 PO, Take 1 capsule by mouth 2 (two) times a day  , Disp: , Rfl:   •  atorvastatin (LIPITOR) 80 mg tablet, TAKE 1 TABLET BY MOUTH EVERY DAY, Disp: 90 tablet, Rfl: 1  •  BD Pen Needle Rosalie U/F 32G X 4 MM MISC, Inject under the skin 4 (four) times a day, Disp: 400 each, Rfl: 3  •  carvedilol (COREG) 25 mg tablet, TAKE 1 TABLET BY MOUTH TWICE A DAY WITH MEALS, Disp: 180 tablet, Rfl: 2  •  glimepiride (AMARYL) 4 mg tablet, TAKE 1 TABLET BY MOUTH 2 TIMES A DAY , Disp: 180 tablet, Rfl: 1  •  glucose blood (Contour Next Test) test strip, Use 1 each 4 (four) times a day Use as instructed (Patient taking differently: Use 1 each daily 3 to 4 times daily  Before meals or randomly), Disp: 400 each, Rfl: 3  •  glucose blood (Contour Next Test) test strip, CHECK SUGAR 4 TIMES DAILY, Disp: 400 strip, Rfl: 3  •  hydrochlorothiazide (HYDRODIURIL) 12 5 mg tablet, Take 0 5 tablets (6 25 mg total) by mouth daily, Disp: 30 tablet, Rfl: 5  •  insuln lispro (HumaLOG KwikPen) 200 units/mL CONCENTRATED U-200 injection pen, Inject 15 units three times daily with meals  , Disp: 18 mL, Rfl: 5  •  lisinopril (ZESTRIL) 40 mg tablet, Take 1 tablet (40 mg total) by mouth daily (Patient taking differently: Take 20 mg by mouth daily), Disp: 90 tablet, Rfl: 3  •  metFORMIN (GLUCOPHAGE) 1000 MG tablet, TAKE 1 TABLET BY MOUTH TWICE A DAY, Disp: 180 tablet, Rfl: 3  •  Microlet Lancets MISC, USE 3 (THREE) TIMES A DAY, Disp: 300 each, Rfl: 3  •  Multiple Vitamins-Minerals (MULTIVITAMIN WITH MINERALS) tablet, Take 1 tablet by mouth daily, Disp: , Rfl:   •  potassium citrate (Urocit-K 10) 10 mEq, Take 1 tablet (10 mEq total) by mouth 2 (two) times a day, Disp: 180 tablet, Rfl: 3  •  tamsulosin (FLOMAX) 0 4 mg, TAKE 1 CAPSULE BY MOUTH EVERY DAY WITH DINNER, Disp: 90 capsule, Rfl: 1  •  Toujeo SoloStar 300 units/mL CONCENTRATED U-300 injection pen (1-unit dial), INJECT 80 UNITS UNDER THE SKIN DAILY, Disp: 22 5 mL, Rfl: 3  •  Trulicity 3 BU/2 3MP SOPN, INJECT 0 5 ML (3 MG TOTAL) UNDER THE SKIN ONCE A WEEK, Disp: 6 mL, Rfl: 1  •  Vitamin D, Cholecalciferol, 400 units TABS, Take by mouth daily, Disp: , Rfl:   •  tamsulosin (FLOMAX) 0 4 mg, Take 1 capsule (0 4 mg total) by mouth daily with dinner as needed for kidney stone management  (Patient not taking: Reported on 1/19/2023), Disp: 90 capsule, Rfl: 1    Lab, Imaging and other studies: I have personally reviewed pertinent labs    Laboratory Results:  Results for orders placed or performed in visit on 01/11/23   Lipid Panel with Direct LDL reflex   Result Value Ref Range    Cholesterol 106 See Comment mg/dL    Triglycerides 123 See Comment mg/dL    HDL, Direct 35 (L) >=40 mg/dL    LDL Calculated 46 0 - 100 mg/dL   PTH, intact   Result Value Ref Range     8 (H) 18 4 - 80 1 pg/mL   Phosphorus   Result Value Ref Range    Phosphorus 3 8 2 3 - 4 1 mg/dL   Magnesium   Result Value Ref Range    Magnesium 1 5 (L) 1 6 - 2 6 mg/dL   CK (with reflex to MB)   Result Value Ref Range    Total  39 - 308 U/L   CBC and Platelet   Result Value Ref Range    WBC 11 90 (H) 4 31 - 10 16 Thousand/uL    RBC 4 79 3 88 - 5 62 Million/uL    Hemoglobin 13 5 12 0 - 17 0 g/dL    Hematocrit 43 3 36 5 - 49 3 %    MCV 90 82 - 98 fL    MCH 28 2 26 8 - 34 3 pg    MCHC 31 2 (L) 31 4 - 37 4 g/dL    RDW 12 6 11 6 - 15 1 %    Platelets 792 631 - 874 Thousands/uL    MPV 9 3 8 9 - 12 7 fL   Comprehensive metabolic panel   Result Value Ref Range    Sodium 141 135 - 147 mmol/L    Potassium 4 7 3 5 - 5 3 mmol/L    Chloride 111 (H) 96 - 108 mmol/L    CO2 21 21 - 32 mmol/L    ANION GAP 9 4 - 13 mmol/L    BUN 20 5 - 25 mg/dL    Creatinine 1 53 (H) 0 60 - 1 30 mg/dL    Glucose, Fasting 112 (H) 65 - 99 mg/dL    Calcium 9 0 8 3 - 10 1 mg/dL    AST 26 5 - 45 U/L    ALT 46 12 - 78 U/L    Alkaline Phosphatase 80 46 - 116 U/L    Total Protein 7 3 6 4 - 8 4 g/dL    Albumin 3 6 3 5 - 5 0 g/dL    Total Bilirubin 0  36 0 20 - 1 00 mg/dL    eGFR 48 ml/min/1 73sq m   Vitamin D 25 hydroxy   Result Value Ref Range    Vit D, 25-Hydroxy 24 1 (L) 30 0 - 100 0 ng/mL   Renin Activity, Plasma   Result Value Ref Range    Renin 1 101 0 167 - 5 380 ng/mL/hr   Metanephrine, Fractionated Plasma Free   Result Value Ref Range    Normetanephrine, Free 95 8 0 0 - 285 2 pg/mL    Metanephrine, Free 29 5 0 0 - 88 0 pg/mL   TSH, 3rd generation with Free T4 reflex   Result Value Ref Range    TSH 3RD GENERATON 2 180 0 450 - 4 500 uIU/mL   Aldosterone   Result Value Ref Range    Aldosterone 1 6 0 0 - 30 0 ng/dL             Invalid input(s): ALBUMIN      Radiology review:   chest X-ray    Ultrasound      Portions of the record may have been created with voice recognition software  Occasional wrong word or "sound a like" substitutions may have occurred due to the inherent limitations of voice recognition software  Read the chart carefully and recognize, using context, where substitutions have occurred

## 2023-01-11 ENCOUNTER — APPOINTMENT (OUTPATIENT)
Dept: LAB | Facility: CLINIC | Age: 63
End: 2023-01-11

## 2023-01-11 DIAGNOSIS — N20.0 NEPHROLITHIASIS: ICD-10-CM

## 2023-01-11 DIAGNOSIS — I12.9 PARENCHYMAL RENAL HYPERTENSION, STAGE 1 THROUGH STAGE 4 OR UNSPECIFIED CHRONIC KIDNEY DISEASE: ICD-10-CM

## 2023-01-11 DIAGNOSIS — N18.32 STAGE 3B CHRONIC KIDNEY DISEASE (CKD) (HCC): ICD-10-CM

## 2023-01-11 DIAGNOSIS — N20.1 URETEROLITHIASIS: ICD-10-CM

## 2023-01-11 DIAGNOSIS — E11.21 DIABETIC NEPHROPATHY ASSOCIATED WITH TYPE 2 DIABETES MELLITUS (HCC): ICD-10-CM

## 2023-01-11 DIAGNOSIS — E78.5 DYSLIPIDEMIA: ICD-10-CM

## 2023-01-11 LAB
25(OH)D3 SERPL-MCNC: 24.1 NG/ML (ref 30–100)
ALBUMIN SERPL BCP-MCNC: 3.6 G/DL (ref 3.5–5)
ALP SERPL-CCNC: 80 U/L (ref 46–116)
ALT SERPL W P-5'-P-CCNC: 46 U/L (ref 12–78)
ANION GAP SERPL CALCULATED.3IONS-SCNC: 9 MMOL/L (ref 4–13)
AST SERPL W P-5'-P-CCNC: 26 U/L (ref 5–45)
BILIRUB SERPL-MCNC: 0.36 MG/DL (ref 0.2–1)
BUN SERPL-MCNC: 20 MG/DL (ref 5–25)
CALCIUM SERPL-MCNC: 9 MG/DL (ref 8.3–10.1)
CHLORIDE SERPL-SCNC: 111 MMOL/L (ref 96–108)
CHOLEST SERPL-MCNC: 106 MG/DL
CK SERPL-CCNC: 152 U/L (ref 39–308)
CO2 SERPL-SCNC: 21 MMOL/L (ref 21–32)
CREAT SERPL-MCNC: 1.53 MG/DL (ref 0.6–1.3)
ERYTHROCYTE [DISTWIDTH] IN BLOOD BY AUTOMATED COUNT: 12.6 % (ref 11.6–15.1)
GFR SERPL CREATININE-BSD FRML MDRD: 48 ML/MIN/1.73SQ M
GLUCOSE P FAST SERPL-MCNC: 112 MG/DL (ref 65–99)
HCT VFR BLD AUTO: 43.3 % (ref 36.5–49.3)
HDLC SERPL-MCNC: 35 MG/DL
HGB BLD-MCNC: 13.5 G/DL (ref 12–17)
LDLC SERPL CALC-MCNC: 46 MG/DL (ref 0–100)
MAGNESIUM SERPL-MCNC: 1.5 MG/DL (ref 1.6–2.6)
MCH RBC QN AUTO: 28.2 PG (ref 26.8–34.3)
MCHC RBC AUTO-ENTMCNC: 31.2 G/DL (ref 31.4–37.4)
MCV RBC AUTO: 90 FL (ref 82–98)
PHOSPHATE SERPL-MCNC: 3.8 MG/DL (ref 2.3–4.1)
PLATELET # BLD AUTO: 291 THOUSANDS/UL (ref 149–390)
PMV BLD AUTO: 9.3 FL (ref 8.9–12.7)
POTASSIUM SERPL-SCNC: 4.7 MMOL/L (ref 3.5–5.3)
PROT SERPL-MCNC: 7.3 G/DL (ref 6.4–8.4)
PTH-INTACT SERPL-MCNC: 122.8 PG/ML (ref 18.4–80.1)
RBC # BLD AUTO: 4.79 MILLION/UL (ref 3.88–5.62)
SODIUM SERPL-SCNC: 141 MMOL/L (ref 135–147)
TRIGL SERPL-MCNC: 123 MG/DL
TSH SERPL DL<=0.05 MIU/L-ACNC: 2.18 UIU/ML (ref 0.45–4.5)
WBC # BLD AUTO: 11.9 THOUSAND/UL (ref 4.31–10.16)

## 2023-01-15 DIAGNOSIS — E11.65 UNCONTROLLED TYPE 2 DIABETES MELLITUS WITH HYPERGLYCEMIA (HCC): ICD-10-CM

## 2023-01-15 LAB
METANEPH FREE SERPL-MCNC: 29.5 PG/ML (ref 0–88)
NORMETANEPHRINE SERPL-MCNC: 95.8 PG/ML (ref 0–285.2)

## 2023-01-15 RX ORDER — PERPHENAZINE 16 MG/1
TABLET, FILM COATED ORAL
Qty: 400 STRIP | Refills: 3 | Status: SHIPPED | OUTPATIENT
Start: 2023-01-15

## 2023-01-16 LAB — ALDOST SERPL-MCNC: 1.6 NG/DL (ref 0–30)

## 2023-01-19 ENCOUNTER — OFFICE VISIT (OUTPATIENT)
Dept: NEPHROLOGY | Facility: CLINIC | Age: 63
End: 2023-01-19

## 2023-01-19 VITALS — WEIGHT: 270 LBS | BODY MASS INDEX: 44.98 KG/M2 | HEIGHT: 65 IN

## 2023-01-19 DIAGNOSIS — E66.01 MORBID OBESITY (HCC): ICD-10-CM

## 2023-01-19 DIAGNOSIS — E78.5 DYSLIPIDEMIA: ICD-10-CM

## 2023-01-19 DIAGNOSIS — I12.9 PARENCHYMAL RENAL HYPERTENSION, STAGE 1 THROUGH STAGE 4 OR UNSPECIFIED CHRONIC KIDNEY DISEASE: Primary | ICD-10-CM

## 2023-01-19 DIAGNOSIS — E11.21 DIABETIC NEPHROPATHY ASSOCIATED WITH TYPE 2 DIABETES MELLITUS (HCC): ICD-10-CM

## 2023-01-19 DIAGNOSIS — N18.31 STAGE 3A CHRONIC KIDNEY DISEASE (HCC): ICD-10-CM

## 2023-01-19 DIAGNOSIS — N20.0 NEPHROLITHIASIS: ICD-10-CM

## 2023-01-19 LAB — RENIN PLAS-CCNC: 1.1 NG/ML/HR (ref 0.17–5.38)

## 2023-01-19 RX ORDER — HYDROCHLOROTHIAZIDE 12.5 MG/1
6.25 TABLET ORAL DAILY
Qty: 30 TABLET | Refills: 5 | Status: SHIPPED | OUTPATIENT
Start: 2023-01-19

## 2023-01-19 NOTE — PATIENT INSTRUCTIONS
1  Medication changes today:  Please begin HCTZ 6 25 mg which is half of a 12 5 mg tablet using a pill cutter, I would begin 3 days a week and then if tolerated after 1 or 2 weeks go to daily  If you get any dizziness lightheadedness etc  please stop it and call me right away  At the same time I would decrease your lisinopril from 40 mg to 20 mg one half the dose daily  Potential neck step is to decrease the carvedilol depending upon your blood pressure to allow us to use the HCTZ which will prevent stones and treat your blood pressure  (Also, by adding the HCTZ we will improve your potassium potentially allowing us to increase the Urocit-K to a higher dose again this is a stone preventer  Current potassium is 4 7 so would be reluctant to increase this until we get follow-up labs)  Please begin vitamin D 1000 units daily over-the-counter for low vitamin D  Please begin Slow-Mag over-the-counter once a day for low magnesium level this will not only help keep your body healthy but also prevent stone please  Follow a general stone diet please see below    2  Please go for non-fasting  lab work 2 weeks after making the above medication change      3   Please take 1 week a blood pressure readings about 2 weeks after making the above medication changes    AS FOLLOWS  MORNING AND EVENING, SITTING AND STANDING as follows:  TAKE THE MORNING READINGS BEFORE ANY MEDICATIONS AND WHEN YOU ARE RELAXED FOR SEVERAL MINUTES  TAKE THE EVENING READINGS:  BETWEEN 7-10 P M ; PRIOR TO ANY MEDICATIONS; AT LEAST IN OUR  FROM DINNER; AND CERTAINLY AFTER RELAXING FOR A FEW MINUTES  PLEASE INCLUDE HEART RATE WITH YOUR BLOOD PRESSURE READINGS  When taking standing readings, keep your arm supported at heart level and not dangling  Make sure you are sitting with your back supported and feet on the ground and do not cross your legs or feet  Make sure you have not taken any coffee or caffeine products or exercised or smoke cigarettes at least 30 minutes before taking your blood pressure  Then please mail these readings into the office    4  Follow-up in 4 months  Please bring in 1 week a blood pressure readings morning evening, sitting and standing is outlined above    Please go for fasting lab work 1-2 weeks prior to your appointment      5  General instructions:  AVOID SALT BUT NOT ADDING AN READING LABELS TO MAKE SURE THERE IS LOW-SALT IN THE FOOD THAT YOU ARE EATING  Goal is less than 2 g of sodium intake or less than 5 g of sodium chloride intake per day    Avoid nonsteroidal anti-inflammatory drugs such as Naprosyn, ibuprofen, Aleve, Advil, Celebrex, Meloxicam (Mobic) etc   You can use Tylenol as needed if you do not have any liver condition to be concerned about    Avoid medications such as Sudafed or decongestants and antihistamines that contained the D component which is the decongestant  You can take antihistamines without the decongestant or D component  Try to avoid medications such as pantoprazole or  Protonix/Nexium or Esomeprazole)/Prilosec or omeprazole/Prevacid or lansoprazole/AcipHex or Rabeprazole  If you are able to, use Pepcid as this is safer for your kidneys  Try to exercise at least 30 minutes 3 days a week to begin with with an ultimate goal of 5 days a week for at least 30 minutes    Try to lose 5-10 lb by your next visit    Please do not drink more than 2 glasses of alcohol/wine on a daily basis as this may contribute to your high blood pressure          Measures to reduce stone development:    Please Drink  oz of water or 2 5L-3 L a day, throughout the day: Continue to do what you are doing and slowly advance the water while decreasing the soda  Avoid salt/low-salt diet   Try to decrease animal protein intake, dairy protein and vegetable base protein are better  Increase fruits and vegetables as much as possible  Avoid calcium products such as Tums or other types of calcium containing antacids, you can use Pepcid for indigestion (but you do not have to restrict your dietary calcium)  Avoid excessive vitamin D   Avoid excessive vitamin C  Try to avoid oxalate products (please refer to the diet sheet)  Limit fructose and sucrose type drinks such as coke

## 2023-01-19 NOTE — LETTER
January 19, 2023     Asuncion Denton, [address] [facility], [place] [address]  [address] [address]    Patient: Jaclyn Arreola   YOB: 1960   Date of Visit: 1/19/2023       Dear Dr Eugenio Garcia: Thank you for referring Jaclyn Arreola to me for evaluation  Below are my notes for this consultation  If you have questions, please do not hesitate to call me  I look forward to following your patient along with you  Sincerely,        Major Bray MD        CC: MD Major De MD  1/19/2023  5:04 PM  Sign when Signing Visit  RENAL FOLLOW UP NOTE: td    • ASSESSMENT AND PLAN:  65y o  year old male with a history of diabetes mellitus type 2/dyslipidemia/hypertension/prostate cancer status post robotic prostatectomy June 2018/ELENA who we are asked to see regarding nephrolithiasis/CKD        1  CKD stage 3A  • Etiology:  Diabetic kidney disease/hypertensive nephrosclerosis/episodes of JAYNE-hydronephrosis of the left kidney/arteriolar nephrosclerosis  Doubt primary glomerular process  • Baseline creatinine:  1 37-1 60, most recently 1 60  Recommend:  • Workup:  ? Current creatinine: 1 53 at baseline  ? UA with microscopic: From 1/11/2023: Trace proteinuria, negative heme; 2-4  RBCs WBCs or bacteria seen  ? Urine protein creatinine ratio: 0 14 g at goal  ? Renal ultrasound with PVR:  To be done did have moderate left-sided hydroureteronephrosis and 9 mm obstructing stone proximal left ureter  ? 10/6/2022: Bilateral nephrolithiasis without hydronephrosis, 9 mm calculus in the upper pole of the right kidney; 5 and 10 mm calculus in the left  ? Renal artery duplex to rule out renal artery stenosis: Negative     • Treatment:  ? Treat hypertension, please see below for recommendations  ? Treat dyslipidemia  ? Avoid nephrotoxic agents such as NSAIDs, and proton pump inhibitors as able; patient counseled as such  ?  Good overall health recommendations including weight loss as appropriate, isotonic exercise as able, and avoidance of salt; patient counseled as such  ? I will contact urology to make sure the 2-4 RBCs are acceptable: Reviewed apparently no issues at this time    2  Volume: Euvolemic    3  Hypertension:  Secondary work-up:  · Normal plasma free metanephrines  · Aldosterone/renin ratio: Negative  · TSH normal  · Renal work-up as above  · ELENA unlikely mild heavy snoring at times but patient would defer at this time       Current blood pressure averages:  None at this time    · Goal blood pressure: Less than 130/80 given CKD    Recommendations:  · Push nonmedical regimen including weight loss, isotonic exercise and a low sodium diet  Patient has been counseled the such  · MedicationChanges today:    · In order to control blood pressure but help kidney stone prevention and given the fact that the HCTZ 25 mg caused hypotension orthostasis and symptoms in addition to potentially more urge incontinence I would recommend a very small dose of HCTZ 6 25 mg beginning 3 days a week if tolerated daily at the same time decreasing lisinopril to 20 mg daily  · Neck step potentially depending upon blood pressure would be to lower carvedilol to 12 5 mg twice a day and potentially increase HCTZ to 12 5 mg I would not put HCTZ at full dose given the urge incontinence  · Recheck blood pressures in about 2 to 3 weeks    4  Electrolytes:  • All acceptable including potassium 4 7 bicarbonate 21    5  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:  · Calcium/magnesium/phosphorus:  · Magnesium: 1 5 I recommend Slow-Mag 1 a day over-the-counter  · PTH intact: 122 8 will be monitored do not overtreat to avoid adynamic bone disease: I would just replete vitamin D at this time as outlined below  · Vitamin-D: 24 1 replete: Given tendency towards nephrolithiasis I would give minimal vitamin D supplementation cannot obtain 24-hour urine to see what his calcium in the urine is      6   Dyslipidemia:  · Goal LDL: Less than 100  · Current lipid profile: 1/11/2023: LDL 46/HDL 35/triglycerides 123  Recommendations:  •  Low-cholesterol/low-fat diet / weight loss as appropriate and isotonic exercise  •  Medication changes today: At goal so no changes    7  Anemia:  • Current hemoglobin: Normal at 13 5      8  Nephrolithiasis:Patient has had calcium oxalate stones as many as 13  most recently requiring intervention with cystoscopy laser lithotripsy and stent placement  Patient with prior calcium oxalate now uric acid stones  · Status post recent ureteroscopy with laser lithotripsy basket stone extraction on the left and left ureteral stent placement on 8/11/2022 by Dr Rosa Almeida  Recommend:    Current status/stone passage none since stone extraction    Workup:  • Check PTH intact level  • Given incontinence cannot obtain formal 24 hour urine  • General stone as well as low oxalate diet   • Patient is slowly increasing water intake decreasing soda intake and gets close to 68 ounces we will continue to work on this! • Place the patient on Urocit-K 10 mEq twice a day  Current potassium 4 7 so I be hesitant to increase this unless the HCTZ will lower it  We will follow closely  • I would add very small dose of HCTZ please see above 6 25 mg daily starting 3 days a week and then slowly advance as tolerated full dose was not tolerated  Eventually based on that as outlined potentially could add higher dose Urocit-K        8  Other problems:  • Diabetes mellitus type 2  : To consider SGLT2 inhibitor per primary physician if no contraindications for renal/cardiac protection  • Prostate cancer status post robotic prostatectomy 2018  • ELENA? GI HEALTH MAINTENANCE: LAST COLONOSCOPY: 2/10/2021      PATIENT INSTRUCTIONS:    Patient Instructions   1  Medication changes today:  • Please begin HCTZ 6 25 mg which is half of a 12 5 mg tablet using a pill cutter, I would begin 3 days a week and then if tolerated after 1 or 2 weeks go to daily    If you get any dizziness lightheadedness etc  please stop it and call me right away  • At the same time I would decrease your lisinopril from 40 mg to 20 mg one half the dose daily  • Potential neck step is to decrease the carvedilol depending upon your blood pressure to allow us to use the HCTZ which will prevent stones and treat your blood pressure  (Also, by adding the HCTZ we will improve your potassium potentially allowing us to increase the Urocit-K to a higher dose again this is a stone preventer  Current potassium is 4 7 so would be reluctant to increase this until we get follow-up labs)  • Please begin vitamin D 1000 units daily over-the-counter for low vitamin D  • Please begin Slow-Mag over-the-counter once a day for low magnesium level this will not only help keep your body healthy but also prevent stone please  • Follow a general stone diet please see below    2  Please go for non-fasting  lab work 2 weeks after making the above medication change  3   Please take 1 week a blood pressure readings about 2 weeks after making the above medication changes    AS FOLLOWS  MORNING AND EVENING, SITTING AND STANDING as follows:  · TAKE THE MORNING READINGS BEFORE ANY MEDICATIONS AND WHEN YOU ARE RELAXED FOR SEVERAL MINUTES  · TAKE THE EVENING READINGS:  BETWEEN 7-10 P M ; PRIOR TO ANY MEDICATIONS; AT LEAST IN OUR  FROM DINNER; AND CERTAINLY AFTER RELAXING FOR A FEW MINUTES  · PLEASE INCLUDE HEART RATE WITH YOUR BLOOD PRESSURE READINGS  · When taking standing readings, keep your arm supported at heart level and not dangling  · Make sure you are sitting with your back supported and feet on the ground and do not cross your legs or feet  · Make sure you have not taken any coffee or caffeine products or exercised or smoke cigarettes at least 30 minutes before taking your blood pressure  Then please mail these readings into the office    4    Follow-up in 4 months  • Please bring in 1 week a blood pressure readings morning evening, sitting and standing is outlined above    • Please go for fasting lab work 1-2 weeks prior to your appointment      5  General instructions:  • AVOID SALT BUT NOT ADDING AN READING LABELS TO MAKE SURE THERE IS LOW-SALT IN THE FOOD THAT YOU ARE EATING  o Goal is less than 2 g of sodium intake or less than 5 g of sodium chloride intake per day    • Avoid nonsteroidal anti-inflammatory drugs such as Naprosyn, ibuprofen, Aleve, Advil, Celebrex, Meloxicam (Mobic) etc   You can use Tylenol as needed if you do not have any liver condition to be concerned about    • Avoid medications such as Sudafed or decongestants and antihistamines that contained the D component which is the decongestant  You can take antihistamines without the decongestant or D component  • Try to avoid medications such as pantoprazole or  Protonix/Nexium or Esomeprazole)/Prilosec or omeprazole/Prevacid or lansoprazole/AcipHex or Rabeprazole  If you are able to, use Pepcid as this is safer for your kidneys  • Try to exercise at least 30 minutes 3 days a week to begin with with an ultimate goal of 5 days a week for at least 30 minutes    • Try to lose 5-10 lb by your next visit    • Please do not drink more than 2 glasses of alcohol/wine on a daily basis as this may contribute to your high blood pressure          Measures to reduce stone development:    · Please Drink  oz of water or 2 5L-3 L a day, throughout the day: Continue to do what you are doing and slowly advance the water while decreasing the soda  · Avoid salt/low-salt diet   · Try to decrease animal protein intake, dairy protein and vegetable base protein are better  · Increase fruits and vegetables as much as possible  · Avoid calcium products such as Tums or other types of calcium containing antacids, you can use Pepcid for indigestion (but you do not have to restrict your dietary calcium)  · Avoid excessive vitamin D   · Avoid excessive vitamin C  · Try to avoid oxalate products (please refer to the diet sheet)  · Limit fructose and sucrose type drinks such as coke         Subjective: There has been no hospitalizations or acute illnesses since last visit  The patient overall is feeling well  No fevers, chills, or cough or colds    Good appetite and good energy  No hematuria, dysuria, voiding symptoms or foamy urine; no stone passage last stone extraction was August   No gastrointestinal symptoms  No cardiovascular symptoms some mild ankle edema nature  No headaches, on HCTZ 25 mg he dropped his blood pressure to 100/60 was very orthostatic had headaches every night  Blood pressure medications:  • Urocit-K 10 mEq twice a day  • Lisinopril 40 mg daily in the morning  • Carvedilol 25 mg twice a day  • Amlodipine 5 mg daily      ROS:  See HPI, otherwise review of systems as completely reviewed with the patient are negative    Past Medical History:   Diagnosis Date   • Acute bronchitis    • Acute low back pain    • Acute low back pain     [id number] resolved   • Acute respiratory infection    • JAYNE (acute kidney injury) ([facility] [address] ) 6/15/2018   • Cancer ([facility] [address] )     prostate   • Cellulitis of scrotum    • Cough    • Diabetes mellitus ([facility] [address] )    • Herpes zoster    • High cholesterol    • Hyperkalemia    • Hypertension    • Kidney disease    • Kidney stone    • Leukocytosis 6/15/2018   • Low back pain    • Lumbar radiculopathy    • Pyelonephritis 8/8/2022   • Rash    • Retinopathy, central serous    • Sleep apnea     [id number]   • Trochanteric bursitis      Past Surgical History:   Procedure Laterality Date   • ABDOMINAL ADHESION SURGERY N/A 6/13/2018    Procedure: LYSIS ADHESIONS;  Surgeon: Alesia Huynh MD;  [facility]: [facility];  Service: Urology   • FL RETROGRADE PYELOGRAM  8/8/2022   • FL RETROGRADE PYELOGRAM  8/11/2022   • LITHOTRIPSY      renal   • UT CYSTO BLADDER W/URETERAL CATHETERIZATION Left 8/8/2022    Procedure: CYSTOSCOPY RETROGRADE PYELOGRAM WITH INSERTION STENT URETERAL;  Surgeon: Camden Moreno MD;  [facility];  Service: Urology   • ID CYSTO/URETERO W/LITHOTRIPSY &INDWELL STENT INSRT Left 8/11/2022    Procedure: CYSTOSCOPY URETEROSCOPY WITH LITHOTRIPSY HOLMIUM LASER, RETROGRADE PYELOGRAM AND INSERTION STENT URETERAL;  Surgeon: Camden Moreno MD;  [facility]: [facility];  Service: Urology   • ID LAPS SURG [id number] RPBIC RAD W/NRV SPARING ROBOT N/A 6/13/2018    Procedure: Lisa Craig;  Surgeon: Hebert Fox MD;  Location: [facility];  Service: Urology   • PROSTATE SURGERY  06/13/2018   • SHOULDER SURGERY       Family History   Problem Relation Age of Onset   • Other Father         cardiac disorder   • Stroke Father    • Diabetes Father         mellitus   • Hypertension Father    • Heart disease Father         cardiac disorder   • Cancer Sister    • Cancer Paternal Grandmother    • Heart disease Family         cardiac disorder   • Kidney disease Family       reports that he has never smoked  He has never used smokeless tobacco  He reports that he does not drink alcohol and does not use drugs  I COMPLETELY REVIEWED THE PAST MEDICAL HISTORY/PAST SURGICAL HISTORY/SOCIAL HISTORY/FAMILY HISTORY/AND MEDICATIONS  AND UPDATED ALL    Objective:     Vitals:   BP sitting on left: 148/80 with a heart rate of 80 and regular  BP standing on left:142/70 with a heart rate of 88 and regular    Weight (last 2 days)     Date/Time Weight    01/19/23 1548 122 (270)        Wt Readings from Last 3 Encounters:   01/19/23 122 kg (270 lb)   01/03/23 122 kg (268 lb 3 2 oz)   09/13/22 120 kg (264 lb)       Body mass index is 44 93 kg/m²      Physical Exam: General:  No acute distress  Skin:  No acute rash  Eyes:  No scleral icterus, noninjected, no discharge from eyes  ENT:  Moist mucous membranes  Neck:  Supple, no jugular venous distention, trachea is midline, no lymphadenopathy and no thyromegaly  Back   No CVAT  Chest:  Clear to auscultation and percussion, good respiratory effort  CVS:  Regular rate and rhythm without a rub, or gallops or murmurs  Abdomen:  Soft and nontender with normal bowel sounds  Extremities:  No cyanosis and no edema, no arthritic changes, normal range of motion  Neuro:  Grossly intact  Psych:  Alert, oriented x3 and appropriate      Medications:    Current Outpatient Medications:   •  albuterol (PROVENTIL HFA,VENTOLIN HFA) 90 mcg/act inhaler, Inhale 1-2 puffs every 6 (six) hours as needed  , Disp: , Rfl:   •  amLODIPine (NORVASC) 5 mg tablet, TAKE 1 TABLET BY MOUTH EVERY DAY, Disp: 90 tablet, Rfl: 1  •  ASPIRIN 81 PO, Take 1 capsule by mouth 2 (two) times a day  , Disp: , Rfl:   •  atorvastatin (LIPITOR) 80 mg tablet, TAKE 1 TABLET BY MOUTH EVERY DAY, Disp: 90 tablet, Rfl: 1  •  BD Pen Needle Rosalie U/F 32G X 4 MM MISC, Inject under the skin 4 (four) times a day, Disp: 400 each, Rfl: 3  •  carvedilol (COREG) 25 mg tablet, TAKE 1 TABLET BY MOUTH TWICE A DAY WITH MEALS, Disp: 180 tablet, Rfl: 2  •  glimepiride (AMARYL) 4 mg tablet, TAKE 1 TABLET BY MOUTH 2 TIMES A DAY , Disp: 180 tablet, Rfl: 1  •  glucose blood (Contour Next Test) test strip, Use 1 each 4 (four) times a day Use as instructed (Patient taking differently: Use 1 each daily 3 to 4 times daily  Before meals or randomly), Disp: 400 each, Rfl: 3  •  glucose blood (Contour Next Test) test strip, CHECK SUGAR 4 TIMES DAILY, Disp: 400 strip, Rfl: 3  •  hydrochlorothiazide (HYDRODIURIL) 12 5 mg tablet, Take 0 5 tablets (6 25 mg total) by mouth daily, Disp: 30 tablet, Rfl: 5  •  insuln lispro (HumaLOG KwikPen) 200 units/mL CONCENTRATED U-200 injection pen, Inject 15 units three times daily with meals  , Disp: 18 mL, Rfl: 5  •  lisinopril (ZESTRIL) 40 mg tablet, Take 1 tablet (40 mg total) by mouth daily (Patient taking differently: Take 20 mg by mouth daily), Disp: 90 tablet, Rfl: 3  •  metFORMIN (GLUCOPHAGE) 1000 MG tablet, TAKE 1 TABLET BY MOUTH TWICE A DAY, Disp: 180 tablet, Rfl: 3  • Microlet Lancets MISC, USE 3 (THREE) TIMES A DAY, Disp: 300 each, Rfl: 3  •  Multiple Vitamins-Minerals (MULTIVITAMIN WITH MINERALS) tablet, Take 1 tablet by mouth daily, Disp: , Rfl:   •  potassium citrate (Urocit-K 10) 10 mEq, Take 1 tablet (10 mEq total) by mouth 2 (two) times a day, Disp: 180 tablet, Rfl: 3  •  tamsulosin (FLOMAX) 0 4 mg, TAKE 1 CAPSULE BY MOUTH EVERY DAY WITH DINNER, Disp: 90 capsule, Rfl: 1  •  Toujeo SoloStar 300 units/mL CONCENTRATED U-300 injection pen (1-unit dial), INJECT 80 UNITS UNDER THE SKIN DAILY, Disp: 22 5 mL, Rfl: 3  •  Trulicity 3 TW/6 0QS SOPN, INJECT 0 5 ML (3 MG TOTAL) UNDER THE SKIN ONCE A WEEK, Disp: 6 mL, Rfl: 1  •  Vitamin D, Cholecalciferol, 400 units TABS, Take by mouth daily, Disp: , Rfl:   •  tamsulosin (FLOMAX) 0 4 mg, Take 1 capsule (0 4 mg total) by mouth daily with dinner as needed for kidney stone management  (Patient not taking: Reported on 1/19/2023), Disp: 90 capsule, Rfl: 1    Lab, Imaging and other studies: I have personally reviewed pertinent labs    Laboratory Results:  Results for orders placed or performed in visit on 01/11/23   Lipid Panel with Direct LDL reflex   Result Value Ref Range    Cholesterol 106 See Comment mg/dL    Triglycerides 123 See Comment mg/dL    HDL, Direct 35 (L) >=40 mg/dL    LDL Calculated 46 0 - 100 mg/dL   PTH, intact   Result Value Ref Range     8 (H) 18 4 - 80 1 pg/mL   Phosphorus   Result Value Ref Range    Phosphorus 3 8 2 3 - 4 1 mg/dL   Magnesium   Result Value Ref Range    Magnesium 1 5 (L) 1 6 - 2 6 mg/dL   CK (with reflex to MB)   Result Value Ref Range    Total  39 - 308 U/L   CBC and Platelet   Result Value Ref Range    WBC 11 90 (H) 4 31 - 10 16 Thousand/uL    RBC 4 79 3 88 - 5 62 Million/uL    Hemoglobin 13 5 12 0 - 17 0 g/dL    Hematocrit 43 3 36 5 - 49 3 %    MCV 90 82 - 98 fL    MCH 28 2 26 8 - 34 3 pg    MCHC 31 2 (L) 31 4 - 37 4 g/dL    RDW 12 6 11 6 - 15 1 %    Platelets 948 857 - 929 Thousands/uL MPV 9 3 8 9 - 12 7 fL   Comprehensive metabolic panel   Result Value Ref Range    Sodium 141 135 - 147 mmol/L    Potassium 4 7 3 5 - 5 3 mmol/L    Chloride 111 (H) 96 - 108 mmol/L    CO2 21 21 - 32 mmol/L    ANION GAP 9 4 - 13 mmol/L    BUN 20 5 - 25 mg/dL    Creatinine 1 53 (H) 0 60 - 1 30 mg/dL    Glucose, Fasting 112 (H) 65 - 99 mg/dL    Calcium 9 0 8 3 - 10 1 mg/dL    AST 26 5 - 45 U/L    ALT 46 12 - 78 U/L    Alkaline Phosphatase 80 46 - 116 U/L    Total Protein 7 3 6 4 - 8 4 g/dL    Albumin 3 6 3 5 - 5 0 g/dL    Total Bilirubin 0 36 0 20 - 1 00 mg/dL    eGFR 48 ml/min/1 73sq m   Vitamin D 25 hydroxy   Result Value Ref Range    Vit D, 25-Hydroxy 24 1 (L) 30 0 - 100 0 ng/mL   Renin Activity, Plasma   Result Value Ref Range    Renin 1 101 0 167 - 5 380 ng/mL/hr   Metanephrine, Fractionated Plasma Free   Result Value Ref Range    Normetanephrine, Free 95 8 0 0 - 285 2 pg/mL    Metanephrine, Free 29 5 0 0 - 88 0 pg/mL   TSH, 3rd generation with Free T4 reflex   Result Value Ref Range    TSH 3RD GENERATON 2 180 0 450 - 4 500 uIU/mL   Aldosterone   Result Value Ref Range    Aldosterone 1 6 0 0 - 30 0 ng/dL             Invalid input(s): ALBUMIN      Radiology review:   chest X-ray    Ultrasound      Portions of the record may have been created with voice recognition software  Occasional wrong word or "sound a like" substitutions may have occurred due to the inherent limitations of voice recognition software  Read the chart carefully and recognize, using context, where substitutions have occurred

## 2023-01-19 NOTE — LETTER
January 19, 2023     Eli Barragan, [address] [facility], [place] [address]  [address] [address]    Patient: Ammy Carrillo   YOB: 1960   Date of Visit: 1/19/2023       Dear Dr Sanders: Thank you for referring Ammy Carrillo to me for evaluation  Below are my notes for this consultation  If you have questions, please do not hesitate to call me  I look forward to following your patient along with you  Sincerely,        Jimena Felix MD        CC: MD Jimena Power MD  1/19/2023  4:59 PM  Incomplete  RENAL FOLLOW UP NOTE: td    • ASSESSMENT AND PLAN:  65y o  year old male with a history of diabetes mellitus type 2/dyslipidemia/hypertension/prostate cancer status post robotic prostatectomy June 2018/ELENA who we are asked to see regarding nephrolithiasis/CKD        1  CKD stage 3A  • Etiology:  Diabetic kidney disease/hypertensive nephrosclerosis/episodes of JAYNE-hydronephrosis of the left kidney/arteriolar nephrosclerosis  Doubt primary glomerular process  • Baseline creatinine:  1 37-1 60, most recently 1 60  Recommend:  • Workup:  ? Current creatinine: 1 53 at baseline  ? UA with microscopic: From 1/11/2023: Trace proteinuria, negative heme; 2-4  RBCs WBCs or bacteria seen  ? Urine protein creatinine ratio: 0 14 g at goal  ? Renal ultrasound with PVR:  To be done did have moderate left-sided hydroureteronephrosis and 9 mm obstructing stone proximal left ureter  ? 10/6/2022: Bilateral nephrolithiasis without hydronephrosis, 9 mm calculus in the upper pole of the right kidney; 5 and 10 mm calculus in the left  ? Renal artery duplex to rule out renal artery stenosis: Negative     • Treatment:  ? Treat hypertension, please see below for recommendations  ? Treat dyslipidemia  ? Avoid nephrotoxic agents such as NSAIDs, and proton pump inhibitors as able; patient counseled as such  ?  Good overall health recommendations including weight loss as appropriate, isotonic exercise as able, and avoidance of salt; patient counseled as such  ? I will contact urology to make sure the 2-4 RBCs are acceptable: Reviewed apparently no issues at this time    2  Volume: Euvolemic    3  Hypertension:  Secondary work-up:  · Normal plasma free metanephrines  · Aldosterone/renin ratio: Negative  · TSH normal  · Renal work-up as above  · ELENA unlikely mild heavy snoring at times but patient would defer at this time       Current blood pressure averages:  None at this time    · Goal blood pressure: Less than 130/80 given CKD    Recommendations:  · Push nonmedical regimen including weight loss, isotonic exercise and a low sodium diet  Patient has been counseled the such  · MedicationChanges today:    · In order to control blood pressure but help kidney stone prevention and given the fact that the HCTZ 25 mg caused hypotension orthostasis and symptoms in addition to potentially more urge incontinence I would recommend a very small dose of HCTZ 6 25 mg beginning 3 days a week if tolerated daily at the same time decreasing lisinopril to 20 mg daily  · Neck step potentially depending upon blood pressure would be to lower carvedilol to 12 5 mg twice a day and potentially increase HCTZ to 12 5 mg I would not put HCTZ at full dose given the urge incontinence  · Recheck blood pressures in about 2 to 3 weeks    4  Electrolytes:  • All acceptable including potassium 4 7 bicarbonate 21    5  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:  · Calcium/magnesium/phosphorus:  · Magnesium: 1 5 I recommend Slow-Mag 1 a day over-the-counter  · PTH intact: 122 8 will be monitored do not overtreat to avoid adynamic bone disease: I would just replete vitamin D at this time as outlined below  · Vitamin-D: 24 1 replete: Given tendency towards nephrolithiasis I would give minimal vitamin D supplementation cannot obtain 24-hour urine to see what his calcium in the urine is      6   Dyslipidemia:  · Goal LDL: Less than 100  · Current lipid profile: 1/11/2023: LDL 46/HDL 35/triglycerides 123  Recommendations:  •  Low-cholesterol/low-fat diet / weight loss as appropriate and isotonic exercise  •  Medication changes today: At goal so no changes    7  Anemia:  • Current hemoglobin: Normal at 13 5      8  Nephrolithiasis:Patient has had calcium oxalate stones as many as 13  most recently requiring intervention with cystoscopy laser lithotripsy and stent placement  Patient with prior calcium oxalate now uric acid stones  · Status post recent ureteroscopy with laser lithotripsy basket stone extraction on the left and left ureteral stent placement on 8/11/2022 by Dr Jelly Henderson  Recommend:    Current status/stone passage none since stone extraction    Workup:  • Check PTH intact level  • Given incontinence cannot obtain formal 24 hour urine  • General stone as well as low oxalate diet   • Patient is slowly increasing water intake decreasing soda intake and gets close to 68 ounces we will continue to work on this! • Place the patient on Urocit-K 10 mEq twice a day  Current potassium 4 7 so I be hesitant to increase this unless the HCTZ will lower it  We will follow closely  • I would add very small dose of HCTZ please see above 6 25 mg daily starting 3 days a week and then slowly advance as tolerated full dose was not tolerated  Eventually based on that as outlined potentially could add higher dose Urocit-K        8  Other problems:  • Diabetes mellitus type 2  : To consider SGLT2 inhibitor per primary physician if no contraindications for renal/cardiac protection  • Prostate cancer status post robotic prostatectomy 2018  • ELENA? GI HEALTH MAINTENANCE: LAST COLONOSCOPY: 2/10/2021      PATIENT INSTRUCTIONS:    Patient Instructions   1  Medication changes today:  • Please begin HCTZ 6 25 mg which is half of a 12 5 mg tablet using a pill cutter, I would begin 3 days a week and then if tolerated after 1 or 2 weeks go to daily    If you get any dizziness lightheadedness etc  please stop it and call me right away  • At the same time I would decrease your lisinopril from 40 mg to 20 mg one half the dose daily  • Potential neck step is to decrease the carvedilol depending upon your blood pressure to allow us to use the HCTZ which will prevent stones and treat your blood pressure  (Also, by adding the HCTZ we will improve your potassium potentially allowing us to increase the Urocit-K to a higher dose again this is a stone preventer  Current potassium is 4 7 so would be reluctant to increase this until we get follow-up labs)  • Please begin vitamin D 1000 units daily over-the-counter for low vitamin D  • Please begin Slow-Mag over-the-counter once a day for low magnesium level this will not only help keep your body healthy but also prevent stone please  • Follow a general stone diet please see below    2  Please go for non-fasting  lab work 2 weeks after making the above medication change  3   Please take 1 week a blood pressure readings about 2 weeks after making the above medication changes    AS FOLLOWS  MORNING AND EVENING, SITTING AND STANDING as follows:  · TAKE THE MORNING READINGS BEFORE ANY MEDICATIONS AND WHEN YOU ARE RELAXED FOR SEVERAL MINUTES  · TAKE THE EVENING READINGS:  BETWEEN 7-10 P M ; PRIOR TO ANY MEDICATIONS; AT LEAST IN OUR  FROM DINNER; AND CERTAINLY AFTER RELAXING FOR A FEW MINUTES  · PLEASE INCLUDE HEART RATE WITH YOUR BLOOD PRESSURE READINGS  · When taking standing readings, keep your arm supported at heart level and not dangling  · Make sure you are sitting with your back supported and feet on the ground and do not cross your legs or feet  · Make sure you have not taken any coffee or caffeine products or exercised or smoke cigarettes at least 30 minutes before taking your blood pressure  Then please mail these readings into the office    4    Follow-up in 4 months  • Please bring in 1 week a blood pressure readings morning evening, sitting and standing is outlined above    • Please go for fasting lab work 1-2 weeks prior to your appointment      5  General instructions:  • AVOID SALT BUT NOT ADDING AN READING LABELS TO MAKE SURE THERE IS LOW-SALT IN THE FOOD THAT YOU ARE EATING  o Goal is less than 2 g of sodium intake or less than 5 g of sodium chloride intake per day    • Avoid nonsteroidal anti-inflammatory drugs such as Naprosyn, ibuprofen, Aleve, Advil, Celebrex, Meloxicam (Mobic) etc   You can use Tylenol as needed if you do not have any liver condition to be concerned about    • Avoid medications such as Sudafed or decongestants and antihistamines that contained the D component which is the decongestant  You can take antihistamines without the decongestant or D component  • Try to avoid medications such as pantoprazole or  Protonix/Nexium or Esomeprazole)/Prilosec or omeprazole/Prevacid or lansoprazole/AcipHex or Rabeprazole  If you are able to, use Pepcid as this is safer for your kidneys  • Try to exercise at least 30 minutes 3 days a week to begin with with an ultimate goal of 5 days a week for at least 30 minutes    • Try to lose 5-10 lb by your next visit    • Please do not drink more than 2 glasses of alcohol/wine on a daily basis as this may contribute to your high blood pressure          Measures to reduce stone development:    · Please Drink  oz of water or 2 5L-3 L a day, throughout the day: Continue to do what you are doing and slowly advance the water while decreasing the soda  · Avoid salt/low-salt diet   · Try to decrease animal protein intake, dairy protein and vegetable base protein are better  · Increase fruits and vegetables as much as possible  · Avoid calcium products such as Tums or other types of calcium containing antacids, you can use Pepcid for indigestion (but you do not have to restrict your dietary calcium)  · Avoid excessive vitamin D   · Avoid excessive vitamin C  · Try to avoid oxalate products (please refer to the diet sheet)  · Limit fructose and sucrose type drinks such as coke         Subjective: There has been no hospitalizations or acute illnesses since last visit  The patient overall is feeling well  No fevers, chills, or cough or colds    Good appetite and good energy  No hematuria, dysuria, voiding symptoms or foamy urine; no stone passage last stone extraction was August   No gastrointestinal symptoms  No cardiovascular symptoms some mild ankle edema nature  No headaches, on HCTZ 25 mg he dropped his blood pressure to 100/60 was very orthostatic had headaches every night  Blood pressure medications:  • Urocit-K 10 mEq twice a day  • Lisinopril 40 mg daily in the morning  • Carvedilol 25 mg twice a day  • Amlodipine 5 mg daily      ROS:  See HPI, otherwise review of systems as completely reviewed with the patient are negative    Past Medical History:   Diagnosis Date   • Acute bronchitis    • Acute low back pain    • Acute low back pain     [id number] resolved   • Acute respiratory infection    • JAYNE (acute kidney injury) ([facility] [address] ) 6/15/2018   • Cancer ([facility] [address] )     prostate   • Cellulitis of scrotum    • Cough    • Diabetes mellitus ([facility] [address] )    • Herpes zoster    • High cholesterol    • Hyperkalemia    • Hypertension    • Kidney disease    • Kidney stone    • Leukocytosis 6/15/2018   • Low back pain    • Lumbar radiculopathy    • Pyelonephritis 8/8/2022   • Rash    • Retinopathy, central serous    • Sleep apnea     [id number]   • Trochanteric bursitis      Past Surgical History:   Procedure Laterality Date   • ABDOMINAL ADHESION SURGERY N/A 6/13/2018    Procedure: LYSIS ADHESIONS;  Surgeon: Stephanie Stevenson MD;  [facility];  Service: Urology   • FL RETROGRADE PYELOGRAM  8/8/2022   • FL RETROGRADE PYELOGRAM  8/11/2022   • LITHOTRIPSY      renal   • VA CYSTO BLADDER W/URETERAL CATHETERIZATION Left 8/8/2022    Procedure: CYSTOSCOPY RETROGRADE PYELOGRAM WITH INSERTION STENT URETERAL;  Surgeon: Laura Martinez MD;  [facility];  Service: Urology   • AK CYSTO/URETERO W/LITHOTRIPSY &INDWELL STENT INSRT Left 8/11/2022    Procedure: CYSTOSCOPY URETEROSCOPY WITH LITHOTRIPSY HOLMIUM LASER, RETROGRADE PYELOGRAM AND INSERTION STENT URETERAL;  Surgeon: Laura Martinez MD;  [facility];  Service: Urology   • AK LAPS SURG [id number] RPBIC RAD W/NRV SPARING ROBOT N/A 6/13/2018    Procedure: Kalina Jow;  Surgeon: Enzo Garcia MD;  [facility];  Service: Urology   • PROSTATE SURGERY  06/13/2018   • SHOULDER SURGERY       Family History   Problem Relation Age of Onset   • Other Father         cardiac disorder   • Stroke Father    • Diabetes Father         mellitus   • Hypertension Father    • Heart disease Father         cardiac disorder   • Cancer Sister    • Cancer Paternal Grandmother    • Heart disease Family         cardiac disorder   • Kidney disease Family       reports that he has never smoked  He has never used smokeless tobacco  He reports that he does not drink alcohol and does not use drugs  I COMPLETELY REVIEWED THE PAST MEDICAL HISTORY/PAST SURGICAL HISTORY/SOCIAL HISTORY/FAMILY HISTORY/AND MEDICATIONS  AND UPDATED ALL    Objective:     Vitals:   BP sitting on left: 148/80 with a heart rate of 80 and regular  BP standing on left:142/70 with a heart rate of 88 and regular    Weight (last 2 days)     Date/Time Weight    01/19/23 1548 122 (270)        Wt Readings from Last 3 Encounters:   01/19/23 122 kg (270 lb)   01/03/23 122 kg (268 lb 3 2 oz)   09/13/22 120 kg (264 lb)       Body mass index is 44 93 kg/m²      Physical Exam: General:  No acute distress  Skin:  No acute rash  Eyes:  No scleral icterus, noninjected, no discharge from eyes  ENT:  Moist mucous membranes  Neck:  Supple, no jugular venous distention, trachea is midline, no lymphadenopathy and no thyromegaly  Back   No CVAT  Chest:  Clear to auscultation and percussion, good respiratory effort  CVS:  Regular rate and rhythm without a rub, or gallops or murmurs  Abdomen:  Soft and nontender with normal bowel sounds  Extremities:  No cyanosis and no edema, no arthritic changes, normal range of motion  Neuro:  Grossly intact  Psych:  Alert, oriented x3 and appropriate      Medications:    Current Outpatient Medications:   •  albuterol (PROVENTIL HFA,VENTOLIN HFA) 90 mcg/act inhaler, Inhale 1-2 puffs every 6 (six) hours as needed  , Disp: , Rfl:   •  amLODIPine (NORVASC) 5 mg tablet, TAKE 1 TABLET BY MOUTH EVERY DAY, Disp: 90 tablet, Rfl: 1  •  ASPIRIN 81 PO, Take 1 capsule by mouth 2 (two) times a day  , Disp: , Rfl:   •  atorvastatin (LIPITOR) 80 mg tablet, TAKE 1 TABLET BY MOUTH EVERY DAY, Disp: 90 tablet, Rfl: 1  •  BD Pen Needle Rosalie U/F 32G X 4 MM MISC, Inject under the skin 4 (four) times a day, Disp: 400 each, Rfl: 3  •  carvedilol (COREG) 25 mg tablet, TAKE 1 TABLET BY MOUTH TWICE A DAY WITH MEALS, Disp: 180 tablet, Rfl: 2  •  glimepiride (AMARYL) 4 mg tablet, TAKE 1 TABLET BY MOUTH 2 TIMES A DAY , Disp: 180 tablet, Rfl: 1  •  glucose blood (Contour Next Test) test strip, Use 1 each 4 (four) times a day Use as instructed (Patient taking differently: Use 1 each daily 3 to 4 times daily  Before meals or randomly), Disp: 400 each, Rfl: 3  •  glucose blood (Contour Next Test) test strip, CHECK SUGAR 4 TIMES DAILY, Disp: 400 strip, Rfl: 3  •  hydrochlorothiazide (HYDRODIURIL) 12 5 mg tablet, Take 0 5 tablets (6 25 mg total) by mouth daily, Disp: 30 tablet, Rfl: 5  •  insuln lispro (HumaLOG KwikPen) 200 units/mL CONCENTRATED U-200 injection pen, Inject 15 units three times daily with meals  , Disp: 18 mL, Rfl: 5  •  lisinopril (ZESTRIL) 40 mg tablet, Take 1 tablet (40 mg total) by mouth daily (Patient taking differently: Take 20 mg by mouth daily), Disp: 90 tablet, Rfl: 3  •  metFORMIN (GLUCOPHAGE) 1000 MG tablet, TAKE 1 TABLET BY MOUTH TWICE A DAY, Disp: 180 tablet, Rfl: 3  •  Microlet Lancets MISC, USE 3 (THREE) TIMES A DAY, Disp: 300 each, Rfl: 3  •  Multiple Vitamins-Minerals (MULTIVITAMIN WITH MINERALS) tablet, Take 1 tablet by mouth daily, Disp: , Rfl:   •  potassium citrate (Urocit-K 10) 10 mEq, Take 1 tablet (10 mEq total) by mouth 2 (two) times a day, Disp: 180 tablet, Rfl: 3  •  tamsulosin (FLOMAX) 0 4 mg, TAKE 1 CAPSULE BY MOUTH EVERY DAY WITH DINNER, Disp: 90 capsule, Rfl: 1  •  Toujeo SoloStar 300 units/mL CONCENTRATED U-300 injection pen (1-unit dial), INJECT 80 UNITS UNDER THE SKIN DAILY, Disp: 22 5 mL, Rfl: 3  •  Trulicity 3 HV/4 7RW SOPN, INJECT 0 5 ML (3 MG TOTAL) UNDER THE SKIN ONCE A WEEK, Disp: 6 mL, Rfl: 1  •  Vitamin D, Cholecalciferol, 400 units TABS, Take by mouth daily, Disp: , Rfl:   •  tamsulosin (FLOMAX) 0 4 mg, Take 1 capsule (0 4 mg total) by mouth daily with dinner as needed for kidney stone management  (Patient not taking: Reported on 1/19/2023), Disp: 90 capsule, Rfl: 1    Lab, Imaging and other studies: I have personally reviewed pertinent labs    Laboratory Results:  Results for orders placed or performed in visit on 01/11/23   Lipid Panel with Direct LDL reflex   Result Value Ref Range    Cholesterol 106 See Comment mg/dL    Triglycerides 123 See Comment mg/dL    HDL, Direct 35 (L) >=40 mg/dL    LDL Calculated 46 0 - 100 mg/dL   PTH, intact   Result Value Ref Range     8 (H) 18 4 - 80 1 pg/mL   Phosphorus   Result Value Ref Range    Phosphorus 3 8 2 3 - 4 1 mg/dL   Magnesium   Result Value Ref Range    Magnesium 1 5 (L) 1 6 - 2 6 mg/dL   CK (with reflex to MB)   Result Value Ref Range    Total  39 - 308 U/L   CBC and Platelet   Result Value Ref Range    WBC 11 90 (H) 4 31 - 10 16 Thousand/uL    RBC 4 79 3 88 - 5 62 Million/uL    Hemoglobin 13 5 12 0 - 17 0 g/dL    Hematocrit 43 3 36 5 - 49 3 %    MCV 90 82 - 98 fL    MCH 28 2 26 8 - 34 3 pg    MCHC 31 2 (L) 31 4 - 37 4 g/dL    RDW 12 6 11 6 - 15 1 %    Platelets 894 764 - 961 Thousands/uL    MPV 9 3 8 9 - 12 7 fL   Comprehensive metabolic panel   Result Value Ref Range    Sodium 141 135 - 147 mmol/L    Potassium 4 7 3 5 - 5 3 mmol/L    Chloride 111 (H) 96 - 108 mmol/L    CO2 21 21 - 32 mmol/L    ANION GAP 9 4 - 13 mmol/L    BUN 20 5 - 25 mg/dL    Creatinine 1 53 (H) 0 60 - 1 30 mg/dL    Glucose, Fasting 112 (H) 65 - 99 mg/dL    Calcium 9 0 8 3 - 10 1 mg/dL    AST 26 5 - 45 U/L    ALT 46 12 - 78 U/L    Alkaline Phosphatase 80 46 - 116 U/L    Total Protein 7 3 6 4 - 8 4 g/dL    Albumin 3 6 3 5 - 5 0 g/dL    Total Bilirubin 0 36 0 20 - 1 00 mg/dL    eGFR 48 ml/min/1 73sq m   Vitamin D 25 hydroxy   Result Value Ref Range    Vit D, 25-Hydroxy 24 1 (L) 30 0 - 100 0 ng/mL   Renin Activity, Plasma   Result Value Ref Range    Renin 1 101 0 167 - 5 380 ng/mL/hr   Metanephrine, Fractionated Plasma Free   Result Value Ref Range    Normetanephrine, Free 95 8 0 0 - 285 2 pg/mL    Metanephrine, Free 29 5 0 0 - 88 0 pg/mL   TSH, 3rd generation with Free T4 reflex   Result Value Ref Range    TSH 3RD GENERATON 2 180 0 450 - 4 500 uIU/mL   Aldosterone   Result Value Ref Range    Aldosterone 1 6 0 0 - 30 0 ng/dL             Invalid input(s): ALBUMIN      Radiology review:   chest X-ray    Ultrasound      Portions of the record may have been created with voice recognition software  Occasional wrong word or "sound a like" substitutions may have occurred due to the inherent limitations of voice recognition software  Read the chart carefully and recognize, using context, where substitutions have occurred  Jennifer Morales MD  1/19/2023  4:56 PM  Sign when Signing Visit  RENAL FOLLOW UP NOTE: td    • ASSESSMENT AND PLAN:  65y o  year old male with a history of diabetes mellitus type 2/dyslipidemia/hypertension/prostate cancer status post robotic prostatectomy June 2018/ELENA who we are asked to see regarding nephrolithiasis/CKD        1   CKD stage 3A  • Etiology:  Diabetic kidney disease/hypertensive nephrosclerosis/episodes of JAYNE-hydronephrosis of the left kidney/arteriolar nephrosclerosis  Doubt primary glomerular process  • Baseline creatinine:  1 37-1 60, most recently 1 60  Recommend:  • Workup:  ? Current creatinine: 1 53 at baseline  ? UA with microscopic: From 1/11/2023: Trace proteinuria, negative heme; 2-4  RBCs WBCs or bacteria seen  ? Urine protein creatinine ratio: 0 14 g at goal  ? Renal ultrasound with PVR:  To be done did have moderate left-sided hydroureteronephrosis and 9 mm obstructing stone proximal left ureter  ? 10/6/2022: Bilateral nephrolithiasis without hydronephrosis, 9 mm calculus in the upper pole of the right kidney; 5 and 10 mm calculus in the left  ? Renal artery duplex to rule out renal artery stenosis: Negative     • Treatment:  ? Treat hypertension, please see below for recommendations  ? Treat dyslipidemia  ? Avoid nephrotoxic agents such as NSAIDs, and proton pump inhibitors as able; patient counseled as such  ? Good overall health recommendations including weight loss as appropriate, isotonic exercise as able, and avoidance of salt; patient counseled as such  ? I will contact urology to make sure the 2-4 RBCs are acceptable: Reviewed apparently no issues at this time    2  Volume: Euvolemic    3  Hypertension:  Secondary work-up:  · Normal plasma free metanephrines  · Aldosterone/renin ratio: Negative  · TSH normal  · Renal work-up as above  · ELENA unlikely mild heavy snoring at times but patient would defer at this time       Current blood pressure averages:  None at this time    · Goal blood pressure: Less than 130/80 given CKD    Recommendations:  · Push nonmedical regimen including weight loss, isotonic exercise and a low sodium diet  Patient has been counseled the such    · MedicationChanges today:    · In order to control blood pressure but help kidney stone prevention and given the fact that the HCTZ 25 mg caused hypotension orthostasis and symptoms in addition to potentially more urge incontinence I would recommend a very small dose of HCTZ 6 25 mg beginning 3 days a week if tolerated daily at the same time decreasing lisinopril to 20 mg daily  · Neck step potentially depending upon blood pressure would be to lower carvedilol to 12 5 mg twice a day and potentially increase HCTZ to 12 5 mg I would not put HCTZ at full dose given the urge incontinence  · Recheck blood pressures in about 2 to 3 weeks    4  Electrolytes:  • All acceptable including potassium 4 7 bicarbonate 21    5  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:  · Calcium/magnesium/phosphorus:  · Magnesium: 1 5 I recommend Slow-Mag 1 a day over-the-counter  · PTH intact: 122 8 will be monitored do not overtreat to avoid adynamic bone disease: I would just replete vitamin D at this time as outlined below  · Vitamin-D: 24 1 replete: Given tendency towards nephrolithiasis I would give minimal vitamin D supplementation cannot obtain 24-hour urine to see what his calcium in the urine is  6   Dyslipidemia:  · Goal LDL: Less than 100  · Current lipid profile: 1/11/2023: LDL 46/HDL 35/triglycerides 123  Recommendations:  •  Low-cholesterol/low-fat diet / weight loss as appropriate and isotonic exercise  •  Medication changes today: At goal so no changes    7  Anemia:  • Current hemoglobin: Normal at 13 5      8  Nephrolithiasis:Patient has had calcium oxalate stones as many as 13  most recently requiring intervention with cystoscopy laser lithotripsy and stent placement  Patient with prior calcium oxalate now uric acid stones    · Status post recent ureteroscopy with laser lithotripsy basket stone extraction on the left and left ureteral stent placement on 8/11/2022 by Dr Kelly Brown  Recommend:    Current status/stone passage none since stone extraction    Workup:  • Check PTH intact level  • Given incontinence cannot obtain formal 24 hour urine  • General stone as well as low oxalate diet   • Patient is slowly increasing water intake decreasing soda intake and gets close to 68 ounces we will continue to work on this! • Place the patient on Urocit-K 10 mEq twice a day  Current potassium 4 7 so I be hesitant to increase this unless the HCTZ will lower it  We will follow closely  • I would add very small dose of HCTZ please see above 6 25 mg daily starting 3 days a week and then slowly advance as tolerated full dose was not tolerated  Eventually based on that as outlined potentially could add higher dose Urocit-K        8  Other problems:  • Diabetes mellitus type 2  : To consider SGLT2 inhibitor per primary physician if no contraindications for renal/cardiac protection***  • Prostate cancer status post robotic prostatectomy 2018  • ELENA? GI HEALTH MAINTENANCE: LAST COLONOSCOPY: 2/10/2021      PATIENT INSTRUCTIONS:    Patient Instructions   1  Medication changes today:  • Please begin HCTZ 6 25 mg which is half of a 12 5 mg tablet using a pill cutter, I would begin 3 days a week and then if tolerated after 1 or 2 weeks go to daily  If you get any dizziness lightheadedness etc  please stop it and call me right away  • At the same time I would decrease your lisinopril from 40 mg to 20 mg one half the dose daily  • Potential neck step is to decrease the carvedilol depending upon your blood pressure to allow us to use the HCTZ which will prevent stones and treat your blood pressure  (Also, by adding the HCTZ we will improve your potassium potentially allowing us to increase the Urocit-K to a higher dose again this is a stone preventer  Current potassium is 4 7 so would be reluctant to increase this until we get follow-up labs)  • Please begin vitamin D 1000 units daily over-the-counter for low vitamin D  • Please begin Slow-Mag over-the-counter once a day for low magnesium level this will not only help keep your body healthy but also prevent stone please  • Follow a general stone diet please see below    2  Please go for non-fasting  lab work 2 weeks after making the above medication change  3   Please take 1 week a blood pressure readings about 2 weeks after making the above medication changes    AS FOLLOWS  MORNING AND EVENING, SITTING AND STANDING as follows:  · TAKE THE MORNING READINGS BEFORE ANY MEDICATIONS AND WHEN YOU ARE RELAXED FOR SEVERAL MINUTES  · TAKE THE EVENING READINGS:  BETWEEN 7-10 P M ; PRIOR TO ANY MEDICATIONS; AT LEAST IN OUR  FROM DINNER; AND CERTAINLY AFTER RELAXING FOR A FEW MINUTES  · PLEASE INCLUDE HEART RATE WITH YOUR BLOOD PRESSURE READINGS  · When taking standing readings, keep your arm supported at heart level and not dangling  · Make sure you are sitting with your back supported and feet on the ground and do not cross your legs or feet  · Make sure you have not taken any coffee or caffeine products or exercised or smoke cigarettes at least 30 minutes before taking your blood pressure  Then please mail these readings into the office    4  Follow-up in 4 months  • Please bring in 1 week a blood pressure readings morning evening, sitting and standing is outlined above    • Please go for fasting lab work 1-2 weeks prior to your appointment      5  General instructions:  • AVOID SALT BUT NOT ADDING AN READING LABELS TO MAKE SURE THERE IS LOW-SALT IN THE FOOD THAT YOU ARE EATING  o Goal is less than 2 g of sodium intake or less than 5 g of sodium chloride intake per day    • Avoid nonsteroidal anti-inflammatory drugs such as Naprosyn, ibuprofen, Aleve, Advil, Celebrex, Meloxicam (Mobic) etc   You can use Tylenol as needed if you do not have any liver condition to be concerned about    • Avoid medications such as Sudafed or decongestants and antihistamines that contained the D component which is the decongestant  You can take antihistamines without the decongestant or D component      • Try to avoid medications such as pantoprazole or  Protonix/Nexium or Esomeprazole)/Prilosec or omeprazole/Prevacid or lansoprazole/AcipHex or Rabeprazole  If you are able to, use Pepcid as this is safer for your kidneys  • Try to exercise at least 30 minutes 3 days a week to begin with with an ultimate goal of 5 days a week for at least 30 minutes    • Try to lose 5-10 lb by your next visit    • Please do not drink more than 2 glasses of alcohol/wine on a daily basis as this may contribute to your high blood pressure  Measures to reduce stone development:    · Please Drink  oz of water or 2 5L-3 L a day, throughout the day: Continue to do what you are doing and slowly advance the water while decreasing the soda  · Avoid salt/low-salt diet   · Try to decrease animal protein intake, dairy protein and vegetable base protein are better  · Increase fruits and vegetables as much as possible  · Avoid calcium products such as Tums or other types of calcium containing antacids, you can use Pepcid for indigestion (but you do not have to restrict your dietary calcium)  · Avoid excessive vitamin D   · Avoid excessive vitamin C  · Try to avoid oxalate products (please refer to the diet sheet)  · Limit fructose and sucrose type drinks such as coke         Subjective: There has been no hospitalizations or acute illnesses since last visit  The patient overall is feeling well  No fevers, chills, or cough or colds    Good appetite and good energy  No hematuria, dysuria, voiding symptoms or foamy urine; no stone passage last stone extraction was August   No gastrointestinal symptoms  No cardiovascular symptoms some mild ankle edema nature  No headaches, on HCTZ 25 mg he dropped his blood pressure to 100/60 was very orthostatic had headaches every night  Blood pressure medications:  • Urocit-K 10 mEq twice a day  • Lisinopril 40 mg daily in the morning  • Carvedilol 25 mg twice a day  • Amlodipine 5 mg daily      ROS:  See HPI, otherwise review of systems as completely reviewed with the patient are negative    Past Medical History:   Diagnosis Date   • Acute bronchitis    • Acute low back pain    • Acute low back pain     [id number] resolved   • Acute respiratory infection    • JAYNE (acute kidney injury) ([facility] [address] ) 6/15/2018   • Cancer (HCC)     prostate   • Cellulitis of scrotum    • Cough    • Diabetes mellitus (HCC)    • Herpes zoster    • High cholesterol    • Hyperkalemia    • Hypertension    • Kidney disease    • Kidney stone    • Leukocytosis 6/15/2018   • Low back pain    • Lumbar radiculopathy    • Pyelonephritis 8/8/2022   • Rash    • Retinopathy, central serous    • Sleep apnea     [id number]   • Trochanteric bursitis      Past Surgical History:   Procedure Laterality Date   • ABDOMINAL ADHESION SURGERY N/A 6/13/2018    Procedure: LYSIS ADHESIONS;  Surgeon: Ester Melgar MD;  [facility]: [facility];  Service: Urology   • FL RETROGRADE PYELOGRAM  8/8/2022   • FL RETROGRADE PYELOGRAM  8/11/2022   • LITHOTRIPSY      renal   • WI CYSTO BLADDER W/URETERAL CATHETERIZATION Left 8/8/2022    Procedure: CYSTOSCOPY RETROGRADE PYELOGRAM WITH INSERTION STENT URETERAL;  Surgeon: Jeannine Goodpasture, MD;  Location[facility];  Service: Urology   • WI CYSTO/URETERO W/LITHOTRIPSY &INDWELL STENT INSRT Left 8/11/2022    Procedure: CYSTOSCOPY URETEROSCOPY WITH LITHOTRIPSY HOLMIUM LASER, RETROGRADE PYELOGRAM AND INSERTION STENT URETERAL;  Surgeon: Jeannine Goodpasture, MD;  Location[facility];  Service: Urology   • WI LAPS SURG [id number] RPBIC RAD W/NRV SPARING ROBOT N/A 6/13/2018    Procedure: Carol Walls;  Surgeon: Ester Melgar MD;  [facility];  Service: Urology   • PROSTATE SURGERY  06/13/2018   • SHOULDER SURGERY       Family History   Problem Relation Age of Onset   • Other Father         cardiac disorder   • Stroke Father    • Diabetes Father         mellitus   • Hypertension Father    • Heart disease Father         cardiac disorder   • Cancer Sister    • Cancer Paternal Grandmother    • Heart disease Family         cardiac disorder   • Kidney disease Family       reports that he has never smoked  He has never used smokeless tobacco  He reports that he does not drink alcohol and does not use drugs  I COMPLETELY REVIEWED THE PAST MEDICAL HISTORY/PAST SURGICAL HISTORY/SOCIAL HISTORY/FAMILY HISTORY/AND MEDICATIONS  AND UPDATED ALL    Objective:     Vitals:   BP sitting on left: 148/80 with a heart rate of 80 and regular  BP standing on left:142/70 with a heart rate of 88 and regular    Weight (last 2 days)     Date/Time Weight    01/19/23 1548 122 (270)        Wt Readings from Last 3 Encounters:   01/19/23 122 kg (270 lb)   01/03/23 122 kg (268 lb 3 2 oz)   09/13/22 120 kg (264 lb)       Body mass index is 44 93 kg/m²      Physical Exam: General:  No acute distress  Skin:  No acute rash  Eyes:  No scleral icterus, noninjected, no discharge from eyes  ENT:  Moist mucous membranes  Neck:  Supple, no jugular venous distention, trachea is midline, no lymphadenopathy and no thyromegaly  Back   No CVAT  Chest:  Clear to auscultation and percussion, good respiratory effort  CVS:  Regular rate and rhythm without a rub, or gallops or murmurs  Abdomen:  Soft and nontender with normal bowel sounds  Extremities:  No cyanosis and no edema, no arthritic changes, normal range of motion  Neuro:  Grossly intact  Psych:  Alert, oriented x3 and appropriate      Medications:    Current Outpatient Medications:   •  albuterol (PROVENTIL HFA,VENTOLIN HFA) 90 mcg/act inhaler, Inhale 1-2 puffs every 6 (six) hours as needed  , Disp: , Rfl:   •  amLODIPine (NORVASC) 5 mg tablet, TAKE 1 TABLET BY MOUTH EVERY DAY, Disp: 90 tablet, Rfl: 1  •  ASPIRIN 81 PO, Take 1 capsule by mouth 2 (two) times a day  , Disp: , Rfl:   •  atorvastatin (LIPITOR) 80 mg tablet, TAKE 1 TABLET BY MOUTH EVERY DAY, Disp: 90 tablet, Rfl: 1  •  BD Pen Needle Rosalie U/F 32G X 4 MM MISC, Inject under the skin 4 (four) times a day, Disp: 400 each, Rfl: 3  •  carvedilol (COREG) 25 mg tablet, TAKE 1 TABLET BY MOUTH TWICE A DAY WITH MEALS, Disp: 180 tablet, Rfl: 2  •  glimepiride (AMARYL) 4 mg tablet, TAKE 1 TABLET BY MOUTH 2 TIMES A DAY , Disp: 180 tablet, Rfl: 1  •  glucose blood (Contour Next Test) test strip, Use 1 each 4 (four) times a day Use as instructed (Patient taking differently: Use 1 each daily 3 to 4 times daily  Before meals or randomly), Disp: 400 each, Rfl: 3  •  glucose blood (Contour Next Test) test strip, CHECK SUGAR 4 TIMES DAILY, Disp: 400 strip, Rfl: 3  •  hydrochlorothiazide (HYDRODIURIL) 12 5 mg tablet, Take 0 5 tablets (6 25 mg total) by mouth daily, Disp: 30 tablet, Rfl: 5  •  insuln lispro (HumaLOG KwikPen) 200 units/mL CONCENTRATED U-200 injection pen, Inject 15 units three times daily with meals  , Disp: 18 mL, Rfl: 5  •  lisinopril (ZESTRIL) 40 mg tablet, Take 1 tablet (40 mg total) by mouth daily (Patient taking differently: Take 20 mg by mouth daily), Disp: 90 tablet, Rfl: 3  •  metFORMIN (GLUCOPHAGE) 1000 MG tablet, TAKE 1 TABLET BY MOUTH TWICE A DAY, Disp: 180 tablet, Rfl: 3  •  Microlet Lancets MISC, USE 3 (THREE) TIMES A DAY, Disp: 300 each, Rfl: 3  •  Multiple Vitamins-Minerals (MULTIVITAMIN WITH MINERALS) tablet, Take 1 tablet by mouth daily, Disp: , Rfl:   •  potassium citrate (Urocit-K 10) 10 mEq, Take 1 tablet (10 mEq total) by mouth 2 (two) times a day, Disp: 180 tablet, Rfl: 3  •  tamsulosin (FLOMAX) 0 4 mg, TAKE 1 CAPSULE BY MOUTH EVERY DAY WITH DINNER, Disp: 90 capsule, Rfl: 1  •  Toujeo SoloStar 300 units/mL CONCENTRATED U-300 injection pen (1-unit dial), INJECT 80 UNITS UNDER THE SKIN DAILY, Disp: 22 5 mL, Rfl: 3  •  Trulicity 3 AR/0 5MN SOPN, INJECT 0 5 ML (3 MG TOTAL) UNDER THE SKIN ONCE A WEEK, Disp: 6 mL, Rfl: 1  •  Vitamin D, Cholecalciferol, 400 units TABS, Take by mouth daily, Disp: , Rfl:   •  tamsulosin (FLOMAX) 0 4 mg, Take 1 capsule (0 4 mg total) by mouth daily with dinner as needed for kidney stone management   (Patient not taking: Reported on 1/19/2023), Disp: 90 capsule, Rfl: 1    Lab, Imaging and other studies: I have personally reviewed pertinent labs    Laboratory Results:  Results for orders placed or performed in visit on 01/11/23   Lipid Panel with Direct LDL reflex   Result Value Ref Range    Cholesterol 106 See Comment mg/dL    Triglycerides 123 See Comment mg/dL    HDL, Direct 35 (L) >=40 mg/dL    LDL Calculated 46 0 - 100 mg/dL   PTH, intact   Result Value Ref Range     8 (H) 18 4 - 80 1 pg/mL   Phosphorus   Result Value Ref Range    Phosphorus 3 8 2 3 - 4 1 mg/dL   Magnesium   Result Value Ref Range    Magnesium 1 5 (L) 1 6 - 2 6 mg/dL   CK (with reflex to MB)   Result Value Ref Range    Total  39 - 308 U/L   CBC and Platelet   Result Value Ref Range    WBC 11 90 (H) 4 31 - 10 16 Thousand/uL    RBC 4 79 3 88 - 5 62 Million/uL    Hemoglobin 13 5 12 0 - 17 0 g/dL    Hematocrit 43 3 36 5 - 49 3 %    MCV 90 82 - 98 fL    MCH 28 2 26 8 - 34 3 pg    MCHC 31 2 (L) 31 4 - 37 4 g/dL    RDW 12 6 11 6 - 15 1 %    Platelets 339 804 - 041 Thousands/uL    MPV 9 3 8 9 - 12 7 fL   Comprehensive metabolic panel   Result Value Ref Range    Sodium 141 135 - 147 mmol/L    Potassium 4 7 3 5 - 5 3 mmol/L    Chloride 111 (H) 96 - 108 mmol/L    CO2 21 21 - 32 mmol/L    ANION GAP 9 4 - 13 mmol/L    BUN 20 5 - 25 mg/dL    Creatinine 1 53 (H) 0 60 - 1 30 mg/dL    Glucose, Fasting 112 (H) 65 - 99 mg/dL    Calcium 9 0 8 3 - 10 1 mg/dL    AST 26 5 - 45 U/L    ALT 46 12 - 78 U/L    Alkaline Phosphatase 80 46 - 116 U/L    Total Protein 7 3 6 4 - 8 4 g/dL    Albumin 3 6 3 5 - 5 0 g/dL    Total Bilirubin 0 36 0 20 - 1 00 mg/dL    eGFR 48 ml/min/1 73sq m   Vitamin D 25 hydroxy   Result Value Ref Range    Vit D, 25-Hydroxy 24 1 (L) 30 0 - 100 0 ng/mL   Renin Activity, Plasma   Result Value Ref Range    Renin 1 101 0 167 - 5 380 ng/mL/hr   Metanephrine, Fractionated Plasma Free   Result Value Ref Range    Normetanephrine, Free 95 8 0 0 - 285 2 pg/mL    Metanephrine, Free 29 5 0 0 - 88 0 pg/mL   TSH, 3rd generation with Free T4 reflex   Result Value Ref Range    TSH 3RD GENERATON 2 180 0 450 - 4 500 uIU/mL   Aldosterone   Result Value Ref Range    Aldosterone 1 6 0 0 - 30 0 ng/dL             Invalid input(s): ALBUMIN      Radiology review:   chest X-ray    Ultrasound      Portions of the record may have been created with voice recognition software  Occasional wrong word or "sound a like" substitutions may have occurred due to the inherent limitations of voice recognition software  Read the chart carefully and recognize, using context, where substitutions have occurred

## 2023-01-23 ENCOUNTER — TELEPHONE (OUTPATIENT)
Dept: NEPHROLOGY | Facility: CLINIC | Age: 63
End: 2023-01-23

## 2023-01-23 NOTE — TELEPHONE ENCOUNTER
Spoke with Patient and schedule appointment for 5/24 with Dr Wilian Chowdhury in the [place] location

## 2023-02-14 DIAGNOSIS — E11.65 UNCONTROLLED TYPE 2 DIABETES MELLITUS WITH HYPERGLYCEMIA (HCC): ICD-10-CM

## 2023-02-14 RX ORDER — ATORVASTATIN CALCIUM 80 MG/1
TABLET, FILM COATED ORAL
Qty: 90 TABLET | Refills: 1 | Status: SHIPPED | OUTPATIENT
Start: 2023-02-14

## 2023-02-16 DIAGNOSIS — E11.65 UNCONTROLLED TYPE 2 DIABETES MELLITUS WITH HYPERGLYCEMIA (HCC): ICD-10-CM

## 2023-02-16 RX ORDER — GLIMEPIRIDE 4 MG/1
TABLET ORAL
Qty: 180 TABLET | Refills: 1 | Status: SHIPPED | OUTPATIENT
Start: 2023-02-16

## 2023-02-16 RX ORDER — PEN NEEDLE, DIABETIC 32GX 5/32"
NEEDLE, DISPOSABLE MISCELLANEOUS
Qty: 400 EACH | Refills: 3 | Status: SHIPPED | OUTPATIENT
Start: 2023-02-16

## 2023-03-14 DIAGNOSIS — I10 BENIGN ESSENTIAL HYPERTENSION: ICD-10-CM

## 2023-03-14 RX ORDER — AMLODIPINE BESYLATE 5 MG/1
TABLET ORAL
Qty: 90 TABLET | Refills: 1 | Status: SHIPPED | OUTPATIENT
Start: 2023-03-14

## 2023-03-15 DIAGNOSIS — E78.5 DYSLIPIDEMIA: ICD-10-CM

## 2023-03-15 DIAGNOSIS — N20.0 NEPHROLITHIASIS: ICD-10-CM

## 2023-03-15 DIAGNOSIS — E11.21 DIABETIC NEPHROPATHY ASSOCIATED WITH TYPE 2 DIABETES MELLITUS (HCC): ICD-10-CM

## 2023-03-15 DIAGNOSIS — N18.31 STAGE 3A CHRONIC KIDNEY DISEASE (HCC): ICD-10-CM

## 2023-03-15 DIAGNOSIS — E66.01 MORBID OBESITY (HCC): ICD-10-CM

## 2023-03-15 DIAGNOSIS — I12.9 PARENCHYMAL RENAL HYPERTENSION, STAGE 1 THROUGH STAGE 4 OR UNSPECIFIED CHRONIC KIDNEY DISEASE: ICD-10-CM

## 2023-03-15 RX ORDER — HYDROCHLOROTHIAZIDE 12.5 MG/1
TABLET ORAL
Qty: 45 TABLET | Refills: 4 | Status: SHIPPED | OUTPATIENT
Start: 2023-03-15

## 2023-05-12 DIAGNOSIS — E11.65 UNCONTROLLED TYPE 2 DIABETES MELLITUS WITH HYPERGLYCEMIA (HCC): ICD-10-CM

## 2023-05-12 RX ORDER — DULAGLUTIDE 3 MG/.5ML
3 INJECTION, SOLUTION SUBCUTANEOUS WEEKLY
Qty: 6 ML | Refills: 1 | Status: SHIPPED | OUTPATIENT
Start: 2023-05-12

## 2023-05-13 ENCOUNTER — APPOINTMENT (OUTPATIENT)
Dept: LAB | Facility: MEDICAL CENTER | Age: 63
End: 2023-05-13

## 2023-05-13 DIAGNOSIS — N18.31 STAGE 3A CHRONIC KIDNEY DISEASE (HCC): ICD-10-CM

## 2023-05-13 DIAGNOSIS — N20.0 NEPHROLITHIASIS: ICD-10-CM

## 2023-05-13 DIAGNOSIS — E11.21 DIABETIC NEPHROPATHY ASSOCIATED WITH TYPE 2 DIABETES MELLITUS (HCC): ICD-10-CM

## 2023-05-13 DIAGNOSIS — I12.9 PARENCHYMAL RENAL HYPERTENSION, STAGE 1 THROUGH STAGE 4 OR UNSPECIFIED CHRONIC KIDNEY DISEASE: ICD-10-CM

## 2023-05-13 DIAGNOSIS — E78.5 DYSLIPIDEMIA: ICD-10-CM

## 2023-05-13 DIAGNOSIS — E66.01 MORBID OBESITY (HCC): ICD-10-CM

## 2023-05-13 LAB
25(OH)D3 SERPL-MCNC: 26.6 NG/ML (ref 30–100)
ALBUMIN SERPL BCP-MCNC: 3.7 G/DL (ref 3.5–5)
ALP SERPL-CCNC: 72 U/L (ref 46–116)
ALT SERPL W P-5'-P-CCNC: 51 U/L (ref 12–78)
ANION GAP SERPL CALCULATED.3IONS-SCNC: 1 MMOL/L (ref 4–13)
AST SERPL W P-5'-P-CCNC: 24 U/L (ref 5–45)
BILIRUB SERPL-MCNC: 0.42 MG/DL (ref 0.2–1)
BUN SERPL-MCNC: 30 MG/DL (ref 5–25)
CALCIUM SERPL-MCNC: 9 MG/DL (ref 8.3–10.1)
CHLORIDE SERPL-SCNC: 110 MMOL/L (ref 96–108)
CK SERPL-CCNC: 119 U/L (ref 39–308)
CO2 SERPL-SCNC: 27 MMOL/L (ref 21–32)
CREAT SERPL-MCNC: 1.52 MG/DL (ref 0.6–1.3)
CREAT UR-MCNC: 96.5 MG/DL
ERYTHROCYTE [DISTWIDTH] IN BLOOD BY AUTOMATED COUNT: 12.9 % (ref 11.6–15.1)
GFR SERPL CREATININE-BSD FRML MDRD: 48 ML/MIN/1.73SQ M
GLUCOSE P FAST SERPL-MCNC: 115 MG/DL (ref 65–99)
HCT VFR BLD AUTO: 42.8 % (ref 36.5–49.3)
HGB BLD-MCNC: 13.6 G/DL (ref 12–17)
MAGNESIUM SERPL-MCNC: 1.6 MG/DL (ref 1.6–2.6)
MCH RBC QN AUTO: 28.8 PG (ref 26.8–34.3)
MCHC RBC AUTO-ENTMCNC: 31.8 G/DL (ref 31.4–37.4)
MCV RBC AUTO: 91 FL (ref 82–98)
PHOSPHATE SERPL-MCNC: 3.2 MG/DL (ref 2.3–4.1)
PLATELET # BLD AUTO: 284 THOUSANDS/UL (ref 149–390)
PMV BLD AUTO: 9.6 FL (ref 8.9–12.7)
POTASSIUM SERPL-SCNC: 4.4 MMOL/L (ref 3.5–5.3)
PROT SERPL-MCNC: 7.3 G/DL (ref 6.4–8.4)
PROT UR-MCNC: 10 MG/DL
PROT/CREAT UR: 0.1 MG/G{CREAT} (ref 0–0.1)
PTH-INTACT SERPL-MCNC: 94.8 PG/ML (ref 18.4–80.1)
RBC # BLD AUTO: 4.73 MILLION/UL (ref 3.88–5.62)
SODIUM SERPL-SCNC: 138 MMOL/L (ref 135–147)
WBC # BLD AUTO: 11.96 THOUSAND/UL (ref 4.31–10.16)

## 2023-05-22 NOTE — PROGRESS NOTES
RENAL FOLLOW UP NOTE: td    ASSESSMENT AND PLAN:  65y o  year old male with a history of diabetes mellitus type 2/dyslipidemia/hypertension/prostate cancer status post robotic prostatectomy June 2018/ELENA who we are asked to see regarding nephrolithiasis/CKD        1  CKD stage 3A  Etiology:  Diabetic kidney disease/hypertensive nephrosclerosis/episodes of JAYNE-hydronephrosis of the left kidney/arteriolar nephrosclerosis   Doubt primary glomerular process  Baseline creatinine:  1 37-1 60, most recently 1 60  Recommend:  Workup:  Current creatinine: 1 52 at baseline  UA with microscopic: From 1/11/2023: Trace proteinuria, negative heme; 2-4  RBCs WBCs or bacteria seen  Urine protein creatinine ratio: 0 10 g at goal  Renal ultrasound with PVR:  To be done did have moderate left-sided hydroureteronephrosis and 9 mm obstructing stone proximal left ureter  10/6/2022: Bilateral nephrolithiasis without hydronephrosis, 9 mm calculus in the upper pole of the right kidney; 5 and 10 mm calculus in the left  Renal artery duplex to rule out renal artery stenosis: Negative     Treatment:  Treat hypertension, please see below for recommendations  Treat dyslipidemia  Avoid nephrotoxic agents such as NSAIDs, and proton pump inhibitors as able; patient counseled as such  Good overall health recommendations including weight loss as appropriate, isotonic exercise as able, and avoidance of salt; patient counseled as such  UA: 2-4 RBCs are acceptable: Reviewed with urology: Apparently no issues at this time     2  Volume: Euvolemic     3    Hypertension:  Secondary work-up:  Normal plasma free metanephrines  Aldosterone/renin ratio: Negative  TSH normal  Renal work-up as above  ELENA unlikely mild heavy snoring at times but patient would defer at this time       Current blood pressure averages:  No formal readings at this time     Goal blood pressure: Less than 130/80 given CKD     Recommendations:  Push nonmedical regimen including weight loss, isotonic exercise and a low sodium diet  Patient has been counseled the such  MedicationChanges today:    Current recommendations:  Patient has been taking Advil for a couple weeks for sciatica will be probably coming off this over the next week or so  Unfortunately he needs this medication short-term other stronger agents can have an effect he would not be able to work  He knows as soon as he can come off this that we will check a week of blood pressure readings and reevaluate at that time     4   Electrolytes: All acceptable including potassium 4 4  Low anion gap most likely spurious recheck at this time      5  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:  Calcium/magnesium/phosphorus:  Magnesium 1 6 lately better has not been taking Slow-Mag because of the side effect of diarrhea he will try another product  PTH intact:  94 6 improved: Will be monitored do not overtreat to avoid adynamic bone disease: I would just replete vitamin D at this time as outlined below  Vitamin-D: 26 6 slightly better: Replete: Given tendency towards nephrolithiasis I would give minimal vitamin D supplementation cannot obtain 24-hour urine to see what his calcium in the urine  I will increase the dose to 1000 units for the secondary hyperparathyroidism      6  Dyslipidemia:  Goal LDL: Less than 100  Current lipid profile: 1/11/2023: LDL 46/HDL 35/triglycerides 123  Recommendations:   Low-cholesterol/low-fat diet / weight loss as appropriate and isotonic exercise   Medication changes today: At goal so no changes     7  Anemia:  Current hemoglobin: Normal at 13 6        8  Nephrolithiasis:Patient has had calcium oxalate stones as many as 13  most recently requiring intervention with cystoscopy laser lithotripsy and stent placement   Patient with prior calcium oxalate now uric acid stones    Status post recent ureteroscopy with laser lithotripsy basket stone extraction on the left and left ureteral stent placement on 8/11/2022 by Dr Rebecca Fang  Recommend:     Current status/stone passage none since stone extraction     Workup:  Patient with mild hyperparathyroidism will increase gently the vitamin D supplement  Doubt primary hyperparathyroidism  Given incontinence cannot obtain formal 24 hour urine  General stone as well as low oxalate diet   Patient is slowly increasing water intake decreasing soda intake and gets close to 68 ounces we will continue to work on this!             8  Other problems:  Diabetes mellitus type 2  : To consider SGLT2 inhibitor per primary physician if no contraindications for renal/cardiac protection  I did contact Dr Sybil Alvarez  Prostate cancer status post robotic prostatectomy 2018  ELENA? Chronic mild leukocytosis stable         GI HEALTH MAINTENANCE: LAST COLONOSCOPY: 2/10/2021       PATIENT INSTRUCTIONS:    Patient Instructions   1  Medication changes today:  Please increase vitamin D to 1000 units daily over-the-counter as a supplement  Please try a different magnesium supplement then Slow-Mag for example 250 mg 3 days a week to begin with a magnesium supplement if no diarrhea can try to take it daily  Continue to try to take  ounces of water a day or is close to this as you can and try to avoid salt is much as you can as well  I also gave you the general stone diet to follow please see below    I did contact Dr Sybil Alvarez regarding the possibility of medications called SGLT2 inhibitors for example [place] or Jardiance that are being recommended for folks with diabetes mellitus or even any chronic kidney disease especially in the setting of diabetes mellitus that helps protect the heart and the kidneys  She will discuss this with you when she sees you      2   Please go for non fasting  lab work 1-2 weeks after making the above medication change this is to recheck the anion gap which is slightly low which is most likely spurious related to the equipment not to you however we just want to make sure this normalizes  3   Please take 1 week a blood pressure readings 1 to 2 weeks after stopping Advil can use your left arm and just sitting    AS FOLLOWS  MORNING AND EVENING, SITTING as follows:  TAKE THE MORNING READINGS BEFORE ANY MEDICATIONS AND WHEN YOU ARE RELAXED FOR SEVERAL MINUTES  TAKE THE EVENING READINGS:  BETWEEN 7-10 P M ; PRIOR TO ANY MEDICATIONS; AT LEAST IN OUR  FROM DINNER; AND CERTAINLY AFTER RELAXING FOR A FEW MINUTES  PLEASE INCLUDE HEART RATE WITH YOUR BLOOD PRESSURE READINGS  When taking standing readings, keep your arm supported at heart level and not dangling  Make sure you are sitting with your back supported and feet on the ground and do not cross your legs or feet  Make sure you have not taken any coffee or caffeine products or exercised or smoke cigarettes at least 30 minutes before taking your blood pressure  Then please mail these readings into the office    4  Follow-up in 4 months  Please bring in 1 week a blood pressure readings morning evening, sitting and standing is outlined above    Please go for fasting lab work 1-2 weeks prior to your appointment      5  General instructions:  AVOID SALT BUT NOT ADDING AN READING LABELS TO MAKE SURE THERE IS LOW-SALT IN THE FOOD THAT YOU ARE EATING  Goal is less than 2 g of sodium intake or less than 5 g of sodium chloride intake per day    Avoid nonsteroidal anti-inflammatory drugs such as Naprosyn, ibuprofen, Aleve, Advil, Celebrex, Meloxicam (Mobic) etc   You can use Tylenol as needed if you do not have any liver condition to be concerned about    Avoid medications such as Sudafed or decongestants and antihistamines that contained the D component which is the decongestant  You can take antihistamines without the decongestant or D component  Try to avoid medications such as pantoprazole or  Protonix/Nexium or Esomeprazole)/Prilosec or omeprazole/Prevacid or lansoprazole/AcipHex or Rabeprazole    If you are able to, use Pepcid as this is safer for your kidneys  Try to exercise at least 30 minutes 3 days a week to begin with with an ultimate goal of 5 days a week for at least 30 minutes    Try to lose 5-10 lb by your next visit    Please do not drink more than 2 glasses of alcohol/wine on a daily basis as this may contribute to your high blood pressure  Measures to reduce stone development:    Please Drink  oz of water or 2 5L-3 L a day, throughout the day  Avoid salt/low-salt diet   Try to decrease animal protein intake, dairy protein and vegetable base protein are better  Increase fruits and vegetables as much as possible  Avoid calcium products such as Tums or other types of calcium containing antacids, you can use Pepcid for indigestion (but you do not have to restrict your dietary calcium)  Avoid excessive vitamin D   Avoid excessive vitamin C  Try to avoid oxalate products (please refer to the diet sheet)  Limit fructose and sucrose type drinks such as coke             Low Oxalate Diet   WHAT YOU NEED TO KNOW:   Oxalate is a chemical found in plant foods  You may need to eat foods that are low in oxalate to help clear kidney stones or prevent them from forming  People who have had kidney stones are at a higher risk of forming kidney stones again  The most common type of kidney stone is made up of crystals that contain calcium and oxalate  Your healthcare provider or dietitian may recommend that you limit oxalate if you get this type of kidney stone often  DISCHARGE INSTRUCTIONS:   Foods to include: Include the following foods that have a low to medium amount of oxalate    Grains:      Egg noodles    Jairo crackers    Pancakes and waffles    Cooked and dry cereals without nuts or bran    White or wild rice    White bread, cornbread, bagels, and white English muffins (medium oxalate)    Saltine or soda crackers and vanilla wafers (medium oxalate)    Brown rice, spaghetti, and other noodles and pastas (medium oxalate)    Fruit:      Apples, bananas, grapes    Cranberries    Peaches, nectarines, apricots, and pears    Papayas and strawberries    Melons and pineapples    Blackberries, blueberries, mangoes, and prunes (medium oxalate)    Vegetables:      Artichokes, asparagus, and brussels sprouts    Broccoli and cauliflower    Kale, endive, cabbage, and lettuce    Cucumbers, peas, and zucchini    Mushrooms, onions, and peppers    Corn    Carrots, celery, and green beans (medium oxalate)    Parsnips, summer squash, tomatoes, and turnips (medium oxalate)    Dairy:      American cheese, Swiss cheese, cottage cheese, ricotta cheese, and cheddar cheese    Milk and buttermilk    Yogurt    Protein foods:      Meat, fish, shellfish, chicken, and turkey    Lunch meat and ham (medium oxalate)    Hot dogs, bratwurst, albrecht, and sausage (medium oxalate)    Drinks and desserts:      Coffee    Fruit punch and lemonade or limeade without added vitamin C    Desserts:      Cookies, cakes, and ice cream    Pudding without chocolate    Foods to limit or avoid:  Limit the following foods that are high in oxalate    Grains:      Wheat bran, wheat germ, and barley    Grits and bran cereal    White corn flour and buckwheat flour    Whole wheat bread    Fruit:      Dried apricots    Red currants, figs, and rhubarb    Kiwi    Grapefruit    Vegetables:      Potatoes and yams    Kriss greens, leeks, okra, and spinach    Wax beans     Eggplant    Beets and beet greens    Swiss chard, escarole, parsley, and rutabagas    Tomato paste    Protein foods:      Baked beans with tomato sauce    Nut butters and nuts (peanuts, almonds, pecans, cashews, hazelnuts)    Soy burgers    Miso    Dried beans    Desserts:      Fruitcake    Chocolate    Carob and marmalade    Beverages:      Chocolate drink mixes    Soy milk    Instant iced tea    Other foods:      Sesame seeds and tahini (paste made of sesame seeds)    Poppy seeds    Other dietary guidelines:   Drink about 12 to 16 (eight-ounce) cups of liquid each day  Liquids help clear kidney stones and prevent them from forming again  Water is the best liquid to drink  You may need more liquid if you are physically active  Ask your healthcare provider or dietitian how much liquid you need to drink each day  Your healthcare provider may suggest that you make other diet changes to help prevent kidney stones  This may include decreasing the amount of sodium you eat each day  © Copyright [employer] 2022 Information is for End User's use only and may not be sold, redistributed or otherwise used for commercial purposes  The above information is an  only  It is not intended as medical advice for individual conditions or treatments  Talk to your doctor, nurse or pharmacist before following any medical regimen to see if it is safe and effective for you  Subjective: There has been no hospitalizations or acute illnesses since last visit  The patient overall is feeling well except for sciatica  No fevers, chills, or cough or colds    Good appetite and good energy  No hematuria, dysuria, voiding symptoms or foamy urine  No stone passage  No gastrointestinal symptoms  No cardiovascular symptoms including swelling of the legs  No headaches, dizziness or lightheadedness  Blood pressure medications/renal pressure medication:  Vitamin D 400 units daily  Urocit-K 10 mEq twice a day  HCTZ 12 5 mg every other day  Metformin 1000 mg twice a day  Lisinopril 20 mg daily in the morning  Carvedilol 25 mg twice a day  Amlodipine 5 mg daily  Atorvastatin 80 mg daily      ROS:  See HPI, otherwise review of systems as completely reviewed with the patient are negative    Past Medical History:   Diagnosis Date   • Acute bronchitis    • Acute low back pain    • Acute low back pain     [id number] resolved   • Acute respiratory infection    • JAYNE (acute kidney injury) ([facility] ) 6/15/2018   • Cancer ([facility] )     prostate   • Cellulitis of scrotum    • Cough    • Diabetes mellitus (HCC)    • Herpes zoster    • High cholesterol    • Hyperkalemia    • Hypertension    • Kidney disease    • Kidney stone    • Leukocytosis 6/15/2018   • Low back pain    • Lumbar radiculopathy    • Pyelonephritis 8/8/2022   • Rash    • Retinopathy, central serous    • Sleep apnea     [id number]   • Trochanteric bursitis      Past Surgical History:   Procedure Laterality Date   • ABDOMINAL ADHESION SURGERY N/A 6/13/2018    Procedure: LYSIS ADHESIONS;  Surgeon: Billy Anton MD;  [facility]: [facility];  Service: Urology   • FL RETROGRADE PYELOGRAM  8/8/2022   • FL RETROGRADE PYELOGRAM  8/11/2022   • LITHOTRIPSY      renal   • AL CYSTO BLADDER W/URETERAL CATHETERIZATION Left 8/8/2022    Procedure: CYSTOSCOPY RETROGRADE PYELOGRAM WITH INSERTION STENT URETERAL;  Surgeon: Dylan Sorto MD;  [facility];  Service: Urology   • AL CYSTO/URETERO W/LITHOTRIPSY &INDWELL STENT INSRT Left 8/11/2022    Procedure: CYSTOSCOPY URETEROSCOPY WITH LITHOTRIPSY HOLMIUM LASER, RETROGRADE PYELOGRAM AND INSERTION STENT URETERAL;  Surgeon: Dylan Sorto MD;  [facility];  Service: Urology   • AL LAPS SURG [id number] RPBIC RAD W/NRV SPARING ROBOT N/A 6/13/2018    Procedure: Keo Wilburn;  Surgeon: Billy Anton MD;  [facility];  Service: Urology   • PROSTATE SURGERY  06/13/2018   • SHOULDER SURGERY       Family History   Problem Relation Age of Onset   • Other Father         cardiac disorder   • Stroke Father    • Diabetes Father         mellitus   • Hypertension Father    • Heart disease Father         cardiac disorder   • Cancer Sister    • Cancer Paternal Grandmother    • Heart disease Family         cardiac disorder   • Kidney disease Family       reports that he has never smoked  He has never used smokeless tobacco  He reports that he does not drink alcohol and does not use drugs      I COMPLETELY REVIEWED THE PAST MEDICAL HISTORY/PAST SURGICAL HISTORY/SOCIAL HISTORY/FAMILY HISTORY/AND MEDICATIONS  AND UPDATED ALL    Objective:     Vitals:   BP sitting on left: 146/70 with a heart rate of 80 and regular  BP standing on left: 142/80 with a heart rate of 100 and regular occasional premature beat    Weight (last 2 days)     Date/Time Weight    05/24/23 1620 122 (268)        Wt Readings from Last 3 Encounters:   05/24/23 122 kg (268 lb)   01/19/23 122 kg (270 lb)   01/03/23 122 kg (268 lb 3 2 oz)       Body mass index is 44 6 kg/m²      Physical Exam: General:  No acute distress/obese  Skin:  No acute rash  Eyes:  No scleral icterus, noninjected, no discharge from eyes  ENT:  Moist mucous membranes  Neck:  Supple, no jugular venous distention, trachea is midline, no lymphadenopathy and no thyromegaly  Back   No CVAT  Chest:  Clear to auscultation and percussion, good respiratory effort  CVS:  Regular rate and rhythm without a rub, or gallops or murmurs  Abdomen:  Obese,Soft and nontender with normal bowel sounds  Extremities:  No cyanosis and no edema, no arthritic changes, normal range of motion  Neuro:  Grossly intact  Psych:  Alert, oriented x3 and appropriate      Medications:    Current Outpatient Medications:   •  amLODIPine (NORVASC) 5 mg tablet, TAKE 1 TABLET BY MOUTH EVERY DAY, Disp: 90 tablet, Rfl: 1  •  ASPIRIN 81 PO, Take 1 capsule by mouth 2 (two) times a day  , Disp: , Rfl:   •  atorvastatin (LIPITOR) 80 mg tablet, TAKE 1 TABLET BY MOUTH EVERY DAY, Disp: 90 tablet, Rfl: 1  •  BD Pen Needle Rosalie 2nd Gen 32G X 4 MM MISC, INJECT UNDER THE SKIN 4 (FOUR) TIMES A DAY, Disp: 400 each, Rfl: 3  •  carvedilol (COREG) 25 mg tablet, TAKE 1 TABLET BY MOUTH TWICE A DAY WITH MEALS, Disp: 180 tablet, Rfl: 2  •  glimepiride (AMARYL) 4 mg tablet, TAKE 1 TABLET BY MOUTH TWICE A DAY, Disp: 180 tablet, Rfl: 1  •  glucose blood (Contour Next Test) test strip, Use 1 each 4 (four) times a day Use as instructed (Patient taking differently: Use 1 each daily 3 to 4 times daily  Before meals or randomly), Disp: 400 each, Rfl: 3  •  glucose blood (Contour Next Test) test strip, CHECK SUGAR 4 TIMES DAILY, Disp: 400 strip, Rfl: 3  •  hydrochlorothiazide (HYDRODIURIL) 12 5 mg tablet, TAKE 0 5 TABLETS BY MOUTH DAILY  (Patient taking differently: Take 12 5 mg by mouth Take 0 5 tab QOD), Disp: 45 tablet, Rfl: 4  •  insuln lispro (HumaLOG KwikPen) 200 units/mL CONCENTRATED U-200 injection pen, Inject 15 units three times daily with meals  , Disp: 18 mL, Rfl: 5  •  lisinopril (ZESTRIL) 40 mg tablet, Take 1 tablet (40 mg total) by mouth daily (Patient taking differently: Take 20 mg by mouth daily), Disp: 90 tablet, Rfl: 3  •  metFORMIN (GLUCOPHAGE) 1000 MG tablet, TAKE 1 TABLET BY MOUTH TWICE A DAY, Disp: 180 tablet, Rfl: 3  •  Microlet Lancets MISC, USE 3 (THREE) TIMES A DAY, Disp: 300 each, Rfl: 3  •  potassium citrate (Urocit-K 10) 10 mEq, Take 1 tablet (10 mEq total) by mouth 2 (two) times a day, Disp: 180 tablet, Rfl: 3  •  tamsulosin (FLOMAX) 0 4 mg, TAKE 1 CAPSULE BY MOUTH EVERY DAY WITH DINNER (Patient taking differently: PRN), Disp: 90 capsule, Rfl: 1  •  Toujeo SoloStar 300 units/mL CONCENTRATED U-300 injection pen (1-unit dial), INJECT 80 UNITS UNDER THE SKIN DAILY, Disp: 22 5 mL, Rfl: 3  •  Trulicity 3 MX/7 2OF injection, INJECT 0 5 ML (3 MG TOTAL) UNDER THE SKIN ONCE A WEEK, Disp: 6 mL, Rfl: 1  •  Vitamin D, Cholecalciferol, 400 units TABS, Take by mouth daily, Disp: , Rfl:   •  albuterol (PROVENTIL HFA,VENTOLIN HFA) 90 mcg/act inhaler, Inhale 1-2 puffs every 6 (six) hours as needed   (Patient not taking: Reported on 5/24/2023), Disp: , Rfl:   •  Multiple Vitamins-Minerals (MULTIVITAMIN WITH MINERALS) tablet, Take 1 tablet by mouth daily, Disp: , Rfl:   •  tamsulosin (FLOMAX) 0 4 mg, Take 1 capsule (0 4 mg total) by mouth daily with dinner as needed for kidney stone management   (Patient not taking: Reported on 1/19/2023), Disp: 90 capsule, Rfl: 1    Lab, Imaging and other studies: "I have personally reviewed pertinent labs  Laboratory Results:  Results for orders placed or performed in visit on 05/13/23   Comprehensive metabolic panel   Result Value Ref Range    Sodium 138 135 - 147 mmol/L    Potassium 4 4 3 5 - 5 3 mmol/L    Chloride 110 (H) 96 - 108 mmol/L    CO2 27 21 - 32 mmol/L    ANION GAP 1 (L) 4 - 13 mmol/L    BUN 30 (H) 5 - 25 mg/dL    Creatinine 1 52 (H) 0 60 - 1 30 mg/dL    Glucose, Fasting 115 (H) 65 - 99 mg/dL    Calcium 9 0 8 3 - 10 1 mg/dL    AST 24 5 - 45 U/L    ALT 51 12 - 78 U/L    Alkaline Phosphatase 72 46 - 116 U/L    Total Protein 7 3 6 4 - 8 4 g/dL    Albumin 3 7 3 5 - 5 0 g/dL    Total Bilirubin 0 42 0 20 - 1 00 mg/dL    eGFR 48 ml/min/1 73sq m   CBC   Result Value Ref Range    WBC 11 96 (H) 4 31 - 10 16 Thousand/uL    RBC 4 73 3 88 - 5 62 Million/uL    Hemoglobin 13 6 12 0 - 17 0 g/dL    Hematocrit 42 8 36 5 - 49 3 %    MCV 91 82 - 98 fL    MCH 28 8 26 8 - 34 3 pg    MCHC 31 8 31 4 - 37 4 g/dL    RDW 12 9 11 6 - 15 1 %    Platelets 710 643 - 806 Thousands/uL    MPV 9 6 8 9 - 12 7 fL   CK   Result Value Ref Range    Total  39 - 308 U/L   Magnesium   Result Value Ref Range    Magnesium 1 6 1 6 - 2 6 mg/dL   Phosphorus   Result Value Ref Range    Phosphorus 3 2 2 3 - 4 1 mg/dL   Protein / creatinine ratio, urine   Result Value Ref Range    Creatinine, Ur 96 5 mg/dL    Protein Urine Random 10 mg/dL    Prot/Creat Ratio, Ur 0 10 0 00 - 0 10   PTH, intact   Result Value Ref Range    PTH 94 8 (H) 18 4 - 80 1 pg/mL   Vitamin D 25 hydroxy   Result Value Ref Range    Vit D, 25-Hydroxy 26 6 (L) 30 0 - 100 0 ng/mL             Invalid input(s): \"ALBUMIN\"      Radiology review:   chest X-ray    Ultrasound      Portions of the record may have been created with voice recognition software  Occasional wrong word or \"sound a like\" substitutions may have occurred due to the inherent limitations of voice recognition software    Read the chart carefully and recognize, using context, " where substitutions have occurred

## 2023-05-24 ENCOUNTER — OFFICE VISIT (OUTPATIENT)
Dept: NEPHROLOGY | Facility: CLINIC | Age: 63
End: 2023-05-24

## 2023-05-24 VITALS — HEIGHT: 65 IN | WEIGHT: 268 LBS | BODY MASS INDEX: 44.65 KG/M2

## 2023-05-24 DIAGNOSIS — I12.9 PARENCHYMAL RENAL HYPERTENSION, STAGE 1 THROUGH STAGE 4 OR UNSPECIFIED CHRONIC KIDNEY DISEASE: Primary | ICD-10-CM

## 2023-05-24 DIAGNOSIS — N20.0 NEPHROLITHIASIS: ICD-10-CM

## 2023-05-24 DIAGNOSIS — E66.01 MORBID OBESITY (HCC): ICD-10-CM

## 2023-05-24 DIAGNOSIS — N18.31 STAGE 3A CHRONIC KIDNEY DISEASE (HCC): ICD-10-CM

## 2023-05-24 DIAGNOSIS — E78.5 DYSLIPIDEMIA: ICD-10-CM

## 2023-05-24 DIAGNOSIS — E11.21 DIABETIC NEPHROPATHY ASSOCIATED WITH TYPE 2 DIABETES MELLITUS (HCC): ICD-10-CM

## 2023-05-24 NOTE — LETTER
May 24, 2023     Jen Alvarado MD  [address] [facility] [address]  [address] [place] [address]    Patient: Licha Wilburn   YOB: 1960   Date of Visit: 5/24/2023       Dear Dr Marcelino Blount: Thank you for referring Licha Wilburn to me for evaluation  Below are my notes for this consultation  If you have questions, please do not hesitate to call me  I look forward to following your patient along with you  Sincerely,        Varsha Garcia MD        CC: No Recipients    Varsha Garcia MD  5/24/2023  5:15 PM  Sign when Signing Visit  RENAL FOLLOW UP NOTE: td    ASSESSMENT AND PLAN:  65y o  year old male with a history of diabetes mellitus type 2/dyslipidemia/hypertension/prostate cancer status post robotic prostatectomy June 2018/ELENA who we are asked to see regarding nephrolithiasis/CKD        1  CKD stage 3A  Etiology:  Diabetic kidney disease/hypertensive nephrosclerosis/episodes of JAYNE-hydronephrosis of the left kidney/arteriolar nephrosclerosis  Doubt primary glomerular process    Baseline creatinine:  1 37-1 60, most recently 1 60  Recommend:  Workup:  Current creatinine: 1 52 at baseline  UA with microscopic: From 1/11/2023: Trace proteinuria, negative heme; 2-4  RBCs WBCs or bacteria seen  Urine protein creatinine ratio: 0 10 g at goal  Renal ultrasound with PVR:  To be done did have moderate left-sided hydroureteronephrosis and 9 mm obstructing stone proximal left ureter  10/6/2022: Bilateral nephrolithiasis without hydronephrosis, 9 mm calculus in the upper pole of the right kidney; 5 and 10 mm calculus in the left  Renal artery duplex to rule out renal artery stenosis: Negative     Treatment:  Treat hypertension, please see below for recommendations  Treat dyslipidemia  Avoid nephrotoxic agents such as NSAIDs, and proton pump inhibitors as able; patient counseled as such  Good overall health recommendations including weight loss as appropriate, isotonic exercise as able, and avoidance of salt; patient counseled as such  UA: 2-4 RBCs are acceptable: Reviewed with urology: Apparently no issues at this time     2  Volume: Euvolemic     3  Hypertension:  Secondary work-up:  Normal plasma free metanephrines  Aldosterone/renin ratio: Negative  TSH normal  Renal work-up as above  ELENA unlikely mild heavy snoring at times but patient would defer at this time       Current blood pressure averages:  No formal readings at this time     Goal blood pressure: Less than 130/80 given CKD     Recommendations:  Push nonmedical regimen including weight loss, isotonic exercise and a low sodium diet  Patient has been counseled the such  MedicationChanges today:    Current recommendations:  Patient has been taking Advil for a couple weeks for sciatica will be probably coming off this over the next week or so  Unfortunately he needs this medication short-term other stronger agents can have an effect he would not be able to work  He knows as soon as he can come off this that we will check a week of blood pressure readings and reevaluate at that time     4  Electrolytes: All acceptable including potassium 4 4  Low anion gap most likely spurious recheck at this time      5  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:  Calcium/magnesium/phosphorus:  Magnesium 1 6 lately better has not been taking Slow-Mag because of the side effect of diarrhea he will try another product  PTH intact:  94 6 improved: Will be monitored do not overtreat to avoid adynamic bone disease: I would just replete vitamin D at this time as outlined below  Vitamin-D: 26 6 slightly better: Replete: Given tendency towards nephrolithiasis I would give minimal vitamin D supplementation cannot obtain 24-hour urine to see what his calcium in the urine  I will increase the dose to 1000 units for the secondary hyperparathyroidism       6   Dyslipidemia:  Goal LDL: Less than 100  Current lipid profile: 1/11/2023: LDL 46/HDL 35/triglycerides 123  Recommendations:   Low-cholesterol/low-fat diet / weight loss as appropriate and isotonic exercise   Medication changes today: At goal so no changes     7  Anemia:  Current hemoglobin: Normal at 13 6        8  Nephrolithiasis:Patient has had calcium oxalate stones as many as 13  most recently requiring intervention with cystoscopy laser lithotripsy and stent placement  Patient with prior calcium oxalate now uric acid stones  Status post recent ureteroscopy with laser lithotripsy basket stone extraction on the left and left ureteral stent placement on 8/11/2022 by Dr Gabby Kearney  Recommend:     Current status/stone passage none since stone extraction     Workup:  Patient with mild hyperparathyroidism will increase gently the vitamin D supplement  Doubt primary hyperparathyroidism  Given incontinence cannot obtain formal 24 hour urine  General stone as well as low oxalate diet   Patient is slowly increasing water intake decreasing soda intake and gets close to 68 ounces we will continue to work on this! 8   Other problems:  Diabetes mellitus type 2  : To consider SGLT2 inhibitor per primary physician if no contraindications for renal/cardiac protection  I did contact Dr Luis A Nogueira  Prostate cancer status post robotic prostatectomy 2018  ELENA? Chronic mild leukocytosis stable         GI HEALTH MAINTENANCE: LAST COLONOSCOPY: 2/10/2021       PATIENT INSTRUCTIONS:    Patient Instructions     1  Medication changes today:  Please increase vitamin D to 1000 units daily over-the-counter as a supplement  Please try a different magnesium supplement then Slow-Mag for example 250 mg 3 days a week to begin with a magnesium supplement if no diarrhea can try to take it daily    Continue to try to take  ounces of water a day or is close to this as you can and try to avoid salt is much as you can as well  I also gave you the general stone diet to follow please see below    I did contact Dr Luis A Nogueira regarding the possibility of medications called SGLT2 inhibitors for example Jimena Tyson or Norberto that are being recommended for folks with diabetes mellitus or even any chronic kidney disease especially in the setting of diabetes mellitus that helps protect the heart and the kidneys  She will discuss this with you when she sees you  2   Please go for non fasting  lab work 1-2 weeks after making the above medication change this is to recheck the anion gap which is slightly low which is most likely spurious related to the equipment not to you however we just want to make sure this normalizes  3   Please take 1 week a blood pressure readings 1 to 2 weeks after stopping Advil can use your left arm and just sitting    AS FOLLOWS  MORNING AND EVENING, SITTING as follows:  TAKE THE MORNING READINGS BEFORE ANY MEDICATIONS AND WHEN YOU ARE RELAXED FOR SEVERAL MINUTES  TAKE THE EVENING READINGS:  BETWEEN 7-10 P M ; PRIOR TO ANY MEDICATIONS; AT LEAST IN OUR  FROM DINNER; AND CERTAINLY AFTER RELAXING FOR A FEW MINUTES  PLEASE INCLUDE HEART RATE WITH YOUR BLOOD PRESSURE READINGS  When taking standing readings, keep your arm supported at heart level and not dangling  Make sure you are sitting with your back supported and feet on the ground and do not cross your legs or feet  Make sure you have not taken any coffee or caffeine products or exercised or smoke cigarettes at least 30 minutes before taking your blood pressure  Then please mail these readings into the office    4  Follow-up in 4 months  Please bring in 1 week a blood pressure readings morning evening, sitting and standing is outlined above    Please go for fasting lab work 1-2 weeks prior to your appointment      5    General instructions:  AVOID SALT BUT NOT ADDING AN READING LABELS TO MAKE SURE THERE IS LOW-SALT IN THE FOOD THAT YOU ARE EATING  Goal is less than 2 g of sodium intake or less than 5 g of sodium chloride intake per day    Avoid nonsteroidal anti-inflammatory drugs such as Naprosyn, ibuprofen, Aleve, Advil, Celebrex, Meloxicam (Mobic) etc   You can use Tylenol as needed if you do not have any liver condition to be concerned about    Avoid medications such as Sudafed or decongestants and antihistamines that contained the D component which is the decongestant  You can take antihistamines without the decongestant or D component  Try to avoid medications such as pantoprazole or  Protonix/Nexium or Esomeprazole)/Prilosec or omeprazole/Prevacid or lansoprazole/AcipHex or Rabeprazole  If you are able to, use Pepcid as this is safer for your kidneys  Try to exercise at least 30 minutes 3 days a week to begin with with an ultimate goal of 5 days a week for at least 30 minutes    Try to lose 5-10 lb by your next visit    Please do not drink more than 2 glasses of alcohol/wine on a daily basis as this may contribute to your high blood pressure  Measures to reduce stone development:    Please Drink  oz of water or 2 5L-3 L a day, throughout the day  Avoid salt/low-salt diet   Try to decrease animal protein intake, dairy protein and vegetable base protein are better  Increase fruits and vegetables as much as possible  Avoid calcium products such as Tums or other types of calcium containing antacids, you can use Pepcid for indigestion (but you do not have to restrict your dietary calcium)  Avoid excessive vitamin D   Avoid excessive vitamin C  Try to avoid oxalate products (please refer to the diet sheet)  Limit fructose and sucrose type drinks such as coke             Low Oxalate Diet   WHAT YOU NEED TO KNOW:   Oxalate is a chemical found in plant foods  You may need to eat foods that are low in oxalate to help clear kidney stones or prevent them from forming  People who have had kidney stones are at a higher risk of forming kidney stones again  The most common type of kidney stone is made up of crystals that contain calcium and oxalate   Your healthcare provider or dietitian may recommend that you limit oxalate if you get this type of kidney stone often  DISCHARGE INSTRUCTIONS:   Foods to include: Include the following foods that have a low to medium amount of oxalate  Grains:      Egg noodles    Jairo crackers    Pancakes and waffles    Cooked and dry cereals without nuts or bran    White or wild rice    White bread, cornbread, bagels, and white English muffins (medium oxalate)    Saltine or soda crackers and vanilla wafers (medium oxalate)    Brown rice, spaghetti, and other noodles and pastas (medium oxalate)    Fruit:      Apples, bananas, grapes    Cranberries    Peaches, nectarines, apricots, and pears    Papayas and strawberries    Melons and pineapples    Blackberries, blueberries, mangoes, and prunes (medium oxalate)    Vegetables:      Artichokes, asparagus, and brussels sprouts    Broccoli and cauliflower    Kale, endive, cabbage, and lettuce    Cucumbers, peas, and zucchini    Mushrooms, onions, and peppers    Corn    Carrots, celery, and green beans (medium oxalate)    Parsnips, summer squash, tomatoes, and turnips (medium oxalate)    Dairy:      American cheese, Swiss cheese, cottage cheese, ricotta cheese, and cheddar cheese    Milk and buttermilk    Yogurt    Protein foods:      Meat, fish, shellfish, chicken, and turkey    Lunch meat and ham (medium oxalate)    Hot dogs, bratwurst, albrecht, and sausage (medium oxalate)    Drinks and desserts:      Coffee    Fruit punch and lemonade or limeade without added vitamin C    Desserts:      Cookies, cakes, and ice cream    Pudding without chocolate    Foods to limit or avoid:  Limit the following foods that are high in oxalate    Grains:      Wheat bran, wheat germ, and barley    Grits and bran cereal    White corn flour and buckwheat flour    Whole wheat bread    Fruit:      Dried apricots    Red currants, figs, and rhubarb    Kiwi    Grapefruit    Vegetables:      Potatoes and yams    Kriss greens, leeks, okra, and spinach    Wax beans     Eggplant    Beets and beet greens    Swiss chard, escarole, parsley, and rutabagas    Tomato paste    Protein foods:      Baked beans with tomato sauce    Nut butters and nuts (peanuts, almonds, pecans, cashews, hazelnuts)    Soy burgers    Miso    Dried beans    Desserts:      Fruitcake    Chocolate    Carob and marmalade    Beverages:      Chocolate drink mixes    Soy milk    Instant iced tea    Other foods:      Sesame seeds and tahini (paste made of sesame seeds)    Poppy seeds    Other dietary guidelines:   Drink about 12 to 16 (eight-ounce) cups of liquid each day  Liquids help clear kidney stones and prevent them from forming again  Water is the best liquid to drink  You may need more liquid if you are physically active  Ask your healthcare provider or dietitian how much liquid you need to drink each day  Your healthcare provider may suggest that you make other diet changes to help prevent kidney stones  This may include decreasing the amount of sodium you eat each day  © [facility] 2022 Information is for End User's use only and may not be sold, redistributed or otherwise used for commercial purposes  The above information is an  only  It is not intended as medical advice for individual conditions or treatments  Talk to your doctor, nurse or pharmacist before following any medical regimen to see if it is safe and effective for you  Subjective: There has been no hospitalizations or acute illnesses since last visit  The patient overall is feeling well except for sciatica  No fevers, chills, or cough or colds    Good appetite and good energy  No hematuria, dysuria, voiding symptoms or foamy urine  No stone passage  No gastrointestinal symptoms  No cardiovascular symptoms including swelling of the legs  No headaches, dizziness or lightheadedness  Blood pressure medications/renal pressure medication:  Vitamin D 400 units daily  Urocit-K 10 mEq twice a day  HCTZ 12 5 mg every other day  Metformin 1000 mg twice a day  Lisinopril 20 mg daily in the morning  Carvedilol 25 mg twice a day  Amlodipine 5 mg daily  Atorvastatin 80 mg daily      ROS:  See HPI, otherwise review of systems as completely reviewed with the patient are negative    Past Medical History:   Diagnosis Date   • Acute bronchitis    • Acute low back pain    • Acute low back pain     [id number] resolved   • Acute respiratory infection    • [facility] 6/15/2018   • Cancer ([facility] )     prostate   • Cellulitis of scrotum    • Cough    • Diabetes mellitus (HCC)    • Herpes zoster    • High cholesterol    • Hyperkalemia    • Hypertension    • Kidney disease    • Kidney stone    • Leukocytosis 6/15/2018   • Low back pain    • Lumbar radiculopathy    • Pyelonephritis 8/8/2022   • Rash    • Retinopathy, central serous    • Sleep apnea     [id number]   • Trochanteric bursitis      Past Surgical History:   Procedure Laterality Date   • ABDOMINAL ADHESION SURGERY N/A 6/13/2018    Procedure: LYSIS ADHESIONS;  Surgeon: Delmis Menchaca MD;  [facility];  Service: Urology   • FL RETROGRADE PYELOGRAM  8/8/2022   • FL RETROGRADE PYELOGRAM  8/11/2022   • LITHOTRIPSY      renal   • MN CYSTO BLADDER W/URETERAL CATHETERIZATION Left 8/8/2022    Procedure: CYSTOSCOPY RETROGRADE PYELOGRAM WITH INSERTION STENT URETERAL;  Surgeon: Keren Pak MD;  [facility];  Service: Urology   • MN CYSTO/URETERO W/LITHOTRIPSY &INDWELL STENT INSRT Left 8/11/2022    Procedure: CYSTOSCOPY URETEROSCOPY WITH LITHOTRIPSY HOLMIUM LASER, RETROGRADE PYELOGRAM AND INSERTION STENT URETERAL;  Surgeon: Keren Pak MD;  [facility];  Service: Urology   • MN LAPS SURG SPLE0OTT RPBIC RAD W/NRV SPARING ROBOT N/A 6/13/2018    Procedure: Marge Mckee W ROBOTICS;  Surgeon: Delmis Menchaca MD;  [facility];  Service: Urology   • PROSTATE SURGERY  06/13/2018   • SHOULDER SURGERY       Family History   Problem Relation Age of Onset   • Other Father         cardiac disorder   • Stroke Father    • Diabetes Father         mellitus   • Hypertension Father    • Heart disease Father         cardiac disorder   • Cancer Sister    • Cancer Paternal Grandmother    • Heart disease Family         cardiac disorder   • Kidney disease Family       reports that he has never smoked  He has never used smokeless tobacco  He reports that he does not drink alcohol and does not use drugs  I COMPLETELY REVIEWED THE PAST MEDICAL HISTORY/PAST SURGICAL HISTORY/SOCIAL HISTORY/FAMILY HISTORY/AND MEDICATIONS  AND UPDATED ALL    Objective:     Vitals:   BP sitting on left: 146/70 with a heart rate of 80 and regular  BP standing on left: 142/80 with a heart rate of 100 and regular occasional premature beat    Weight (last 2 days)       Date/Time Weight    05/24/23 1620 122 (268)          Wt Readings from Last 3 Encounters:   05/24/23 122 kg (268 lb)   01/19/23 122 kg (270 lb)   01/03/23 122 kg (268 lb 3 2 oz)       Body mass index is 44 6 kg/m²      Physical Exam: General:  No acute distress/obese  Skin:  No acute rash  Eyes:  No scleral icterus, noninjected, no discharge from eyes  ENT:  Moist mucous membranes  Neck:  Supple, no jugular venous distention, trachea is midline, no lymphadenopathy and no thyromegaly  Back   No CVAT  Chest:  Clear to auscultation and percussion, good respiratory effort  CVS:  Regular rate and rhythm without a rub, or gallops or murmurs  Abdomen:  Obese,Soft and nontender with normal bowel sounds  Extremities:  No cyanosis and no edema, no arthritic changes, normal range of motion  Neuro:  Grossly intact  Psych:  Alert, oriented x3 and appropriate      Medications:    Current Outpatient Medications:   •  amLODIPine (NORVASC) 5 mg tablet, TAKE 1 TABLET BY MOUTH EVERY DAY, Disp: 90 tablet, Rfl: 1  •  ASPIRIN 81 PO, Take 1 capsule by mouth 2 (two) times a day  , Disp: , Rfl:   •  atorvastatin (LIPITOR) 80 mg tablet, TAKE 1 TABLET BY MOUTH EVERY DAY, Disp: 90 tablet, Rfl: 1  •  BD Pen Needle Rosalie 2nd Gen 32G X 4 MM MISC, INJECT UNDER THE SKIN 4 (FOUR) TIMES A DAY, Disp: 400 each, Rfl: 3  •  carvedilol (COREG) 25 mg tablet, TAKE 1 TABLET BY MOUTH TWICE A DAY WITH MEALS, Disp: 180 tablet, Rfl: 2  •  glimepiride (AMARYL) 4 mg tablet, TAKE 1 TABLET BY MOUTH TWICE A DAY, Disp: 180 tablet, Rfl: 1  •  glucose blood (Contour Next Test) test strip, Use 1 each 4 (four) times a day Use as instructed (Patient taking differently: Use 1 each daily 3 to 4 times daily  Before meals or randomly), Disp: 400 each, Rfl: 3  •  glucose blood (Contour Next Test) test strip, CHECK SUGAR 4 TIMES DAILY, Disp: 400 strip, Rfl: 3  •  hydrochlorothiazide (HYDRODIURIL) 12 5 mg tablet, TAKE 0 5 TABLETS BY MOUTH DAILY  (Patient taking differently: Take 12 5 mg by mouth Take 0 5 tab QOD), Disp: 45 tablet, Rfl: 4  •  insuln lispro (HumaLOG KwikPen) 200 units/mL CONCENTRATED U-200 injection pen, Inject 15 units three times daily with meals  , Disp: 18 mL, Rfl: 5  •  lisinopril (ZESTRIL) 40 mg tablet, Take 1 tablet (40 mg total) by mouth daily (Patient taking differently: Take 20 mg by mouth daily), Disp: 90 tablet, Rfl: 3  •  metFORMIN (GLUCOPHAGE) 1000 MG tablet, TAKE 1 TABLET BY MOUTH TWICE A DAY, Disp: 180 tablet, Rfl: 3  •  Microlet Lancets MISC, USE 3 (THREE) TIMES A DAY, Disp: 300 each, Rfl: 3  •  potassium citrate (Urocit-K 10) 10 mEq, Take 1 tablet (10 mEq total) by mouth 2 (two) times a day, Disp: 180 tablet, Rfl: 3  •  tamsulosin (FLOMAX) 0 4 mg, TAKE 1 CAPSULE BY MOUTH EVERY DAY WITH DINNER (Patient taking differently: PRN), Disp: 90 capsule, Rfl: 1  •  Toujeo SoloStar 300 units/mL CONCENTRATED U-300 injection pen (1-unit dial), INJECT 80 UNITS UNDER THE SKIN DAILY, Disp: 22 5 mL, Rfl: 3  •  Trulicity 3 PB/7 4YQ injection, INJECT 0 5 ML (3 MG TOTAL) UNDER THE SKIN ONCE A WEEK, Disp: 6 mL, Rfl: 1  •  Vitamin D, Cholecalciferol, 400 units TABS, Take by mouth daily, Disp: , Rfl:   •  albuterol (PROVENTIL HFA,VENTOLIN HFA) 90 mcg/act inhaler, Inhale 1-2 puffs every 6 (six) hours as needed   (Patient not taking: Reported on 5/24/2023), Disp: , Rfl:   •  Multiple Vitamins-Minerals (MULTIVITAMIN WITH MINERALS) tablet, Take 1 tablet by mouth daily, Disp: , Rfl:   •  tamsulosin (FLOMAX) 0 4 mg, Take 1 capsule (0 4 mg total) by mouth daily with dinner as needed for kidney stone management  (Patient not taking: Reported on 1/19/2023), Disp: 90 capsule, Rfl: 1    Lab, Imaging and other studies: I have personally reviewed pertinent labs    Laboratory Results:  Results for orders placed or performed in visit on 05/13/23   Comprehensive metabolic panel   Result Value Ref Range    Sodium 138 135 - 147 mmol/L    Potassium 4 4 3 5 - 5 3 mmol/L    Chloride 110 (H) 96 - 108 mmol/L    CO2 27 21 - 32 mmol/L    ANION GAP 1 (L) 4 - 13 mmol/L    BUN 30 (H) 5 - 25 mg/dL    Creatinine 1 52 (H) 0 60 - 1 30 mg/dL    Glucose, Fasting 115 (H) 65 - 99 mg/dL    Calcium 9 0 8 3 - 10 1 mg/dL    AST 24 5 - 45 U/L    ALT 51 12 - 78 U/L    Alkaline Phosphatase 72 46 - 116 U/L    Total Protein 7 3 6 4 - 8 4 g/dL    Albumin 3 7 3 5 - 5 0 g/dL    Total Bilirubin 0 42 0 20 - 1 00 mg/dL    eGFR 48 ml/min/1 73sq m   CBC   Result Value Ref Range    WBC 11 96 (H) 4 31 - 10 16 Thousand/uL    RBC 4 73 3 88 - 5 62 Million/uL    Hemoglobin 13 6 12 0 - 17 0 g/dL    Hematocrit 42 8 36 5 - 49 3 %    MCV 91 82 - 98 fL    MCH 28 8 26 8 - 34 3 pg    MCHC 31 8 31 4 - 37 4 g/dL    RDW 12 9 11 6 - 15 1 %    Platelets 626 034 - 755 Thousands/uL    MPV 9 6 8 9 - 12 7 fL   CK   Result Value Ref Range    Total  39 - 308 U/L   Magnesium   Result Value Ref Range    Magnesium 1 6 1 6 - 2 6 mg/dL   Phosphorus   Result Value Ref Range    Phosphorus 3 2 2 3 - 4 1 mg/dL   Protein / creatinine ratio, urine   Result Value Ref Range    Creatinine, Ur 96 5 mg/dL "Protein Urine Random 10 mg/dL    Prot/Creat Ratio, Ur 0 10 0 00 - 0 10   PTH, intact   Result Value Ref Range    PTH 94 8 (H) 18 4 - 80 1 pg/mL   Vitamin D 25 hydroxy   Result Value Ref Range    Vit D, 25-Hydroxy 26 6 (L) 30 0 - 100 0 ng/mL             Invalid input(s): \"ALBUMIN\"      Radiology review:   chest X-ray    Ultrasound      Portions of the record may have been created with voice recognition software  Occasional wrong word or \"sound a like\" substitutions may have occurred due to the inherent limitations of voice recognition software  Read the chart carefully and recognize, using context, where substitutions have occurred                      "

## 2023-05-24 NOTE — PATIENT INSTRUCTIONS
1  Medication changes today:  Please increase vitamin D to 1000 units daily over-the-counter as a supplement  Please try a different magnesium supplement then Slow-Mag for example 250 mg 3 days a week to begin with a magnesium supplement if no diarrhea can try to take it daily  Continue to try to take  ounces of water a day or is close to this as you can and try to avoid salt is much as you can as well  I also gave you the general stone diet to follow please see below    I did contact Dr Luis A Pierce regarding the possibility of medications called SGLT2 inhibitors for example [place] or Jardiance that are being recommended for folks with diabetes mellitus or even any chronic kidney disease especially in the setting of diabetes mellitus that helps protect the heart and the kidneys  She will discuss this with you when she sees you  2   Please go for non fasting  lab work 1-2 weeks after making the above medication change this is to recheck the anion gap which is slightly low which is most likely spurious related to the equipment not to you however we just want to make sure this normalizes      3   Please take 1 week a blood pressure readings 1 to 2 weeks after stopping Advil can use your left arm and just sitting    AS FOLLOWS  MORNING AND EVENING, SITTING as follows:  TAKE THE MORNING READINGS BEFORE ANY MEDICATIONS AND WHEN YOU ARE RELAXED FOR SEVERAL MINUTES  TAKE THE EVENING READINGS:  BETWEEN 7-10 P M ; PRIOR TO ANY MEDICATIONS; AT LEAST IN OUR  FROM DINNER; AND CERTAINLY AFTER RELAXING FOR A FEW MINUTES  PLEASE INCLUDE HEART RATE WITH YOUR BLOOD PRESSURE READINGS  When taking standing readings, keep your arm supported at heart level and not dangling  Make sure you are sitting with your back supported and feet on the ground and do not cross your legs or feet  Make sure you have not taken any coffee or caffeine products or exercised or smoke cigarettes at least 30 minutes before taking your blood pressure  Then please mail these readings into the office    4  Follow-up in 4 months  Please bring in 1 week a blood pressure readings morning evening, sitting and standing is outlined above    Please go for fasting lab work 1-2 weeks prior to your appointment      5  General instructions:  AVOID SALT BUT NOT ADDING AN READING LABELS TO MAKE SURE THERE IS LOW-SALT IN THE FOOD THAT YOU ARE EATING  Goal is less than 2 g of sodium intake or less than 5 g of sodium chloride intake per day    Avoid nonsteroidal anti-inflammatory drugs such as Naprosyn, ibuprofen, Aleve, Advil, Celebrex, Meloxicam (Mobic) etc   You can use Tylenol as needed if you do not have any liver condition to be concerned about    Avoid medications such as Sudafed or decongestants and antihistamines that contained the D component which is the decongestant  You can take antihistamines without the decongestant or D component  Try to avoid medications such as pantoprazole or  Protonix/Nexium or Esomeprazole)/Prilosec or omeprazole/Prevacid or lansoprazole/AcipHex or Rabeprazole  If you are able to, use Pepcid as this is safer for your kidneys  Try to exercise at least 30 minutes 3 days a week to begin with with an ultimate goal of 5 days a week for at least 30 minutes    Try to lose 5-10 lb by your next visit    Please do not drink more than 2 glasses of alcohol/wine on a daily basis as this may contribute to your high blood pressure            Measures to reduce stone development:    Please Drink  oz of water or 2 5L-3 L a day, throughout the day  Avoid salt/low-salt diet   Try to decrease animal protein intake, dairy protein and vegetable base protein are better  Increase fruits and vegetables as much as possible  Avoid calcium products such as Tums or other types of calcium containing antacids, you can use Pepcid for indigestion (but you do not have to restrict your dietary calcium)  Avoid excessive vitamin D   Avoid excessive vitamin C  Try to avoid oxalate products (please refer to the diet sheet)  Limit fructose and sucrose type drinks such as coke             Low Oxalate Diet   WHAT YOU NEED TO KNOW:   Oxalate is a chemical found in plant foods  You may need to eat foods that are low in oxalate to help clear kidney stones or prevent them from forming  People who have had kidney stones are at a higher risk of forming kidney stones again  The most common type of kidney stone is made up of crystals that contain calcium and oxalate  Your healthcare provider or dietitian may recommend that you limit oxalate if you get this type of kidney stone often  DISCHARGE INSTRUCTIONS:   Foods to include: Include the following foods that have a low to medium amount of oxalate    Grains:      Egg noodles    Jairo crackers    Pancakes and waffles    Cooked and dry cereals without nuts or bran    White or wild rice    White bread, cornbread, bagels, and white English muffins (medium oxalate)    Saltine or soda crackers and vanilla wafers (medium oxalate)    Brown rice, spaghetti, and other noodles and pastas (medium oxalate)    Fruit:      Apples, bananas, grapes    Cranberries    Peaches, nectarines, apricots, and pears    Papayas and strawberries    Melons and pineapples    Blackberries, blueberries, mangoes, and prunes (medium oxalate)    Vegetables:      Artichokes, asparagus, and brussels sprouts    Broccoli and cauliflower    Kale, endive, cabbage, and lettuce    Cucumbers, peas, and zucchini    Mushrooms, onions, and peppers    Corn    Carrots, celery, and green beans (medium oxalate)    Parsnips, summer squash, tomatoes, and turnips (medium oxalate)    Dairy:      American cheese, Swiss cheese, cottage cheese, ricotta cheese, and cheddar cheese    Milk and buttermilk    Yogurt    Protein foods:      Meat, fish, shellfish, chicken, and turkey    Lunch meat and ham (medium oxalate)    Hot dogs, bratwurst, albrecht, and sausage (medium oxalate)    Drinks and desserts:      Coffee    Fruit punch and lemonade or limeade without added vitamin C    Desserts:      Cookies, cakes, and ice cream    Pudding without chocolate    Foods to limit or avoid:  Limit the following foods that are high in oxalate  Grains:      Wheat bran, wheat germ, and barley    Grits and bran cereal    White corn flour and buckwheat flour    Whole wheat bread    Fruit:      Dried apricots    Red currants, figs, and rhubarb    Kiwi    Grapefruit    Vegetables:      Potatoes and yams    Kriss greens, leeks, okra, and spinach    Wax beans     Eggplant    Beets and beet greens    Swiss chard, escarole, parsley, and rutabagas    Tomato paste    Protein foods:      Baked beans with tomato sauce    Nut butters and nuts (peanuts, almonds, pecans, cashews, hazelnuts)    Soy burgers    Miso    Dried beans    Desserts:      Fruitcake    Chocolate    Carob and marmalade    Beverages:      Chocolate drink mixes    Soy milk    Instant iced tea    Other foods:      Sesame seeds and tahini (paste made of sesame seeds)    Poppy seeds    Other dietary guidelines:   Drink about 12 to 16 (eight-ounce) cups of liquid each day  Liquids help clear kidney stones and prevent them from forming again  Water is the best liquid to drink  You may need more liquid if you are physically active  Ask your healthcare provider or dietitian how much liquid you need to drink each day  Your healthcare provider may suggest that you make other diet changes to help prevent kidney stones  This may include decreasing the amount of sodium you eat each day  © Copyright [employer] 2022 Information is for End User's use only and may not be sold, redistributed or otherwise used for commercial purposes  The above information is an  only  It is not intended as medical advice for individual conditions or treatments   Talk to your doctor, nurse or pharmacist before following any medical regimen to see if it is safe and effective for you

## 2023-05-26 DIAGNOSIS — E11.65 UNCONTROLLED TYPE 2 DIABETES MELLITUS WITH HYPERGLYCEMIA (HCC): ICD-10-CM

## 2023-05-26 RX ORDER — LANCETS
EACH MISCELLANEOUS
Qty: 300 EACH | Refills: 3 | Status: SHIPPED | OUTPATIENT
Start: 2023-05-26

## 2023-05-31 ENCOUNTER — APPOINTMENT (OUTPATIENT)
Dept: LAB | Facility: CLINIC | Age: 63
End: 2023-05-31
Payer: COMMERCIAL

## 2023-05-31 DIAGNOSIS — N20.0 NEPHROLITHIASIS: ICD-10-CM

## 2023-05-31 DIAGNOSIS — N18.31 TYPE 2 DIABETES MELLITUS WITH STAGE 3A CHRONIC KIDNEY DISEASE, WITH LONG-TERM CURRENT USE OF INSULIN (HCC): ICD-10-CM

## 2023-05-31 DIAGNOSIS — N18.31 STAGE 3A CHRONIC KIDNEY DISEASE (CKD) (HCC): ICD-10-CM

## 2023-05-31 DIAGNOSIS — E66.01 MORBID OBESITY (HCC): ICD-10-CM

## 2023-05-31 DIAGNOSIS — I12.9 PARENCHYMAL RENAL HYPERTENSION, STAGE 1 THROUGH STAGE 4 OR UNSPECIFIED CHRONIC KIDNEY DISEASE: ICD-10-CM

## 2023-05-31 DIAGNOSIS — E78.5 DYSLIPIDEMIA: ICD-10-CM

## 2023-05-31 DIAGNOSIS — E11.22 TYPE 2 DIABETES MELLITUS WITH STAGE 3A CHRONIC KIDNEY DISEASE, WITH LONG-TERM CURRENT USE OF INSULIN (HCC): ICD-10-CM

## 2023-05-31 DIAGNOSIS — E11.21 DIABETIC NEPHROPATHY ASSOCIATED WITH TYPE 2 DIABETES MELLITUS (HCC): ICD-10-CM

## 2023-05-31 DIAGNOSIS — Z79.4 TYPE 2 DIABETES MELLITUS WITH STAGE 3A CHRONIC KIDNEY DISEASE, WITH LONG-TERM CURRENT USE OF INSULIN (HCC): ICD-10-CM

## 2023-05-31 LAB
ALBUMIN SERPL BCP-MCNC: 3.6 G/DL (ref 3.5–5)
ALP SERPL-CCNC: 81 U/L (ref 46–116)
ALT SERPL W P-5'-P-CCNC: 46 U/L (ref 12–78)
ANION GAP SERPL CALCULATED.3IONS-SCNC: -2 MMOL/L (ref 4–13)
AST SERPL W P-5'-P-CCNC: 33 U/L (ref 5–45)
BASOPHILS # BLD AUTO: 0.07 THOUSANDS/ÂΜL (ref 0–0.1)
BASOPHILS NFR BLD AUTO: 1 % (ref 0–1)
BILIRUB SERPL-MCNC: 0.37 MG/DL (ref 0.2–1)
BUN SERPL-MCNC: 34 MG/DL (ref 5–25)
CALCIUM SERPL-MCNC: 9.1 MG/DL (ref 8.3–10.1)
CHLORIDE SERPL-SCNC: 108 MMOL/L (ref 96–108)
CHOLEST SERPL-MCNC: 112 MG/DL
CO2 SERPL-SCNC: 28 MMOL/L (ref 21–32)
CREAT SERPL-MCNC: 1.78 MG/DL (ref 0.6–1.3)
EOSINOPHIL # BLD AUTO: 0.41 THOUSAND/ÂΜL (ref 0–0.61)
EOSINOPHIL NFR BLD AUTO: 3 % (ref 0–6)
ERYTHROCYTE [DISTWIDTH] IN BLOOD BY AUTOMATED COUNT: 12.5 % (ref 11.6–15.1)
EST. AVERAGE GLUCOSE BLD GHB EST-MCNC: 148 MG/DL
GFR SERPL CREATININE-BSD FRML MDRD: 39 ML/MIN/1.73SQ M
GLUCOSE P FAST SERPL-MCNC: 152 MG/DL (ref 65–99)
HBA1C MFR BLD: 6.8 %
HCT VFR BLD AUTO: 43 % (ref 36.5–49.3)
HDLC SERPL-MCNC: 35 MG/DL
HGB BLD-MCNC: 13.5 G/DL (ref 12–17)
IMM GRANULOCYTES # BLD AUTO: 0.04 THOUSAND/UL (ref 0–0.2)
IMM GRANULOCYTES NFR BLD AUTO: 0 % (ref 0–2)
LDLC SERPL CALC-MCNC: 54 MG/DL (ref 0–100)
LYMPHOCYTES # BLD AUTO: 2.6 THOUSANDS/ÂΜL (ref 0.6–4.47)
LYMPHOCYTES NFR BLD AUTO: 21 % (ref 14–44)
MCH RBC QN AUTO: 29.2 PG (ref 26.8–34.3)
MCHC RBC AUTO-ENTMCNC: 31.4 G/DL (ref 31.4–37.4)
MCV RBC AUTO: 93 FL (ref 82–98)
MONOCYTES # BLD AUTO: 1.05 THOUSAND/ÂΜL (ref 0.17–1.22)
MONOCYTES NFR BLD AUTO: 9 % (ref 4–12)
NEUTROPHILS # BLD AUTO: 8.06 THOUSANDS/ÂΜL (ref 1.85–7.62)
NEUTS SEG NFR BLD AUTO: 66 % (ref 43–75)
NRBC BLD AUTO-RTO: 0 /100 WBCS
PLATELET # BLD AUTO: 272 THOUSANDS/UL (ref 149–390)
PMV BLD AUTO: 9.3 FL (ref 8.9–12.7)
POTASSIUM SERPL-SCNC: 5.6 MMOL/L (ref 3.5–5.3)
PROT SERPL-MCNC: 7.2 G/DL (ref 6.4–8.4)
RBC # BLD AUTO: 4.62 MILLION/UL (ref 3.88–5.62)
SODIUM SERPL-SCNC: 134 MMOL/L (ref 135–147)
TRIGL SERPL-MCNC: 116 MG/DL
TSH SERPL DL<=0.05 MIU/L-ACNC: 2.63 UIU/ML (ref 0.45–4.5)
WBC # BLD AUTO: 12.23 THOUSAND/UL (ref 4.31–10.16)

## 2023-05-31 PROCEDURE — 83036 HEMOGLOBIN GLYCOSYLATED A1C: CPT

## 2023-05-31 PROCEDURE — 80061 LIPID PANEL: CPT

## 2023-05-31 PROCEDURE — 84443 ASSAY THYROID STIM HORMONE: CPT

## 2023-05-31 PROCEDURE — 80053 COMPREHEN METABOLIC PANEL: CPT

## 2023-05-31 PROCEDURE — 3044F HG A1C LEVEL LT 7.0%: CPT | Performed by: INTERNAL MEDICINE

## 2023-05-31 PROCEDURE — 85025 COMPLETE CBC W/AUTO DIFF WBC: CPT

## 2023-05-31 PROCEDURE — 36415 COLL VENOUS BLD VENIPUNCTURE: CPT

## 2023-06-01 ENCOUNTER — TELEPHONE (OUTPATIENT)
Dept: NEPHROLOGY | Facility: CLINIC | Age: 63
End: 2023-06-01

## 2023-06-01 DIAGNOSIS — N20.0 NEPHROLITHIASIS: ICD-10-CM

## 2023-06-01 DIAGNOSIS — I12.9 PARENCHYMAL RENAL HYPERTENSION, STAGE 1 THROUGH STAGE 4 OR UNSPECIFIED CHRONIC KIDNEY DISEASE: ICD-10-CM

## 2023-06-01 DIAGNOSIS — N18.31 STAGE 3A CHRONIC KIDNEY DISEASE (HCC): Primary | ICD-10-CM

## 2023-06-01 NOTE — TELEPHONE ENCOUNTER
----- Message from Deyvi Joy MD sent at 5/31/2023  9:33 AM EDT -----  In reviewing patient's labs his anion gap is still slightly low potassium slightly high sodium slightly low    Recommend:  1  Decrease lisinopril to 20 mg daily this will help with the potassium  2  Modestly low potassium diet  3    Repeat a basic metabolic profile/SPEP/UPEP and light chain ratio in about 2 weeks  Nonfasting     Thank you    ----- Message -----  From: Lab, Background User  Sent: 5/31/2023   8:38 AM EDT  To: Deyvi Joy MD

## 2023-06-06 ENCOUNTER — OFFICE VISIT (OUTPATIENT)
Dept: INTERNAL MEDICINE CLINIC | Facility: CLINIC | Age: 63
End: 2023-06-06
Payer: COMMERCIAL

## 2023-06-06 VITALS
BODY MASS INDEX: 44.85 KG/M2 | SYSTOLIC BLOOD PRESSURE: 126 MMHG | WEIGHT: 269.2 LBS | DIASTOLIC BLOOD PRESSURE: 76 MMHG | HEART RATE: 86 BPM | HEIGHT: 65 IN | OXYGEN SATURATION: 96 %

## 2023-06-06 DIAGNOSIS — N18.31 STAGE 3A CHRONIC KIDNEY DISEASE (HCC): ICD-10-CM

## 2023-06-06 DIAGNOSIS — E11.21 DIABETIC NEPHROPATHY ASSOCIATED WITH TYPE 2 DIABETES MELLITUS (HCC): ICD-10-CM

## 2023-06-06 DIAGNOSIS — N18.31 TYPE 2 DIABETES MELLITUS WITH STAGE 3A CHRONIC KIDNEY DISEASE, WITH LONG-TERM CURRENT USE OF INSULIN (HCC): Primary | ICD-10-CM

## 2023-06-06 DIAGNOSIS — E78.5 DYSLIPIDEMIA: ICD-10-CM

## 2023-06-06 DIAGNOSIS — E11.22 TYPE 2 DIABETES MELLITUS WITH STAGE 3A CHRONIC KIDNEY DISEASE, WITH LONG-TERM CURRENT USE OF INSULIN (HCC): Primary | ICD-10-CM

## 2023-06-06 DIAGNOSIS — I10 BENIGN ESSENTIAL HYPERTENSION: ICD-10-CM

## 2023-06-06 DIAGNOSIS — G89.29 CHRONIC BILATERAL LOW BACK PAIN WITH BILATERAL SCIATICA: ICD-10-CM

## 2023-06-06 DIAGNOSIS — M54.42 CHRONIC BILATERAL LOW BACK PAIN WITH BILATERAL SCIATICA: ICD-10-CM

## 2023-06-06 DIAGNOSIS — M54.41 CHRONIC BILATERAL LOW BACK PAIN WITH BILATERAL SCIATICA: ICD-10-CM

## 2023-06-06 DIAGNOSIS — Z79.4 TYPE 2 DIABETES MELLITUS WITH STAGE 3A CHRONIC KIDNEY DISEASE, WITH LONG-TERM CURRENT USE OF INSULIN (HCC): Primary | ICD-10-CM

## 2023-06-06 PROCEDURE — 99214 OFFICE O/P EST MOD 30 MIN: CPT | Performed by: INTERNAL MEDICINE

## 2023-06-06 RX ORDER — LISINOPRIL 40 MG/1
20 TABLET ORAL DAILY
Start: 2023-06-06

## 2023-06-06 NOTE — ASSESSMENT & PLAN NOTE
Discussed benefit of SGLT 1 inh  Declines to start more meds  A1C and BP controlled  Most recent BMP showed an elevated K and higher than baseline creatinine  He did not see the message to reduce the lisinopril  I instructed him to only take 20mg and have labs ordered by nephrology in 2 weeks    Lab Results   Component Value Date    HGBA1C 6 8 (H) 05/31/2023

## 2023-06-06 NOTE — PROGRESS NOTES
Assessment/Plan:    Type 2 diabetes mellitus with stage 3a chronic kidney disease, with long-term current use of insulin ([facility])  Discussed benefit of SGLT 1 inh  Declines to start more meds  A1C and BP controlled  Most recent BMP showed an elevated K and higher than baseline creatinine  He did not see the message to reduce the lisinopril  I instructed him to only take 20mg and have labs ordered by nephrology in 2 weeks    Lab Results   Component Value Date    HGBA1C 6 8 (H) 05/31/2023     Prefers to hold off on steroids and gabapentin for chronic low back pain  Consider muscle relaxant       Problem List Items Addressed This Visit        Endocrine    Diabetic nephropathy associated with type 2 diabetes mellitus ([facility])    Relevant Medications    lisinopril (ZESTRIL) 40 mg tablet    Other Relevant Orders    Hemoglobin A1C    TSH, 3rd generation with Free T4 reflex    Lipid Panel with Direct LDL reflex    Type 2 diabetes mellitus with stage 3a chronic kidney disease, with long-term current use of insulin ([facility] ) - Primary     Discussed benefit of SGLT 1 inh  Declines to start more meds  A1C and BP controlled  Most recent BMP showed an elevated K and higher than baseline creatinine  He did not see the message to reduce the lisinopril  I instructed him to only take 20mg and have labs ordered by nephrology in 2 weeks    Lab Results   Component Value Date    HGBA1C 6 8 (H) 05/31/2023            Relevant Medications    lisinopril (ZESTRIL) 40 mg tablet    Other Relevant Orders    Hemoglobin A1C    TSH, 3rd generation with Free T4 reflex    Lipid Panel with Direct LDL reflex       Cardiovascular and Mediastinum    Benign essential hypertension    Relevant Medications    lisinopril (ZESTRIL) 40 mg tablet    Other Relevant Orders    TSH, 3rd generation with Free T4 reflex       Genitourinary    Stage 3 chronic kidney disease ([facility] )    Relevant Medications    lisinopril (ZESTRIL) 40 mg tablet       Other    Dyslipidemia    Relevant "Orders    TSH, 3rd generation with Free T4 reflex    Lipid Panel with Direct LDL reflex   Other Visit Diagnoses     Chronic bilateral low back pain with bilateral sciatica                Subjective:      Patient ID: Chelsea Courtney is a 59 y o  male  HPI  Here for a follow up  Recent labs reviewed and A1C better at 6 8  Denies hypoglycemic spells  FBS in the 150s and 100s at night  Low back pain down both legs for a month improving  Takes 2 Advils a day which he knows he should not bec of kidney function    The following portions of the patient's history were reviewed and updated as appropriate: allergies, current medications, past family history, past medical history, past social history, past surgical history and problem list     Review of Systems   Constitutional: Negative for unexpected weight change  Respiratory: Negative for shortness of breath  Cardiovascular: Negative for chest pain and palpitations  Gastrointestinal: Negative for abdominal pain  Genitourinary: Positive for urgency  Musculoskeletal: Positive for back pain  Objective:      /76 (BP Location: Left arm, Patient Position: Sitting, Cuff Size: Large)   Pulse 86   Ht 5' 5\" (1 651 m)   Wt 122 kg (269 lb 3 2 oz)   SpO2 96%   BMI 44 80 kg/m²          Physical Exam  Constitutional:       General: He is not in acute distress  Appearance: He is well-developed  He is obese  He is not ill-appearing, toxic-appearing or diaphoretic  Eyes:      Conjunctiva/sclera: Conjunctivae normal    Cardiovascular:      Rate and Rhythm: Normal rate and regular rhythm  Heart sounds: Normal heart sounds  No murmur heard  Pulmonary:      Effort: Pulmonary effort is normal  No respiratory distress  Breath sounds: Normal breath sounds  No wheezing or rales  Abdominal:      General: There is no distension  Palpations: Abdomen is soft  There is no mass  Tenderness: There is no abdominal tenderness   There is no guarding " or rebound  Musculoskeletal:      Cervical back: Neck supple  Right lower leg: No edema  Left lower leg: No edema  Neurological:      Mental Status: He is alert and oriented to person, place, and time  Psychiatric:         Mood and Affect: Mood normal          Behavior: Behavior normal          Thought Content:  Thought content normal          Judgment: Judgment normal

## 2023-06-07 RX ORDER — LISINOPRIL 10 MG/1
10 TABLET ORAL DAILY
Qty: 90 TABLET | Refills: 3 | Status: SHIPPED | OUTPATIENT
Start: 2023-06-07

## 2023-06-20 DIAGNOSIS — M54.41 CHRONIC BILATERAL LOW BACK PAIN WITH BILATERAL SCIATICA: Primary | ICD-10-CM

## 2023-06-20 DIAGNOSIS — G89.29 CHRONIC BILATERAL LOW BACK PAIN WITH BILATERAL SCIATICA: Primary | ICD-10-CM

## 2023-06-20 DIAGNOSIS — M54.42 CHRONIC BILATERAL LOW BACK PAIN WITH BILATERAL SCIATICA: Primary | ICD-10-CM

## 2023-06-20 RX ORDER — METHYLPREDNISOLONE 4 MG/1
TABLET ORAL
Qty: 21 EACH | Refills: 0 | Status: SHIPPED | OUTPATIENT
Start: 2023-06-20

## 2023-07-06 ENCOUNTER — HOSPITAL ENCOUNTER (OUTPATIENT)
Dept: RADIOLOGY | Facility: HOSPITAL | Age: 63
Discharge: HOME | End: 2023-07-06
Payer: COMMERCIAL

## 2023-07-06 DIAGNOSIS — M54.41 CHRONIC BILATERAL LOW BACK PAIN WITH BILATERAL SCIATICA: Primary | ICD-10-CM

## 2023-07-06 DIAGNOSIS — M54.42 CHRONIC BILATERAL LOW BACK PAIN WITH BILATERAL SCIATICA: Primary | ICD-10-CM

## 2023-07-06 DIAGNOSIS — G89.29 CHRONIC BILATERAL LOW BACK PAIN WITH BILATERAL SCIATICA: Primary | ICD-10-CM

## 2023-07-06 DIAGNOSIS — G89.29 CHRONIC BILATERAL LOW BACK PAIN WITH BILATERAL SCIATICA: ICD-10-CM

## 2023-07-06 DIAGNOSIS — M54.42 CHRONIC BILATERAL LOW BACK PAIN WITH BILATERAL SCIATICA: ICD-10-CM

## 2023-07-06 DIAGNOSIS — M54.41 CHRONIC BILATERAL LOW BACK PAIN WITH BILATERAL SCIATICA: ICD-10-CM

## 2023-07-06 PROCEDURE — 72110 X-RAY EXAM L-2 SPINE 4/>VWS: CPT

## 2023-07-06 RX ORDER — GABAPENTIN 100 MG/1
100 CAPSULE ORAL 3 TIMES DAILY
Qty: 90 CAPSULE | Refills: 3 | Status: SHIPPED | OUTPATIENT
Start: 2023-07-06

## 2023-07-13 DIAGNOSIS — I10 BENIGN ESSENTIAL HYPERTENSION: ICD-10-CM

## 2023-07-13 RX ORDER — CARVEDILOL 25 MG/1
TABLET ORAL
Qty: 180 TABLET | Refills: 2 | Status: SHIPPED | OUTPATIENT
Start: 2023-07-13

## 2023-07-17 DIAGNOSIS — G89.29 CHRONIC BILATERAL LOW BACK PAIN WITH BILATERAL SCIATICA: Primary | ICD-10-CM

## 2023-07-17 DIAGNOSIS — M54.41 CHRONIC BILATERAL LOW BACK PAIN WITH BILATERAL SCIATICA: Primary | ICD-10-CM

## 2023-07-17 DIAGNOSIS — M54.42 CHRONIC BILATERAL LOW BACK PAIN WITH BILATERAL SCIATICA: Primary | ICD-10-CM

## 2023-07-26 ENCOUNTER — TELEPHONE (OUTPATIENT)
Dept: PHYSICAL THERAPY | Facility: OTHER | Age: 63
End: 2023-07-26

## 2023-07-26 NOTE — TELEPHONE ENCOUNTER
Patient called into [facility] today 07/26 and left a v/m 12:15pm.    Returned patient's call today 07/26 @12:24pm.    I made patient aware he has a DIRECT PT REFERRAL ( no need to triage) AND PAIN MANAGEMENT REFERRAL. PT [facility] site phone number and address provided since that is the office the patient would like to have his evaluation/treatments.     He does have PM phone number he will call when he gets a chance to schedule an eval.

## 2023-08-01 DIAGNOSIS — E11.65 UNCONTROLLED TYPE 2 DIABETES MELLITUS WITH HYPERGLYCEMIA (HCC): ICD-10-CM

## 2023-08-01 RX ORDER — INSULIN GLARGINE 300 U/ML
80 INJECTION, SOLUTION SUBCUTANEOUS DAILY
Qty: 22.5 ML | Refills: 3 | Status: SHIPPED | OUTPATIENT
Start: 2023-08-01

## 2023-08-01 RX ORDER — GLIMEPIRIDE 4 MG/1
TABLET ORAL
Qty: 180 TABLET | Refills: 1 | Status: SHIPPED | OUTPATIENT
Start: 2023-08-01

## 2023-08-01 RX ORDER — ATORVASTATIN CALCIUM 80 MG/1
TABLET, FILM COATED ORAL
Qty: 90 TABLET | Refills: 1 | Status: SHIPPED | OUTPATIENT
Start: 2023-08-01

## 2023-08-20 DIAGNOSIS — I10 BENIGN ESSENTIAL HYPERTENSION: ICD-10-CM

## 2023-08-20 RX ORDER — AMLODIPINE BESYLATE 5 MG/1
TABLET ORAL
Qty: 90 TABLET | Refills: 1 | Status: SHIPPED | OUTPATIENT
Start: 2023-08-20

## 2023-08-21 ENCOUNTER — EVALUATION (OUTPATIENT)
Dept: PHYSICAL THERAPY | Age: 63
End: 2023-08-21
Payer: COMMERCIAL

## 2023-08-21 DIAGNOSIS — M54.41 CHRONIC BILATERAL LOW BACK PAIN WITH BILATERAL SCIATICA: Primary | ICD-10-CM

## 2023-08-21 DIAGNOSIS — M54.42 CHRONIC BILATERAL LOW BACK PAIN WITH BILATERAL SCIATICA: Primary | ICD-10-CM

## 2023-08-21 DIAGNOSIS — E11.65 UNCONTROLLED TYPE 2 DIABETES MELLITUS WITH HYPERGLYCEMIA (HCC): ICD-10-CM

## 2023-08-21 DIAGNOSIS — G89.29 CHRONIC BILATERAL LOW BACK PAIN WITH BILATERAL SCIATICA: Primary | ICD-10-CM

## 2023-08-21 PROCEDURE — 97161 PT EVAL LOW COMPLEX 20 MIN: CPT

## 2023-08-21 PROCEDURE — 97110 THERAPEUTIC EXERCISES: CPT

## 2023-08-21 RX ORDER — LANCETS
EACH MISCELLANEOUS 3 TIMES DAILY
Qty: 300 EACH | Refills: 3 | Status: SHIPPED | OUTPATIENT
Start: 2023-08-21

## 2023-08-21 NOTE — PROGRESS NOTES
PT Evaluation     Today's date: 2023  Patient name: Yadi Valera  : 1960  MRN: [id number]  Referring provider: Pablo Rahman MD  Dx:   Encounter Diagnosis     ICD-10-CM    1. Chronic bilateral low back pain with bilateral sciatica  M54.42 Ambulatory Referral to Physical Therapy    M54.41     G89.29                      Assessment  Assessment details: Pt is a 59 y.o. male who presents to [facility] with chief c/o low back pain with radicular symptoms into BLE (L>R). Signs and symptoms indicate diagnosis of lumbar radiculopathy with possible extension movement preference. He has primary impairments of decreased lumbar ROM/mobility, increased neural tension, decreased LE strength, decreased hip/knee flexibility, and increased pain. He is limited functionally as he has difficulty ambulating, difficulty with prolonged standing, lifting, carrying, and participating in usual leisure activities. Provided pt with HEP for lumbar extension-bias which he was able to complete with VC's for proper form. Educated pt on proper completion of HEP, normal response to exercises, diagnosis, and POC. He verbalizes understanding of all education provided. All questions answered. Pt would benefit from skilled OP PT in order to improve upon impairments and return to PLOF.   Impairments: abnormal gait, abnormal or restricted ROM, impaired balance, impaired physical strength, lacks appropriate home exercise program, pain with function and poor posture   Understanding of Dx/Px/POC: good  Goals  Short term goals  Pt will improve strength by 1/2 grade in order to perform ADL's within 2-3 weeks    Pt will be able to bend down to  object off of the floor with a 50% reduction in symptoms within 2-3 weeks  Pt will be able to stand for >30 minutes with a 50% reduction in symptoms within 2-3 weeks    Long term goals  Pt will be I with advanced HEP or symptom management   Pt will be able to perform all ADL's with <3/10 pain  Pt will report 90% functional improvement  Pt will be able to tolerate all leisure activities (pushing/pulling/lifting) with <3/10 pain  Pt will be able to tolerate prolonged sitting/standing/walking with <3/10 pain  Pt will improve FOTO >/= expected        Plan  Planned therapy interventions: patient education, therapeutic exercise, graded exercise, functional ROM exercises, flexibility, home exercise program, manual therapy, activity modification, strengthening, stretching, joint mobilization, massage, therapeutic activities, balance/weight bearing training, neuromuscular re-education and nerve gliding  Frequency: 2x week  Duration in visits: 12  Duration in weeks: 6  Treatment plan discussed with: patient        Subjective Evaluation    History of Present Illness  Mechanism of injury: Pt states that he has had sciatica for the past two months that has not resolved. He has it in both legs. Mostly in the L leg. Pt states he get symptoms into the foot. Pt states that he gets numbness with any amount of walking. Pt states that he can only stand for about 10 minutes. Sitting is probably the best position. Lying flat does not really bother him. If he twists or turns in bed then he'll feel it. He has not been able to do as much as he used to. Denies B/B changes, does have hx of prostate cx, denies night pain, denies fever/chills. He is currently taking gabapentin which has been "taking the edge off" of his symptoms.             Recurrent probem    Quality of life: good    Pain  Current pain ratin  At best pain ratin  At worst pain ratin  Quality: radiating, dull ache, sharp and tight  Relieving factors: rest and medications  Aggravating factors: walking, standing, stair climbing and lifting  Progression: improved          Objective     Active Range of Motion     Additional Active Range of Motion Details  Lumbar flexion AROM (cm from third finger to floor):  Flex: 24 cm  L lat flex: 64 cm  R lat flex: 53 cm    Ext: 12 degrees  Mechanical Assessment    Cervical      Thoracic      Lumbar    Sitting flexion: repeated movements  Pain location: no change  Lying extension: sustained positions  Pain location: centralized    Strength/Myotome Testing     Left Hip   Planes of Motion   Flexion: 4-  Extension: 4-  Abduction: 4-  Adduction: 4-    Right Hip   Planes of Motion   Flexion: 4-  Extension: 4-  Abduction: 4-  Adduction: 4-    Left Knee   Flexion: 4-  Extension: 4-    Right Knee   Flexion: 4-  Extension: 4-    Left Ankle/Foot   Dorsiflexion: 4-  Plantar flexion: 4-    Right Ankle/Foot   Dorsiflexion: 4-  Plantar flexion: 4-    Tests     Lumbar     Left   Positive passive SLR and slump test.     Right   Negative passive SLR and slump test.     Left Pelvic Girdle/Sacrum   Negative: thigh thrust.     Right Pelvic Girdle/Sacrum   Negative: thigh thrust.     Left Hip   Negative SAMIRA and FADIR. Right Hip   Negative SAMIRA and FADIR.               Precautions: hx of prostate cancer; Type II DM w/ neuropathy; CKD stage 3      Manuals 8/21            Lumbar P/A grade III-IV                                                    Neuro Re-Ed                                                                                                        Ther Ex             Nu step             Prone lying 3'            PPU's edu             Sciatic nerve tensioner x10            Supine bridge             Hip add iso             SLR flex w/ TA             TA ball press             Paloff press             Cybex leg press             Cybex hip abd             Sled push                                                    Ther Activity                                       Gait Training                                       Modalities

## 2023-08-26 DIAGNOSIS — E11.65 UNCONTROLLED TYPE 2 DIABETES MELLITUS WITH HYPERGLYCEMIA (HCC): ICD-10-CM

## 2023-08-26 RX ORDER — INSULIN LISPRO 200 [IU]/ML
INJECTION, SOLUTION SUBCUTANEOUS
Qty: 18 ML | Refills: 5 | Status: SHIPPED | OUTPATIENT
Start: 2023-08-26

## 2023-08-30 ENCOUNTER — OFFICE VISIT (OUTPATIENT)
Dept: PHYSICAL THERAPY | Age: 63
End: 2023-08-30

## 2023-08-30 DIAGNOSIS — G89.29 CHRONIC BILATERAL LOW BACK PAIN WITH BILATERAL SCIATICA: Primary | ICD-10-CM

## 2023-08-30 DIAGNOSIS — M54.42 CHRONIC BILATERAL LOW BACK PAIN WITH BILATERAL SCIATICA: Primary | ICD-10-CM

## 2023-08-30 DIAGNOSIS — M54.41 CHRONIC BILATERAL LOW BACK PAIN WITH BILATERAL SCIATICA: Primary | ICD-10-CM

## 2023-08-30 PROCEDURE — 97110 THERAPEUTIC EXERCISES: CPT

## 2023-08-30 PROCEDURE — 97112 NEUROMUSCULAR REEDUCATION: CPT

## 2023-08-30 NOTE — PROGRESS NOTES
Daily Note     Today's date: 2023  Patient name: Vale Simmons  : 1960  MRN: [id number]  Referring provider: Guadlupe Closs, MD  Dx:   Encounter Diagnosis     ICD-10-CM    1. Chronic bilateral low back pain with bilateral sciatica  M54.42     M54.41     G89.29                      Subjective:  Sore after walking around the hospital today. Compliance with HEP but is expereincing some cervical pain with performing sciatic tensioners. Objective: See treatment diary below      Assessment: Tolerated treatment well. Avoided C-S flexion with tensioners today; assess NV. Reviewed HEP and progressed through treatment plan as outlined. Challenged with maintaining proper TA recruitment with exercise. Positive response to manuals. Patient demonstrated fatigue post treatment, exhibited good technique with therapeutic exercises and would benefit from continued PT      Plan: Continue per plan of care. Precautions: hx of prostate cancer; Type II DM w/ neuropathy; CKD stage 3      Manuals            Lumbar P/A grade III-IV  CRESPO                                                  Neuro Re-Ed                                                                                                        Ther Ex             Nu step  10 mins           Prone lying 3' 3'            PPU's edu             Sciatic nerve tensioner x10 x10           Supine bridge  2x10           Hip add iso  5"x20           SLR flex w/ TA  1x10 ea.            TA ball press  5"x20           Paloff press             Cybex leg press             Cybex hip abd             Sled push                                                    Ther Activity                                       Gait Training                                       Modalities

## 2023-08-31 ENCOUNTER — OFFICE VISIT (OUTPATIENT)
Dept: PHYSICAL THERAPY | Age: 63
End: 2023-08-31

## 2023-08-31 DIAGNOSIS — M54.42 CHRONIC BILATERAL LOW BACK PAIN WITH BILATERAL SCIATICA: Primary | ICD-10-CM

## 2023-08-31 DIAGNOSIS — G89.29 CHRONIC BILATERAL LOW BACK PAIN WITH BILATERAL SCIATICA: Primary | ICD-10-CM

## 2023-08-31 DIAGNOSIS — M54.41 CHRONIC BILATERAL LOW BACK PAIN WITH BILATERAL SCIATICA: Primary | ICD-10-CM

## 2023-08-31 PROCEDURE — 97110 THERAPEUTIC EXERCISES: CPT

## 2023-08-31 NOTE — PROGRESS NOTES
Daily Note     Today's date: 2023  Patient name: Zoraida Maddox  : 1960  MRN: [id number]  Referring provider: Shelley Urbano MD  Dx:   Encounter Diagnosis     ICD-10-CM    1. Chronic bilateral low back pain with bilateral sciatica  M54.42     M54.41     G89.29                      Subjective:  Patient states he feels about the same as yesterday. He experiences relief after PT however it is short lived. Objective: See treatment diary below      Assessment: Tolerated treatment well. No cervical discomfort with nerve glides today, avoided cervical movement. Challenged with DLS while maintaining proper breathing patterns, but overall performed well. Patient demonstrated fatigue post treatment, exhibited good technique with therapeutic exercises and would benefit from continued PT      Plan: Continue per plan of care. Precautions: hx of prostate cancer; Type II DM w/ neuropathy; CKD stage 3      Manuals           Lumbar P/A grade III-IV  CRESPO           Foam Roll   MP                                    Neuro Re-Ed                                                                                                       Ther Ex            Nu step  10 mins 10 mins          Prone lying 3' 3'  3;          PPU's edu             Sciatic nerve tensioner x10 x10 2 x 10          Supine bridge  2x10 3 x 10          Hip add iso  5"x20 5" x 20           SLR flex w/ TA  1x10 ea.  2 x 10          TA ball press  5"x20 5" x 30          Paloff press             Cybex leg press             Cybex hip abd             Sled push                                                    Ther Activity                                       Gait Training                                       Modalities

## 2023-09-06 ENCOUNTER — OFFICE VISIT (OUTPATIENT)
Dept: PHYSICAL THERAPY | Age: 63
End: 2023-09-06
Payer: COMMERCIAL

## 2023-09-06 DIAGNOSIS — M54.42 CHRONIC BILATERAL LOW BACK PAIN WITH BILATERAL SCIATICA: Primary | ICD-10-CM

## 2023-09-06 DIAGNOSIS — G89.29 CHRONIC BILATERAL LOW BACK PAIN WITH BILATERAL SCIATICA: Primary | ICD-10-CM

## 2023-09-06 DIAGNOSIS — M54.41 CHRONIC BILATERAL LOW BACK PAIN WITH BILATERAL SCIATICA: Primary | ICD-10-CM

## 2023-09-06 PROCEDURE — 97140 MANUAL THERAPY 1/> REGIONS: CPT

## 2023-09-06 PROCEDURE — 97110 THERAPEUTIC EXERCISES: CPT

## 2023-09-06 NOTE — PROGRESS NOTES
Daily Note     Today's date: 2023  Patient name: Lesli Coley  : 1960  MRN: [id number]  Referring provider: Shauna Ferraro MD  Dx:   Encounter Diagnosis     ICD-10-CM    1. Chronic bilateral low back pain with bilateral sciatica  M54.42     M54.41     G89.29                      Subjective: Pt states he had a little bit of a set back over the weekend. He waxed his generator and compressors in preparation for the winter. It required him to bend down slightly for longer periods of time. Today his pain is at about a 3/10 and he states that he rebounded quicker than usual. Believes the exercises are helping. Objective: See treatment diary below      Assessment: Good response to lumbar extension, nerve glides, and core strengthening to reduce symptoms. Adequately challenged by current TE program. Incorporated additional core stabilization with paloff press which was tolerated well. Tolerated treatment well. Patient demonstrated fatigue post treatment, exhibited good technique with therapeutic exercises and would benefit from continued PT      Plan: Continue per plan of care. Progress treatment as tolerated. Precautions: hx of prostate cancer; Type II DM w/ neuropathy; CKD stage 3      Manuals          Lumbar P/A grade III-IV  CRESPO  NR         Foam Roll   MP                                    Neuro Re-Ed                                                                                                       Ther Ex           Nu step  10 mins 10 mins 10 mins L 3         Prone lying 3' 3'  3;          PPU's edu    2x10          Sciatic nerve tensioner x10 x10 2 x 10 10"x10         Supine bridge  2x10 3 x 10 3x10          Hip add iso  5"x20 5" x 20  10"x10         SLR flex w/ TA  1x10 ea.  2 x 10 2x10 ea         TA ball press  5"x20 5" x 30 5"x20         Paloff press    5"x10 6.5#         Cybex leg press             Cybex hip abd             Sled push Ther Activity                                       Gait Training                                       Modalities

## 2023-09-07 ENCOUNTER — CONSULT (OUTPATIENT)
Dept: PAIN MEDICINE | Facility: CLINIC | Age: 63
End: 2023-09-07
Payer: COMMERCIAL

## 2023-09-07 ENCOUNTER — OFFICE VISIT (OUTPATIENT)
Dept: PHYSICAL THERAPY | Age: 63
End: 2023-09-07
Payer: COMMERCIAL

## 2023-09-07 VITALS
WEIGHT: 269 LBS | DIASTOLIC BLOOD PRESSURE: 70 MMHG | HEART RATE: 86 BPM | SYSTOLIC BLOOD PRESSURE: 105 MMHG | BODY MASS INDEX: 44.76 KG/M2

## 2023-09-07 DIAGNOSIS — M54.41 CHRONIC BILATERAL LOW BACK PAIN WITH BILATERAL SCIATICA: Primary | ICD-10-CM

## 2023-09-07 DIAGNOSIS — G89.29 CHRONIC BILATERAL LOW BACK PAIN WITH BILATERAL SCIATICA: Primary | ICD-10-CM

## 2023-09-07 DIAGNOSIS — M43.16 SPONDYLOLISTHESIS, LUMBAR REGION: ICD-10-CM

## 2023-09-07 DIAGNOSIS — M54.42 CHRONIC BILATERAL LOW BACK PAIN WITH BILATERAL SCIATICA: Primary | ICD-10-CM

## 2023-09-07 DIAGNOSIS — M47.816 LUMBAR FACET ARTHROPATHY: ICD-10-CM

## 2023-09-07 PROCEDURE — 97140 MANUAL THERAPY 1/> REGIONS: CPT

## 2023-09-07 PROCEDURE — 99204 OFFICE O/P NEW MOD 45 MIN: CPT | Performed by: PHYSICAL MEDICINE & REHABILITATION

## 2023-09-07 PROCEDURE — 97110 THERAPEUTIC EXERCISES: CPT

## 2023-09-07 NOTE — PROGRESS NOTES
Assessment  1. Chronic bilateral low back pain with bilateral sciatica    2. Lumbar facet arthropathy    3. Spondylolisthesis, lumbar region        Plan  Mr. Delonte Dewey is a pleasant 61-year-old male significant past medical history of type 2 diabetes, CKD presents to [address] [place] [address] [facility] for initial evaluation regarding greater than 2 months duration of worsening low back pain unrelieved with conservative measures including physical therapy and home exercises. During today's evaluation he is demonstrating clinical evidence of suspected lumbar spinal stenosis with neurogenic claudication and radicular pain into the bilateral lower extremities. Unfortunately we do not have any recent MRI and current lumbar x-ray demonstrates multilevel facet arthrosis and retrolisthesis L1-L2 and L2-L3. He has completed nearly 1 months duration of physical therapy with no significant improvements in his pain. At this time further diagnostic work-up will be beneficial and warranted. As such we will plan for a lumbar MRI without contrast to better identify disc and spine pathology contributing to symptoms. All questions answered, patient is agreeable with plan. Pending MRI results would consider interventional approaches with epidural steroid injection but for now we will await MRI results. My impressions and treatment recommendations were discussed in detail with the patient who verbalized understanding and had no further questions. Discharge instructions were provided. I personally saw and examined the patient and I agree with the above discussed plan of care. Orders Placed This Encounter   Procedures   • MRI lumbar spine without contrast     Standing Status:   Future     Standing Expiration Date:   9/7/2027     Scheduling Instructions: There is no preparation for this test. Please leave your jewelry and valuables at home, wedding rings are the exception.  All patients will be required to change into a hospital gown and pants. Street clothes are not permitted in the MRI. Magnetic nail polish must be removed prior to arrival for your test. Please bring your insurance cards, a form of photo ID and a list of your medications with you. Arrive 15 minutes prior to your appointment time in order to register. Please bring any prior CT or MRI studies of this area that were not performed at a [facility] facility. To schedule this appointment, please contact Central Scheduling at [phone]. Prior to your appointment, please make sure you complete the MRI Screening Form when you e-Check in for your appointment. This will be available starting 7 days before your appointment in [address] [place] [address]. You may receive an e-mail with an activation code if you do not have a [employer] account. If you do not have access to a device, we will complete your screening at your appointment. Order Specific Question:   What is the patient's sedation requirement? If Medication for Claustrophobia is selected, order medication at this point. Answer:   No Sedation     Order Specific Question:   Does this procedure require the 3T MRI at [facility] or [place]?     Answer:   No     Order Specific Question:   Release to patient through [employer]     Answer:   Immediate     Order Specific Question:   Is order priority selected as STAT? Answer:   No     Order Specific Question:   Reason for Exam (FREE TEXT)     Answer:   Lumbar radiculopathy unrelieved with PT     No orders of the defined types were placed in this encounter. History of Present Illness    Disha Astorga is a 59 y.o. male presents to [facility] for initial evaluation regarding 2+ months duration of low back pain with radiating symptoms into bilateral lower extremities.   Patient reports having symptoms similar back 7 years ago that was managed conservatively with home exercises, physical therapy and rest, however, patient reports this new pain is worse and lingering. Today he reports moderate to severe pain rated 8 out of 10 and interfering to activities. Pain is nearly constant and present throughout the day and night. Describes symptoms as burning, shooting, numbness, sharp, pins-and-needles, pressure-like. Denies any significant lower extremity weakness or falls. Does not use any durable medical equipment ambulation. Symptoms are worse with standing, bending, coughing. Reports no significant relief with physical therapy. Denies smoking, marijuana or alcohol use. Previously tried Motrin and oral steroids which did provide some relief but returned with pain after the Medrol Dosepak. Presents today for initial evaluation. I have personally reviewed and/or updated the patient's past medical history, past surgical history, family history, social history, current medications, allergies, and vital signs today. Review of Systems   Constitutional: Negative for fever and unexpected weight change. HENT: Negative for trouble swallowing. Eyes: Negative for visual disturbance. Respiratory: Negative for shortness of breath and wheezing. Cardiovascular: Negative for chest pain and palpitations. Gastrointestinal: Negative for constipation, diarrhea, nausea and vomiting. Endocrine: Negative for cold intolerance, heat intolerance and polydipsia. Genitourinary: Negative for difficulty urinating and frequency. Musculoskeletal: Positive for back pain, gait problem and joint swelling. Negative for arthralgias and myalgias. Skin: Negative for rash. Neurological: Positive for numbness. Negative for dizziness, seizures, syncope, weakness and headaches. Hematological: Does not bruise/bleed easily. Psychiatric/Behavioral: Negative for dysphoric mood. All other systems reviewed and are negative.       Patient Active Problem List   Diagnosis   • Ureterolithiasis   • Prostate cancer [facility])   • Benign essential hypertension   • Type 2 diabetes mellitus with stage 3a chronic kidney disease, with long-term current use of insulin (HCC)   • Dyslipidemia   • Morbid obesity ([address])   • Other fatigue   • Stress incontinence of urine   • Parenchymal renal hypertension   • Stage 3 chronic kidney disease (HCC)   • Diabetic nephropathy associated with type 2 diabetes mellitus ([address])   • Nephrolithiasis       Past Medical History:   Diagnosis Date   • Acute bronchitis    • Acute low back pain    • Acute low back pain     [id number] resolved   • Acute respiratory infection    • JAYNE (acute kidney injury) ([address]) 06/15/2018   • Cancer (HCC)     prostate   • Cellulitis of scrotum    • Cough    • Diabetes mellitus (HCC)    • Herpes zoster    • High cholesterol    • Hyperkalemia    • Hypertension    • Kidney disease    • Kidney stone    • Leukocytosis 06/15/2018   • Low back pain    • Lumbar radiculopathy    • Pyelonephritis 08/08/2022   • Rash    • Retinopathy, central serous    • Shingles    • Sleep apnea     [id number]   • Trochanteric bursitis        Past Surgical History:   Procedure Laterality Date   • ABDOMINAL ADHESION SURGERY N/A 6/13/2018    Procedure: LYSIS ADHESIONS;  Surgeon: Karolina Johnson MD;  [facility];  Service: Urology   • FL RETROGRADE PYELOGRAM  8/8/2022   • FL RETROGRADE PYELOGRAM  8/11/2022   • LITHOTRIPSY      renal   • VA CYSTO BLADDER W/URETERAL CATHETERIZATION Left 8/8/2022    Procedure: CYSTOSCOPY RETROGRADE PYELOGRAM WITH INSERTION STENT URETERAL;  Surgeon: Rosa Walls MD;  [facility];  Service: Urology   • VA CYSTO/URETERO W/LITHOTRIPSY &INDWELL STENT INSRT Left 8/11/2022    Procedure: CYSTOSCOPY URETEROSCOPY WITH LITHOTRIPSY HOLMIUM LASER, RETROGRADE PYELOGRAM AND INSERTION STENT URETERAL;  Surgeon: Rosa Walls MD;  [facility];  Service: Urology   • VA LAPS SURG NUYS2HFS RPBIC RAD W/NRV SPARING ROBOT N/A 6/13/2018    Procedure: Tereza Parada W ROBOTICS;  Surgeon: Karolina Johnson MD;  [facility]; Service: Urology   • PROSTATE SURGERY  06/13/2018   • SHOULDER SURGERY         Family History   Problem Relation Age of Onset   • Other Father         cardiac disorder   • Stroke Father    • Diabetes Father         mellitus   • Hypertension Father    • Heart disease Father         cardiac disorder   • Cancer Sister    • Cancer Paternal Grandmother    • Heart disease Family         cardiac disorder   • Kidney disease Family    • Hypertension Mother    • Cancer Mother        Social History     Occupational History   • Not on file   Tobacco Use   • Smoking status: Never   • Smokeless tobacco: Never   Vaping Use   • Vaping Use: Never used   Substance and Sexual Activity   • Alcohol use: No   • Drug use: No   • Sexual activity: Not on file       Current Outpatient Medications on File Prior to Visit   Medication Sig   • albuterol (PROVENTIL HFA,VENTOLIN HFA) 90 mcg/act inhaler Inhale 1-2 puffs every 6 (six) hours as needed   • amLODIPine (NORVASC) 5 mg tablet TAKE 1 TABLET BY MOUTH EVERY DAY   • ASPIRIN 81 PO Take 1 capsule by mouth 2 (two) times a day     • atorvastatin (LIPITOR) 80 mg tablet TAKE 1 TABLET BY MOUTH EVERY DAY   • BD Pen Needle Rosalie 2nd Gen 32G X 4 MM MISC INJECT UNDER THE SKIN 4 (FOUR) TIMES A DAY   • carvedilol (COREG) 25 mg tablet TAKE 1 TABLET BY MOUTH TWICE A DAY WITH MEALS   • gabapentin (Neurontin) 100 mg capsule Take 1 capsule (100 mg total) by mouth 3 (three) times a day   • glimepiride (AMARYL) 4 mg tablet TAKE 1 TABLET BY MOUTH TWICE A DAY   • glucose blood (Contour Next Test) test strip Use 1 each 4 (four) times a day Use as instructed (Patient taking differently: Use 1 each daily 3 to 4 times daily. Before meals or randomly)   • glucose blood (Contour Next Test) test strip CHECK SUGAR 4 TIMES DAILY   • HumaLOG KwikPen 200 units/mL CONCENTRATED U-200 injection pen INJECT 15 UNITS THREE TIMES DAILY WITH MEALS. • hydrochlorothiazide (HYDRODIURIL) 12.5 mg tablet TAKE 0.5 TABLETS BY MOUTH DAILY. (Patient taking differently: Take 12.5 mg by mouth Take 0.5 tab QOD)   • lisinopril (ZESTRIL) 10 mg tablet Take 1 tablet (10 mg total) by mouth daily   • lisinopril (ZESTRIL) 40 mg tablet Take 0.5 tablets (20 mg total) by mouth daily   • metFORMIN (GLUCOPHAGE) 1000 MG tablet TAKE 1 TABLET BY MOUTH TWICE A DAY   • methylPREDNISolone 4 MG tablet therapy pack Use as directed on package   • Microlet Lancets MISC Use 3 (three) times a day   • Multiple Vitamins-Minerals (MULTIVITAMIN WITH MINERALS) tablet Take 1 tablet by mouth daily   • potassium citrate (Urocit-K 10) 10 mEq Take 1 tablet (10 mEq total) by mouth 2 (two) times a day   • tamsulosin (FLOMAX) 0.4 mg Take 1 capsule (0.4 mg total) by mouth daily with dinner as needed for kidney stone management. (Patient not taking: Reported on 1/19/2023)   • tamsulosin (FLOMAX) 0.4 mg TAKE 1 CAPSULE BY MOUTH EVERY DAY WITH DINNER (Patient taking differently: PRN)   • Toujeo SoloStar 300 units/mL CONCENTRATED U-300 injection pen (1-unit dial) INJECT 80 UNITS UNDER THE SKIN DAILY   • Trulicity 3 CR/0.1XZ injection INJECT 0.5 ML (3 MG TOTAL) UNDER THE SKIN ONCE A WEEK   • Vitamin D, Cholecalciferol, 400 units TABS Take by mouth daily     No current facility-administered medications on file prior to visit. Allergies   Allergen Reactions   • Simvastatin      Increases Liver functoin       Physical Exam    /70   Pulse 86   Wt 122 kg (269 lb)   BMI 44.76 kg/m²     Constitutional: normal, well developed, well nourished, alert, in no distress and non-toxic and no overt pain behavior.   Eyes: anicteric  HEENT: grossly intact  Neck: supple, symmetric, trachea midline and no masses   Pulmonary:even and unlabored  Cardiovascular:No edema or pitting edema present  Skin:Normal without rashes or lesions and well hydrated  Psychiatric:Mood and affect appropriate  Neurologic:Cranial Nerves II-XII grossly intact  Musculoskeletal:normal gait, tenderness to palpation bilateral lumbar paraspinals, decreased active and passive range of motion with lumbar flexion and extension limited by pain, MMT 5-5 bilateral extremities, sensation decreased to light touch in patchy distribution left lower extremity, DTRs within normal limits, positive straight leg raise in the seated position with radicular pain into the left leg    Imaging    LUMBAR SPINE     INDICATION:   M54.42: Lumbago with sciatica, left side  M54.41: Lumbago with sciatica, right side  G89.29: Other chronic pain.     COMPARISON: 10/5/2022     VIEWS:  XR SPINE LUMBAR MINIMUM 4 VIEWS NON INJURY        FINDINGS:     There are 5 non rib bearing lumbar vertebral bodies.     Stable mild loss of height of the L1 vertebral body.     Mild levoscoliosis. Retrolisthesis of L1-L2 and L2-L3 as well as L3-L4. Multilevel facet arthrosis.     No significant lumbar degenerative change noted.     The pedicles appear intact.     Soft tissues are unremarkable.     IMPRESSION:     No acute osseous abnormality.  Stable mild loss of height of the L1 vertebral body.     Degenerative changes as described

## 2023-09-07 NOTE — PROGRESS NOTES
Daily Note     Today's date: 2023  Patient name: Saray Burns  : 1960  MRN: [id number]  Referring provider: Ilan Wilkes MD  Dx:   Encounter Diagnosis     ICD-10-CM    1. Chronic bilateral low back pain with bilateral sciatica  M54.42     M54.41     G89.29                      Subjective: Pt reports he had a rough day today as he was sitting for about 3-4 hours. He was okay in the morning when he was walking around but had to sit to take calls. Reports he saw MD who thinks he has spinal stenosis and is recommending an MRI. Objective: See treatment diary below      Assessment: Pt continues to respond positively to lumbar extension in order to eliminate radicular symptoms in LE. Will continue with this POC. Improved symptoms post-tx today. Tolerated treatment well. Patient demonstrated fatigue post treatment, exhibited good technique with therapeutic exercises and would benefit from continued PT      Plan: Continue per plan of care. Progress treatment as tolerated. Precautions: hx of prostate cancer; Type II DM w/ neuropathy; CKD stage 3      Manuals         Lumbar P/A grade III-IV  CRESPO  NR NR        Foam Roll   MP                                    Neuro Re-Ed                                                                                                       Ther Ex          Nu step  10 mins 10 mins 10 mins L 3 10 mins L3        Prone lying 3' 3'  3;  10' on wedge w/ MH        PPU's edu    2x10  2x10        Sciatic nerve tensioner x10 x10 2 x 10 10"x10 np        Supine bridge  2x10 3 x 10 3x10  3x10        Hip add iso  5"x20 5" x 20  10"x10 10"x10        SLR flex w/ TA  1x10 ea.  2 x 10 2x10 ea 2x10         H/L rotation     x15 R+L        TA ball press  5"x20 5" x 30 5"x20 5"x20        Paloff press    5"x10 6.5# 5"x15 6.7#        Cybex leg press             Cybex hip abd             Sled push Ther Activity                                       Gait Training                                       Modalities

## 2023-09-11 ENCOUNTER — OFFICE VISIT (OUTPATIENT)
Dept: PHYSICAL THERAPY | Age: 63
End: 2023-09-11
Payer: COMMERCIAL

## 2023-09-11 DIAGNOSIS — G89.29 CHRONIC BILATERAL LOW BACK PAIN WITH BILATERAL SCIATICA: Primary | ICD-10-CM

## 2023-09-11 DIAGNOSIS — M54.42 CHRONIC BILATERAL LOW BACK PAIN WITH BILATERAL SCIATICA: Primary | ICD-10-CM

## 2023-09-11 DIAGNOSIS — M54.41 CHRONIC BILATERAL LOW BACK PAIN WITH BILATERAL SCIATICA: Primary | ICD-10-CM

## 2023-09-11 PROCEDURE — 97140 MANUAL THERAPY 1/> REGIONS: CPT

## 2023-09-11 PROCEDURE — 97110 THERAPEUTIC EXERCISES: CPT

## 2023-09-11 NOTE — PROGRESS NOTES
Daily Note     Today's date: 2023  Patient name: Pricila Berg  : 1960  MRN: [id number]  Referring provider: Tyson Newberry MD  Dx:   Encounter Diagnosis     ICD-10-CM    1. Chronic bilateral low back pain with bilateral sciatica  M54.42     M54.41     G89.29                      Subjective: Pt reports he is a little worse today. He was sitting in conference chair without support on Thursday, Friday and . Since he started PT he still feels as though symptoms have improved. It has gone from a "severe" pain to an "annoyance". If he is sitting in his office chair he doesn't really notice his back but if he goes to stand and walk it takes about 3 steps before he starts to feel symptoms. Symptoms in L leg are > R leg. Objective: See treatment diary below      Assessment: Pt continues to demonstrate decreased lumbar mobility, decreased LE strength, and decreased hip/knee flexibility which is contributing to symptoms. Able to achieve greater height on SLR's today in supine. Increased degree of lumbar extension while prone with elevated plinth which he tolerated well. Overall reduction in symptoms post-tx. Advised pt to trial lumbar roll with prolonged sitting. He verbalized understanding. Tolerated treatment well. Patient demonstrated fatigue post treatment, exhibited good technique with therapeutic exercises and would benefit from continued PT      Plan: Continue per plan of care. Progress treatment as tolerated.        Precautions: hx of prostate cancer; Type II DM w/ neuropathy; CKD stage 3      Manuals        Lumbar P/A grade III-IV  CRESPO  NR NR np       Foam Roll   MP                                    Neuro Re-Ed                                                                                                       Ther Ex         Nu step  10 mins 10 mins 10 mins L 3 10 mins L3 10 mins L3       Prone lying 3' 3'  3;  10' on wedge w/ MH 5' on incline plinth       PPU's edu    2x10  2x10 2x10       Sciatic nerve tensioner x10 x10 2 x 10 10"x10 np 3x20 nn. glide       Supine bridge  2x10 3 x 10 3x10  3x10 3x10       Hip add iso  5"x20 5" x 20  10"x10 10"x10        SLR flex w/ TA  1x10 ea.  2 x 10 2x10 ea 2x10  3x10       H/L rotation     x15 R+L        TA ball press  5"x20 5" x 30 5"x20 5"x20 5"x15       Paloff press    5"x10 6.5# 5"x15 6.7# 5"x15        Hip abd TB      3x10 black TB       Cybex leg press             Cybex hip abd             Sled push                                                    Ther Activity                                       Gait Training                                       Modalities

## 2023-09-14 ENCOUNTER — OFFICE VISIT (OUTPATIENT)
Dept: PHYSICAL THERAPY | Age: 63
End: 2023-09-14
Payer: COMMERCIAL

## 2023-09-14 DIAGNOSIS — M54.41 CHRONIC BILATERAL LOW BACK PAIN WITH BILATERAL SCIATICA: Primary | ICD-10-CM

## 2023-09-14 DIAGNOSIS — M54.42 CHRONIC BILATERAL LOW BACK PAIN WITH BILATERAL SCIATICA: Primary | ICD-10-CM

## 2023-09-14 DIAGNOSIS — G89.29 CHRONIC BILATERAL LOW BACK PAIN WITH BILATERAL SCIATICA: Primary | ICD-10-CM

## 2023-09-14 PROCEDURE — 97140 MANUAL THERAPY 1/> REGIONS: CPT

## 2023-09-14 PROCEDURE — 97110 THERAPEUTIC EXERCISES: CPT

## 2023-09-14 NOTE — PROGRESS NOTES
Daily Note     Today's date: 2023  Patient name: Junior Morton  : 1960  MRN: [id number]  Referring provider: Grayson Melgar MD  Dx:   Encounter Diagnosis     ICD-10-CM    1. Chronic bilateral low back pain with bilateral sciatica  M54.42     M54.41     G89.29                      Subjective: Pt states that his low back symptoms were increased yesterday. Today symptoms are a little bit better but probably worse than last session. Objective: See treatment diary below      Assessment: Pt continues to demonstrate decrease lumbar ROM and decreased LE strength (L weaker than R). Challenged by hip flexion SLR on plinth 2* weakness. Improving flexibility and neural tension in LLE with nerve glides. Mildly improved symptoms post-tx. Tolerated treatment well. Patient demonstrated fatigue post treatment, exhibited good technique with therapeutic exercises and would benefit from continued PT      Plan: Continue per plan of care. Progress treatment as tolerated. Precautions: hx of prostate cancer; Type II DM w/ neuropathy; CKD stage 3      Manuals       Lumbar P/A grade III-IV  CRESPO  NR NR np NR      Foam Roll   MP                                    Neuro Re-Ed                                                                                                       Ther Ex         Nu step  10 mins 10 mins 10 mins L 3 10 mins L3 10 mins L3 10 mins L3      Prone lying 3' 3'  3;  10' on wedge w/ MH 5' on incline plinth 5' on incline plinth      PPU's edu    2x10  2x10 2x10 2x10       Sciatic nerve tensioner x10 x10 2 x 10 10"x10 np 3x20 nn. glide 3x20 nn. glide      Supine bridge  2x10 3 x 10 3x10  3x10 3x10 3x10       Hip add iso  5"x20 5" x 20  10"x10 10"x10        SLR flex w/ TA  1x10 ea.  2 x 10 2x10 ea 2x10  3x10 3x10 b/l      H/L rotation     x15 R+L        TA ball press  5"x20 5" x 30 5"x20 5"x20 5"x15 5"x15      Paloff press    5"x10 6.5# 5"x15 6. 7# 5"x15  5"x15 7#      Hip abd TB      3x10 black TB 3x10 black TB      Cybex leg press       NV? Cybex hip abd       NV? Sled push       NV?                                              Ther Activity                                       Gait Training                                       Modalities

## 2023-09-15 DIAGNOSIS — E11.21 DIABETIC NEPHROPATHY ASSOCIATED WITH TYPE 2 DIABETES MELLITUS (HCC): ICD-10-CM

## 2023-09-15 DIAGNOSIS — N18.32 STAGE 3B CHRONIC KIDNEY DISEASE (HCC): ICD-10-CM

## 2023-09-15 DIAGNOSIS — E78.5 DYSLIPIDEMIA: ICD-10-CM

## 2023-09-15 DIAGNOSIS — N20.1 URETEROLITHIASIS: ICD-10-CM

## 2023-09-15 DIAGNOSIS — N20.0 NEPHROLITHIASIS: ICD-10-CM

## 2023-09-15 DIAGNOSIS — I12.9 PARENCHYMAL RENAL HYPERTENSION, STAGE 1 THROUGH STAGE 4 OR UNSPECIFIED CHRONIC KIDNEY DISEASE: ICD-10-CM

## 2023-09-15 RX ORDER — POTASSIUM CITRATE 10 MEQ/1
10 TABLET, EXTENDED RELEASE ORAL 2 TIMES DAILY
Qty: 180 TABLET | Refills: 3 | Status: SHIPPED | OUTPATIENT
Start: 2023-09-15

## 2023-09-18 ENCOUNTER — OFFICE VISIT (OUTPATIENT)
Dept: PHYSICAL THERAPY | Age: 63
End: 2023-09-18
Payer: COMMERCIAL

## 2023-09-18 DIAGNOSIS — M54.42 CHRONIC BILATERAL LOW BACK PAIN WITH BILATERAL SCIATICA: Primary | ICD-10-CM

## 2023-09-18 DIAGNOSIS — M54.41 CHRONIC BILATERAL LOW BACK PAIN WITH BILATERAL SCIATICA: Primary | ICD-10-CM

## 2023-09-18 DIAGNOSIS — G89.29 CHRONIC BILATERAL LOW BACK PAIN WITH BILATERAL SCIATICA: Primary | ICD-10-CM

## 2023-09-18 PROCEDURE — 97110 THERAPEUTIC EXERCISES: CPT

## 2023-09-18 PROCEDURE — 97140 MANUAL THERAPY 1/> REGIONS: CPT

## 2023-09-18 NOTE — PROGRESS NOTES
Daily Note     Today's date: 2023  Patient name: Saray Burns  : 1960  MRN: [id number]  Referring provider: Ilan Wilkes MD  Dx:   Encounter Diagnosis     ICD-10-CM    1. Chronic bilateral low back pain with bilateral sciatica  M54.42     M54.41     G89.29                      Subjective: Pt reports he is a little better today than previous sessions. He still has some diminished sensation in his legs but no pain. Objective: See treatment diary below      Assessment: Incorporated additional LE strengthening exercises to improved hip stability and activity tolerance. Pt noted mild n/t during sled push but this diminshed following conclusion of this exercise. Continues to respond positively to lumbar extension and is improving ROM during prone press ups. Tolerated treatment well. Patient demonstrated fatigue post treatment, exhibited good technique with therapeutic exercises and would benefit from continued PT      Plan: Continue per plan of care. Progress treatment as tolerated. Precautions: hx of prostate cancer; Type II DM w/ neuropathy; CKD stage 3      Manuals      Lumbar P/A grade III-IV  CRESPO  NR NR np NR NR     Foam Roll   MP                                    Neuro Re-Ed                                                                                                       Ther Ex         Nu step  10 mins 10 mins 10 mins L 3 10 mins L3 10 mins L3 10 mins L3 10 mins L3     Prone lying 3' 3'  3;  10' on wedge w/ MH 5' on incline plinth 5' on incline plinth nv     PPU's edu    2x10  2x10 2x10 2x10  2x10     Sciatic nerve tensioner x10 x10 2 x 10 10"x10 np 3x20 nn. glide 3x20 nn. glide      Supine bridge  2x10 3 x 10 3x10  3x10 3x10 3x10  3x10      Hip add iso  5"x20 5" x 20  10"x10 10"x10        SLR flex w/ TA  1x10 ea.  2 x 10 2x10 ea 2x10  3x10 3x10 b/l 3x10 b/l     H/L rotation     x15 R+L        TA ball press  5"x20 5" x 30 5"x20 5"x20 5"x15 5"x15 5"x20     Paloff press    5"x10 6.5# 5"x15 6.7# 5"x15  5"x15 7# 5"x15 9#     Hip abd TB      3x10 black TB 3x10 black TB      Cybex leg press       NV? 3x10 65#     Cybex hip abd       NV? 3x10 40#     Sled push       NV? 3 laps 35#                                            Ther Activity                                       Gait Training                                       Modalities

## 2023-09-21 ENCOUNTER — HOSPITAL ENCOUNTER (OUTPATIENT)
Dept: RADIOLOGY | Facility: HOSPITAL | Age: 63
Discharge: HOME | End: 2023-09-21
Attending: PHYSICAL MEDICINE & REHABILITATION
Payer: COMMERCIAL

## 2023-09-21 ENCOUNTER — OFFICE VISIT (OUTPATIENT)
Dept: PHYSICAL THERAPY | Age: 63
End: 2023-09-21
Payer: COMMERCIAL

## 2023-09-21 DIAGNOSIS — M54.41 CHRONIC BILATERAL LOW BACK PAIN WITH BILATERAL SCIATICA: ICD-10-CM

## 2023-09-21 DIAGNOSIS — M54.41 CHRONIC BILATERAL LOW BACK PAIN WITH BILATERAL SCIATICA: Primary | ICD-10-CM

## 2023-09-21 DIAGNOSIS — M43.16 SPONDYLOLISTHESIS, LUMBAR REGION: ICD-10-CM

## 2023-09-21 DIAGNOSIS — M47.816 LUMBAR FACET ARTHROPATHY: ICD-10-CM

## 2023-09-21 DIAGNOSIS — M54.42 CHRONIC BILATERAL LOW BACK PAIN WITH BILATERAL SCIATICA: Primary | ICD-10-CM

## 2023-09-21 DIAGNOSIS — M54.42 CHRONIC BILATERAL LOW BACK PAIN WITH BILATERAL SCIATICA: ICD-10-CM

## 2023-09-21 DIAGNOSIS — G89.29 CHRONIC BILATERAL LOW BACK PAIN WITH BILATERAL SCIATICA: ICD-10-CM

## 2023-09-21 DIAGNOSIS — G89.29 CHRONIC BILATERAL LOW BACK PAIN WITH BILATERAL SCIATICA: Primary | ICD-10-CM

## 2023-09-21 PROCEDURE — 97110 THERAPEUTIC EXERCISES: CPT

## 2023-09-21 PROCEDURE — 72148 MRI LUMBAR SPINE W/O DYE: CPT

## 2023-09-21 PROCEDURE — G1004 CDSM NDSC: HCPCS

## 2023-09-21 PROCEDURE — 97140 MANUAL THERAPY 1/> REGIONS: CPT

## 2023-09-21 NOTE — PROGRESS NOTES
Daily Note     Today's date: 2023  Patient name: Kanu Mccall  : 1960  MRN: [id number]  Referring provider: Carloyn Halsted, MD  Dx:   Encounter Diagnosis     ICD-10-CM    1. Chronic bilateral low back pain with bilateral sciatica  M54.42     M54.41     G89.29                      Subjective: Pt reports that he continues to have episodes of severe low back pain usually when transition from sitting to standing and then subsequently walking. He will go up 4 steps before he feels the pain in his low back. The thought of even walking a mile seems like a significant challenge. He went for his MRI today and is awaiting results. Objective: See treatment diary below      Assessment: Pt with improving LE strength and core strength. Continues to have mobility/ROM restrictions in lumbar spine. Able to tolerate progression of strengthening exercises today without an increase in symptoms. Tolerated treatment well. Patient demonstrated fatigue post treatment, exhibited good technique with therapeutic exercises and would benefit from continued PT      Plan: Continue per plan of care. Progress treatment as tolerated.        Precautions: hx of prostate cancer; Type II DM w/ neuropathy; CKD stage 3      Manuals     Lumbar P/A grade III-IV  CRESPO  NR NR np NR NR NR    Foam Roll   MP                                    Neuro Re-Ed                                                                                                       Ther Ex         Nu step  10 mins 10 mins 10 mins L 3 10 mins L3 10 mins L3 10 mins L3 10 mins L3 10 mins L3    Prone lying 3' 3'  3;  10' on wedge w/ MH 5' on incline plinth 5' on incline plinth nv     PPU's edu    2x10  2x10 2x10 2x10  2x10 2x10    Sciatic nerve tensioner x10 x10 2 x 10 10"x10 np 3x20 nn. glide 3x20 nn. glide      Supine bridge  2x10 3 x 10 3x10  3x10 3x10 3x10  3x10  3x10     Hip add iso  5"x20 5" x 20 10"x10 10"x10        SLR flex w/ TA  1x10 ea.  2 x 10 2x10 ea 2x10  3x10 3x10 b/l 3x10 b/l 3x10 b/l    H/L rotation     x15 R+L        TA ball press  5"x20 5" x 30 5"x20 5"x20 5"x15 5"x15 5"x20 x20 18.5#    Paloff press    5"x10 6.5# 5"x15 6.7# 5"x15  5"x15 7# 5"x15 9# 5"x15 9#    Hip abd TB      3x10 black TB 3x10 black TB      Cybex leg press       NV? 3x10 65# 3x10 65#    Cybex hip abd       NV? 3x10 40# 3x10 40#    Sled push       NV? 3 laps 35# 3 laps 50#                                           Ther Activity                                       Gait Training                                       Modalities

## 2023-09-23 LAB
LEFT EYE DIABETIC RETINOPATHY: NORMAL
RIGHT EYE DIABETIC RETINOPATHY: NORMAL

## 2023-09-23 PROCEDURE — 2023F DILAT RTA XM W/O RTNOPTHY: CPT | Performed by: PHYSICAL MEDICINE & REHABILITATION

## 2023-09-25 ENCOUNTER — OFFICE VISIT (OUTPATIENT)
Dept: PHYSICAL THERAPY | Age: 63
End: 2023-09-25
Payer: COMMERCIAL

## 2023-09-25 DIAGNOSIS — M54.42 CHRONIC BILATERAL LOW BACK PAIN WITH BILATERAL SCIATICA: Primary | ICD-10-CM

## 2023-09-25 DIAGNOSIS — G89.29 CHRONIC BILATERAL LOW BACK PAIN WITH BILATERAL SCIATICA: Primary | ICD-10-CM

## 2023-09-25 DIAGNOSIS — M54.41 CHRONIC BILATERAL LOW BACK PAIN WITH BILATERAL SCIATICA: Primary | ICD-10-CM

## 2023-09-25 PROCEDURE — 97110 THERAPEUTIC EXERCISES: CPT

## 2023-09-25 PROCEDURE — 97140 MANUAL THERAPY 1/> REGIONS: CPT

## 2023-09-25 NOTE — PROGRESS NOTES
Daily Note     Today's date: 2023   Patient name: Moncho Kathleen  : 1960  MRN: [id number]  Referring provider: Jean Beebe MD  Dx:   Encounter Diagnosis     ICD-10-CM    1. Chronic bilateral low back pain with bilateral sciatica  M54.42     M54.41     G89.29                      Subjective: Pt reports his symptoms remain the same. Leg symptoms had improved but have reached a plateau. He even tried to just rest yesterday and didn't do any exercises but symptoms did not change. Objective: See treatment diary below      Assessment: Incorporated repeated seated lumbar flexion and L lateral side-glide which pt tolerated well. Will hold on lumbar extension at this time as pt has reached a plateau in this direction. Able to tolerate LE strengthening without an increase in symptoms. Tolerated treatment well. Patient demonstrated fatigue post treatment, exhibited good technique with therapeutic exercises and would benefit from continued PT      Plan: Continue per plan of care. Progress treatment as tolerated. Precautions: hx of prostate cancer; Type II DM w/ neuropathy; CKD stage 3      Manuals    Lumbar P/A grade III-IV  CRESPO  NR NR np NR NR NR NR   Foam Roll   MP                                    Neuro Re-Ed                                                                                                       Ther Ex         Nu step  10 mins 10 mins 10 mins L 3 10 mins L3 10 mins L3 10 mins L3 10 mins L3 10 mins L3 10 mins L3   Prone lying 3' 3'  3;  10' on wedge w/ MH 5' on incline plinth 5' on incline plinth nv  Hold   PPU's edu    2x10  2x10 2x10 2x10  2x10 2x10 Hold   Sciatic nerve tensioner x10 x10 2 x 10 10"x10 np 3x20 nn. glide 3x20 nn. glide      Supine bridge  2x10 3 x 10 3x10  3x10 3x10 3x10  3x10  3x10  3x10   Hip add iso  5"x20 5" x 20  10"x10 10"x10        SLR flex w/ TA  1x10 ea.  2 x 10 2x10 ea 2x10  3x10 3x10 b/l 3x10 b/l 3x10 b/l 3x10 b/l   H/L rotation     x15 R+L        TA ball press  5"x20 5" x 30 5"x20 5"x20 5"x15 5"x15 5"x20 x20 18.5# x20 18.5#   Paloff press    5"x10 6.5# 5"x15 6.7# 5"x15  5"x15 7# 5"x15 9# 5"x15 9# 5"x15 9#   Hip abd TB      3x10 black TB 3x10 black TB      Cybex leg press       NV? 3x10 65# 3x10 65# 3x10 70#   Cybex hip abd       NV? 3x10 40# 3x10 40# 3x10 40#   Sled push       NV? 3 laps 35# 3 laps 50# 5 laps 50#   Lumbar flex w/ pb          x30   L side-glide          x20                Ther Activity                                       Gait Training                                       Modalities

## 2023-09-26 ENCOUNTER — TELEPHONE (OUTPATIENT)
Dept: PAIN MEDICINE | Facility: CLINIC | Age: 63
End: 2023-09-26

## 2023-09-26 NOTE — TELEPHONE ENCOUNTER
----- Message from Brooklyn Xavier DO sent at 9/26/2023  2:26 PM EDT -----  Please notify patient MRI lumbar spine results demonstrating multilevel degenerative disc disease and varying degrees of mild central and foraminal narrowing most notable at L4-L5  At this time would consider an L5-S1 LESI under fluoroscopy guidance  If patient is interested and amenable please schedule  Thank you  ----- Message -----  From: Interface, Radiology Results In  Sent: 9/25/2023   3:46 PM EDT  To: Brooklyn Xavier DO

## 2023-09-26 NOTE — TELEPHONE ENCOUNTER
S/W pt. Advised pt of the same. Pt verbalized understanding. Pt agrees with KW's recommendations for PRO: L5-S1 LESI. Pt would like to move forward with scheduling PRO.

## 2023-09-27 ENCOUNTER — TELEPHONE (OUTPATIENT)
Dept: PAIN MEDICINE | Facility: CLINIC | Age: 63
End: 2023-09-27

## 2023-09-27 NOTE — TELEPHONE ENCOUNTER
Spoke with patient, advised not to take aspirin after 10/16/23 and ok to have flu shot on 10/10/23 per KW.

## 2023-09-27 NOTE — TELEPHONE ENCOUNTER
This patient is being considered for a neuraxial injection for the management of their pain. However, the patient is currently on an anticoagulant per your orders. The [employer] Guideline on neuraxial procedures and anti-coagulation indicates that medication should be held as follows: Aspirin 6 days prior to injection. Please advise if you ok the hold of this medication.     Thank you,    Polo Iqbal  Procedure    [facility]

## 2023-09-27 NOTE — TELEPHONE ENCOUNTER
Patient has appt for LESI 10/23/23, he takes two 81 mg ASA per day, one AM, one PM, do we need to cut down to one 6 days prior? If so, do I need to get hold approval?  Also, he is schedule for flu shot 10/10, one day short of the two weeks, can you still do the procedure 10/23 or do we need to bump out another week?

## 2023-09-28 ENCOUNTER — OFFICE VISIT (OUTPATIENT)
Dept: PHYSICAL THERAPY | Age: 63
End: 2023-09-28
Payer: COMMERCIAL

## 2023-09-28 DIAGNOSIS — M54.41 CHRONIC BILATERAL LOW BACK PAIN WITH BILATERAL SCIATICA: Primary | ICD-10-CM

## 2023-09-28 DIAGNOSIS — G89.29 CHRONIC BILATERAL LOW BACK PAIN WITH BILATERAL SCIATICA: Primary | ICD-10-CM

## 2023-09-28 DIAGNOSIS — M54.42 CHRONIC BILATERAL LOW BACK PAIN WITH BILATERAL SCIATICA: Primary | ICD-10-CM

## 2023-09-28 PROCEDURE — 97140 MANUAL THERAPY 1/> REGIONS: CPT

## 2023-09-28 PROCEDURE — 97110 THERAPEUTIC EXERCISES: CPT

## 2023-09-28 NOTE — PROGRESS NOTES
Daily Note     Today's date: 2023   Patient name: Yadi Valera  : 1960  MRN: [id number]  Referring provider: Pablo Rahman MD  Dx:   Encounter Diagnosis     ICD-10-CM    1. Chronic bilateral low back pain with bilateral sciatica  M54.42     M54.41     G89.29                      Subjective: Pt states that yesterday he stood up, put on his jacket and backpack, walked to his car and did not feel his back pain. This morning he woke up and "felt like crap."       Objective: See treatment diary below      Assessment: Continued with flexion and L lateral flexion bias as pt had one day of relief after last session. Pt adequately challenged by LE and core strengthening exercises. Educated pt to continue with current flexion and L lateral flexion bias exercises. He verbalized understanding . Tolerated treatment well. Patient demonstrated fatigue post treatment, exhibited good technique with therapeutic exercises and would benefit from continued PT      Plan: Continue per plan of care. Progress treatment as tolerated.        Precautions: hx of prostate cancer; Type II DM w/ neuropathy; CKD stage 3      Manuals    Lumbar P/A grade III-IV NR         NR   Foam Roll                                       Neuro Re-Ed                                                                                                        Ther Ex             Nu step          10 mins L3   Prone lying          Hold   PPU's           Hold   Sciatic nerve tensioner             Supine bridge 3x10          3x10   Hip add iso             SLR flex w/ TA 3x10 b/l         3x10 b/l   H/L rotation             TA ball press x20 18.5#         x20 18.5#   Paloff press 5"x15 9#         5"x15 9#   Hip abd TB             Cybex leg press 3x10 90#         3x10 70#   Cybex hip abd 3x10 40#         3x10 40#   Sled push 5 laps 50#         5 laps 50#   Lumbar flex w/ pb x30          x30   L side-glide x20          x20                Ther Activity Gait Training                                       Modalities

## 2023-10-02 ENCOUNTER — EVALUATION (OUTPATIENT)
Dept: PHYSICAL THERAPY | Age: 63
End: 2023-10-02
Payer: COMMERCIAL

## 2023-10-02 DIAGNOSIS — M54.42 CHRONIC BILATERAL LOW BACK PAIN WITH BILATERAL SCIATICA: Primary | ICD-10-CM

## 2023-10-02 DIAGNOSIS — M54.41 CHRONIC BILATERAL LOW BACK PAIN WITH BILATERAL SCIATICA: Primary | ICD-10-CM

## 2023-10-02 DIAGNOSIS — G89.29 CHRONIC BILATERAL LOW BACK PAIN WITH BILATERAL SCIATICA: Primary | ICD-10-CM

## 2023-10-02 PROCEDURE — 97110 THERAPEUTIC EXERCISES: CPT

## 2023-10-02 PROCEDURE — 97140 MANUAL THERAPY 1/> REGIONS: CPT

## 2023-10-02 PROCEDURE — 97164 PT RE-EVAL EST PLAN CARE: CPT

## 2023-10-02 NOTE — PROGRESS NOTES
PT Re-Evaluation     Today's date: 10/2/2023  Patient name: Disha Astorga  : 1960  MRN: [id number]  Referring provider: Justyna Foster MD  Dx:   Encounter Diagnosis     ICD-10-CM    1. Chronic bilateral low back pain with bilateral sciatica  M54.42     M54.41     G89.29                      Assessment  Assessment details: Pt is a 59 y.o. male who presents to re-evaluation with chief c/o of continued low back pain with radicular symptoms into BLE (L>R). He self-reports a 75% improvement in function since starting OP PT. He has made improvements in lumbar ROM, LE strength, noticed a reduction in radicular symptoms, and FOTO score has improved by 5 points. Although he has made improvement, he continues to function below baseline as he has difficulty ambulating usual distances, sitting for prolonged periods of time, standing for prolonged periods of time, completing ADL's, and participating in usual leisure activities. He would benefit from skilled OP PT in order to improve upon impairments, achieve all goals, and return to PLOF.    Impairments: abnormal gait, abnormal or restricted ROM, impaired balance, impaired physical strength, lacks appropriate home exercise program, pain with function and poor posture   Understanding of Dx/Px/POC: good   Prognosis: good    Goals  Short term goals  Pt will improve strength by 1/2 grade in order to perform ADL's within 2-3 weeks - MET    Pt will be able to bend down to  object off of the floor with a 50% reduction in symptoms within 2-3 weeks - MET  Pt will be able to stand for >30 minutes with a 50% reduction in symptoms within 2-3 weeks - MET    Long term goals  Pt will be I with advanced HEP or symptom management - IN PROGRESS  Pt will be able to perform all ADL's with <3/10 pain - IN PROGRESS  Pt will report 90% functional improvement - IN PROGRESS  Pt will be able to tolerate all leisure activities (pushing/pulling/lifting) with <3/10 pain - IN PROGRESS  Pt will be able to tolerate prolonged sitting/standing/walking with <3/10 pain - IN PROGRESS  Pt will improve FOTO >/= expected - IN PROGRESS        Plan  Planned therapy interventions: patient education, therapeutic exercise, graded exercise, functional ROM exercises, flexibility, home exercise program, manual therapy, activity modification, strengthening, stretching, joint mobilization, massage, therapeutic activities, balance/weight bearing training, neuromuscular re-education and nerve gliding  Frequency: 2x week  Duration in visits: 12  Duration in weeks: 6  Treatment plan discussed with: patient        Subjective Evaluation    History of Present Illness  Mechanism of injury: Pt states that he has had sciatica for the past two months that has not resolved. He has it in both legs. Mostly in the L leg. Pt states he get symptoms into the foot. Pt states that he gets numbness with any amount of walking. Pt states that he can only stand for about 10 minutes. Sitting is probably the best position. Lying flat does not really bother him. If he twists or turns in bed then he'll feel it. He has not been able to do as much as he used to. Denies B/B changes, does have hx of prostate cx, denies night pain, denies fever/chills. He is currently taking gabapentin which has been "taking the edge off" of his symptoms. Re-evaluation (10/2/23): Pt states that this morning he got up, carried his bag down to his truck and did not have any pain. He felt pulling but no pain. He would say today he feels 90% improved since he started. On average over the last week he would say he feels 75% improved. Before today, symptoms seemed to have reached a plateau. His pain worsens if he walks too far or stands for too long. He is leaving for vacation this week and then has an injection scheduled the week after. He wants to hold on PT until after his injection and sees his doctor.            Recurrent probem    Quality of life: good    Patient Goals  Patient goals for therapy: decreased pain, return to sport/leisure activities and independence with ADLs/IADLs    Pain  Current pain ratin  At best pain ratin  At worst pain ratin  Quality: radiating, dull ache, sharp and tight  Relieving factors: rest and medications  Aggravating factors: walking, standing, stair climbing and lifting  Progression: improved    Treatments  Previous treatment: physical therapy  Current treatment: injection treatment and physical therapy        Objective     Active Range of Motion     Lumbar   Flexion:  Restriction level: minimal  Extension:  Restriction level: moderate  Left lateral flexion:  Restriction level: moderate  Right lateral flexion:  Restriction level: minimal  Left rotation:  Restriction level: moderate  Right rotation:  Restriction level: moderate    Additional Active Range of Motion Details  Lumbar flexion AROM (cm from third finger to floor):  Flex: 24 cm  L lat flex: 64 cm  R lat flex: 53 cm    Ext: 12 degrees  Mechanical Assessment    Cervical      Thoracic      Lumbar    Sitting flexion: repeated movements  Pain location: no change  Lying extension: sustained positions  Pain location: centralized    Strength/Myotome Testing     Left Hip   Planes of Motion   Flexion: 4  Extension: 4  Abduction: 4  Adduction: 4    Right Hip   Planes of Motion   Flexion: 4  Extension: 4  Abduction: 4  Adduction: 4    Left Knee   Flexion: 4  Extension: 4    Right Knee   Flexion: 4  Extension: 4    Left Ankle/Foot   Dorsiflexion: 4  Plantar flexion: 4    Right Ankle/Foot   Dorsiflexion: 4  Plantar flexion: 4    Tests     Lumbar     Left   Positive passive SLR and slump test.     Right   Negative passive SLR and slump test.     Left Pelvic Girdle/Sacrum   Negative: thigh thrust.     Right Pelvic Girdle/Sacrum   Negative: thigh thrust.     Left Hip   Negative SAMIRA and FADIR. Right Hip   Negative SAMIRA and FADIR.               Precautions: hx of prostate cancer; Type II DM w/ neuropathy; CKD stage 3    Manuals 9/28 10/2        9/25   Lumbar P/A grade III-IV NR NR        NR   Foam Roll                                       Neuro Re-Ed                                                                                                        Ther Ex             Nu step          10 mins L3   Prone lying          Hold   PPU's           Hold   Sciatic nerve tensioner             Supine bridge 3x10  3x10        3x10   Hip add iso             SLR flex w/ TA 3x10 b/l 3x10 b/l         3x10 b/l   H/L rotation             TA ball press x20 18.5# x20 18.5#        x20 18.5#   Paloff press 5"x15 9# 5"x15 9#        5"x15 9#   Hip abd TB             Cybex leg press 3x10 90# 3x10 90#        3x10 70#   Cybex hip abd 3x10 40# 3x10 45#        3x10 40#   Sled push 5 laps 50# 5 laps 50#        5 laps 50#   Lumbar flex w/ pb x30  3x30        x30   L side-glide x20  x30        x20   SKTC  5"x20 b/l           Ther Activity                                       Gait Training                                       Modalities

## 2023-10-22 NOTE — PROGRESS NOTES
RENAL FOLLOW UP NOTE:.td    ASSESSMENT AND PLAN:  65y.o. year old male with a history of diabetes mellitus type 2/dyslipidemia/hypertension/prostate cancer status post robotic prostatectomy June 2018/ELENA who we are asked to see regarding nephrolithiasis/CKD        1. CKD stage 3A  Etiology:  Diabetic kidney disease/hypertensive nephrosclerosis/episodes of JAYNE-hydronephrosis of the left kidney/arteriolar nephrosclerosis. Doubt primary glomerular process. Baseline creatinine:  1.37-1.60, most recently 1.60  Recommend:  Workup:  Current creatinine: 1.52 at baseline  UA with microscopic: From 1/11/2023: Trace proteinuria, negative heme; 2-4  RBCs WBCs or bacteria seen  Urine protein creatinine ratio: 0.08 g at goal  Renal ultrasound with PVR:  To be done did have moderate left-sided hydroureteronephrosis and 9 mm obstructing stone proximal left ureter  10/6/2022: Bilateral nephrolithiasis without hydronephrosis, 9 mm calculus in the upper pole of the right kidney; 5 and 10 mm calculus in the left  Renal artery duplex to rule out renal artery stenosis: Negative     Treatment:  Treat hypertension, please see below for recommendations  Treat dyslipidemia  Avoid nephrotoxic agents such as NSAIDs, and proton pump inhibitors as able; patient counseled as such  Good overall health recommendations including weight loss as appropriate, isotonic exercise as able, and avoidance of salt; patient counseled as such  UA: 2-4 RBCs are acceptable: Reviewed with urology: Apparently no issues at this time     2. Volume: Euvolemic     3.   Hypertension:  Secondary work-up:  Normal plasma free metanephrines  Aldosterone/renin ratio: Negative  TSH normal  Renal work-up as above  ELENA unlikely mild heavy snoring at times but patient would defer at this time       Current blood pressure averages:  No formal readings but about 140/78     Goal blood pressure: Less than 130/80 given CKD     Recommendations:  Push nonmedical regimen including weight loss, isotonic exercise and a low sodium diet. Patient has been counseled the such. MedicationChanges today:     Current recommendations:  Certainly increased by pain/steroids recently I will have him with his wife's assistance she takes his blood pressure at home check a week of blood pressure readings formally and send those in     4. Electrolytes: All acceptable including potassium 4.8       5. 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:  Calcium/magnesium/phosphorus:  Magnesium 1.5? Another product   Phosphorus 4.4 we will just follow for now  PTH intact: 140.4 slightly increased Will be monitored do not overtreat to avoid adynamic bone disease: I would just replete vitamin D at this time as outlined below  Vitamin-D: Now actually at goal at 33. 5! As of 10/25/2023     6. Dyslipidemia:  Goal LDL: Less than 100  Current lipid profile: 52/HDL 39/triglycerides 128  Recommendations:   Low-cholesterol/low-fat diet / weight loss as appropriate and isotonic exercise   Medication changes today: At goal so no changes     7. Anemia:  Current hemoglobin: Normal at 13.6  Myeloma evaluation from 10/25/2023 negative including SPEP UPEP and light chain ratio        8. Nephrolithiasis:Patient has had calcium oxalate stones as many as 13  most recently requiring intervention with cystoscopy laser lithotripsy and stent placement. Patient with prior calcium oxalate now uric acid stones. Status post recent ureteroscopy with laser lithotripsy basket stone extraction on the left and left ureteral stent placement on 8/11/2022 by Dr. Jane Woodard  Recommend:     Current status/stone passage none since stone extraction     Workup:  Patient with mild hyperparathyroidism will increase gently the vitamin D supplement.   Doubt primary hyperparathyroidism  Given incontinence cannot obtain formal 24 hour urine  General stone as well as low oxalate diet   Patient is slowly increasing water intake decreasing soda intake and gets close to 68 ounces we will continue to work on this! Can actually take closer to 2.5-3 L              8.  Other problems:  Diabetes mellitus type 2  : To consider SGLT2 inhibitor per primary physician if no contraindications for renal/cardiac protection. Once he is feeling better regarding the back issue and recommend trying Farxiga 5 mg or Jardiance 10 mg just 3 days a week hold there for renal/cardiac protection and recheck basic metabolic profile 1 to 2 weeks later  Prostate cancer status post robotic prostatectomy 2018  ELENA? Chronic mild leukocytosis stable  Recent back pain MRI demonstrated spinal canal stenosis mild exacerbated by acquired degenerative disc disease small disc herniations annular bulging posterior element hypertrophic changes. Foraminal narrowing most pronounced on the left T11-12 and L4-5 level bilaterally. Seen by Dr. Ambar Meng from pain management felt lumbar radiculopathy. Status post steroid injection about a week plus with marked improvement in pain able to walk  Slight irregular heart rhythm I believe seems premature atrial contraction I will have him go for an ECG with rhythm strip at his convenience it is asymptomatic         GI HEALTH MAINTENANCE: LAST COLONOSCOPY: 2/10/2021       PATIENT INSTRUCTIONS:    Patient Instructions   Visit summary:  - Overall kidney function is very good! Labs in general look great. The only slight abnormality is the magnesium. As we discussed I recommend trying to find a different product with the help of a pharmacist and take it 3 days a week to start making sure you tolerate it.    -Also at your convenience go for an electrocardiogram or ECG it is a walk-in and one of the labs.   You most likely have a premature was called atrial contraction which has 0 significance I just wanted make sure    - In regards to your stone prevention I am recommending increasing water intake to about 2-1/2 3 L as much as you can do which is approximately  ounces as a range the closer you get the better. And certainly continue to avoid salt is much as you can. Now that you are feeling better as well I would recommend trying to exercise as much as you are able to with permission from your pain specialist.    Finally I did send a message to Dr. Clarissa Sandhu regarding either Sylvie Rail or Martinez  at a very small dose this is the new class of medications as we discussed SGLT2 inhibitors. The purpose in the setting of diabetes and even mild chronic kidney disease is heart disease prevention and also progression of chronic kidney disease. The data/information suggests this is a very helpful medication. Again I would only recommend starting it 3 days a week at the very smallest dose to make sure you tolerate it before it is advanced in the future. 1. Medication changes today:  Just a trial of a magnesium supplement over-the-counter 3 days a week make sure to watch for diarrhea check with your pharmacist  As outlined check with Dr. Clarissa Sandhu regarding Martinez  or Sylvie Rail again just 3 days a week at the smallest dose    2. Please go for non fasting  lab work 1-2 weeks after starting Sylvie Rail or Martinez . 3.  Please take 1 week a blood pressure readings at your convenience in the next couple weeks.     AS FOLLOWS  MORNING AND EVENING, SITTING through your blood pressure as follows:  TAKE THE MORNING READINGS BEFORE ANY MEDICATIONS AND WHEN YOU ARE RELAXED FOR SEVERAL MINUTES  TAKE THE EVENING READINGS:  BETWEEN 7-10 P.M.; PRIOR TO ANY MEDICATIONS; AT LEAST IN OUR  FROM DINNER; AND CERTAINLY AFTER RELAXING FOR A FEW MINUTES  PLEASE INCLUDE HEART RATE WITH YOUR BLOOD PRESSURE READINGS  When taking standing readings, keep your arm supported at heart level and not dangling  Make sure you are sitting with your back supported and feet on the ground and do not cross your legs or feet  Make sure you have not taken any coffee or caffeine products or exercised or smoke cigarettes at least 30 minutes before taking your blood pressure  Then please mail these readings into the office    4. In 3 months:  Please go for nonfasting lab work but in the morning  Please take 1 week a blood pressure readings as outlined above and mail those into the office      5. Follow-up in 6 months  Please bring in 1 week a blood pressure readings morning evening, sitting and standing is outlined above  PLEASE BRING AN YOUR BLOOD PRESSURE MACHINE TO CORRELATE WITH THE OFFICE MACHINE AT THIS NEXT SCHEDULED VISIT  Please go for fasting lab work 1-2 weeks prior to your appointment      6. General instructions:  AVOID SALT BUT NOT ADDING AN READING LABELS TO MAKE SURE THERE IS LOW-SALT IN THE FOOD THAT YOU ARE EATING  Goal is less than 2 g of sodium intake or less than 5 g of sodium chloride intake per day    Avoid nonsteroidal anti-inflammatory drugs such as Naprosyn, ibuprofen, Aleve, Advil, Celebrex, Meloxicam (Mobic) etc.  You can use Tylenol as needed if you do not have any liver condition to be concerned about    Avoid medications such as Sudafed or decongestants and antihistamines that contained the D component which is the decongestant. You can take antihistamines without the decongestant or D component. Try to avoid medications such as pantoprazole or  Protonix/Nexium or Esomeprazole)/Prilosec or omeprazole/Prevacid or lansoprazole/AcipHex or Rabeprazole. If you are able to, use Pepcid as this is safer for your kidneys. Try to exercise at least 30 minutes 3 days a week to begin with with an ultimate goal of 5 days a week for at least 30 minutes    Try to lose 10-15 lb by your next visit    Please do not drink more than 2 glasses of alcohol/wine on a daily basis as this may contribute to your high blood pressure. Subjective:   See above regarding back pain s/p steroid injection 10/23/2023    No fevers, chills, or cough or colds.   Good appetite and good energy  No hematuria, dysuria, voiding symptoms or foamy urine. No kidney stone passage as well.   No gastrointestinal symptoms  No cardiovascular symptoms; (intermittent leg foot edema but resolves)  Rare headaches, but no dizziness or lightheadedness  Blood pressure medications:  Lisinopril 10 mg daily  HCTZ 12.5 mg daily  Amlodipine 5 mg daily in the morning  Carvedilol 25 mg twice a day    Renal pertinent medications:  Vitamin D 400 units daily  Flomax only as needed with kidney stone passage  Urocit-K 10 mEq twice a day  Metformin 1000 mg twice a day  ASA 81 mg twice a day  Atorvastatin 80 mg daily  Gabapentin 100 mg 3 times a day    ROS:  See HPI, otherwise review of systems as completely reviewed with the patient are negative    Past Medical History:   Diagnosis Date    Acute bronchitis     Acute low back pain     Acute low back pain     [id number] resolved    Acute respiratory infection     JAYNE (acute kidney injury) ([address]) 06/15/2018    Cancer ([address])     prostate    Cellulitis of scrotum     Cough     Diabetes mellitus (HCC)     Herpes zoster     High cholesterol     Hyperkalemia     Hypertension     Kidney disease     Kidney stone     Leukocytosis 06/15/2018    Low back pain     Lumbar radiculopathy     Pyelonephritis 08/08/2022    Rash     Retinopathy, central serous     Shingles     Sleep apnea     [id number]    Trochanteric bursitis      Past Surgical History:   Procedure Laterality Date    ABDOMINAL ADHESION SURGERY N/A 6/13/2018    Procedure: LYSIS ADHESIONS;  Surgeon: Karolina Johnson MD;  Location: [facility];  Service: Urology    FL RETROGRADE PYELOGRAM  8/8/2022    FL RETROGRADE PYELOGRAM  8/11/2022    LITHOTRIPSY      renal    GA CYSTO BLADDER W/URETERAL CATHETERIZATION Left 8/8/2022    Procedure: CYSTOSCOPY RETROGRADE PYELOGRAM WITH INSERTION STENT URETERAL;  Surgeon: Rosa Walls MD;  Location: [facility];  Service: Urology    GA CYSTO/URETERO W/LITHOTRIPSY &INDWELL STENT INSRT Left 8/11/2022    Procedure: CYSTOSCOPY URETEROSCOPY WITH LITHOTRIPSY HOLMIUM LASER, RETROGRADE PYELOGRAM AND INSERTION STENT URETERAL;  Surgeon: Bert Farfan MD;  Location: [facility];  Service: Urology    OK LAPS SURG [id number] RPBIC RAD W/NRV SPARING ROBOT N/A 6/13/2018    Procedure: Maral Balwinder;  Surgeon: Mahsa Bagley MD;  Location: [facility];  Service: Urology    PROSTATE SURGERY  06/13/2018    SHOULDER SURGERY       Family History   Problem Relation Age of Onset    Other Father         cardiac disorder    Stroke Father     Diabetes Father         mellitus    Hypertension Father     Heart disease Father         cardiac disorder    Cancer Sister     Cancer Paternal Grandmother     Heart disease Family         cardiac disorder    Kidney disease Family     Hypertension Mother     Cancer Mother       reports that he has never smoked. He has never used smokeless tobacco. He reports that he does not drink alcohol and does not use drugs. I COMPLETELY REVIEWED THE PAST MEDICAL HISTORY/PAST SURGICAL HISTORY/SOCIAL HISTORY/FAMILY HISTORY/AND MEDICATIONS  AND UPDATED ALL    Objective:     Vitals:   BP sitting on left: 138/72 with a heart rate of 84 and slightly irregular, and general regular but an occasional premature beat  BP standing on left: 134/80 with a heart rate of 84, regular with occasional premature beat    Weight (last 2 days)       Date/Time Weight    11/01/23 1600 122 (270)          Wt Readings from Last 3 Encounters:   11/01/23 122 kg (270 lb)   09/07/23 122 kg (269 lb)   06/06/23 122 kg (269 lb 3.2 oz)       Body mass index is 44.93 kg/m².     Physical Exam: General:  No acute distress/obese  Skin:  No acute rash/eczema patches   Eyes:  No scleral icterus, noninjected, no discharge from eyes  ENT:  Moist mucous membranes  Neck:  Supple, no jugular venous distention, trachea is midline, no lymphadenopathy and no thyromegaly  Back   No CVAT  Chest:  Clear to auscultation and percussion, good respiratory effort  CVS: Regular rate and rhythm without a rub, or gallops or murmurs  Abdomen:  obese,Soft and nontender with normal bowel sounds  Extremities:  No cyanosis and no edema, no arthritic changes, normal range of motion  Neuro:  Grossly intact  Psych:  Alert, oriented x3 and appropriate      Medications:    Current Outpatient Medications:     albuterol (PROVENTIL HFA,VENTOLIN HFA) 90 mcg/act inhaler, Inhale 1-2 puffs every 6 (six) hours as needed, Disp: , Rfl:     amLODIPine (NORVASC) 5 mg tablet, TAKE 1 TABLET BY MOUTH EVERY DAY, Disp: 90 tablet, Rfl: 1    ASPIRIN 81 PO, Take 1 capsule by mouth 2 (two) times a day  , Disp: , Rfl:     atorvastatin (LIPITOR) 80 mg tablet, TAKE 1 TABLET BY MOUTH EVERY DAY, Disp: 90 tablet, Rfl: 1    BD Pen Needle Rosalie 2nd Gen 32G X 4 MM MISC, INJECT UNDER THE SKIN 4 (FOUR) TIMES A DAY, Disp: 400 each, Rfl: 3    carvedilol (COREG) 25 mg tablet, TAKE 1 TABLET BY MOUTH TWICE A DAY WITH MEALS, Disp: 180 tablet, Rfl: 2    dulaglutide (Trulicity) 3 CM/6.7AY injection, INJECT 0.5 ML (3 MG TOTAL) UNDER THE SKIN ONCE A WEEK, Disp: 6 mL, Rfl: 1    gabapentin (Neurontin) 100 mg capsule, Take 1 capsule (100 mg total) by mouth 3 (three) times a day, Disp: 270 capsule, Rfl: 3    glimepiride (AMARYL) 4 mg tablet, TAKE 1 TABLET BY MOUTH TWICE A DAY, Disp: 180 tablet, Rfl: 1    glucose blood (Contour Next Test) test strip, Use 1 each 4 (four) times a day Use as instructed (Patient taking differently: Use 1 each daily 3 to 4 times daily. Before meals or randomly), Disp: 400 each, Rfl: 3    glucose blood (Contour Next Test) test strip, CHECK SUGAR 4 TIMES DAILY, Disp: 400 strip, Rfl: 3    HumaLOG KwikPen 200 units/mL CONCENTRATED U-200 injection pen, INJECT 15 UNITS THREE TIMES DAILY WITH MEALS., Disp: 18 mL, Rfl: 5    hydrochlorothiazide (HYDRODIURIL) 12.5 mg tablet, TAKE 0.5 TABLETS BY MOUTH DAILY.  (Patient taking differently: Take 12.5 mg by mouth Take 0.5 tab QOD), Disp: 45 tablet, Rfl: 4    lisinopril (ZESTRIL) 10 mg tablet, Take 1 tablet (10 mg total) by mouth daily, Disp: 90 tablet, Rfl: 3    metFORMIN (GLUCOPHAGE) 1000 MG tablet, TAKE 1 TABLET BY MOUTH TWICE A DAY, Disp: 180 tablet, Rfl: 3    Microlet Lancets MISC, Use 3 (three) times a day, Disp: 300 each, Rfl: 3    Multiple Vitamins-Minerals (MULTIVITAMIN WITH MINERALS) tablet, Take 1 tablet by mouth daily, Disp: , Rfl:     potassium citrate (UROCIT-K) 10 mEq, TAKE 1 TABLET BY MOUTH 2 TIMES A DAY., Disp: 180 tablet, Rfl: 3    tamsulosin (FLOMAX) 0.4 mg, TAKE 1 CAPSULE BY MOUTH EVERY DAY WITH DINNER (Patient taking differently: PRN), Disp: 90 capsule, Rfl: 1    Toujeo SoloStar 300 units/mL CONCENTRATED U-300 injection pen (1-unit dial), INJECT 80 UNITS UNDER THE SKIN DAILY, Disp: 22.5 mL, Rfl: 3    Vitamin D, Cholecalciferol, 400 units TABS, Take by mouth daily, Disp: , Rfl:     lisinopril (ZESTRIL) 40 mg tablet, Take 0.5 tablets (20 mg total) by mouth daily (Patient not taking: Reported on 11/1/2023), Disp: , Rfl:     methylPREDNISolone 4 MG tablet therapy pack, Use as directed on package (Patient not taking: Reported on 11/1/2023), Disp: 21 each, Rfl: 0    tamsulosin (FLOMAX) 0.4 mg, Take 1 capsule (0.4 mg total) by mouth daily with dinner as needed for kidney stone management. (Patient not taking: Reported on 1/19/2023), Disp: 90 capsule, Rfl: 1    Lab, Imaging and other studies: I have personally reviewed pertinent labs.   Laboratory Results:  Results for orders placed or performed in visit on 10/25/23   Immunoglobulin free LT chains blood   Result Value Ref Range    Ig Kappa Free Light Chain 34.8 (H) 3.3 - 19.4 mg/L    Ig Lambda Free Light Chain 23.5 5.7 - 26.3 mg/L    Kappa/Lambda FluidC Ratio 1.48 0.26 - 1.65   Protein electrophoresis, serum   Result Value Ref Range    A/G Ratio 1.38 1.10 - 1.80    Albumin Electrophoresis 57.9 48.0 - 70.0 %    Albumin CONC 3.65 3.20 - 5.10 g/dl    Alpha 1 4.9 1.8 - 7.0 %    ALPHA 1 CONC 0.31 0.15 - 0.47 g/dL    Alpha 2 14.0 5.9 - 14.9 %    ALPHA 2 CONC 0.88 0.42 - 1.04 g/dL    Beta-1 6.0 4.7 - 7.7 %    BETA 1 CONC 0.38 0.31 - 0.57 g/dL    Beta-2 5.6 3.1 - 7.9 %    BETA 2 CONC 0.35 0.20 - 0.58 g/dL    Gamma Globulin 11.6 6.9 - 22.3 %    GAMMA CONC 0.73 0.40 - 1.66 g/dL    Total Protein 6.3 (L) 6.4 - 8.2 g/dL    SPEP Interpretation See Comment    Protein electrophoresis, urine   Result Value Ref Range    Urine Protein 8.0 mg/dL    Albumin ELP, Urine 100.0 %    Alpha 1, Urine 0.0 %    Alpha 2, Urine 0.0 %    Beta, Urine 0.0 %    Gamma Globulin, Urine 0.0 %    UPEP Interp See Comment    Lipid Panel with Direct LDL reflex   Result Value Ref Range    Cholesterol 117 See Comment mg/dL    Triglycerides 128 See Comment mg/dL    HDL, Direct 39 (L) >=40 mg/dL    LDL Calculated 52 0 - 100 mg/dL   Hemoglobin A1C   Result Value Ref Range    Hemoglobin A1C 7.3 (H) Normal 4.0-5.6%; PreDiabetic 5.7-6.4%;  Diabetic >=6.5%; Glycemic control for adults with diabetes <7.0% %     mg/dl   TSH, 3rd generation with Free T4 reflex   Result Value Ref Range    TSH 3RD GENERATON 2.588 0.450 - 4.500 uIU/mL   Vitamin D 25 hydroxy   Result Value Ref Range    Vit D, 25-Hydroxy 33.5 30.0 - 100.0 ng/mL   PTH, intact   Result Value Ref Range    .4 (H) 12.0 - 88.0 pg/mL   Phosphorus   Result Value Ref Range    Phosphorus 4.4 (H) 2.3 - 4.1 mg/dL   Magnesium   Result Value Ref Range    Magnesium 1.5 (L) 1.9 - 2.7 mg/dL   CBC and Platelet   Result Value Ref Range    WBC 13.64 (H) 4.31 - 10.16 Thousand/uL    RBC 4.56 3.88 - 5.62 Million/uL    Hemoglobin 13.4 12.0 - 17.0 g/dL    Hematocrit 42.0 36.5 - 49.3 %    MCV 92 82 - 98 fL    MCH 29.4 26.8 - 34.3 pg    MCHC 31.9 31.4 - 37.4 g/dL    RDW 12.6 11.6 - 15.1 %    Platelets 405 992 - 096 Thousands/uL    MPV 9.2 8.9 - 12.7 fL   Comprehensive metabolic panel   Result Value Ref Range    Sodium 141 135 - 147 mmol/L    Potassium 4.8 3.5 - 5.3 mmol/L    Chloride 106 96 - 108 mmol/L    CO2 24 21 - 32 mmol/L    ANION GAP 11 mmol/L    BUN 27 (H) 5 - 25 mg/dL    Creatinine 1.52 (H) 0.60 - 1.30 mg/dL    Glucose, Fasting 89 65 - 99 mg/dL    Calcium 8.9 8.4 - 10.2 mg/dL    AST 25 13 - 39 U/L    ALT 35 7 - 52 U/L    Alkaline Phosphatase 69 34 - 104 U/L    Total Protein 6.7 6.4 - 8.4 g/dL    Albumin 4.1 3.5 - 5.0 g/dL    Total Bilirubin 0.36 0.20 - 1.00 mg/dL    eGFR 48 ml/min/1.73sq m             Invalid input(s): "ALBUMIN"      Radiology review:   chest X-ray    Ultrasound      Portions of the record may have been created with voice recognition software. Occasional wrong word or "sound a like" substitutions may have occurred due to the inherent limitations of voice recognition software. Read the chart carefully and recognize, using context, where substitutions have occurred.

## 2023-10-23 ENCOUNTER — HOSPITAL ENCOUNTER (OUTPATIENT)
Dept: RADIOLOGY | Facility: CLINIC | Age: 63
Discharge: HOME | End: 2023-10-23
Payer: COMMERCIAL

## 2023-10-23 VITALS
SYSTOLIC BLOOD PRESSURE: 159 MMHG | RESPIRATION RATE: 18 BRPM | TEMPERATURE: 98.8 F | OXYGEN SATURATION: 96 % | HEART RATE: 65 BPM | DIASTOLIC BLOOD PRESSURE: 73 MMHG

## 2023-10-23 DIAGNOSIS — M54.16 LUMBAR RADICULOPATHY: ICD-10-CM

## 2023-10-23 PROCEDURE — 62323 NJX INTERLAMINAR LMBR/SAC: CPT | Performed by: PHYSICAL MEDICINE & REHABILITATION

## 2023-10-23 RX ORDER — METHYLPREDNISOLONE ACETATE 80 MG/ML
80 INJECTION, SUSPENSION INTRA-ARTICULAR; INTRALESIONAL; INTRAMUSCULAR; PARENTERAL; SOFT TISSUE ONCE
Status: COMPLETED | OUTPATIENT
Start: 2023-10-23 | End: 2023-10-23

## 2023-10-23 RX ADMIN — IOHEXOL 1 ML: 300 INJECTION, SOLUTION INTRAVENOUS at 14:35

## 2023-10-23 RX ADMIN — METHYLPREDNISOLONE ACETATE 80 MG: 80 INJECTION, SUSPENSION INTRA-ARTICULAR; INTRALESIONAL; INTRAMUSCULAR; PARENTERAL; SOFT TISSUE at 14:35

## 2023-10-23 NOTE — H&P
History of Present Illness:  The patient is a 59 y.o. male who presents with complaints of low back pain    Past Medical History:   Diagnosis Date    Acute bronchitis     Acute low back pain     Acute low back pain     [id number] resolved    Acute respiratory infection     AJYNE (acute kidney injury) ([address]) 06/15/2018    Cancer (HCC)     prostate    Cellulitis of scrotum     Cough     Diabetes mellitus (HCC)     Herpes zoster     High cholesterol     Hyperkalemia     Hypertension     Kidney disease     Kidney stone     Leukocytosis 06/15/2018    Low back pain     Lumbar radiculopathy     Pyelonephritis 08/08/2022    Rash     Retinopathy, central serous     Shingles     Sleep apnea     [id number]    Trochanteric bursitis        Past Surgical History:   Procedure Laterality Date    ABDOMINAL ADHESION SURGERY N/A 6/13/2018    Procedure: LYSIS ADHESIONS;  Surgeon: Viv Barton MD;  Location: [facility];  Service: Urology    FL RETROGRADE PYELOGRAM  8/8/2022    FL RETROGRADE PYELOGRAM  8/11/2022    LITHOTRIPSY      renal    NH CYSTO BLADDER W/URETERAL CATHETERIZATION Left 8/8/2022    Procedure: CYSTOSCOPY RETROGRADE PYELOGRAM WITH INSERTION STENT URETERAL;  Surgeon: Shelia Horton MD;  Location: [facility];  Service: Urology    NH CYSTO/URETERO W/LITHOTRIPSY &INDWELL STENT INSRT Left 8/11/2022    Procedure: CYSTOSCOPY URETEROSCOPY WITH LITHOTRIPSY HOLMIUM LASER, RETROGRADE PYELOGRAM AND INSERTION STENT URETERAL;  Surgeon: Shelia Horton MD;  Location: [facility];  Service: Urology    NH LAPS SURG [id number] RPBIC RAD W/NRV SPARING ROBOT N/A 6/13/2018    Procedure: Chela Quintanilla W ROBOTICS;  Surgeon: Viv Barton MD;  Location: [facility];  Service: Urology    PROSTATE SURGERY  06/13/2018    SHOULDER SURGERY           Current Outpatient Medications:     albuterol (PROVENTIL HFA,VENTOLIN HFA) 90 mcg/act inhaler, Inhale 1-2 puffs every 6 (six) hours as needed, Disp: , Rfl:     amLODIPine (NORVASC) 5 mg tablet, TAKE 1 TABLET BY MOUTH EVERY DAY, Disp: 90 tablet, Rfl: 1    ASPIRIN 81 PO, Take 1 capsule by mouth 2 (two) times a day  , Disp: , Rfl:     atorvastatin (LIPITOR) 80 mg tablet, TAKE 1 TABLET BY MOUTH EVERY DAY, Disp: 90 tablet, Rfl: 1    BD Pen Needle Rosalie 2nd Gen 32G X 4 MM MISC, INJECT UNDER THE SKIN 4 (FOUR) TIMES A DAY, Disp: 400 each, Rfl: 3    carvedilol (COREG) 25 mg tablet, TAKE 1 TABLET BY MOUTH TWICE A DAY WITH MEALS, Disp: 180 tablet, Rfl: 2    gabapentin (Neurontin) 100 mg capsule, Take 1 capsule (100 mg total) by mouth 3 (three) times a day, Disp: 90 capsule, Rfl: 3    glimepiride (AMARYL) 4 mg tablet, TAKE 1 TABLET BY MOUTH TWICE A DAY, Disp: 180 tablet, Rfl: 1    glucose blood (Contour Next Test) test strip, Use 1 each 4 (four) times a day Use as instructed (Patient taking differently: Use 1 each daily 3 to 4 times daily. Before meals or randomly), Disp: 400 each, Rfl: 3    glucose blood (Contour Next Test) test strip, CHECK SUGAR 4 TIMES DAILY, Disp: 400 strip, Rfl: 3    HumaLOG KwikPen 200 units/mL CONCENTRATED U-200 injection pen, INJECT 15 UNITS THREE TIMES DAILY WITH MEALS., Disp: 18 mL, Rfl: 5    hydrochlorothiazide (HYDRODIURIL) 12.5 mg tablet, TAKE 0.5 TABLETS BY MOUTH DAILY.  (Patient taking differently: Take 12.5 mg by mouth Take 0.5 tab QOD), Disp: 45 tablet, Rfl: 4    lisinopril (ZESTRIL) 10 mg tablet, Take 1 tablet (10 mg total) by mouth daily, Disp: 90 tablet, Rfl: 3    lisinopril (ZESTRIL) 40 mg tablet, Take 0.5 tablets (20 mg total) by mouth daily, Disp: , Rfl:     metFORMIN (GLUCOPHAGE) 1000 MG tablet, TAKE 1 TABLET BY MOUTH TWICE A DAY, Disp: 180 tablet, Rfl: 3    methylPREDNISolone 4 MG tablet therapy pack, Use as directed on package, Disp: 21 each, Rfl: 0    Microlet Lancets MISC, Use 3 (three) times a day, Disp: 300 each, Rfl: 3    Multiple Vitamins-Minerals (MULTIVITAMIN WITH MINERALS) tablet, Take 1 tablet by mouth daily, Disp: , Rfl:     potassium citrate (UROCIT-K) 10 mEq, TAKE 1 TABLET BY MOUTH 2 TIMES A DAY., Disp: 180 tablet, Rfl: 3    tamsulosin (FLOMAX) 0.4 mg, Take 1 capsule (0.4 mg total) by mouth daily with dinner as needed for kidney stone management. (Patient not taking: Reported on 1/19/2023), Disp: 90 capsule, Rfl: 1    tamsulosin (FLOMAX) 0.4 mg, TAKE 1 CAPSULE BY MOUTH EVERY DAY WITH DINNER (Patient taking differently: PRN), Disp: 90 capsule, Rfl: 1    Toujeo SoloStar 300 units/mL CONCENTRATED U-300 injection pen (1-unit dial), INJECT 80 UNITS UNDER THE SKIN DAILY, Disp: 22.5 mL, Rfl: 3    Trulicity 3 ZT/5.9SS injection, INJECT 0.5 ML (3 MG TOTAL) UNDER THE SKIN ONCE A WEEK, Disp: 6 mL, Rfl: 1    Vitamin D, Cholecalciferol, 400 units TABS, Take by mouth daily, Disp: , Rfl:     Allergies   Allergen Reactions    Simvastatin      Increases Liver functoin       Physical Exam:   Vitals:    10/23/23 1421   BP: 170/83   Pulse: 59   Resp: 18   Temp: 98.8 °F (37.1 °C)   SpO2: 96%     General: Awake, Alert, Oriented x 3, Mood and affect appropriate  Respiratory: Respirations even and unlabored  Cardiovascular: Peripheral pulses intact; no edema  Musculoskeletal Exam: Tenderness to palpation bilateral lumbar paraspinals    ASA Score: 2    Patient/Chart Verification  Patient ID Verified: Verbal  ID Band Applied: No  Consents Confirmed: Procedural, To be obtained in the Pre-Procedure area  H&P( within 30 days) Verified: To be obtained in the Pre-Procedure area  Allergies Reviewed: Yes  Anticoag/NSAID held?: Yes (ASA held 1 week)  Currently on antibiotics?: No    Assessment:   1.  Lumbar radiculopathy        Plan: L5-S1 LESI

## 2023-10-23 NOTE — DISCHARGE INSTR - LAB
Epidural Steroid Injection   WHAT YOU NEED TO KNOW:   An epidural steroid injection (KENTRELL) is a procedure to inject steroid medicine into the epidural space. The epidural space is between your spinal cord and vertebrae. Steroids reduce inflammation and fluid buildup in your spine that may be causing pain. You may be given pain medicine along with the steroids. ACTIVITY  Do not drive or operate machinery today. No strenuous activity today - bending, lifting, etc.  You may resume normal activites starting tomorrow - start slowly and as tolerated. You may shower today, but no tub baths or hot tubs. You may have numbness for several hours from the local anesthetic. Please use caution and common sense, especially with weight-bearing activities. CARE OF THE INJECTION SITE  If you have soreness or pain, apply ice to the area today (20 minutes on/20 minutes off). Starting tomorrow, you may use warm, moist heat or ice if needed. You may have an increase or change in your discomfort for 36-48 hours after your treatment. Apply ice and continue with any pain medication you have been prescribed. Notify the [facility] if you have any of the following: redness, drainage, swelling, headache, stiff neck or fever above 100°F.    SPECIAL INSTRUCTIONS  Our office will contact you in approximately 7 days for a progress report. MEDICATIONS  Continue to take all routine medications. Our office may have instructed you to hold some medications. As no general anesthesia was used in today's procedure, you should not experience any side effects related to anesthesia. If you are diabetic, the steroids used in today's injection may temporarily increase your blood sugar levels after the first few days after your injection. Please keep a close eye on your sugars and alert the doctor who manages your diabetes if your sugars are significantly high from your baseline or you are symptomatic.      If you have a problem specifically related to your procedure, please call our office at [phone]. Problems not related to your procedure should be directed to your primary care physician.

## 2023-10-25 ENCOUNTER — APPOINTMENT (OUTPATIENT)
Dept: LAB | Facility: CLINIC | Age: 63
End: 2023-10-25
Payer: COMMERCIAL

## 2023-10-25 DIAGNOSIS — E11.22 TYPE 2 DIABETES MELLITUS WITH STAGE 3A CHRONIC KIDNEY DISEASE, WITH LONG-TERM CURRENT USE OF INSULIN (HCC): ICD-10-CM

## 2023-10-25 DIAGNOSIS — N20.0 NEPHROLITHIASIS: ICD-10-CM

## 2023-10-25 DIAGNOSIS — I12.9 PARENCHYMAL RENAL HYPERTENSION, STAGE 1 THROUGH STAGE 4 OR UNSPECIFIED CHRONIC KIDNEY DISEASE: ICD-10-CM

## 2023-10-25 DIAGNOSIS — E78.5 DYSLIPIDEMIA: ICD-10-CM

## 2023-10-25 DIAGNOSIS — N18.31 STAGE 3A CHRONIC KIDNEY DISEASE (HCC): ICD-10-CM

## 2023-10-25 DIAGNOSIS — N18.31 TYPE 2 DIABETES MELLITUS WITH STAGE 3A CHRONIC KIDNEY DISEASE, WITH LONG-TERM CURRENT USE OF INSULIN (HCC): ICD-10-CM

## 2023-10-25 DIAGNOSIS — E11.21 DIABETIC NEPHROPATHY ASSOCIATED WITH TYPE 2 DIABETES MELLITUS (HCC): ICD-10-CM

## 2023-10-25 DIAGNOSIS — I10 BENIGN HYPERTENSION: ICD-10-CM

## 2023-10-25 DIAGNOSIS — E66.01 MORBID OBESITY (HCC): ICD-10-CM

## 2023-10-25 DIAGNOSIS — Z79.4 TYPE 2 DIABETES MELLITUS WITH STAGE 3A CHRONIC KIDNEY DISEASE, WITH LONG-TERM CURRENT USE OF INSULIN (HCC): ICD-10-CM

## 2023-10-25 DIAGNOSIS — C61 PROSTATE CANCER (HCC): Primary | ICD-10-CM

## 2023-10-25 LAB
25(OH)D3 SERPL-MCNC: 33.5 NG/ML (ref 30–100)
ALBUMIN SERPL BCP-MCNC: 4.1 G/DL (ref 3.5–5)
ALBUMIN SERPL ELPH-MCNC: 3.65 G/DL (ref 3.2–5.1)
ALBUMIN SERPL ELPH-MCNC: 57.9 % (ref 48–70)
ALP SERPL-CCNC: 69 U/L (ref 34–104)
ALPHA1 GLOB SERPL ELPH-MCNC: 0.31 G/DL (ref 0.15–0.47)
ALPHA1 GLOB SERPL ELPH-MCNC: 4.9 % (ref 1.8–7)
ALPHA2 GLOB SERPL ELPH-MCNC: 0.88 G/DL (ref 0.42–1.04)
ALPHA2 GLOB SERPL ELPH-MCNC: 14 % (ref 5.9–14.9)
ALT SERPL W P-5'-P-CCNC: 35 U/L (ref 7–52)
ANION GAP SERPL CALCULATED.3IONS-SCNC: 11 MMOL/L
AST SERPL W P-5'-P-CCNC: 25 U/L (ref 13–39)
BETA GLOB ABNORMAL SERPL ELPH-MCNC: 0.38 G/DL (ref 0.31–0.57)
BETA1 GLOB SERPL ELPH-MCNC: 6 % (ref 4.7–7.7)
BETA2 GLOB SERPL ELPH-MCNC: 5.6 % (ref 3.1–7.9)
BETA2+GAMMA GLOB SERPL ELPH-MCNC: 0.35 G/DL (ref 0.2–0.58)
BILIRUB SERPL-MCNC: 0.36 MG/DL (ref 0.2–1)
BUN SERPL-MCNC: 27 MG/DL (ref 5–25)
CALCIUM SERPL-MCNC: 8.9 MG/DL (ref 8.4–10.2)
CHLORIDE SERPL-SCNC: 106 MMOL/L (ref 96–108)
CHOLEST SERPL-MCNC: 117 MG/DL
CO2 SERPL-SCNC: 24 MMOL/L (ref 21–32)
CREAT SERPL-MCNC: 1.52 MG/DL (ref 0.6–1.3)
ERYTHROCYTE [DISTWIDTH] IN BLOOD BY AUTOMATED COUNT: 12.6 % (ref 11.6–15.1)
EST. AVERAGE GLUCOSE BLD GHB EST-MCNC: 163 MG/DL
GAMMA GLOB ABNORMAL SERPL ELPH-MCNC: 0.73 G/DL (ref 0.4–1.66)
GAMMA GLOB SERPL ELPH-MCNC: 11.6 % (ref 6.9–22.3)
GFR SERPL CREATININE-BSD FRML MDRD: 48 ML/MIN/1.73SQ M
GLUCOSE P FAST SERPL-MCNC: 89 MG/DL (ref 65–99)
HBA1C MFR BLD: 7.3 %
HCT VFR BLD AUTO: 42 % (ref 36.5–49.3)
HDLC SERPL-MCNC: 39 MG/DL
HGB BLD-MCNC: 13.4 G/DL (ref 12–17)
IGG/ALB SER: 1.38 {RATIO} (ref 1.1–1.8)
LDLC SERPL CALC-MCNC: 52 MG/DL (ref 0–100)
MAGNESIUM SERPL-MCNC: 1.5 MG/DL (ref 1.9–2.7)
MCH RBC QN AUTO: 29.4 PG (ref 26.8–34.3)
MCHC RBC AUTO-ENTMCNC: 31.9 G/DL (ref 31.4–37.4)
MCV RBC AUTO: 92 FL (ref 82–98)
PHOSPHATE SERPL-MCNC: 4.4 MG/DL (ref 2.3–4.1)
PLATELET # BLD AUTO: 294 THOUSANDS/UL (ref 149–390)
PMV BLD AUTO: 9.2 FL (ref 8.9–12.7)
POTASSIUM SERPL-SCNC: 4.8 MMOL/L (ref 3.5–5.3)
PROT PATTERN SERPL ELPH-IMP: ABNORMAL
PROT SERPL-MCNC: 6.3 G/DL (ref 6.4–8.2)
PROT SERPL-MCNC: 6.7 G/DL (ref 6.4–8.4)
PTH-INTACT SERPL-MCNC: 140.4 PG/ML (ref 12–88)
RBC # BLD AUTO: 4.56 MILLION/UL (ref 3.88–5.62)
SODIUM SERPL-SCNC: 141 MMOL/L (ref 135–147)
TRIGL SERPL-MCNC: 128 MG/DL
TSH SERPL DL<=0.05 MIU/L-ACNC: 2.59 UIU/ML (ref 0.45–4.5)
WBC # BLD AUTO: 13.64 THOUSAND/UL (ref 4.31–10.16)

## 2023-10-25 PROCEDURE — 83521 IG LIGHT CHAINS FREE EACH: CPT

## 2023-10-25 PROCEDURE — 80061 LIPID PANEL: CPT

## 2023-10-25 PROCEDURE — 84165 PROTEIN E-PHORESIS SERUM: CPT

## 2023-10-25 PROCEDURE — 36415 COLL VENOUS BLD VENIPUNCTURE: CPT

## 2023-10-25 PROCEDURE — 84100 ASSAY OF PHOSPHORUS: CPT

## 2023-10-25 PROCEDURE — 84166 PROTEIN E-PHORESIS/URINE/CSF: CPT

## 2023-10-25 PROCEDURE — 85027 COMPLETE CBC AUTOMATED: CPT

## 2023-10-25 PROCEDURE — 83735 ASSAY OF MAGNESIUM: CPT

## 2023-10-25 PROCEDURE — 83970 ASSAY OF PARATHORMONE: CPT

## 2023-10-25 PROCEDURE — 82306 VITAMIN D 25 HYDROXY: CPT

## 2023-10-25 PROCEDURE — 84165 PROTEIN E-PHORESIS SERUM: CPT | Performed by: PATHOLOGY

## 2023-10-25 PROCEDURE — 80053 COMPREHEN METABOLIC PANEL: CPT

## 2023-10-25 PROCEDURE — 84443 ASSAY THYROID STIM HORMONE: CPT

## 2023-10-25 PROCEDURE — 83036 HEMOGLOBIN GLYCOSYLATED A1C: CPT

## 2023-10-25 PROCEDURE — 3051F HG A1C>EQUAL 7.0%<8.0%: CPT | Performed by: PHYSICAL MEDICINE & REHABILITATION

## 2023-10-26 LAB
ALBUMIN UR ELPH-MCNC: 100 %
ALPHA1 GLOB MFR UR ELPH: 0 %
ALPHA2 GLOB MFR UR ELPH: 0 %
B-GLOBULIN MFR UR ELPH: 0 %
GAMMA GLOB MFR UR ELPH: 0 %
KAPPA LC FREE SER-MCNC: 34.8 MG/L (ref 3.3–19.4)
KAPPA LC FREE/LAMBDA FREE SER: 1.48 {RATIO} (ref 0.26–1.65)
LAMBDA LC FREE SERPL-MCNC: 23.5 MG/L (ref 5.7–26.3)
PROT PATTERN UR ELPH-IMP: NORMAL
PROT UR-MCNC: 8 MG/DL

## 2023-10-26 PROCEDURE — 84166 PROTEIN E-PHORESIS/URINE/CSF: CPT | Performed by: PATHOLOGY

## 2023-10-27 DIAGNOSIS — E11.65 UNCONTROLLED TYPE 2 DIABETES MELLITUS WITH HYPERGLYCEMIA (HCC): ICD-10-CM

## 2023-10-27 RX ORDER — DULAGLUTIDE 3 MG/.5ML
3 INJECTION, SOLUTION SUBCUTANEOUS WEEKLY
Qty: 6 ML | Refills: 1 | Status: SHIPPED | OUTPATIENT
Start: 2023-10-27

## 2023-10-30 ENCOUNTER — TELEPHONE (OUTPATIENT)
Dept: PAIN MEDICINE | Facility: CLINIC | Age: 63
End: 2023-10-30

## 2023-10-30 DIAGNOSIS — M54.41 CHRONIC BILATERAL LOW BACK PAIN WITH BILATERAL SCIATICA: ICD-10-CM

## 2023-10-30 DIAGNOSIS — G89.29 CHRONIC BILATERAL LOW BACK PAIN WITH BILATERAL SCIATICA: ICD-10-CM

## 2023-10-30 DIAGNOSIS — M54.42 CHRONIC BILATERAL LOW BACK PAIN WITH BILATERAL SCIATICA: ICD-10-CM

## 2023-10-30 RX ORDER — GABAPENTIN 100 MG/1
100 CAPSULE ORAL 3 TIMES DAILY
Qty: 270 CAPSULE | Refills: 3 | Status: SHIPPED | OUTPATIENT
Start: 2023-10-30 | End: 2023-12-04

## 2023-10-30 NOTE — TELEPHONE ENCOUNTER
Pt reports 90% improvement post inj   Pain level 2-3/10  Pt aware I will call next week for an update

## 2023-11-01 ENCOUNTER — OFFICE VISIT (OUTPATIENT)
Dept: NEPHROLOGY | Facility: CLINIC | Age: 63
End: 2023-11-01
Payer: COMMERCIAL

## 2023-11-01 VITALS — BODY MASS INDEX: 44.98 KG/M2 | WEIGHT: 270 LBS | HEIGHT: 65 IN

## 2023-11-01 DIAGNOSIS — N18.31 STAGE 3A CHRONIC KIDNEY DISEASE (HCC): ICD-10-CM

## 2023-11-01 DIAGNOSIS — I49.9 IRREGULAR CARDIAC RHYTHM: ICD-10-CM

## 2023-11-01 DIAGNOSIS — I12.9 PARENCHYMAL RENAL HYPERTENSION, STAGE 1 THROUGH STAGE 4 OR UNSPECIFIED CHRONIC KIDNEY DISEASE: Primary | ICD-10-CM

## 2023-11-01 DIAGNOSIS — E78.5 DYSLIPIDEMIA: ICD-10-CM

## 2023-11-01 DIAGNOSIS — N20.0 NEPHROLITHIASIS: ICD-10-CM

## 2023-11-01 DIAGNOSIS — E11.21 DIABETIC NEPHROPATHY ASSOCIATED WITH TYPE 2 DIABETES MELLITUS (HCC): ICD-10-CM

## 2023-11-01 DIAGNOSIS — E66.01 MORBID OBESITY (HCC): ICD-10-CM

## 2023-11-01 PROCEDURE — 99214 OFFICE O/P EST MOD 30 MIN: CPT | Performed by: INTERNAL MEDICINE

## 2023-11-01 NOTE — LETTER
November 1, 2023     Lu Childress MD  [id number] [id number]    Patient: Damir Reed   YOB: 1960   Date of Visit: 11/1/2023       Dear Dr. Letitia Alegria: Thank you for referring Damir Reed to me for evaluation. Below are my notes for this consultation. If you have questions, please do not hesitate to call me. I look forward to following your patient along with you. Sincerely,        Carolina Phipps MD        CC: DO Carolina Wilkins MD  11/1/2023  4:42 PM  Sign when Signing Visit  RENAL FOLLOW UP NOTE:.td    ASSESSMENT AND PLAN:  65y.o. year old male with a history of diabetes mellitus type 2/dyslipidemia/hypertension/prostate cancer status post robotic prostatectomy June 2018/ELENA who we are asked to see regarding nephrolithiasis/CKD        1. CKD stage 3A  Etiology:  Diabetic kidney disease/hypertensive nephrosclerosis/episodes of JAYNE-hydronephrosis of the left kidney/arteriolar nephrosclerosis. Doubt primary glomerular process.   Baseline creatinine:  1.37-1.60, most recently 1.60  Recommend:  Workup:  Current creatinine: 1.52 at baseline  UA with microscopic: From 1/11/2023: Trace proteinuria, negative heme; 2-4  RBCs WBCs or bacteria seen  Urine protein creatinine ratio: 0.08 g at goal  Renal ultrasound with PVR:  To be done did have moderate left-sided hydroureteronephrosis and 9 mm obstructing stone proximal left ureter  10/6/2022: Bilateral nephrolithiasis without hydronephrosis, 9 mm calculus in the upper pole of the right kidney; 5 and 10 mm calculus in the left  Renal artery duplex to rule out renal artery stenosis: Negative     Treatment:  Treat hypertension, please see below for recommendations  Treat dyslipidemia  Avoid nephrotoxic agents such as NSAIDs, and proton pump inhibitors as able; patient counseled as such  Good overall health recommendations including weight loss as appropriate, isotonic exercise as able, and avoidance of salt; patient counseled as such  UA: 2-4 RBCs are acceptable: Reviewed with urology: Apparently no issues at this time     2. Volume: Euvolemic     3. Hypertension:  Secondary work-up:  Normal plasma free metanephrines  Aldosterone/renin ratio: Negative  TSH normal  Renal work-up as above  ELENA unlikely mild heavy snoring at times but patient would defer at this time       Current blood pressure averages:  No formal readings but about 140/78     Goal blood pressure: Less than 130/80 given CKD     Recommendations:  Push nonmedical regimen including weight loss, isotonic exercise and a low sodium diet. Patient has been counseled the such. MedicationChanges today:     Current recommendations:  Certainly increased by pain/steroids recently I will have him with his wife's assistance she takes his blood pressure at home check a week of blood pressure readings formally and send those in     4. Electrolytes: All acceptable including potassium 4.8       5. 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:  Calcium/magnesium/phosphorus:  Magnesium 1.5? Another product   Phosphorus 4.4 we will just follow for now  PTH intact: 140.4 slightly increased Will be monitored do not overtreat to avoid adynamic bone disease: I would just replete vitamin D at this time as outlined below  Vitamin-D: Now actually at goal at 33. 5! As of 10/25/2023     6. Dyslipidemia:  Goal LDL: Less than 100  Current lipid profile: 52/HDL 39/triglycerides 128  Recommendations:   Low-cholesterol/low-fat diet / weight loss as appropriate and isotonic exercise   Medication changes today: At goal so no changes     7. Anemia:  Current hemoglobin: Normal at 13.6  Myeloma evaluation from 10/25/2023 negative including SPEP UPEP and light chain ratio        8. Nephrolithiasis:Patient has had calcium oxalate stones as many as 13  most recently requiring intervention with cystoscopy laser lithotripsy and stent placement.   Patient with prior calcium oxalate now uric acid stones. Status post recent ureteroscopy with laser lithotripsy basket stone extraction on the left and left ureteral stent placement on 8/11/2022 by Dr. Dusty De Leon  Recommend:     Current status/stone passage none since stone extraction     Workup:  Patient with mild hyperparathyroidism will increase gently the vitamin D supplement. Doubt primary hyperparathyroidism  Given incontinence cannot obtain formal 24 hour urine  General stone as well as low oxalate diet   Patient is slowly increasing water intake decreasing soda intake and gets close to 68 ounces we will continue to work on this! Can actually take closer to 2.5-3 L              8.  Other problems:  Diabetes mellitus type 2  : To consider SGLT2 inhibitor per primary physician if no contraindications for renal/cardiac protection. Once he is feeling better regarding the back issue and recommend trying Farxiga 5 mg or Jardiance 10 mg just 3 days a week hold there for renal/cardiac protection and recheck basic metabolic profile 1 to 2 weeks later  Prostate cancer status post robotic prostatectomy 2018  ELENA? Chronic mild leukocytosis stable  Recent back pain MRI demonstrated spinal canal stenosis mild exacerbated by acquired degenerative disc disease small disc herniations annular bulging posterior element hypertrophic changes. Foraminal narrowing most pronounced on the left T11-12 and L4-5 level bilaterally. Seen by Dr. Tatiana Duque from pain management felt lumbar radiculopathy. Status post steroid injection about a week plus with marked improvement in pain able to walk  Slight irregular heart rhythm I believe seems premature atrial contraction I will have him go for an ECG with rhythm strip at his convenience it is asymptomatic         GI HEALTH MAINTENANCE: LAST COLONOSCOPY: 2/10/2021       PATIENT INSTRUCTIONS:    Patient Instructions   Visit summary:  - Overall kidney function is very good! Labs in general look great.   The only slight abnormality is the magnesium. As we discussed I recommend trying to find a different product with the help of a pharmacist and take it 3 days a week to start making sure you tolerate it.    -Also at your convenience go for an electrocardiogram or ECG it is a walk-in and one of the labs. You most likely have a premature was called atrial contraction which has 0 significance I just wanted make sure    - In regards to your stone prevention I am recommending increasing water intake to about 2-1/2 3 L as much as you can do which is approximately  ounces as a range the closer you get the better. And certainly continue to avoid salt is much as you can. Now that you are feeling better as well I would recommend trying to exercise as much as you are able to with permission from your pain specialist.    Finally I did send a message to Dr. Gregg Mata regarding either McConnell Orn or Eber Cotton at a very small dose this is the new class of medications as we discussed SGLT2 inhibitors. The purpose in the setting of diabetes and even mild chronic kidney disease is heart disease prevention and also progression of chronic kidney disease. The data/information suggests this is a very helpful medication. Again I would only recommend starting it 3 days a week at the very smallest dose to make sure you tolerate it before it is advanced in the future. 1. Medication changes today:  Just a trial of a magnesium supplement over-the-counter 3 days a week make sure to watch for diarrhea check with your pharmacist  As outlined check with Dr. Gregg Mata regarding Eber Cotton or McConnell Orn again just 3 days a week at the smallest dose    2. Please go for non fasting  lab work 1-2 weeks after starting McConnell Orn or Eber Cotton. 3.  Please take 1 week a blood pressure readings at your convenience in the next couple weeks.     AS FOLLOWS  MORNING AND EVENING, SITTING through your blood pressure as follows:  TAKE THE MORNING READINGS BEFORE ANY MEDICATIONS AND WHEN YOU ARE RELAXED FOR SEVERAL MINUTES  TAKE THE EVENING READINGS:  BETWEEN 7-10 P.M.; PRIOR TO ANY MEDICATIONS; AT LEAST IN OUR  FROM DINNER; AND CERTAINLY AFTER RELAXING FOR A FEW MINUTES  PLEASE INCLUDE HEART RATE WITH YOUR BLOOD PRESSURE READINGS  When taking standing readings, keep your arm supported at heart level and not dangling  Make sure you are sitting with your back supported and feet on the ground and do not cross your legs or feet  Make sure you have not taken any coffee or caffeine products or exercised or smoke cigarettes at least 30 minutes before taking your blood pressure  Then please mail these readings into the office    4. In 3 months:  Please go for nonfasting lab work but in the morning  Please take 1 week a blood pressure readings as outlined above and mail those into the office      5. Follow-up in 6 months  Please bring in 1 week a blood pressure readings morning evening, sitting and standing is outlined above  PLEASE BRING AN YOUR BLOOD PRESSURE MACHINE TO CORRELATE WITH THE OFFICE MACHINE AT THIS NEXT SCHEDULED VISIT  Please go for fasting lab work 1-2 weeks prior to your appointment      6. General instructions:  AVOID SALT BUT NOT ADDING AN READING LABELS TO MAKE SURE THERE IS LOW-SALT IN THE FOOD THAT YOU ARE EATING  Goal is less than 2 g of sodium intake or less than 5 g of sodium chloride intake per day    Avoid nonsteroidal anti-inflammatory drugs such as Naprosyn, ibuprofen, Aleve, Advil, Celebrex, Meloxicam (Mobic) etc.  You can use Tylenol as needed if you do not have any liver condition to be concerned about    Avoid medications such as Sudafed or decongestants and antihistamines that contained the D component which is the decongestant. You can take antihistamines without the decongestant or D component.     Try to avoid medications such as pantoprazole or  Protonix/Nexium or Esomeprazole)/Prilosec or omeprazole/Prevacid or lansoprazole/AcipHex or Rabeprazole. If you are able to, use Pepcid as this is safer for your kidneys. Try to exercise at least 30 minutes 3 days a week to begin with with an ultimate goal of 5 days a week for at least 30 minutes    Try to lose 10-15 lb by your next visit    Please do not drink more than 2 glasses of alcohol/wine on a daily basis as this may contribute to your high blood pressure. Subjective:   See above regarding back pain s/p steroid injection 10/23/2023    No fevers, chills, or cough or colds. Good appetite and good energy  No hematuria, dysuria, voiding symptoms or foamy urine. No kidney stone passage as well.   No gastrointestinal symptoms  No cardiovascular symptoms; (intermittent leg foot edema but resolves)  Rare headaches, but no dizziness or lightheadedness  Blood pressure medications:  Lisinopril 10 mg daily  HCTZ 12.5 mg daily  Amlodipine 5 mg daily in the morning  Carvedilol 25 mg twice a day    Renal pertinent medications:  Vitamin D 400 units daily  Flomax only as needed with kidney stone passage  Urocit-K 10 mEq twice a day  Metformin 1000 mg twice a day  ASA 81 mg twice a day  Atorvastatin 80 mg daily  Gabapentin 100 mg 3 times a day    ROS:  See HPI, otherwise review of systems as completely reviewed with the patient are negative    Past Medical History:   Diagnosis Date   • Acute bronchitis    • Acute low back pain    • Acute low back pain     [id number] resolved   • Acute respiratory infection    • JAYNE (acute kidney injury) ([address]) 06/15/2018   • Cancer ([address])     prostate   • Cellulitis of scrotum    • Cough    • Diabetes mellitus ([address])    • Herpes zoster    • High cholesterol    • Hyperkalemia    • Hypertension    • Kidney disease    • Kidney stone    • Leukocytosis 06/15/2018   • Low back pain    • Lumbar radiculopathy    • Pyelonephritis 08/08/2022   • Rash    • Retinopathy, central serous    • Shingles    • Sleep apnea     [id number]   • Trochanteric bursitis      Past Surgical History: Procedure Laterality Date   • ABDOMINAL ADHESION SURGERY N/A 6/13/2018    Procedure: LYSIS ADHESIONS;  Surgeon: Tori Heller MD;  Location: [facility];  Service: Urology   • FL RETROGRADE PYELOGRAM  8/8/2022   • FL RETROGRADE PYELOGRAM  8/11/2022   • LITHOTRIPSY      renal   • LA CYSTO BLADDER W/URETERAL CATHETERIZATION Left 8/8/2022    Procedure: CYSTOSCOPY RETROGRADE PYELOGRAM WITH INSERTION STENT URETERAL;  Surgeon: Oliver Rich MD;  Location: [facility];  Service: Urology   • LA CYSTO/URETERO W/LITHOTRIPSY &INDWELL STENT INSRT Left 8/11/2022    Procedure: CYSTOSCOPY URETEROSCOPY WITH LITHOTRIPSY HOLMIUM LASER, RETROGRADE PYELOGRAM AND INSERTION STENT URETERAL;  Surgeon: Oliver Rich MD;  Location: [facility];  Service: Urology   • LA LAPS SURG [id number] RPBIC RAD W/NRV SPARING ROBOT N/A 6/13/2018    Procedure: Gearl Huh;  Surgeon: Tori Heller MD;  Location: [facility];  Service: Urology   • PROSTATE SURGERY  06/13/2018   • SHOULDER SURGERY       Family History   Problem Relation Age of Onset   • Other Father         cardiac disorder   • Stroke Father    • Diabetes Father         mellitus   • Hypertension Father    • Heart disease Father         cardiac disorder   • Cancer Sister    • Cancer Paternal Grandmother    • Heart disease Family         cardiac disorder   • Kidney disease Family    • Hypertension Mother    • Cancer Mother       reports that he has never smoked. He has never used smokeless tobacco. He reports that he does not drink alcohol and does not use drugs.     I COMPLETELY REVIEWED THE PAST MEDICAL HISTORY/PAST SURGICAL HISTORY/SOCIAL HISTORY/FAMILY HISTORY/AND MEDICATIONS  AND UPDATED ALL    Objective:     Vitals:   BP sitting on left: 138/72 with a heart rate of 84 and slightly irregular, and general regular but an occasional premature beat  BP standing on left: 134/80 with a heart rate of 84, regular with occasional premature beat    Weight (last 2 days) Date/Time Weight    11/01/23 1600 122 (270)          Wt Readings from Last 3 Encounters:   11/01/23 122 kg (270 lb)   09/07/23 122 kg (269 lb)   06/06/23 122 kg (269 lb 3.2 oz)       Body mass index is 44.93 kg/m².     Physical Exam: General:  No acute distress/obese  Skin:  No acute rash/eczema patches   Eyes:  No scleral icterus, noninjected, no discharge from eyes  ENT:  Moist mucous membranes  Neck:  Supple, no jugular venous distention, trachea is midline, no lymphadenopathy and no thyromegaly  Back   No CVAT  Chest:  Clear to auscultation and percussion, good respiratory effort  CVS:  Regular rate and rhythm without a rub, or gallops or murmurs  Abdomen:  obese,Soft and nontender with normal bowel sounds  Extremities:  No cyanosis and no edema, no arthritic changes, normal range of motion  Neuro:  Grossly intact  Psych:  Alert, oriented x3 and appropriate      Medications:    Current Outpatient Medications:   •  albuterol (PROVENTIL HFA,VENTOLIN HFA) 90 mcg/act inhaler, Inhale 1-2 puffs every 6 (six) hours as needed, Disp: , Rfl:   •  amLODIPine (NORVASC) 5 mg tablet, TAKE 1 TABLET BY MOUTH EVERY DAY, Disp: 90 tablet, Rfl: 1  •  ASPIRIN 81 PO, Take 1 capsule by mouth 2 (two) times a day  , Disp: , Rfl:   •  atorvastatin (LIPITOR) 80 mg tablet, TAKE 1 TABLET BY MOUTH EVERY DAY, Disp: 90 tablet, Rfl: 1  •  BD Pen Needle Rosalie 2nd Gen 32G X 4 MM MISC, INJECT UNDER THE SKIN 4 (FOUR) TIMES A DAY, Disp: 400 each, Rfl: 3  •  carvedilol (COREG) 25 mg tablet, TAKE 1 TABLET BY MOUTH TWICE A DAY WITH MEALS, Disp: 180 tablet, Rfl: 2  •  dulaglutide (Trulicity) 3 TA/3.6PK injection, INJECT 0.5 ML (3 MG TOTAL) UNDER THE SKIN ONCE A WEEK, Disp: 6 mL, Rfl: 1  •  gabapentin (Neurontin) 100 mg capsule, Take 1 capsule (100 mg total) by mouth 3 (three) times a day, Disp: 270 capsule, Rfl: 3  •  glimepiride (AMARYL) 4 mg tablet, TAKE 1 TABLET BY MOUTH TWICE A DAY, Disp: 180 tablet, Rfl: 1  •  glucose blood (Contour Next Test) test strip, Use 1 each 4 (four) times a day Use as instructed (Patient taking differently: Use 1 each daily 3 to 4 times daily. Before meals or randomly), Disp: 400 each, Rfl: 3  •  glucose blood (Contour Next Test) test strip, CHECK SUGAR 4 TIMES DAILY, Disp: 400 strip, Rfl: 3  •  HumaLOG KwikPen 200 units/mL CONCENTRATED U-200 injection pen, INJECT 15 UNITS THREE TIMES DAILY WITH MEALS., Disp: 18 mL, Rfl: 5  •  hydrochlorothiazide (HYDRODIURIL) 12.5 mg tablet, TAKE 0.5 TABLETS BY MOUTH DAILY. (Patient taking differently: Take 12.5 mg by mouth Take 0.5 tab QOD), Disp: 45 tablet, Rfl: 4  •  lisinopril (ZESTRIL) 10 mg tablet, Take 1 tablet (10 mg total) by mouth daily, Disp: 90 tablet, Rfl: 3  •  metFORMIN (GLUCOPHAGE) 1000 MG tablet, TAKE 1 TABLET BY MOUTH TWICE A DAY, Disp: 180 tablet, Rfl: 3  •  Microlet Lancets MISC, Use 3 (three) times a day, Disp: 300 each, Rfl: 3  •  Multiple Vitamins-Minerals (MULTIVITAMIN WITH MINERALS) tablet, Take 1 tablet by mouth daily, Disp: , Rfl:   •  potassium citrate (UROCIT-K) 10 mEq, TAKE 1 TABLET BY MOUTH 2 TIMES A DAY., Disp: 180 tablet, Rfl: 3  •  tamsulosin (FLOMAX) 0.4 mg, TAKE 1 CAPSULE BY MOUTH EVERY DAY WITH DINNER (Patient taking differently: PRN), Disp: 90 capsule, Rfl: 1  •  Toujeo SoloStar 300 units/mL CONCENTRATED U-300 injection pen (1-unit dial), INJECT 80 UNITS UNDER THE SKIN DAILY, Disp: 22.5 mL, Rfl: 3  •  Vitamin D, Cholecalciferol, 400 units TABS, Take by mouth daily, Disp: , Rfl:   •  lisinopril (ZESTRIL) 40 mg tablet, Take 0.5 tablets (20 mg total) by mouth daily (Patient not taking: Reported on 11/1/2023), Disp: , Rfl:   •  methylPREDNISolone 4 MG tablet therapy pack, Use as directed on package (Patient not taking: Reported on 11/1/2023), Disp: 21 each, Rfl: 0  •  tamsulosin (FLOMAX) 0.4 mg, Take 1 capsule (0.4 mg total) by mouth daily with dinner as needed for kidney stone management.  (Patient not taking: Reported on 1/19/2023), Disp: 90 capsule, Rfl: 1    Lab, Imaging and other studies: I have personally reviewed pertinent labs. Laboratory Results:  Results for orders placed or performed in visit on 10/25/23   Immunoglobulin free LT chains blood   Result Value Ref Range    Ig Kappa Free Light Chain 34.8 (H) 3.3 - 19.4 mg/L    Ig Lambda Free Light Chain 23.5 5.7 - 26.3 mg/L    Kappa/Lambda FluidC Ratio 1.48 0.26 - 1.65   Protein electrophoresis, serum   Result Value Ref Range    A/G Ratio 1.38 1.10 - 1.80    Albumin Electrophoresis 57.9 48.0 - 70.0 %    Albumin CONC 3.65 3.20 - 5.10 g/dl    Alpha 1 4.9 1.8 - 7.0 %    ALPHA 1 CONC 0.31 0.15 - 0.47 g/dL    Alpha 2 14.0 5.9 - 14.9 %    ALPHA 2 CONC 0.88 0.42 - 1.04 g/dL    Beta-1 6.0 4.7 - 7.7 %    BETA 1 CONC 0.38 0.31 - 0.57 g/dL    Beta-2 5.6 3.1 - 7.9 %    BETA 2 CONC 0.35 0.20 - 0.58 g/dL    Gamma Globulin 11.6 6.9 - 22.3 %    GAMMA CONC 0.73 0.40 - 1.66 g/dL    Total Protein 6.3 (L) 6.4 - 8.2 g/dL    SPEP Interpretation See Comment    Protein electrophoresis, urine   Result Value Ref Range    Urine Protein 8.0 mg/dL    Albumin ELP, Urine 100.0 %    Alpha 1, Urine 0.0 %    Alpha 2, Urine 0.0 %    Beta, Urine 0.0 %    Gamma Globulin, Urine 0.0 %    UPEP Interp See Comment    Lipid Panel with Direct LDL reflex   Result Value Ref Range    Cholesterol 117 See Comment mg/dL    Triglycerides 128 See Comment mg/dL    HDL, Direct 39 (L) >=40 mg/dL    LDL Calculated 52 0 - 100 mg/dL   Hemoglobin A1C   Result Value Ref Range    Hemoglobin A1C 7.3 (H) Normal 4.0-5.6%; PreDiabetic 5.7-6.4%;  Diabetic >=6.5%; Glycemic control for adults with diabetes <7.0% %     mg/dl   TSH, 3rd generation with Free T4 reflex   Result Value Ref Range    TSH 3RD GENERATON 2.588 0.450 - 4.500 uIU/mL   Vitamin D 25 hydroxy   Result Value Ref Range    Vit D, 25-Hydroxy 33.5 30.0 - 100.0 ng/mL   PTH, intact   Result Value Ref Range    .4 (H) 12.0 - 88.0 pg/mL   Phosphorus   Result Value Ref Range    Phosphorus 4.4 (H) 2.3 - 4.1 mg/dL Magnesium   Result Value Ref Range    Magnesium 1.5 (L) 1.9 - 2.7 mg/dL   CBC and Platelet   Result Value Ref Range    WBC 13.64 (H) 4.31 - 10.16 Thousand/uL    RBC 4.56 3.88 - 5.62 Million/uL    Hemoglobin 13.4 12.0 - 17.0 g/dL    Hematocrit 42.0 36.5 - 49.3 %    MCV 92 82 - 98 fL    MCH 29.4 26.8 - 34.3 pg    MCHC 31.9 31.4 - 37.4 g/dL    RDW 12.6 11.6 - 15.1 %    Platelets 123 268 - 386 Thousands/uL    MPV 9.2 8.9 - 12.7 fL   Comprehensive metabolic panel   Result Value Ref Range    Sodium 141 135 - 147 mmol/L    Potassium 4.8 3.5 - 5.3 mmol/L    Chloride 106 96 - 108 mmol/L    CO2 24 21 - 32 mmol/L    ANION GAP 11 mmol/L    BUN 27 (H) 5 - 25 mg/dL    Creatinine 1.52 (H) 0.60 - 1.30 mg/dL    Glucose, Fasting 89 65 - 99 mg/dL    Calcium 8.9 8.4 - 10.2 mg/dL    AST 25 13 - 39 U/L    ALT 35 7 - 52 U/L    Alkaline Phosphatase 69 34 - 104 U/L    Total Protein 6.7 6.4 - 8.4 g/dL    Albumin 4.1 3.5 - 5.0 g/dL    Total Bilirubin 0.36 0.20 - 1.00 mg/dL    eGFR 48 ml/min/1.73sq m             Invalid input(s): "ALBUMIN"      Radiology review:   chest X-ray    Ultrasound      Portions of the record may have been created with voice recognition software. Occasional wrong word or "sound a like" substitutions may have occurred due to the inherent limitations of voice recognition software. Read the chart carefully and recognize, using context, where substitutions have occurred.

## 2023-11-01 NOTE — PATIENT INSTRUCTIONS
Visit summary:  - Overall kidney function is very good! Labs in general look great. The only slight abnormality is the magnesium. As we discussed I recommend trying to find a different product with the help of a pharmacist and take it 3 days a week to start making sure you tolerate it.    -Also at your convenience go for an electrocardiogram or ECG it is a walk-in and one of the labs. You most likely have a premature was called atrial contraction which has 0 significance I just wanted make sure    - In regards to your stone prevention I am recommending increasing water intake to about 2-1/2 3 L as much as you can do which is approximately  ounces as a range the closer you get the better. And certainly continue to avoid salt is much as you can. Now that you are feeling better as well I would recommend trying to exercise as much as you are able to with permission from your pain specialist.    Finally I did send a message to Dr. Yumiko Chilel regarding either Marvene Roseanna or Mary Sauger at a very small dose this is the new class of medications as we discussed SGLT2 inhibitors. The purpose in the setting of diabetes and even mild chronic kidney disease is heart disease prevention and also progression of chronic kidney disease. The data/information suggests this is a very helpful medication. Again I would only recommend starting it 3 days a week at the very smallest dose to make sure you tolerate it before it is advanced in the future. 1. Medication changes today:  Just a trial of a magnesium supplement over-the-counter 3 days a week make sure to watch for diarrhea check with your pharmacist  As outlined check with Dr. Yumiko Chilel regarding Mary Sauger or Marvene Roseanna again just 3 days a week at the smallest dose    2. Please go for non fasting  lab work 1-2 weeks after starting Marvene Roseanna or Mary Sauger. 3.  Please take 1 week a blood pressure readings at your convenience in the next couple weeks.     AS FOLLOWS  MORNING AND Brynn Price through your blood pressure as follows:  TAKE THE MORNING READINGS BEFORE ANY MEDICATIONS AND WHEN YOU ARE RELAXED FOR SEVERAL MINUTES  TAKE THE EVENING READINGS:  BETWEEN 7-10 P.M.; PRIOR TO ANY MEDICATIONS; AT LEAST IN OUR  FROM DINNER; AND CERTAINLY AFTER RELAXING FOR A FEW MINUTES  PLEASE INCLUDE HEART RATE WITH YOUR BLOOD PRESSURE READINGS  When taking standing readings, keep your arm supported at heart level and not dangling  Make sure you are sitting with your back supported and feet on the ground and do not cross your legs or feet  Make sure you have not taken any coffee or caffeine products or exercised or smoke cigarettes at least 30 minutes before taking your blood pressure  Then please mail these readings into the office    4. In 3 months:  Please go for nonfasting lab work but in the morning  Please take 1 week a blood pressure readings as outlined above and mail those into the office      5. Follow-up in 6 months  Please bring in 1 week a blood pressure readings morning evening, sitting and standing is outlined above  PLEASE BRING AN YOUR BLOOD PRESSURE MACHINE TO CORRELATE WITH THE OFFICE MACHINE AT THIS NEXT SCHEDULED VISIT  Please go for fasting lab work 1-2 weeks prior to your appointment      6. General instructions:  AVOID SALT BUT NOT ADDING AN READING LABELS TO MAKE SURE THERE IS LOW-SALT IN THE FOOD THAT YOU ARE EATING  Goal is less than 2 g of sodium intake or less than 5 g of sodium chloride intake per day    Avoid nonsteroidal anti-inflammatory drugs such as Naprosyn, ibuprofen, Aleve, Advil, Celebrex, Meloxicam (Mobic) etc.  You can use Tylenol as needed if you do not have any liver condition to be concerned about    Avoid medications such as Sudafed or decongestants and antihistamines that contained the D component which is the decongestant. You can take antihistamines without the decongestant or D component.     Try to avoid medications such as pantoprazole or Protonix/Nexium or Esomeprazole)/Prilosec or omeprazole/Prevacid or lansoprazole/AcipHex or Rabeprazole. If you are able to, use Pepcid as this is safer for your kidneys. Try to exercise at least 30 minutes 3 days a week to begin with with an ultimate goal of 5 days a week for at least 30 minutes    Try to lose 10-15 lb by your next visit    Please do not drink more than 2 glasses of alcohol/wine on a daily basis as this may contribute to your high blood pressure.

## 2023-11-06 DIAGNOSIS — E11.22 TYPE 2 DIABETES MELLITUS WITH STAGE 3A CHRONIC KIDNEY DISEASE, WITH LONG-TERM CURRENT USE OF INSULIN (HCC): Primary | ICD-10-CM

## 2023-11-06 DIAGNOSIS — N18.31 TYPE 2 DIABETES MELLITUS WITH STAGE 3A CHRONIC KIDNEY DISEASE, WITH LONG-TERM CURRENT USE OF INSULIN (HCC): Primary | ICD-10-CM

## 2023-11-06 DIAGNOSIS — Z79.4 TYPE 2 DIABETES MELLITUS WITH STAGE 3A CHRONIC KIDNEY DISEASE, WITH LONG-TERM CURRENT USE OF INSULIN (HCC): Primary | ICD-10-CM

## 2023-11-06 NOTE — TELEPHONE ENCOUNTER
Lm to cb with 80% improvement and 4-5/10 pain level 2wk post inj      Pt states his best day was the 2nd day post injection.  He says he can function better but not 100%

## 2023-11-28 ENCOUNTER — RA CDI HCC (OUTPATIENT)
Dept: OTHER | Facility: HOSPITAL | Age: 63
End: 2023-11-28

## 2023-11-28 NOTE — PROGRESS NOTES
[address] [facility] coding opportunities          Chart Reviewed number of suggestions sent to Provider: 2     Patients Insurance        Commercial Insurance: [employer]     F49.13  E11.65

## 2023-12-04 ENCOUNTER — OFFICE VISIT (OUTPATIENT)
Dept: INTERNAL MEDICINE CLINIC | Facility: CLINIC | Age: 63
End: 2023-12-04
Payer: COMMERCIAL

## 2023-12-04 VITALS
OXYGEN SATURATION: 96 % | SYSTOLIC BLOOD PRESSURE: 134 MMHG | HEIGHT: 65 IN | BODY MASS INDEX: 45.18 KG/M2 | WEIGHT: 271.2 LBS | HEART RATE: 91 BPM | DIASTOLIC BLOOD PRESSURE: 80 MMHG

## 2023-12-04 DIAGNOSIS — E66.01 MORBID OBESITY (HCC): ICD-10-CM

## 2023-12-04 DIAGNOSIS — N18.31 TYPE 2 DIABETES MELLITUS WITH STAGE 3A CHRONIC KIDNEY DISEASE, WITH LONG-TERM CURRENT USE OF INSULIN (HCC): Primary | ICD-10-CM

## 2023-12-04 DIAGNOSIS — E78.5 DYSLIPIDEMIA: ICD-10-CM

## 2023-12-04 DIAGNOSIS — I49.9 IRREGULAR HEART BEAT: ICD-10-CM

## 2023-12-04 DIAGNOSIS — Z23 ENCOUNTER FOR IMMUNIZATION: ICD-10-CM

## 2023-12-04 DIAGNOSIS — M54.16 LUMBAR RADICULOPATHY: ICD-10-CM

## 2023-12-04 DIAGNOSIS — I10 BENIGN ESSENTIAL HYPERTENSION: ICD-10-CM

## 2023-12-04 DIAGNOSIS — Z79.4 TYPE 2 DIABETES MELLITUS WITH STAGE 3A CHRONIC KIDNEY DISEASE, WITH LONG-TERM CURRENT USE OF INSULIN (HCC): Primary | ICD-10-CM

## 2023-12-04 DIAGNOSIS — E11.22 TYPE 2 DIABETES MELLITUS WITH STAGE 3A CHRONIC KIDNEY DISEASE, WITH LONG-TERM CURRENT USE OF INSULIN (HCC): Primary | ICD-10-CM

## 2023-12-04 PROCEDURE — 90678 RSV VACC PREF BIVALENT IM: CPT

## 2023-12-04 PROCEDURE — 93000 ELECTROCARDIOGRAM COMPLETE: CPT | Performed by: INTERNAL MEDICINE

## 2023-12-04 PROCEDURE — 99214 OFFICE O/P EST MOD 30 MIN: CPT | Performed by: INTERNAL MEDICINE

## 2023-12-04 PROCEDURE — 90471 IMMUNIZATION ADMIN: CPT

## 2023-12-04 RX ORDER — GABAPENTIN 100 MG/1
200 CAPSULE ORAL 3 TIMES DAILY
Qty: 540 CAPSULE | Refills: 1 | Status: SHIPPED | OUTPATIENT
Start: 2023-12-04

## 2023-12-04 NOTE — PROGRESS NOTES
Name: Damir Reed      : 1960      MRN: [id number]  Encounter Provider: Lu Childress MD  Encounter Date: 2023   Encounter department: [facility]    Assessment & Plan     1. Type 2 diabetes mellitus with stage 3a chronic kidney disease, with long-term current use of insulin ([address])  Assessment & Plan:  He was able to get Jardiance which I encouraged him to start  He can discontinue glimepiride  He will stay on metformin Toujeo and Humalog  Declined CGM  Lab Results   Component Value Date    HGBA1C 7.3 (H) 10/25/2023       Orders:  -     HEMOGLOBIN A1C W/ EAG ESTIMATION; Future; Expected date: 2024    2. Benign essential hypertension    3. Dyslipidemia    4. Morbid obesity ([address])    5. Lumbar radiculopathy  Assessment & Plan:  Increase gabapentin to 200mg TID  Considering getting another KENTRELL after wife's surgery in January    Orders:  -     gabapentin (Neurontin) 100 mg capsule; Take 2 capsules (200 mg total) by mouth 3 (three) times a day    6. Encounter for immunization  -     Respiratory Syncytial Virus (RSV) vaccine (recombinant) (Abrysvo)    7. Irregular heart beat  -     POCT ECG    Consider Shingrix        Subjective      Here for a follow up  Ongoing low back pain and had little short lived relief from L5S1 KENTRELL 4 weeks ago  He is taking gabapentin 100mg TID  BS reviewed and has been high  A1C 7.3 4 weeks ago      Review of Systems   Respiratory:  Negative for shortness of breath. Cardiovascular:  Negative for chest pain and palpitations. Gastrointestinal:  Negative for abdominal pain. Musculoskeletal:  Positive for back pain.        Current Outpatient Medications on File Prior to Visit   Medication Sig   • albuterol (PROVENTIL HFA,VENTOLIN HFA) 90 mcg/act inhaler Inhale 1-2 puffs every 6 (six) hours as needed   • amLODIPine (NORVASC) 5 mg tablet TAKE 1 TABLET BY MOUTH EVERY DAY   • ASPIRIN 81 PO Take 1 capsule by mouth 2 (two) times a day     • atorvastatin (LIPITOR) 80 mg tablet TAKE 1 TABLET BY MOUTH EVERY DAY   • BD Pen Needle Rosalie 2nd Gen 32G X 4 MM MISC INJECT UNDER THE SKIN 4 (FOUR) TIMES A DAY   • carvedilol (COREG) 25 mg tablet TAKE 1 TABLET BY MOUTH TWICE A DAY WITH MEALS   • dulaglutide (Trulicity) 3 WG/6.3CD injection INJECT 0.5 ML (3 MG TOTAL) UNDER THE SKIN ONCE A WEEK   • Empagliflozin (JARDIANCE) 10 MG TABS tablet Take 1 tablet (10 mg total) by mouth daily   • glucose blood (Contour Next Test) test strip Use 1 each 4 (four) times a day Use as instructed (Patient taking differently: Use 1 each daily 3 to 4 times daily. Before meals or randomly)   • glucose blood (Contour Next Test) test strip CHECK SUGAR 4 TIMES DAILY   • HumaLOG KwikPen 200 units/mL CONCENTRATED U-200 injection pen INJECT 15 UNITS THREE TIMES DAILY WITH MEALS. • hydrochlorothiazide (HYDRODIURIL) 12.5 mg tablet TAKE 0.5 TABLETS BY MOUTH DAILY. (Patient taking differently: Take 12.5 mg by mouth Take 0.5 tab QOD)   • lisinopril (ZESTRIL) 10 mg tablet Take 1 tablet (10 mg total) by mouth daily   • metFORMIN (GLUCOPHAGE) 1000 MG tablet TAKE 1 TABLET BY MOUTH TWICE A DAY   • Microlet Lancets MISC Use 3 (three) times a day   • Multiple Vitamins-Minerals (MULTIVITAMIN WITH MINERALS) tablet Take 1 tablet by mouth daily   • potassium citrate (UROCIT-K) 10 mEq TAKE 1 TABLET BY MOUTH 2 TIMES A DAY.    • Toutashao SoloStar 300 units/mL CONCENTRATED U-300 injection pen (1-unit dial) INJECT 80 UNITS UNDER THE SKIN DAILY   • Vitamin D, Cholecalciferol, 400 units TABS Take by mouth daily   • [DISCONTINUED] gabapentin (Neurontin) 100 mg capsule Take 1 capsule (100 mg total) by mouth 3 (three) times a day   • [DISCONTINUED] glimepiride (AMARYL) 4 mg tablet TAKE 1 TABLET BY MOUTH TWICE A DAY   • tamsulosin (FLOMAX) 0.4 mg TAKE 1 CAPSULE BY MOUTH EVERY DAY WITH DINNER (Patient taking differently: PRN)   • [DISCONTINUED] lisinopril (ZESTRIL) 40 mg tablet Take 0.5 tablets (20 mg total) by mouth daily (Patient not taking: Reported on 11/1/2023)   • [DISCONTINUED] methylPREDNISolone 4 MG tablet therapy pack Use as directed on package (Patient not taking: Reported on 12/4/2023)   • [DISCONTINUED] tamsulosin (FLOMAX) 0.4 mg Take 1 capsule (0.4 mg total) by mouth daily with dinner as needed for kidney stone management. (Patient not taking: Reported on 1/19/2023)       Objective     /80 (BP Location: Left arm, Patient Position: Sitting, Cuff Size: Large)   Pulse 91   Ht 5' 5" (1.651 m)   Wt 123 kg (271 lb 3.2 oz)   SpO2 96%   BMI 45.13 kg/m²     Physical Exam  Constitutional:       Appearance: He is well-developed. He is obese. He is not ill-appearing. Eyes:      Conjunctiva/sclera: Conjunctivae normal.   Cardiovascular:      Rate and Rhythm: Normal rate and regular rhythm. Heart sounds: Normal heart sounds. No murmur heard. Pulmonary:      Effort: Pulmonary effort is normal. No respiratory distress. Breath sounds: Normal breath sounds. No wheezing or rales. Abdominal:      General: There is no distension. Palpations: Abdomen is soft. There is no mass. Tenderness: There is no abdominal tenderness. There is no guarding or rebound. Musculoskeletal:      Cervical back: Neck supple. Right lower leg: No edema. Left lower leg: No edema. Neurological:      Mental Status: He is alert and oriented to person, place, and time. Psychiatric:         Mood and Affect: Mood normal.         Behavior: Behavior normal.         Thought Content:  Thought content normal.         Judgment: Judgment normal.       Sandro Olson MD

## 2023-12-04 NOTE — ASSESSMENT & PLAN NOTE
He was able to get Jardiance which I encouraged him to start  He can discontinue glimepiride  He will stay on metformin Toujeo and Humalog  Declined CGM  Lab Results   Component Value Date    HGBA1C 7.3 (H) 10/25/2023

## 2023-12-04 NOTE — ASSESSMENT & PLAN NOTE
Increase gabapentin to 200mg TID  Considering getting another KENTRELL after wife's surgery in January

## 2023-12-05 ENCOUNTER — APPOINTMENT (OUTPATIENT)
Dept: LAB | Facility: CLINIC | Age: 63
End: 2023-12-05
Payer: COMMERCIAL

## 2023-12-05 DIAGNOSIS — I12.9 PARENCHYMAL RENAL HYPERTENSION, STAGE 1 THROUGH STAGE 4 OR UNSPECIFIED CHRONIC KIDNEY DISEASE: ICD-10-CM

## 2023-12-05 DIAGNOSIS — E78.5 DYSLIPIDEMIA: ICD-10-CM

## 2023-12-05 DIAGNOSIS — E66.01 MORBID OBESITY (HCC): ICD-10-CM

## 2023-12-05 DIAGNOSIS — N20.0 NEPHROLITHIASIS: ICD-10-CM

## 2023-12-05 DIAGNOSIS — N18.31 STAGE 3A CHRONIC KIDNEY DISEASE (HCC): ICD-10-CM

## 2023-12-05 DIAGNOSIS — E11.21 DIABETIC NEPHROPATHY ASSOCIATED WITH TYPE 2 DIABETES MELLITUS (HCC): ICD-10-CM

## 2023-12-05 DIAGNOSIS — I49.9 IRREGULAR CARDIAC RHYTHM: ICD-10-CM

## 2023-12-05 LAB — PSA SERPL-MCNC: 0.01 NG/ML (ref 0–4)

## 2023-12-06 ENCOUNTER — TELEPHONE (OUTPATIENT)
Dept: UROLOGY | Facility: CLINIC | Age: 63
End: 2023-12-06

## 2023-12-06 NOTE — TELEPHONE ENCOUNTER
I called and confirmed time change with patient. Detail Level: Detailed Depth Of Biopsy: dermis Was A Bandage Applied: Yes Size Of Lesion In Cm: 0 Biopsy Type: H and E Biopsy Method: Dermablade Anesthesia Type: 1% lidocaine with epinephrine Anesthesia Volume In Cc (Will Not Render If 0): 0.5 Hemostasis: Drysol Wound Care: Petrolatum Dressing: bandage Destruction After The Procedure: No Type Of Destruction Used: Curettage Curettage Text: The wound bed was treated with curettage after the biopsy was performed. Cryotherapy Text: The wound bed was treated with cryotherapy after the biopsy was performed. Electrodesiccation Text: The wound bed was treated with electrodesiccation after the biopsy was performed. Electrodesiccation And Curettage Text: The wound bed was treated with electrodesiccation and curettage after the biopsy was performed. Silver Nitrate Text: The wound bed was treated with silver nitrate after the biopsy was performed. Lab: -128 Path Notes (To The Dermatopathologist): Red scale Consent: Written consent was obtained and risks were reviewed including but not limited to scarring, infection, bleeding, scabbing, incomplete removal, nerve damage and allergy to anesthesia. Post-Care Instructions: I reviewed with the patient in detail post-care instructions. Patient is to keep the biopsy site dry overnight, and then apply bacitracin twice daily until healed. Patient may apply hydrogen peroxide soaks to remove any crusting. Notification Instructions: Patient will be notified of biopsy results. However, patient instructed to call the office if not contacted within 2 weeks. Billing Type: Third-Party Bill Information: Selecting Yes will display possible errors in your note based on the variables you have selected. This validation is only offered as a suggestion for you. PLEASE NOTE THAT THE VALIDATION TEXT WILL BE REMOVED WHEN YOU FINALIZE YOUR NOTE. IF YOU WANT TO FAX A PRELIMINARY NOTE YOU WILL NEED TO TOGGLE THIS TO 'NO' IF YOU DO NOT WANT IT IN YOUR FAXED NOTE.

## 2023-12-10 DIAGNOSIS — E11.65 UNCONTROLLED TYPE 2 DIABETES MELLITUS WITH HYPERGLYCEMIA (HCC): ICD-10-CM

## 2023-12-10 RX ORDER — GLIMEPIRIDE 4 MG/1
4 TABLET ORAL 2 TIMES DAILY
Qty: 180 TABLET | Refills: 1
Start: 2023-12-10

## 2023-12-11 ENCOUNTER — PATIENT MESSAGE (OUTPATIENT)
Dept: PAIN MEDICINE | Facility: CLINIC | Age: 63
End: 2023-12-11

## 2023-12-19 DIAGNOSIS — E11.65 UNCONTROLLED TYPE 2 DIABETES MELLITUS WITH HYPERGLYCEMIA (HCC): ICD-10-CM

## 2023-12-19 RX ORDER — PERPHENAZINE 16 MG/1
TABLET, FILM COATED ORAL
Qty: 400 STRIP | Refills: 1 | Status: SHIPPED | OUTPATIENT
Start: 2023-12-19

## 2023-12-19 RX ORDER — ATORVASTATIN CALCIUM 80 MG/1
TABLET, FILM COATED ORAL
Qty: 90 TABLET | Refills: 1 | Status: SHIPPED | OUTPATIENT
Start: 2023-12-19

## 2023-12-19 NOTE — PROGRESS NOTES
Referring Physician: Lea Reyes, MD  A copy of this note was sent to the referring physician.       There are no diagnoses linked to this encounter.          Assessment and plan:          #1 Prostate Cancer  - pT2a GG4+3=7  -  Robotic prostatectomy June 2018  - MAGGIE    #2 post prostatectomy KEEGAN  - 10-14 depends per day    #3 Recurrent nephrolithiasis  -Status post recent ureteroscopy (Dr. Olmedo)  -Intrarenal calculi are present on ultrasound, patient is asymptomatic  -Of note, in the past ultrasound has under detected the patient's stone burden which ultimately necessitated an unplanned surgical intervention  - As such I will plan for a CT to evaluate his stone burden at next year's follow-up visit    Rashad is doing very well.  Great to see him as always.  He will follow-up with me in 1 year with a CT and a PSA prior.      Pipo Goetz      Chief Complaint     Follow-up prostate cancer, nephrolithiasis      History of Present Illness     Bry Weller is a 63 y.o.  male returns in follow-up.     Detailed Urologic History     - please refer to HPI    Review of Systems     Review of Systems   Constitutional:  Negative for activity change and fatigue.   HENT:  Negative for congestion.    Eyes:  Negative for visual disturbance.   Respiratory:  Negative for shortness of breath and wheezing.    Cardiovascular:  Negative for chest pain and leg swelling.   Gastrointestinal:  Negative for abdominal pain.   Endocrine: Positive for polyuria.   Genitourinary:  Positive for dysuria. Negative for flank pain, hematuria and urgency.   Musculoskeletal:  Negative for back pain.   Allergic/Immunologic: Negative for immunocompromised state.   Neurological:  Negative for dizziness and numbness.   Psychiatric/Behavioral:  Negative for dysphoric mood.    All other systems reviewed and are negative.            Allergies     Allergies   Allergen Reactions    Simvastatin      Increases Liver functoin       Physical Exam     Physical  Exam  Constitutional:       General: He is not in acute distress.     Appearance: He is well-developed.   HENT:      Head: Normocephalic and atraumatic.   Cardiovascular:      Comments: Obese abdomen  Pulmonary:      Effort: Pulmonary effort is normal.      Breath sounds: Normal breath sounds.   Abdominal:      Palpations: Abdomen is soft.   Genitourinary:     Comments: Negative CVA tenderness  Musculoskeletal:         General: Normal range of motion.      Cervical back: Normal range of motion.   Skin:     General: Skin is warm.   Neurological:      Mental Status: He is alert and oriented to person, place, and time.   Psychiatric:         Behavior: Behavior normal.             Vital Signs  There were no vitals filed for this visit.        Current Medications       Current Outpatient Medications:     albuterol (PROVENTIL HFA,VENTOLIN HFA) 90 mcg/act inhaler, Inhale 1-2 puffs every 6 (six) hours as needed, Disp: , Rfl:     amLODIPine (NORVASC) 5 mg tablet, TAKE 1 TABLET BY MOUTH EVERY DAY, Disp: 90 tablet, Rfl: 1    ASPIRIN 81 PO, Take 1 capsule by mouth 2 (two) times a day  , Disp: , Rfl:     atorvastatin (LIPITOR) 80 mg tablet, TAKE 1 TABLET BY MOUTH EVERY DAY, Disp: 90 tablet, Rfl: 1    BD Pen Needle Rosalie 2nd Gen 32G X 4 MM MISC, INJECT UNDER THE SKIN 4 (FOUR) TIMES A DAY, Disp: 400 each, Rfl: 3    carvedilol (COREG) 25 mg tablet, TAKE 1 TABLET BY MOUTH TWICE A DAY WITH MEALS, Disp: 180 tablet, Rfl: 2    dulaglutide (Trulicity) 3 MG/0.5ML injection, INJECT 0.5 ML (3 MG TOTAL) UNDER THE SKIN ONCE A WEEK, Disp: 6 mL, Rfl: 1    gabapentin (Neurontin) 100 mg capsule, Take 2 capsules (200 mg total) by mouth 3 (three) times a day, Disp: 540 capsule, Rfl: 1    glimepiride (AMARYL) 4 mg tablet, Take 1 tablet (4 mg total) by mouth 2 (two) times a day, Disp: 180 tablet, Rfl: 1    glucose blood (Contour Next Test) test strip, Use 1 each 4 (four) times a day Use as instructed (Patient taking differently: Use 1 each daily 3 to  4 times daily. Before meals or randomly), Disp: 400 each, Rfl: 3    glucose blood (Contour Next Test) test strip, CHECK SUGAR 4 TIMES DAILY, Disp: 400 strip, Rfl: 3    HumaLOG KwikPen 200 units/mL CONCENTRATED U-200 injection pen, INJECT 15 UNITS THREE TIMES DAILY WITH MEALS., Disp: 18 mL, Rfl: 5    hydrochlorothiazide (HYDRODIURIL) 12.5 mg tablet, TAKE 0.5 TABLETS BY MOUTH DAILY. (Patient taking differently: Take 12.5 mg by mouth Take 0.5 tab QOD), Disp: 45 tablet, Rfl: 4    lisinopril (ZESTRIL) 10 mg tablet, Take 1 tablet (10 mg total) by mouth daily, Disp: 90 tablet, Rfl: 3    metFORMIN (GLUCOPHAGE) 1000 MG tablet, TAKE 1 TABLET BY MOUTH TWICE A DAY, Disp: 180 tablet, Rfl: 3    Microlet Lancets MISC, Use 3 (three) times a day, Disp: 300 each, Rfl: 3    Multiple Vitamins-Minerals (MULTIVITAMIN WITH MINERALS) tablet, Take 1 tablet by mouth daily, Disp: , Rfl:     potassium citrate (UROCIT-K) 10 mEq, TAKE 1 TABLET BY MOUTH 2 TIMES A DAY., Disp: 180 tablet, Rfl: 3    tamsulosin (FLOMAX) 0.4 mg, TAKE 1 CAPSULE BY MOUTH EVERY DAY WITH DINNER (Patient taking differently: PRN), Disp: 90 capsule, Rfl: 1    Toujeo SoloStar 300 units/mL CONCENTRATED U-300 injection pen (1-unit dial), INJECT 80 UNITS UNDER THE SKIN DAILY, Disp: 22.5 mL, Rfl: 3    Vitamin D, Cholecalciferol, 400 units TABS, Take by mouth daily, Disp: , Rfl:       Active Problems     Patient Active Problem List   Diagnosis    Ureterolithiasis    Prostate cancer (HCC)    Benign essential hypertension    Type 2 diabetes mellitus with stage 3a chronic kidney disease, with long-term current use of insulin (HCC)    Dyslipidemia    Morbid obesity (HCC)    Other fatigue    Stress incontinence of urine    Parenchymal renal hypertension    Stage 3 chronic kidney disease (HCC)    Diabetic nephropathy associated with type 2 diabetes mellitus (HCC)    Nephrolithiasis    Lumbar radiculopathy         Past Medical History     Past Medical History:   Diagnosis Date    Acute  bronchitis     Acute low back pain     Acute low back pain     [id number] resolved    Acute respiratory infection     JAYNE (acute kidney injury) (HCC) 06/15/2018    Cancer (HCC)     prostate    Cellulitis of scrotum     Cough     Diabetes mellitus (HCC)     Herpes zoster     High cholesterol     Hyperkalemia     Hypertension     Kidney disease     Kidney stone     Leukocytosis 06/15/2018    Low back pain     Lumbar radiculopathy     Pyelonephritis 08/08/2022    Rash     Retinopathy, central serous     Shingles     Sleep apnea     [id number]    Trochanteric bursitis          Surgical History     Past Surgical History:   Procedure Laterality Date    ABDOMINAL ADHESION SURGERY N/A 6/13/2018    Procedure: LYSIS ADHESIONS;  Surgeon: Carlton Wolfe MD;  Location: [facility];  Service: Urology    FL RETROGRADE PYELOGRAM  8/8/2022    FL RETROGRADE PYELOGRAM  8/11/2022    LITHOTRIPSY      renal    WV CYSTO BLADDER W/URETERAL CATHETERIZATION Left 8/8/2022    Procedure: CYSTOSCOPY RETROGRADE PYELOGRAM WITH INSERTION STENT URETERAL;  Surgeon: Dimple Olmedo MD;  Location: [facility];  Service: Urology    WV CYSTO/URETERO W/LITHOTRIPSY &INDWELL STENT INSRT Left 8/11/2022    Procedure: CYSTOSCOPY URETEROSCOPY WITH LITHOTRIPSY HOLMIUM LASER, RETROGRADE PYELOGRAM AND INSERTION STENT URETERAL;  Surgeon: Dimple Olmedo MD;  Location: [facility];  Service: Urology    WV LAPS SURG [id number] RPBIC RAD W/NRV SPARING ROBOT N/A 6/13/2018    Procedure: PROSTATECTOMY RADICAL W ROBOTICS;  Surgeon: Carlton Wolfe MD;  Location: [facility];  Service: Urology    PROSTATE SURGERY  06/13/2018    SHOULDER SURGERY           Family History     Family History   Problem Relation Age of Onset    Other Father         cardiac disorder    Stroke Father     Diabetes Father         mellitus    Hypertension Father     Heart disease Father         cardiac disorder    Cancer Sister     Cancer Paternal Grandmother     Heart disease Family         cardiac disorder     "Kidney disease Family     Hypertension Mother     Cancer Mother          Social History     Social History     Social History     Tobacco Use   Smoking Status Never   Smokeless Tobacco Never         Pertinent Lab Values     Lab Results   Component Value Date    CREATININE 1.52 (H) 10/25/2023       Lab Results   Component Value Date    PSA 0.01 12/05/2023    PSA <0.1 12/13/2022    PSA <0.1 02/03/2022       @RESULTRCNT(1H])@      Pertinent Imaging        URINARY TRACT FINDINGS:     RIGHT KIDNEY AND URETER:  There is redemonstration of a 1 cm calculus at the upper pole.  There is no hydronephrosis.     LEFT KIDNEY AND URETER:  There are a couple of 2-3 mm calculi at the lower pole.  There is no hydronephrosis.     URINARY BLADDER:  Unremarkable.        ADDITIONAL FINDINGS:     LOWER CHEST:  No clinically significant abnormality identified in the visualized lower chest.     SOLID VISCERA: Limited low radiation dose noncontrast CT evaluation demonstrates no clinically significant abnormality of the imaged portions of the liver, spleen, pancreas, or adrenal glands.       GALLBLADDER/BILIARY TREE:  There are gallstone(s) within the gallbladder, without pericholecystic inflammatory changes.  No biliary dilatation.     STOMACH AND BOWEL:  Unremarkable.     APPENDIX:  No findings to suggest appendicitis.     ABDOMINOPELVIC CAVITY:  No ascites.  No pneumoperitoneum.  No lymphadenopathy.     REPRODUCTIVE ORGANS:  Unremarkable for patient's age.     ABDOMINAL WALL/INGUINAL REGIONS:  Small fat-containing subumbilical ventral hernia.     OSSEOUS STRUCTURES:  No acute fracture or destructive osseous lesion.     IMPRESSION:     Bilateral nephrolithiasis without hydronephrosis.  No obstructing urinary tract calculi.    Portions of the record may have been created with voice recognition software.  Occasional wrong word or \"sound a like\" substitutions may have occurred due to the inherent limitations of voice recognition software.  " Read the chart carefully and recognize, using context, where substitutions have occurred.

## 2023-12-20 ENCOUNTER — OFFICE VISIT (OUTPATIENT)
Dept: UROLOGY | Facility: CLINIC | Age: 63
End: 2023-12-20
Payer: COMMERCIAL

## 2023-12-20 VITALS
SYSTOLIC BLOOD PRESSURE: 134 MMHG | DIASTOLIC BLOOD PRESSURE: 84 MMHG | BODY MASS INDEX: 44.65 KG/M2 | HEART RATE: 96 BPM | HEIGHT: 65 IN | WEIGHT: 268 LBS | OXYGEN SATURATION: 99 %

## 2023-12-20 DIAGNOSIS — C61 PROSTATE CANCER (HCC): Primary | ICD-10-CM

## 2023-12-20 DIAGNOSIS — N20.1 URETEROLITHIASIS: ICD-10-CM

## 2023-12-20 PROCEDURE — 99213 OFFICE O/P EST LOW 20 MIN: CPT | Performed by: UROLOGY

## 2024-01-21 DIAGNOSIS — I10 BENIGN ESSENTIAL HYPERTENSION: ICD-10-CM

## 2024-01-22 RX ORDER — CARVEDILOL 25 MG/1
25 TABLET ORAL 2 TIMES DAILY WITH MEALS
Qty: 180 TABLET | Refills: 1 | Status: SHIPPED | OUTPATIENT
Start: 2024-01-22

## 2024-01-24 ENCOUNTER — OFFICE VISIT (OUTPATIENT)
Dept: PAIN MEDICINE | Facility: CLINIC | Age: 64
End: 2024-01-24
Payer: COMMERCIAL

## 2024-01-24 ENCOUNTER — TELEPHONE (OUTPATIENT)
Dept: PAIN MEDICINE | Facility: CLINIC | Age: 64
End: 2024-01-24

## 2024-01-24 VITALS
SYSTOLIC BLOOD PRESSURE: 128 MMHG | RESPIRATION RATE: 16 BRPM | HEART RATE: 92 BPM | BODY MASS INDEX: 45.32 KG/M2 | HEIGHT: 65 IN | WEIGHT: 272 LBS | DIASTOLIC BLOOD PRESSURE: 74 MMHG

## 2024-01-24 DIAGNOSIS — M48.062 SPINAL STENOSIS OF LUMBAR REGION WITH NEUROGENIC CLAUDICATION: ICD-10-CM

## 2024-01-24 DIAGNOSIS — M51.16 INTERVERTEBRAL DISC DISORDER WITH RADICULOPATHY OF LUMBAR REGION: ICD-10-CM

## 2024-01-24 DIAGNOSIS — M43.16 SPONDYLOLISTHESIS OF LUMBAR REGION: ICD-10-CM

## 2024-01-24 DIAGNOSIS — M47.816 LUMBAR SPONDYLOSIS: ICD-10-CM

## 2024-01-24 DIAGNOSIS — G89.4 CHRONIC PAIN SYNDROME: Primary | ICD-10-CM

## 2024-01-24 PROCEDURE — 99214 OFFICE O/P EST MOD 30 MIN: CPT

## 2024-01-24 NOTE — PROGRESS NOTES
Assessment:  1. Chronic pain syndrome    2. Intervertebral disc disorder with radiculopathy of lumbar region    3. Spinal stenosis of lumbar region with neurogenic claudication    4. Spondylolisthesis of lumbar region    5. Lumbar spondylosis        Plan:  The patient is a 63-year-old male with a history of chronic pain secondary to low back pain, lumbar intervertebral disc disorder with radiculopathy and lumbar spondylolisthesis who presents to the office with worsening bilateral low back pain and pain that radiates into bilateral lower extremities.    MRI of lumbar spine done on 9/21/2023 shows degenerative disc disease with small disc herniations resulting in mild to moderate foraminal narrowing and mild canal stenosis at L4-L5.  I advised patient we can repeat a lumbar epidural steroid injection to help decrease inflammation and provide him relief.  Patient would like to proceed.  I instructed our  will schedule him.  Complete risks and benefits including bleeding, infection, tissue reaction, nerve injury and allergic reaction were discussed.  The approach was demonstrated using models and literature was provided.  Verbal and written consent was obtained.    My impressions and treatment recommendations were discussed in detail with the patient who verbalized understanding and had no further questions.  Discharge instructions were provided. I personally saw and examined the patient and I agree with the above discussed plan of care.    Orders Placed This Encounter   Procedures    FL spine and pain procedure     Dr Cochran     Standing Status:   Future     Standing Expiration Date:   1/24/2028     Order Specific Question:   Reason for Exam:     Answer:   Bilateral L4 TFESI     Order Specific Question:   Anticoagulant hold needed?     Answer:   No     No orders of the defined types were placed in this encounter.      History of Present Illness:  Bry Weller is a 63 y.o. male with a history of chronic pain  secondary to low back pain, lumbar intervertebral disc disorder with radiculopathy and lumbar spondylolisthesis.  He was last seen on 10/23/2023 where he underwent an L5-S1 LESI which provided him greater than 50% relief of his pain for 2 weeks at which point his low back pain gradually returned.  He presents to the office with worsening bilateral low back pain and pain that radiates into bilateral lower extremities.    He states his pain is worse since the last office visit and constant.  He states his pain is worse with activity and does ease with rest.  Rates the quality of his pain is burning, sharp, throbbing, numbness and is currently rating his pain a 4-9/10 on a numeric scale.    Current pain medications include gabapentin 100 mg in the morning, 100 mg in the afternoon and 200 mg in the evening.    I have personally reviewed and/or updated the patient's past medical history, past surgical history, family history, social history, current medications, allergies, and vital signs today.     Review of Systems   Respiratory:  Negative for shortness of breath.    Cardiovascular:  Negative for chest pain.   Gastrointestinal:  Positive for constipation and nausea. Negative for diarrhea and vomiting.   Musculoskeletal:  Positive for back pain and gait problem. Negative for arthralgias, joint swelling and myalgias.        BLE Pain   Skin:  Negative for rash.   Neurological:  Negative for dizziness, seizures and weakness.   All other systems reviewed and are negative.      Patient Active Problem List   Diagnosis    Ureterolithiasis    Prostate cancer (HCC)    Benign essential hypertension    Type 2 diabetes mellitus with stage 3a chronic kidney disease, with long-term current use of insulin (HCC)    Dyslipidemia    Morbid obesity (HCC)    Other fatigue    Stress incontinence of urine    Parenchymal renal hypertension    Stage 3 chronic kidney disease (HCC)    Diabetic nephropathy associated with type 2 diabetes mellitus  (HCC)    Nephrolithiasis    Lumbar radiculopathy       Past Medical History:   Diagnosis Date    Acute bronchitis     Acute low back pain     Acute low back pain     [id number] resolved    Acute respiratory infection     JAYNE (acute kidney injury) (HCC) 06/15/2018    Cancer (HCC)     prostate    Cellulitis of scrotum     Cough     Diabetes mellitus (HCC)     Herpes zoster     High cholesterol     Hyperkalemia     Hypertension     Kidney disease     Kidney stone     Leukocytosis 06/15/2018    Low back pain     Lumbar radiculopathy     Pyelonephritis 08/08/2022    Rash     Retinopathy, central serous     Shingles     Sleep apnea     [id number]    Trochanteric bursitis        Past Surgical History:   Procedure Laterality Date    ABDOMINAL ADHESION SURGERY N/A 6/13/2018    Procedure: LYSIS ADHESIONS;  Surgeon: Carlton Wolfe MD;  [facility];  Service: Urology    FL RETROGRADE PYELOGRAM  8/8/2022    FL RETROGRADE PYELOGRAM  8/11/2022    LITHOTRIPSY      renal    OK CYSTO BLADDER W/URETERAL CATHETERIZATION Left 8/8/2022    Procedure: CYSTOSCOPY RETROGRADE PYELOGRAM WITH INSERTION STENT URETERAL;  Surgeon: Dimple Olmedo MD;  [facility];  Service: Urology    OK CYSTO/URETERO W/LITHOTRIPSY &INDWELL STENT INSRT Left 8/11/2022    Procedure: CYSTOSCOPY URETEROSCOPY WITH LITHOTRIPSY HOLMIUM LASER, RETROGRADE PYELOGRAM AND INSERTION STENT URETERAL;  Surgeon: Dimple Olmedo MD;  [facility];  Service: Urology    OK LAPS SURG [id number] RPBIC RAD W/NRV SPARING ROBOT N/A 6/13/2018    Procedure: PROSTATECTOMY RADICAL W ROBOTICS;  Surgeon: Carlton Wolfe MD;  [facility];  Service: Urology    PROSTATE SURGERY  06/13/2018    SHOULDER SURGERY         Family History   Problem Relation Age of Onset    Other Father         cardiac disorder    Stroke Father     Diabetes Father         mellitus    Hypertension Father     Heart disease Father         cardiac disorder    Cancer Sister     Cancer Paternal  Grandmother     Heart disease Family         cardiac disorder    Kidney disease Family     Hypertension Mother     Cancer Mother        Social History     Occupational History    Not on file   Tobacco Use    Smoking status: Never    Smokeless tobacco: Never   Vaping Use    Vaping status: Never Used   Substance and Sexual Activity    Alcohol use: No    Drug use: No    Sexual activity: Not on file       Current Outpatient Medications on File Prior to Visit   Medication Sig    albuterol (PROVENTIL HFA,VENTOLIN HFA) 90 mcg/act inhaler Inhale 1-2 puffs every 6 (six) hours as needed    amLODIPine (NORVASC) 5 mg tablet TAKE 1 TABLET BY MOUTH EVERY DAY    ASPIRIN 81 PO Take 1 capsule by mouth 2 (two) times a day      atorvastatin (LIPITOR) 80 mg tablet TAKE 1 TABLET BY MOUTH EVERY DAY    BD Pen Needle Rosalie 2nd Gen 32G X 4 MM MISC INJECT UNDER THE SKIN 4 (FOUR) TIMES A DAY    carvedilol (COREG) 25 mg tablet TAKE 1 TABLET BY MOUTH TWICE A DAY WITH FOOD    dulaglutide (Trulicity) 3 MG/0.5ML injection INJECT 0.5 ML (3 MG TOTAL) UNDER THE SKIN ONCE A WEEK    gabapentin (Neurontin) 100 mg capsule Take 2 capsules (200 mg total) by mouth 3 (three) times a day (Patient taking differently: Take 200 mg by mouth 3 (three) times a day 100 in AM, 100 in PM and 200 in evening)    glimepiride (AMARYL) 4 mg tablet Take 1 tablet (4 mg total) by mouth 2 (two) times a day    glucose blood (Contour Next Test) test strip Use 1 each 4 (four) times a day Use as instructed (Patient taking differently: Use 1 each daily 3 to 4 times daily. Before meals or randomly)    glucose blood (Contour Next Test) test strip CHECK SUGAR 4 TIMES DAILY    HumaLOG KwikPen 200 units/mL CONCENTRATED U-200 injection pen INJECT 15 UNITS THREE TIMES DAILY WITH MEALS.    hydrochlorothiazide (HYDRODIURIL) 12.5 mg tablet TAKE 0.5 TABLETS BY MOUTH DAILY. (Patient taking differently: Take 12.5 mg by mouth Take 0.5 tab QOD)    lisinopril (ZESTRIL) 10 mg tablet Take 1 tablet  "(10 mg total) by mouth daily    metFORMIN (GLUCOPHAGE) 1000 MG tablet TAKE 1 TABLET BY MOUTH TWICE A DAY    Microlet Lancets MISC Use 3 (three) times a day    Multiple Vitamins-Minerals (MULTIVITAMIN WITH MINERALS) tablet Take 1 tablet by mouth daily    potassium citrate (UROCIT-K) 10 mEq TAKE 1 TABLET BY MOUTH 2 TIMES A DAY.    tamsulosin (FLOMAX) 0.4 mg TAKE 1 CAPSULE BY MOUTH EVERY DAY WITH DINNER (Patient taking differently: PRN)    [place] SoloStar 300 units/mL CONCENTRATED U-300 injection pen (1-unit dial) INJECT 80 UNITS UNDER THE SKIN DAILY    Vitamin D, Cholecalciferol, 400 units TABS Take by mouth daily     No current facility-administered medications on file prior to visit.       Allergies   Allergen Reactions    Simvastatin      Increases Liver functoin       Physical Exam:    /74   Pulse 92   Resp 16   Ht 5' 5\" (1.651 m)   Wt 123 kg (272 lb)   BMI 45.26 kg/m²     Constitutional:normal, well developed, well nourished, alert, in no distress and non-toxic and no overt pain behavior.  Eyes:anicteric  HEENT:grossly intact  Neck:supple, symmetric, trachea midline and no masses   Pulmonary:even and unlabored  Cardiovascular:No edema or pitting edema present  Skin:Normal without rashes or lesions and well hydrated  Psychiatric:Mood and affect appropriate  Neurologic:Cranial Nerves II-XII grossly intact  Musculoskeletal:normal    Lumbar Spine Exam    Appearance:  Normal lordosis  Palpation/Tenderness:  left lumbar paraspinal tenderness  right lumbar paraspinal tenderness  Sensory:  no sensory deficits noted  Range of Motion:  Flexion:  Minimally limited  with pain  Extension:  Minimally limited  with pain  Motor Strength:  Left hip flexion:  5/5  Right hip flexion:  5/5  Left knee flexion:  5/5  Left knee extension:  5/5  Right knee flexion:  5/5  Right knee extension:  5/5  Left foot dorsiflexion:  5/5  Left foot plantar flexion:  5/5  Right foot dorsiflexion:  5/5  Right foot plantar flexion:  " 5/5      Imaging

## 2024-01-24 NOTE — TELEPHONE ENCOUNTER
This patient is being considered for a neuraxial injection for the management of their pain. However, the patient is currently on an anticoagulant per your orders. The American Society of Regional Anesthesia Guideline on neuraxial procedures and anti-coagulation indicates that medication should be held as follows: Aspirin 6 days prior to injection.   Please advise if you ok the hold of this medication.    Patient can drop down to one 81 mg tablet per day if that is agreeable.     Thank you,     Ye Huerta  Procedure   [facility]

## 2024-02-07 DIAGNOSIS — E11.65 UNCONTROLLED TYPE 2 DIABETES MELLITUS WITH HYPERGLYCEMIA (HCC): ICD-10-CM

## 2024-02-07 RX ORDER — PEN NEEDLE, DIABETIC 32GX 5/32"
NEEDLE, DISPOSABLE MISCELLANEOUS
Qty: 400 EACH | Refills: 1 | Status: SHIPPED | OUTPATIENT
Start: 2024-02-07

## 2024-02-22 ENCOUNTER — TELEPHONE (OUTPATIENT)
Dept: INTERNAL MEDICINE CLINIC | Facility: CLINIC | Age: 64
End: 2024-02-22

## 2024-02-22 NOTE — TELEPHONE ENCOUNTER
TONY CANDELARIO ALREADY APPROVED    Prior authorization approved  Payer: [employer] - [employer] Case ID: [id number]  Note from payer: This medication or product was previously approved on [id number] from 2020-07-07 to 2024-07-06. **Please note: This request was submitted electronically. Formulary lowering, tiering exception, cost reduction and/or pre-benefit determination review (including prospective Medicare hospice reviews) requests cannot be requested using this method of submission. Providers contact us at [phone] for further assistance.

## 2024-02-22 NOTE — TELEPHONE ENCOUNTER
Patient received a letter in the mail stating the Trulicity will need a prior authorization due to changes of the formulary. There is no key in the letter but the letter will be scanned into the chart

## 2024-03-04 DIAGNOSIS — J20.8 VIRAL BRONCHITIS: Primary | ICD-10-CM

## 2024-03-04 DIAGNOSIS — E11.65 UNCONTROLLED TYPE 2 DIABETES MELLITUS WITH HYPERGLYCEMIA (HCC): ICD-10-CM

## 2024-03-04 DIAGNOSIS — I10 BENIGN ESSENTIAL HYPERTENSION: ICD-10-CM

## 2024-03-04 RX ORDER — ALBUTEROL SULFATE 90 UG/1
1-2 AEROSOL, METERED RESPIRATORY (INHALATION) EVERY 6 HOURS PRN
Qty: 8 G | Refills: 1 | Status: SHIPPED | OUTPATIENT
Start: 2024-03-04

## 2024-03-05 RX ORDER — AMLODIPINE BESYLATE 5 MG/1
TABLET ORAL
Qty: 90 TABLET | Refills: 1 | Status: SHIPPED | OUTPATIENT
Start: 2024-03-05

## 2024-03-05 RX ORDER — DULAGLUTIDE 3 MG/.5ML
3 INJECTION, SOLUTION SUBCUTANEOUS WEEKLY
Qty: 6 ML | Refills: 1 | Status: SHIPPED | OUTPATIENT
Start: 2024-03-05

## 2024-03-13 DIAGNOSIS — I12.9 PARENCHYMAL RENAL HYPERTENSION, STAGE 1 THROUGH STAGE 4 OR UNSPECIFIED CHRONIC KIDNEY DISEASE: ICD-10-CM

## 2024-03-13 DIAGNOSIS — N18.31 STAGE 3A CHRONIC KIDNEY DISEASE (HCC): ICD-10-CM

## 2024-03-13 DIAGNOSIS — N20.0 NEPHROLITHIASIS: ICD-10-CM

## 2024-03-13 RX ORDER — LISINOPRIL 10 MG/1
10 TABLET ORAL DAILY
Qty: 90 TABLET | Refills: 1 | Status: SHIPPED | OUTPATIENT
Start: 2024-03-13

## 2024-03-25 ENCOUNTER — HOSPITAL ENCOUNTER (OUTPATIENT)
Dept: RADIOLOGY | Facility: CLINIC | Age: 64
Discharge: HOME | End: 2024-03-25
Payer: COMMERCIAL

## 2024-03-25 VITALS
RESPIRATION RATE: 18 BRPM | SYSTOLIC BLOOD PRESSURE: 151 MMHG | TEMPERATURE: 98 F | HEART RATE: 50 BPM | OXYGEN SATURATION: 98 % | DIASTOLIC BLOOD PRESSURE: 76 MMHG

## 2024-03-25 DIAGNOSIS — M51.16 INTERVERTEBRAL DISC DISORDER WITH RADICULOPATHY OF LUMBAR REGION: ICD-10-CM

## 2024-03-25 PROCEDURE — 64483 NJX AA&/STRD TFRM EPI L/S 1: CPT | Performed by: PHYSICAL MEDICINE & REHABILITATION

## 2024-03-25 RX ORDER — LIDOCAINE HYDROCHLORIDE 10 MG/ML
4 INJECTION, SOLUTION EPIDURAL; INFILTRATION; INTRACAUDAL; PERINEURAL ONCE
Status: COMPLETED | OUTPATIENT
Start: 2024-03-25 | End: 2024-03-25

## 2024-03-25 RX ORDER — METHYLPREDNISOLONE ACETATE 40 MG/ML
40 INJECTION, SUSPENSION INTRA-ARTICULAR; INTRALESIONAL; INTRAMUSCULAR; PARENTERAL; SOFT TISSUE ONCE
Status: COMPLETED | OUTPATIENT
Start: 2024-03-25 | End: 2024-03-25

## 2024-03-25 RX ORDER — BUPIVACAINE HCL/PF 2.5 MG/ML
2 VIAL (ML) INJECTION ONCE
Status: COMPLETED | OUTPATIENT
Start: 2024-03-25 | End: 2024-03-25

## 2024-03-25 RX ADMIN — BUPIVACAINE HYDROCHLORIDE 2 ML: 2.5 INJECTION, SOLUTION EPIDURAL; INFILTRATION; INTRACAUDAL at 14:34

## 2024-03-25 RX ADMIN — IOHEXOL 2 ML: 300 INJECTION, SOLUTION INTRAVENOUS at 14:34

## 2024-03-25 RX ADMIN — METHYLPREDNISOLONE ACETATE 40 MG: 40 INJECTION, SUSPENSION INTRA-ARTICULAR; INTRALESIONAL; INTRAMUSCULAR; PARENTERAL; SOFT TISSUE at 14:34

## 2024-03-25 RX ADMIN — LIDOCAINE HYDROCHLORIDE 4 ML: 10 INJECTION, SOLUTION EPIDURAL; INFILTRATION; INTRACAUDAL; PERINEURAL at 14:30

## 2024-03-25 NOTE — DISCHARGE INSTR - LAB
Epidural Steroid Injection     ** Resume Aspirin **  WHAT YOU NEED TO KNOW:   An epidural steroid injection (KENTRELL) is a procedure to inject steroid medicine into the epidural space. The epidural space is between your spinal cord and vertebrae. Steroids reduce inflammation and fluid buildup in your spine that may be causing pain. You may be given pain medicine along with the steroids.          ACTIVITY  Do not drive or operate machinery today.  No strenuous activity today - bending, lifting, etc.  You may resume normal activites starting tomorrow - start slowly and as tolerated.  You may shower today, but no tub baths or hot tubs.  You may have numbness for several hours from the local anesthetic. Please use caution and common sense, especially with weight-bearing activities.    CARE OF THE INJECTION SITE  If you have soreness or pain, apply ice to the area today (20 minutes on/20 minutes off).  Starting tomorrow, you may use warm, moist heat or ice if needed.  You may have an increase or change in your discomfort for 36-48 hours after your treatment.  Apply ice and continue with any pain medication you have been prescribed.  Notify the [facility] if you have any of the following: redness, drainage, swelling, headache, stiff neck or fever above 100°F.    SPECIAL INSTRUCTIONS  Our office will contact you in approximately 7 days for a progress report.    MEDICATIONS  Continue to take all routine medications.  Our office may have instructed you to hold some medications.    As no general anesthesia was used in today's procedure, you should not experience any side effects related to anesthesia.     If you are diabetic, the steroids used in today's injection may temporarily increase your blood sugar levels after the first few days after your injection. Please keep a close eye on your sugars and alert the doctor who manages your diabetes if your sugars are significantly high from your baseline or you are  symptomatic.     If you have a problem specifically related to your procedure, please call our office at [phone].  Problems not related to your procedure should be directed to your primary care physician.

## 2024-03-25 NOTE — H&P
History of Present Illness: The patient is a 64 y.o. male who presents with complaints of low back pain    Past Medical History:   Diagnosis Date    Acute bronchitis     Acute low back pain     Acute low back pain     [id number] resolved    Acute respiratory infection     JAYNE (acute kidney injury) (HCC) 06/15/2018    Cancer (HCC)     prostate    Cellulitis of scrotum     Cough     Diabetes mellitus (HCC)     Herpes zoster     High cholesterol     Hyperkalemia     Hypertension     Kidney disease     Kidney stone     Leukocytosis 06/15/2018    Low back pain     Lumbar radiculopathy     Pyelonephritis 08/08/2022    Rash     Retinopathy, central serous     Shingles     Sleep apnea     [id number]    Trochanteric bursitis        Past Surgical History:   Procedure Laterality Date    ABDOMINAL ADHESION SURGERY N/A 6/13/2018    Procedure: LYSIS ADHESIONS;  Surgeon: Carlton Wolfe MD;  [facility];  Service: Urology    FL RETROGRADE PYELOGRAM  8/8/2022    FL RETROGRADE PYELOGRAM  8/11/2022    LITHOTRIPSY      renal    WI CYSTO BLADDER W/URETERAL CATHETERIZATION Left 8/8/2022    Procedure: CYSTOSCOPY RETROGRADE PYELOGRAM WITH INSERTION STENT URETERAL;  Surgeon: Dimple Olmedo MD;  [facility];  Service: Urology    WI CYSTO/URETERO W/LITHOTRIPSY &INDWELL STENT INSRT Left 8/11/2022    Procedure: CYSTOSCOPY URETEROSCOPY WITH LITHOTRIPSY HOLMIUM LASER, RETROGRADE PYELOGRAM AND INSERTION STENT URETERAL;  Surgeon: Dimple Olmedo MD;  [facility];  Service: Urology    WI LAPS SURG [id number] RPBIC RAD W/NRV SPARING ROBOT N/A 6/13/2018    Procedure: PROSTATECTOMY RADICAL W ROBOTICS;  Surgeon: Carlton Wolfe MD;  [facility];  Service: Urology    PROSTATE SURGERY  06/13/2018    SHOULDER SURGERY           Current Outpatient Medications:     albuterol (PROVENTIL HFA,VENTOLIN HFA) 90 mcg/act inhaler, Inhale 1-2 puffs every 6 (six) hours as needed for wheezing, Disp: 8 g, Rfl: 1    amLODIPine (NORVASC) 5 mg  tablet, TAKE 1 TABLET BY MOUTH EVERY DAY, Disp: 90 tablet, Rfl: 1    ASPIRIN 81 PO, Take 1 capsule by mouth 2 (two) times a day  , Disp: , Rfl:     atorvastatin (LIPITOR) 80 mg tablet, TAKE 1 TABLET BY MOUTH EVERY DAY, Disp: 90 tablet, Rfl: 1    BD Pen Needle Rosalie 2nd Gen 32G X 4 MM MISC, INJECT UNDER THE SKIN 4 (FOUR) TIMES A DAY, Disp: 400 each, Rfl: 1    carvedilol (COREG) 25 mg tablet, TAKE 1 TABLET BY MOUTH TWICE A DAY WITH FOOD, Disp: 180 tablet, Rfl: 1    dulaglutide (Trulicity) 3 MG/0.5ML injection, INJECT 0.5 ML (3 MG TOTAL) UNDER THE SKIN ONCE A WEEK, Disp: 6 mL, Rfl: 1    gabapentin (Neurontin) 100 mg capsule, Take 2 capsules (200 mg total) by mouth 3 (three) times a day (Patient taking differently: Take 200 mg by mouth 3 (three) times a day 100 in AM, 100 in PM and 200 in evening), Disp: 540 capsule, Rfl: 1    glimepiride (AMARYL) 4 mg tablet, Take 1 tablet (4 mg total) by mouth 2 (two) times a day, Disp: 180 tablet, Rfl: 1    glucose blood (Contour Next Test) test strip, Use 1 each 4 (four) times a day Use as instructed (Patient taking differently: Use 1 each daily 3 to 4 times daily. Before meals or randomly), Disp: 400 each, Rfl: 3    glucose blood (Contour Next Test) test strip, CHECK SUGAR 4 TIMES DAILY, Disp: 400 strip, Rfl: 1    HumaLOG KwikPen 200 units/mL CONCENTRATED U-200 injection pen, INJECT 15 UNITS THREE TIMES DAILY WITH MEALS., Disp: 18 mL, Rfl: 5    hydrochlorothiazide (HYDRODIURIL) 12.5 mg tablet, TAKE 0.5 TABLETS BY MOUTH DAILY. (Patient taking differently: Take 12.5 mg by mouth Take 0.5 tab QOD), Disp: 45 tablet, Rfl: 4    lisinopril (ZESTRIL) 10 mg tablet, TAKE 1 TABLET BY MOUTH EVERY DAY, Disp: 90 tablet, Rfl: 1    metFORMIN (GLUCOPHAGE) 1000 MG tablet, TAKE 1 TABLET BY MOUTH TWICE A DAY, Disp: 180 tablet, Rfl: 3    Microlet Lancets MISC, Use 3 (three) times a day, Disp: 300 each, Rfl: 3    Multiple Vitamins-Minerals (MULTIVITAMIN WITH MINERALS) tablet, Take 1 tablet by mouth daily,  Disp: , Rfl:     potassium citrate (UROCIT-K) 10 mEq, TAKE 1 TABLET BY MOUTH 2 TIMES A DAY., Disp: 180 tablet, Rfl: 3    tamsulosin (FLOMAX) 0.4 mg, TAKE 1 CAPSULE BY MOUTH EVERY DAY WITH DINNER (Patient taking differently: PRN), Disp: 90 capsule, Rfl: 1    T[place]jeo SoloStar 300 units/mL CONCENTRATED U-300 injection pen (1-unit dial), INJECT 80 UNITS UNDER THE SKIN DAILY, Disp: 22.5 mL, Rfl: 3    Vitamin D, Cholecalciferol, 400 units TABS, Take by mouth daily, Disp: , Rfl:     Current Facility-Administered Medications:     bupivacaine (PF) (MARCAINE) 0.25 % injection 2 mL, 2 mL, Epidural, Once, Mark Cochran DO    iohexol (OMNIPAQUE) 300 mg/mL injection 50 mL, 50 mL, Epidural, Once, Mark Cochran DO    lidocaine (PF) (XYLOCAINE-MPF) 1 % injection 4 mL, 4 mL, Infiltration, Once, Mark Cochran DO    methylPREDNISolone acetate (DEPO-MEDROL) injection 40 mg, 40 mg, Perineural, Once, Mark Cochran, DO    Allergies   Allergen Reactions    Simvastatin      Increases Liver functoin       Physical Exam:   Vitals:    03/25/24 1408   BP: 151/74   Pulse: (!) 50   Resp: 18   Temp: 98 °F (36.7 °C)   SpO2: 98%     General: Awake, Alert, Oriented x 3, Mood and affect appropriate  Respiratory: Respirations even and unlabored  Cardiovascular: Peripheral pulses intact; no edema  Musculoskeletal Exam: Tenderness to palpation bilateral lumbar paraspinals    ASA Score: 2    Patient/Chart Verification  Patient ID Verified: Verbal  ID Band Applied: No  Consents Confirmed: Procedural, To be obtained in the Pre-Procedure area  H&P( within 30 days) Verified: To be obtained in the Pre-Procedure area  Allergies Reviewed: Yes  Anticoag/NSAID held?: Yes (ASA held 1 week, denies other blood thinners)  Currently on antibiotics?: No    Assessment:   1. Intervertebral disc disorder with radiculopathy of lumbar region        Plan: Bilateral L4 TFESI

## 2024-04-01 ENCOUNTER — TELEPHONE (OUTPATIENT)
Dept: PAIN MEDICINE | Facility: CLINIC | Age: 64
End: 2024-04-01

## 2024-04-01 NOTE — TELEPHONE ENCOUNTER
Pt reports 30% improvement post inj   Pain level 3/10  Pt aware I will call next week for an update

## 2024-04-08 NOTE — TELEPHONE ENCOUNTER
Patient Reports     30    %     improvement post injection 2 week    Pain Level   3-4  /10     Some things are better and some are worse

## 2024-04-11 DIAGNOSIS — E11.65 UNCONTROLLED TYPE 2 DIABETES MELLITUS WITH HYPERGLYCEMIA (HCC): ICD-10-CM

## 2024-04-11 RX ORDER — DULAGLUTIDE 3 MG/.5ML
3 INJECTION, SOLUTION SUBCUTANEOUS WEEKLY
Qty: 6 ML | Refills: 1 | Status: SHIPPED | OUTPATIENT
Start: 2024-04-11

## 2024-04-11 NOTE — TELEPHONE ENCOUNTER
Rashda Weller   to  [facility] (supporting Lea Reyes, MD)         4/11/24  8:36 AM  Dr. Reyes,      Can you please renew the Trulicity for me (see attached).     Thanks,     Rashad

## 2024-04-11 NOTE — TELEPHONE ENCOUNTER
Medication: Trulicity    Dose/Frequency: 0.5mL once a week    Quantity: 6mL    Pharmacy: [facility] [address]    Office:   [x] PCP/Provider -   [] Speciality/Provider -     Does the patient have enough for 3 days?   [x] Yes   [] No - Send as HP to POD

## 2024-04-22 DIAGNOSIS — E11.65 UNCONTROLLED TYPE 2 DIABETES MELLITUS WITH HYPERGLYCEMIA (HCC): ICD-10-CM

## 2024-04-30 ENCOUNTER — TELEPHONE (OUTPATIENT)
Dept: NEPHROLOGY | Facility: CLINIC | Age: 64
End: 2024-04-30

## 2024-05-03 LAB
LEFT EYE DIABETIC RETINOPATHY: NORMAL
RIGHT EYE DIABETIC RETINOPATHY: NORMAL

## 2024-05-05 DIAGNOSIS — E11.65 UNCONTROLLED TYPE 2 DIABETES MELLITUS WITH HYPERGLYCEMIA (HCC): ICD-10-CM

## 2024-05-06 DIAGNOSIS — E11.65 UNCONTROLLED TYPE 2 DIABETES MELLITUS WITH HYPERGLYCEMIA (HCC): ICD-10-CM

## 2024-05-07 ENCOUNTER — OFFICE VISIT (OUTPATIENT)
Dept: PAIN MEDICINE | Facility: CLINIC | Age: 64
End: 2024-05-07
Payer: COMMERCIAL

## 2024-05-07 ENCOUNTER — TELEPHONE (OUTPATIENT)
Dept: NEPHROLOGY | Facility: CLINIC | Age: 64
End: 2024-05-07

## 2024-05-07 VITALS
SYSTOLIC BLOOD PRESSURE: 134 MMHG | HEIGHT: 65 IN | RESPIRATION RATE: 16 BRPM | DIASTOLIC BLOOD PRESSURE: 70 MMHG | BODY MASS INDEX: 44.65 KG/M2 | WEIGHT: 268 LBS | HEART RATE: 52 BPM

## 2024-05-07 DIAGNOSIS — G89.4 CHRONIC PAIN SYNDROME: ICD-10-CM

## 2024-05-07 DIAGNOSIS — M43.16 SPONDYLOLISTHESIS OF LUMBAR REGION: ICD-10-CM

## 2024-05-07 DIAGNOSIS — M51.16 INTERVERTEBRAL DISC DISORDER WITH RADICULOPATHY OF LUMBAR REGION: Primary | ICD-10-CM

## 2024-05-07 PROCEDURE — 99214 OFFICE O/P EST MOD 30 MIN: CPT

## 2024-05-07 RX ORDER — GLIMEPIRIDE 4 MG/1
4 TABLET ORAL 2 TIMES DAILY
Qty: 180 TABLET | Refills: 1 | Status: SHIPPED | OUTPATIENT
Start: 2024-05-07

## 2024-05-07 NOTE — PROGRESS NOTES
Assessment:  1. Intervertebral disc disorder with radiculopathy of lumbar region    2. Chronic pain syndrome    3. Spondylolisthesis of lumbar region        Plan:  The patient is a 64-year-old male with a history of chronic pain secondary to low back pain, lumbar intervertebral disc disorder with radiculopathy and lumbar spondylolisthesis who presents to the office with improved but ongoing bilateral low back pain and pain that radiates into bilateral lower extremities following a bilateral L4-L5 TFESI done on 3/25/2024.    At this time, I did instruct patient he may receive 3 to 6 months relief of his pain following the lumbar epidural steroid injection, and that the injection can be repeated if his pain were to worsen.  Patient was instructed to call our office if he would like to repeat.  For now, we will follow-up on an as-needed basis.    My impressions and treatment recommendations were discussed in detail with the patient who verbalized understanding and had no further questions.  Discharge instructions were provided. I personally saw and examined the patient and I agree with the above discussed plan of care.    No orders of the defined types were placed in this encounter.    No orders of the defined types were placed in this encounter.      History of Present Illness:  Bry Weller is a 64 y.o. male with a history of chronic pain secondary to low back pain, lumbar intervertebral disc disorder with radiculopathy and lumbar spondylolisthesis.  He was last seen on 3/25/2024 where he underwent a bilateral L4-L5 TFESI.  He presents today for follow-up.  He reports 40% relief of his pain following epidural steroid injection.  He states he is able to walk further without stopping.    He states his pain is better since the last office visit and constant.  He rates quality of his pain is burning, pressure-like, shooting, pins/needles and is currently rating his pain a 3-6/10 on a numeric scale.    Current pain  medications include gabapentin 100 mg in the morning, 100 mg in the afternoon and 200 mg in the evening which is helpful.    I have personally reviewed and/or updated the patient's past medical history, past surgical history, family history, social history, current medications, allergies, and vital signs today.     Review of Systems   Respiratory:  Negative for shortness of breath.    Cardiovascular:  Negative for chest pain.   Gastrointestinal:  Positive for constipation and nausea. Negative for diarrhea and vomiting.   Musculoskeletal:  Positive for back pain and gait problem. Negative for arthralgias, joint swelling and myalgias.        BLE Pain   Skin:  Negative for rash.   Neurological:  Negative for dizziness, seizures and weakness.   All other systems reviewed and are negative.      Patient Active Problem List   Diagnosis    Ureterolithiasis    Prostate cancer (HCC)    Benign essential hypertension    Type 2 diabetes mellitus with stage 3a chronic kidney disease, with long-term current use of insulin (HCC)    Dyslipidemia    Morbid obesity (HCC)    Other fatigue    Stress incontinence of urine    Parenchymal renal hypertension    Stage 3 chronic kidney disease (HCC)    Diabetic nephropathy associated with type 2 diabetes mellitus (HCC)    Nephrolithiasis    Intervertebral disc disorder with radiculopathy of lumbar region       Past Medical History:   Diagnosis Date    Acute bronchitis     Acute low back pain     Acute low back pain     [id number] resolved    Acute respiratory infection     JAYNE (acute kidney injury) (HCC) 06/15/2018    Cancer (HCC)     prostate    Cellulitis of scrotum     Cough     Diabetes mellitus (HCC)     Herpes zoster     High cholesterol     Hyperkalemia     Hypertension     Kidney disease     Kidney stone     Leukocytosis 06/15/2018    Low back pain     Lumbar radiculopathy     Pyelonephritis 08/08/2022    Rash     Retinopathy, central serous     Shingles     Sleep apnea     [id number]     Trochanteric bursitis        Past Surgical History:   Procedure Laterality Date    ABDOMINAL ADHESION SURGERY N/A 6/13/2018    Procedure: LYSIS ADHESIONS;  Surgeon: Carlton Wolfe MD;  Location: [facility];  Service: Urology    FL RETROGRADE PYELOGRAM  8/8/2022    FL RETROGRADE PYELOGRAM  8/11/2022    LITHOTRIPSY      renal    IL CYSTO BLADDER W/URETERAL CATHETERIZATION Left 8/8/2022    Procedure: CYSTOSCOPY RETROGRADE PYELOGRAM WITH INSERTION STENT URETERAL;  Surgeon: Dimple Olmedo MD;  Location: [facility];  Service: Urology    IL CYSTO/URETERO W/LITHOTRIPSY &INDWELL STENT INSRT Left 8/11/2022    Procedure: CYSTOSCOPY URETEROSCOPY WITH LITHOTRIPSY HOLMIUM LASER, RETROGRADE PYELOGRAM AND INSERTION STENT URETERAL;  Surgeon: Dimple Olmedo MD;  Location: [facility];  Service: Urology    IL LAPS SURG [id number] RPBIC RAD W/NRV SPARING ROBOT N/A 6/13/2018    Procedure: PROSTATECTOMY RADICAL W ROBOTICS;  Surgeon: Carlton Wolfe MD;  Location: [facility];  Service: Urology    PROSTATE SURGERY  06/13/2018    SHOULDER SURGERY         Family History   Problem Relation Age of Onset    Other Father         cardiac disorder    Stroke Father     Diabetes Father         mellitus    Hypertension Father     Heart disease Father         cardiac disorder    Cancer Sister     Cancer Paternal Grandmother     Heart disease Family         cardiac disorder    Kidney disease Family     Hypertension Mother     Cancer Mother        Social History     Occupational History    Not on file   Tobacco Use    Smoking status: Never    Smokeless tobacco: Never   Vaping Use    Vaping status: Never Used   Substance and Sexual Activity    Alcohol use: No    Drug use: No    Sexual activity: Not on file       Current Outpatient Medications on File Prior to Visit   Medication Sig    albuterol (PROVENTIL HFA,VENTOLIN HFA) 90 mcg/act inhaler Inhale 1-2 puffs every 6 (six) hours as needed for wheezing    amLODIPine (NORVASC) 5 mg tablet TAKE 1 TABLET BY  MOUTH EVERY DAY    ASPIRIN 81 PO Take 1 capsule by mouth 2 (two) times a day      atorvastatin (LIPITOR) 80 mg tablet TAKE 1 TABLET BY MOUTH EVERY DAY    BD Pen Needle Rosalie 2nd Gen 32G X 4 MM MISC INJECT UNDER THE SKIN 4 (FOUR) TIMES A DAY    carvedilol (COREG) 25 mg tablet TAKE 1 TABLET BY MOUTH TWICE A DAY WITH FOOD    Contour Next Test test strip CHECK SUGAR 4 TIMES DAILY    dulaglutide (Trulicity) 3 MG/0.5ML injection Inject 0.5 mL (3 mg total) under the skin once a week    gabapentin (Neurontin) 100 mg capsule Take 2 capsules (200 mg total) by mouth 3 (three) times a day (Patient taking differently: Take 200 mg by mouth 3 (three) times a day 100 in AM, 100 in PM and 200 in evening)    glimepiride (AMARYL) 4 mg tablet Take 1 tablet (4 mg total) by mouth 2 (two) times a day    glucose blood (Contour Next Test) test strip Use 1 each 4 (four) times a day Use as instructed (Patient taking differently: Use 1 each daily 3 to 4 times daily. Before meals or randomly)    HumaLOG KwikPen 200 units/mL CONCENTRATED U-200 injection pen INJECT 15 UNITS THREE TIMES DAILY WITH MEALS.    hydrochlorothiazide (HYDRODIURIL) 12.5 mg tablet TAKE 0.5 TABLETS BY MOUTH DAILY. (Patient taking differently: Take 12.5 mg by mouth Take 0.5 tab QOD)    lisinopril (ZESTRIL) 10 mg tablet TAKE 1 TABLET BY MOUTH EVERY DAY    metFORMIN (GLUCOPHAGE) 1000 MG tablet TAKE 1 TABLET BY MOUTH TWICE A DAY    Microlet Lancets MISC Use 3 (three) times a day    Multiple Vitamins-Minerals (MULTIVITAMIN WITH MINERALS) tablet Take 1 tablet by mouth daily    potassium citrate (UROCIT-K) 10 mEq TAKE 1 TABLET BY MOUTH 2 TIMES A DAY.    tamsulosin (FLOMAX) 0.4 mg TAKE 1 CAPSULE BY MOUTH EVERY DAY WITH DINNER (Patient taking differently: PRN)    Toujeo SoloStar 300 units/mL CONCENTRATED U-300 injection pen (1-unit dial) INJECT 80 UNITS UNDER THE SKIN DAILY    Vitamin D, Cholecalciferol, 400 units TABS Take by mouth daily     No current facility-administered  "medications on file prior to visit.       Allergies   Allergen Reactions    Simvastatin      Increases Liver functoin       Physical Exam:    /70   Pulse (!) 52   Resp 16   Ht 5' 5\" (1.651 m)   Wt 122 kg (268 lb)   BMI 44.60 kg/m²     Constitutional:normal, well developed, well nourished, alert, in no distress and non-toxic and no overt pain behavior.  Eyes:anicteric  HEENT:grossly intact  Neck:supple, symmetric, trachea midline and no masses   Pulmonary:even and unlabored  Cardiovascular:No edema or pitting edema present  Skin:Normal without rashes or lesions and well hydrated  Psychiatric:Mood and affect appropriate  Neurologic:Cranial Nerves II-XII grossly intact  Musculoskeletal:antalgic    Imaging    "

## 2024-05-08 ENCOUNTER — LAB (OUTPATIENT)
Dept: LAB | Facility: HOSPITAL | Age: 64
End: 2024-05-08
Payer: COMMERCIAL

## 2024-05-08 ENCOUNTER — TELEPHONE (OUTPATIENT)
Dept: NEPHROLOGY | Facility: CLINIC | Age: 64
End: 2024-05-08

## 2024-05-08 DIAGNOSIS — Z79.4 TYPE 2 DIABETES MELLITUS WITH STAGE 3A CHRONIC KIDNEY DISEASE, WITH LONG-TERM CURRENT USE OF INSULIN (HCC): ICD-10-CM

## 2024-05-08 DIAGNOSIS — N18.31 TYPE 2 DIABETES MELLITUS WITH STAGE 3A CHRONIC KIDNEY DISEASE, WITH LONG-TERM CURRENT USE OF INSULIN (HCC): ICD-10-CM

## 2024-05-08 DIAGNOSIS — E78.5 DYSLIPIDEMIA: ICD-10-CM

## 2024-05-08 DIAGNOSIS — I12.9 PARENCHYMAL RENAL HYPERTENSION, STAGE 1 THROUGH STAGE 4 OR UNSPECIFIED CHRONIC KIDNEY DISEASE: ICD-10-CM

## 2024-05-08 DIAGNOSIS — E11.21 DIABETIC NEPHROPATHY ASSOCIATED WITH TYPE 2 DIABETES MELLITUS (HCC): ICD-10-CM

## 2024-05-08 DIAGNOSIS — N18.31 STAGE 3A CHRONIC KIDNEY DISEASE (HCC): ICD-10-CM

## 2024-05-08 DIAGNOSIS — C61 PROSTATE CANCER (HCC): ICD-10-CM

## 2024-05-08 DIAGNOSIS — N20.0 NEPHROLITHIASIS: ICD-10-CM

## 2024-05-08 DIAGNOSIS — N18.31 STAGE 3A CHRONIC KIDNEY DISEASE (HCC): Primary | ICD-10-CM

## 2024-05-08 DIAGNOSIS — E66.01 MORBID OBESITY (HCC): ICD-10-CM

## 2024-05-08 DIAGNOSIS — E11.22 TYPE 2 DIABETES MELLITUS WITH STAGE 3A CHRONIC KIDNEY DISEASE, WITH LONG-TERM CURRENT USE OF INSULIN (HCC): ICD-10-CM

## 2024-05-08 LAB
ALBUMIN SERPL BCP-MCNC: 4.4 G/DL (ref 3.5–5)
ALP SERPL-CCNC: 58 U/L (ref 34–104)
ALT SERPL W P-5'-P-CCNC: 43 U/L (ref 7–52)
ANION GAP SERPL CALCULATED.3IONS-SCNC: 9 MMOL/L (ref 4–13)
AST SERPL W P-5'-P-CCNC: 29 U/L (ref 13–39)
BILIRUB SERPL-MCNC: 0.42 MG/DL (ref 0.2–1)
BUN SERPL-MCNC: 34 MG/DL (ref 5–25)
CALCIUM SERPL-MCNC: 9.5 MG/DL (ref 8.4–10.2)
CHLORIDE SERPL-SCNC: 106 MMOL/L (ref 96–108)
CO2 SERPL-SCNC: 26 MMOL/L (ref 21–32)
CREAT SERPL-MCNC: 1.65 MG/DL (ref 0.6–1.3)
CREAT UR-MCNC: 100.6 MG/DL
ERYTHROCYTE [DISTWIDTH] IN BLOOD BY AUTOMATED COUNT: 13 % (ref 11.6–15.1)
EST. AVERAGE GLUCOSE BLD GHB EST-MCNC: 157 MG/DL
GFR SERPL CREATININE-BSD FRML MDRD: 43 ML/MIN/1.73SQ M
GLUCOSE P FAST SERPL-MCNC: 127 MG/DL (ref 65–99)
HBA1C MFR BLD: 7.1 %
HCT VFR BLD AUTO: 42.1 % (ref 36.5–49.3)
HGB BLD-MCNC: 13.7 G/DL (ref 12–17)
MAGNESIUM SERPL-MCNC: 1.4 MG/DL (ref 1.9–2.7)
MCH RBC QN AUTO: 29.7 PG (ref 26.8–34.3)
MCHC RBC AUTO-ENTMCNC: 32.5 G/DL (ref 31.4–37.4)
MCV RBC AUTO: 91 FL (ref 82–98)
PHOSPHATE SERPL-MCNC: 3.8 MG/DL (ref 2.3–4.1)
PLATELET # BLD AUTO: 292 THOUSANDS/UL (ref 149–390)
PMV BLD AUTO: 9.5 FL (ref 8.9–12.7)
POTASSIUM SERPL-SCNC: 5.4 MMOL/L (ref 3.5–5.3)
PROT SERPL-MCNC: 7.2 G/DL (ref 6.4–8.4)
PROT UR-MCNC: 6 MG/DL
PROT/CREAT UR: 0.06 MG/G{CREAT} (ref 0–0.1)
PSA SERPL-MCNC: <0.01 NG/ML (ref 0–4)
PTH-INTACT SERPL-MCNC: 105.2 PG/ML (ref 12–88)
RBC # BLD AUTO: 4.61 MILLION/UL (ref 3.88–5.62)
SODIUM SERPL-SCNC: 141 MMOL/L (ref 135–147)
WBC # BLD AUTO: 11.44 THOUSAND/UL (ref 4.31–10.16)

## 2024-05-08 PROCEDURE — 36415 COLL VENOUS BLD VENIPUNCTURE: CPT

## 2024-05-08 PROCEDURE — 85027 COMPLETE CBC AUTOMATED: CPT

## 2024-05-08 PROCEDURE — 84156 ASSAY OF PROTEIN URINE: CPT

## 2024-05-08 PROCEDURE — 84100 ASSAY OF PHOSPHORUS: CPT

## 2024-05-08 PROCEDURE — 82570 ASSAY OF URINE CREATININE: CPT

## 2024-05-08 PROCEDURE — 84153 ASSAY OF PSA TOTAL: CPT

## 2024-05-08 PROCEDURE — 82306 VITAMIN D 25 HYDROXY: CPT

## 2024-05-08 PROCEDURE — 83735 ASSAY OF MAGNESIUM: CPT

## 2024-05-08 PROCEDURE — 83970 ASSAY OF PARATHORMONE: CPT

## 2024-05-08 PROCEDURE — 80053 COMPREHEN METABOLIC PANEL: CPT

## 2024-05-08 PROCEDURE — 83036 HEMOGLOBIN GLYCOSYLATED A1C: CPT

## 2024-05-08 NOTE — RESULT ENCOUNTER NOTE
Please let the patient know the great news that his PSA is undetectable.  He is cancer free.  Should follow-up as scheduled.  Thank you

## 2024-05-08 NOTE — TELEPHONE ENCOUNTER
----- Message from Mateo Montero MD sent at 5/8/2024  9:30 AM EDT -----  Potassium slightly high ideally if we could increase HCTZ to 25 mg that would be awesome if he could try that that would be great he can start by doing it 3 days a week currently is taking HCTZ 12.5 mg daily if he tolerates that go to daily    Then recommend repeat a basic metabolic profile at his convenience in the next week or so nonfasting    Also reinforced low potassium diet I am trying to keep him on the Urocit-K/potassium citrate for his stone prevention  ----- Message -----  From: Lab, Background User  Sent: 5/8/2024   7:46 AM EDT  To: Mateo Montero MD

## 2024-05-08 NOTE — TELEPHONE ENCOUNTER
----- Message from Mateo Montero MD sent at 5/8/2024  9:34 AM EDT -----  Also magnesium level Slow-Mag over-the-counter daily watch for diarrhea  ----- Message -----  From: Lab, Background User  Sent: 5/8/2024   7:46 AM EDT  To: Mateo Montero MD

## 2024-05-10 DIAGNOSIS — I12.9 PARENCHYMAL RENAL HYPERTENSION, STAGE 1 THROUGH STAGE 4 OR UNSPECIFIED CHRONIC KIDNEY DISEASE: ICD-10-CM

## 2024-05-10 DIAGNOSIS — N18.31 STAGE 3A CHRONIC KIDNEY DISEASE (HCC): ICD-10-CM

## 2024-05-10 DIAGNOSIS — N20.0 NEPHROLITHIASIS: ICD-10-CM

## 2024-05-10 RX ORDER — LISINOPRIL 10 MG/1
10 TABLET ORAL DAILY
Qty: 90 TABLET | Refills: 1 | Status: SHIPPED | OUTPATIENT
Start: 2024-05-10

## 2024-05-16 LAB
25(OH)D2 SERPL-MCNC: <1 NG/ML
25(OH)D3 SERPL-MCNC: 34 NG/ML
25(OH)D3+25(OH)D2 SERPL-MCNC: 34 NG/ML

## 2024-05-30 DIAGNOSIS — M54.16 LUMBAR RADICULOPATHY: ICD-10-CM

## 2024-05-31 RX ORDER — GABAPENTIN 100 MG/1
200 CAPSULE ORAL 3 TIMES DAILY
Qty: 540 CAPSULE | Refills: 1 | Status: SHIPPED | OUTPATIENT
Start: 2024-05-31

## 2024-05-31 NOTE — TELEPHONE ENCOUNTER
Refill must be reviewed and completed by the office or provider. The refill is unable to be approved or denied by the medication management team.    Please review to see if the refill is appropriate.

## 2024-06-03 ENCOUNTER — RA CDI HCC (OUTPATIENT)
Dept: OTHER | Facility: HOSPITAL | Age: 64
End: 2024-06-03

## 2024-06-03 NOTE — PROGRESS NOTES
HCC coding opportunities          Chart Reviewed number of suggestions sent to Provider: 2     Patients Insurance        Commercial Insurance: [employer]     E11.65  E66.01

## 2024-06-06 PROBLEM — E83.42 HYPOMAGNESEMIA: Status: ACTIVE | Noted: 2024-06-06

## 2024-06-06 PROBLEM — E87.5 HYPERKALEMIA: Status: ACTIVE | Noted: 2024-06-06

## 2024-06-06 NOTE — PROGRESS NOTES
RENAL FOLLOW UP NOTE:.td    ASSESSMENT AND PLAN:  64 y.o. year old male with a history of diabetes mellitus type 2/dyslipidemia/hypertension/prostate cancer status post robotic prostatectomy June 2018/ELENA who we are asked to see regarding nephrolithiasis/CKD        1. CKD stage 3A  Etiology:  Diabetic kidney disease/hypertensive nephrosclerosis/episodes of JAYNE-hydronephrosis of the left kidney/arteriolar nephrosclerosis.  Doubt primary glomerular process.  Baseline creatinine:  1.37-1.60, most recently 1.60  Recommend:  Workup:  Current creatinine: 1.46 at baseline  UA with microscopic: From 1/11/2023: Trace proteinuria, negative heme; 2-4  RBCs WBCs or bacteria seen  Urine protein creatinine ratio: 0.06 g at goal  Renal ultrasound with PVR:  To be done did have moderate left-sided hydroureteronephrosis and 9 mm obstructing stone proximal left ureter  10/6/2022: Bilateral nephrolithiasis without hydronephrosis, 9 mm calculus in the upper pole of the right kidney; 5 and 10 mm calculus in the left  Renal artery duplex to rule out renal artery stenosis: Negative     Treatment:  Treat hypertension, please see below for recommendations  Treat dyslipidemia  Avoid nephrotoxic agents such as NSAIDs, and proton pump inhibitors as able; patient counseled as such  Good overall health recommendations including weight loss as appropriate, isotonic exercise as able, and avoidance of salt; patient counseled as such  UA: 2-4 RBCs are acceptable: Reviewed with urology: Apparently no issues at this time       2.  Volume: Euvolemic     3.  Hypertension:  Secondary work-up:  Normal plasma free metanephrines  Aldosterone/renin ratio: Negative  TSH normal  Renal work-up as above  ELENA unlikely mild heavy snoring at times but patient would defer at this time       Current blood pressure averages:  None today,     Goal blood pressure: Less than 130/80 given CKD     Recommendations:  Push nonmedical regimen including weight loss, isotonic  exercise and a low sodium diet.  Patient has been counseled the such.  MedicationChanges today:     Current recommendations:  Stop lisinopril  Repeat blood pressures 2 to 3 weeks after vacation     4.  Electrolytes:  All acceptable including potassium now 4.6: Will stop lisinopril        5.  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  Dyslipidemia:  Goal LDL: Less than 100  Current lipid profile: 52/HDL 39/triglycerides 128 from 10/25/2023  Recommendations:   Low-cholesterol/low-fat diet / weight loss as appropriate and isotonic exercise   Medication changes today: At goal so no changes     7.  Anemia:  Current hemoglobin: Normal at 13.7  Myeloma evaluation from 10/25/2023 negative including SPEP UPEP and light chain ratio        8.  Nephrolithiasis:Patient has had calcium oxalate stones as many as 13  most recently requiring intervention with cystoscopy laser lithotripsy and stent placement.  Patient with prior calcium oxalate now uric acid stones.  Status post recent ureteroscopy with laser lithotripsy basket stone extraction on the left and left ureteral stent placement on 8/11/2022 by Dr. Olmedo  Recommend:     Current status/stone passage none since stone extraction     Workup:  Patient with mild hyperparathyroidism will increase gently the vitamin D supplement.  Doubt primary hyperparathyroidism  Given incontinence cannot obtain formal 24 hour urine  General stone as well as low oxalate diet   Patient is slowly increasing water intake decreasing soda intake and gets close to 68 ounces we will continue to  work on this!  Can actually take closer to 2.5-3 L              8.  Other problems:  Diabetes mellitus type 2  : To consider SGLT2 inhibitor per primary physician if no contraindications for renal/cardiac protection.  Once he is feeling better regarding the back issue and recommend trying Farxiga 5 mg or Jardiance 10 mg just 3 days a week hold there for renal/cardiac protection and recheck basic metabolic profile 1 to 2 weeks later  Prostate cancer status post robotic prostatectomy 2018  ELENA?  Chronic mild leukocytosis stable  Recent back pain MRI demonstrated spinal canal stenosis mild exacerbated by acquired degenerative disc disease small disc herniations annular bulging posterior element hypertrophic changes.  Foraminal narrowing most pronounced on the left T11-12 and L4-5 level bilaterally.  Seen by Dr. Mark Corral from pain management felt lumbar radiculopathy.  Status post steroid injection about a week plus with marked improvement in pain able to walk  Slight irregular heart rhythm I believe seems premature atrial contraction: ECG with rhythm strip demonstrated normal sinus rhythm from 12/4/2023         GI HEALTH MAINTENANCE: LAST COLONOSCOPY: 2/10/2021       PATIENT INSTRUCTIONS:    Patient Instructions   Visit summary:  - Overall labs look excellent  - Try to take different type of magnesium formulation 3 days a week if you tolerate it otherwise please stop it and again continue to try to adjust your magnesium in your diet    Your blood pressure is actually low normal so I am going to stop lisinopril which will help both raise your blood pressure slightly which is the immediate goal, and help the potassium which is improved by the way        1. Medication changes today:  Please stop lisinopril now    2.  General instructions:  Please continue the general stone diet specifically water intake as best she can 2-1/2 to 3 L or 86 to 102 ounces and avoidance of salt as discussed  Continue as you are back allows  to exercise and lose weight        3.  Please take 1 week a blood pressure readings at least 2 to 3 weeks after vacation    AS FOLLOWS  MORNING AND EVENING, SITTING as follows:  TAKE THE MORNING READINGS BEFORE ANY MEDICATIONS AND WHEN YOU ARE RELAXED FOR SEVERAL MINUTES  TAKE THE EVENING READINGS:  BETWEEN 7-10 P.M.; PRIOR TO ANY MEDICATIONS; AT LEAST IN OUR  FROM DINNER; AND CERTAINLY AFTER RELAXING FOR A FEW MINUTES  PLEASE INCLUDE HEART RATE WITH YOUR BLOOD PRESSURE READINGS  When taking standing readings, keep your arm supported at heart level and not dangling  Make sure you are sitting with your back supported and feet on the ground and do not cross your legs or feet  Make sure you have not taken any coffee or caffeine products or exercised or smoke cigarettes at least 30 minutes before taking your blood pressure  Then please mail these readings into the office      4.  In 3 months:  Please go for nonfasting lab work but in the morning  Please take 1 week a blood pressure readings as outlined above and mail those into the office      5.  Follow-up in 6 months  Please bring in 1 week a blood pressure readings morning evening, sitting and standing is outlined above  PLEASE BRING AN YOUR BLOOD PRESSURE MACHINE TO CORRELATE WITH THE OFFICE MACHINE AT THIS NEXT SCHEDULED VISIT  Please go for fasting lab work 1-2 weeks prior to your appointment      6.  General non medical recommendations:  AVOID SALT BUT NOT ADDING AN READING LABELS TO MAKE SURE THERE IS LOW-SALT IN THE FOOD THAT YOU ARE EATING  Goal is less than 2 g of sodium intake or less than 5 g of sodium chloride intake per day    Avoid nonsteroidal anti-inflammatory drugs such as Naprosyn, ibuprofen, Aleve, Advil, Celebrex, Meloxicam (Mobic) etc.  You can use Tylenol as needed if you do not have any liver condition to be concerned about    Avoid medications such as Sudafed or decongestants and antihistamines that contained the D component which is  the decongestant.  You can take antihistamines without the decongestant or D component.    Try to avoid medications such as pantoprazole or  Protonix/Nexium or Esomeprazole)/Prilosec or omeprazole/Prevacid or lansoprazole/AcipHex or Rabeprazole.  If you are able to, use Pepcid as this is safer for your kidneys.    Try to exercise at least 30 minutes 3 days a week to begin with with an ultimate goal of 5 days a week for at least 30 minutes    Please do not drink more than 2 glasses of alcohol/wine on a daily basis as this may contribute to your high blood pressure.          Measures to reduce stone development:    Please Drink  oz of water or 2.5L-3 L a day, throughout the day  Avoid salt/low-salt diet   Try to decrease animal protein intake, dairy protein and vegetable base protein are better  Increase fruits and vegetables as much as possible  Avoid calcium products such as Tums or other types of calcium containing antacids, you can use Pepcid for indigestion (but you do not have to restrict your dietary calcium)  Avoid excessive vitamin D   Avoid excessive vitamin C  Try to avoid oxalate products (please refer to the diet sheet)  Limit fructose and sucrose type drinks such as coke        Subjective:   There has been no hospitalizations or acute illnesses since last visit.  The patient overall is feeling well.  No fevers, chills, or cough or colds.  Good appetite and good energy  No hematuria, dysuria, voiding symptoms or foamy urine: No stone passage  No gastrointestinal symptoms  No cardiovascular symptoms including swelling of the legs except for intermittent left foot swelling  No headaches, dizziness or lightheadedness  Blood pressure medications:  Lisinopril 10 mg daily in the morning  HCTZ 6.25 mg every other day  Carvedilol 25 mg twice a day  Amlodipine 5 mg daily in the morning    Renal pertinent medications:  Vitamin D 1000 units daily  Potassium citrate 10 mEq daily  Metformin 1000 mg twice a  day  Atorvastatin 80 mg daily  Baby aspirin's twice a day    ROS:  See HPI, otherwise review of systems as completely reviewed with the patient are negative    Past Medical History:   Diagnosis Date    Acute bronchitis     Acute low back pain     Acute low back pain     [id number] resolved    Acute respiratory infection     JAYNE (acute kidney injury) (HCC) 06/15/2018    Cancer (HCC)     prostate    Cellulitis of scrotum     Cough     Diabetes mellitus (HCC)     Herpes zoster     High cholesterol     Hyperkalemia     Hypertension     Kidney disease     Kidney stone     Leukocytosis 06/15/2018    Low back pain     Lumbar radiculopathy     Pyelonephritis 08/08/2022    Rash     Retinopathy, central serous     Shingles     Sleep apnea     [id number]    Trochanteric bursitis      Past Surgical History:   Procedure Laterality Date    ABDOMINAL ADHESION SURGERY N/A 6/13/2018    Procedure: LYSIS ADHESIONS;  Surgeon: Carlton Wolfe MD;  Location: [facility];  Service: Urology    FL RETROGRADE PYELOGRAM  8/8/2022    FL RETROGRADE PYELOGRAM  8/11/2022    LITHOTRIPSY      renal    DE CYSTO BLADDER W/URETERAL CATHETERIZATION Left 8/8/2022    Procedure: CYSTOSCOPY RETROGRADE PYELOGRAM WITH INSERTION STENT URETERAL;  Surgeon: Dimple Olmedo MD;  Location: [facility];  Service: Urology    DE CYSTO/URETERO W/LITHOTRIPSY &INDWELL STENT INSRT Left 8/11/2022    Procedure: CYSTOSCOPY URETEROSCOPY WITH LITHOTRIPSY HOLMIUM LASER, RETROGRADE PYELOGRAM AND INSERTION STENT URETERAL;  Surgeon: Dimple Olmedo MD;  Location: [facility];  Service: Urology    DE LAPS SURG [id number] RPBIC RAD W/NRV SPARING ROBOT N/A 6/13/2018    Procedure: PROSTATECTOMY RADICAL W ROBOTICS;  Surgeon: Carlton Wolfe MD;  Location: [facility];  Service: Urology    PROSTATE SURGERY  06/13/2018    SHOULDER SURGERY       Family History   Problem Relation Age of Onset    Other Father         cardiac disorder    Stroke Father     Diabetes Father         mellitus    Hypertension  Father     Heart disease Father         cardiac disorder    Cancer Sister     Cancer Paternal Grandmother     Heart disease Family         cardiac disorder    Kidney disease Family     Hypertension Mother     Cancer Mother       reports that he has never smoked. He has never used smokeless tobacco. He reports that he does not drink alcohol and does not use drugs.    I COMPLETELY REVIEWED THE PAST MEDICAL HISTORY/PAST SURGICAL HISTORY/SOCIAL HISTORY/FAMILY HISTORY/AND MEDICATIONS  AND UPDATED ALL    Objective:     Vitals:   BP sitting on left: 120/60 with a heart rate of 64 slightly irregular  BP standing on left: 120/60 with a heart rate of 72 and slightly irregular  Vitals:    06/14/24 0949   BP: 104/66   Pulse: 77       Weight (last 2 days)       Date/Time Weight    06/14/24 0949 120 (265)          Wt Readings from Last 3 Encounters:   06/14/24 120 kg (265 lb)   06/10/24 121 kg (267 lb 12.8 oz)   05/07/24 122 kg (268 lb)       Body mass index is 44.1 kg/m².    Physical Exam: General:  No acute distress/obese  Skin:  No acute rash  Eyes:  No scleral icterus, noninjected, no discharge from eyes  ENT:  Moist mucous membranes  Neck:  Supple, no jugular venous distention, trachea is midline, no lymphadenopathy and no thyromegaly  Back   No CVAT  Chest:  Clear to auscultation and percussion, good respiratory effort  CVS: Slightly irregular rhythm without a rub, or gallops or murmurs  Abdomen:  obese, Soft and nontender with normal bowel sounds  Extremities:  No cyanosis and no edema, no arthritic changes, normal range of motion  Neuro:  Grossly intact  Psych:  Alert, oriented x3 and appropriate      Medications:    Current Outpatient Medications:     albuterol (PROVENTIL HFA,VENTOLIN HFA) 90 mcg/act inhaler, Inhale 1-2 puffs every 6 (six) hours as needed for wheezing, Disp: 8 g, Rfl: 1    amLODIPine (NORVASC) 5 mg tablet, TAKE 1 TABLET BY MOUTH EVERY DAY, Disp: 90 tablet, Rfl: 1    ASPIRIN 81 PO, Take 1 capsule by mouth  2 (two) times a day  , Disp: , Rfl:     atorvastatin (LIPITOR) 80 mg tablet, TAKE 1 TABLET BY MOUTH EVERY DAY, Disp: 90 tablet, Rfl: 1    BD Pen Needle Rosalie 2nd Gen 32G X 4 MM MISC, INJECT UNDER THE SKIN 4 (FOUR) TIMES A DAY, Disp: 400 each, Rfl: 1    carvedilol (COREG) 25 mg tablet, TAKE 1 TABLET BY MOUTH TWICE A DAY WITH FOOD, Disp: 180 tablet, Rfl: 1    Contour Next Test test strip, CHECK SUGAR 4 TIMES DAILY, Disp: 400 strip, Rfl: 1    dulaglutide (Trulicity) 3 MG/0.5ML injection, Inject 0.5 mL (3 mg total) under the skin once a week, Disp: 6 mL, Rfl: 1    gabapentin (NEURONTIN) 100 mg capsule, TAKE 2 CAPSULES BY MOUTH 3 TIMES A DAY., Disp: 540 capsule, Rfl: 1    glimepiride (AMARYL) 4 mg tablet, Take 1 tablet (4 mg total) by mouth 2 (two) times a day, Disp: 180 tablet, Rfl: 1    glucose blood (Contour Next Test) test strip, Use 1 each 4 (four) times a day Use as instructed (Patient taking differently: Use 1 each daily 3 to 4 times daily. Before meals or randomly), Disp: 400 each, Rfl: 3    HumaLOG KwikPen 200 units/mL CONCENTRATED U-200 injection pen, INJECT 15 UNITS THREE TIMES DAILY WITH MEALS., Disp: 18 mL, Rfl: 5    hydrochlorothiazide (HYDRODIURIL) 12.5 mg tablet, TAKE 0.5 TABLETS BY MOUTH DAILY. (Patient taking differently: Take 12.5 mg by mouth Take 0.5 tab QOD), Disp: 45 tablet, Rfl: 4    metFORMIN (GLUCOPHAGE) 1000 MG tablet, TAKE 1 TABLET BY MOUTH TWICE A DAY, Disp: 180 tablet, Rfl: 1    Microlet Lancets MISC, Use 3 (three) times a day, Disp: 300 each, Rfl: 3    Multiple Vitamins-Minerals (MULTIVITAMIN WITH MINERALS) tablet, Take 1 tablet by mouth daily, Disp: , Rfl:     potassium citrate (UROCIT-K) 10 mEq, TAKE 1 TABLET BY MOUTH 2 TIMES A DAY. (Patient taking differently: Take 10 mEq by mouth in the morning), Disp: 180 tablet, Rfl: 3    tamsulosin (FLOMAX) 0.4 mg, TAKE 1 CAPSULE BY MOUTH EVERY DAY WITH DINNER (Patient taking differently: PRN), Disp: 90 capsule, Rfl: 1    Toujeo SoloStar 300 units/mL  "CONCENTRATED U-300 injection pen (1-unit dial), INJECT 80 UNITS UNDER THE SKIN DAILY, Disp: 22.5 mL, Rfl: 3    Vitamin D, Cholecalciferol, 400 units TABS, Take 1,000 Units by mouth daily, Disp: , Rfl:     Lab, Imaging and other studies: I have personally reviewed pertinent labs.  Laboratory Results:  Results for orders placed or performed in visit on 06/10/24   Basic metabolic panel   Result Value Ref Range    Sodium 140 135 - 147 mmol/L    Potassium 4.6 3.5 - 5.3 mmol/L    Chloride 104 96 - 108 mmol/L    CO2 27 21 - 32 mmol/L    ANION GAP 9 4 - 13 mmol/L    BUN 31 (H) 5 - 25 mg/dL    Creatinine 1.46 (H) 0.60 - 1.30 mg/dL    Glucose, Fasting 156 (H) 65 - 99 mg/dL    Calcium 9.7 8.4 - 10.2 mg/dL    eGFR 50 ml/min/1.73sq m   Magnesium   Result Value Ref Range    Magnesium 1.5 (L) 1.9 - 2.7 mg/dL       Results from last 7 days   Lab Units 06/10/24  0833   POTASSIUM mmol/L 4.6   CHLORIDE mmol/L 104   CO2 mmol/L 27   BUN mg/dL 31*   CREATININE mg/dL 1.46*   CALCIUM mg/dL 9.7   MAGNESIUM mg/dL 1.5*         Radiology review:   chest X-ray    Ultrasound      Portions of the record may have been created with voice recognition software.  Occasional wrong word or \"sound a like\" substitutions may have occurred due to the inherent limitations of voice recognition software.  Read the chart carefully and recognize, using context, where substitutions have occurred.                    "

## 2024-06-07 ENCOUNTER — TELEPHONE (OUTPATIENT)
Dept: NEPHROLOGY | Facility: CLINIC | Age: 64
End: 2024-06-07

## 2024-06-07 NOTE — TELEPHONE ENCOUNTER
----- Message from Mateo Montero MD sent at 6/6/2024  3:38 PM EDT -----  Believe he is due for a basic metabolic profile with medication changes along with magnesium prior to appointment

## 2024-06-07 NOTE — TELEPHONE ENCOUNTER
Called patient and left a voicemail going over the following information:    Please have  basic metabolic profile with medication changes along with magnesium prior to appointment.    I asked the patient please call the office with further questions.   Labs in chart.

## 2024-06-10 ENCOUNTER — OFFICE VISIT (OUTPATIENT)
Dept: INTERNAL MEDICINE CLINIC | Facility: CLINIC | Age: 64
End: 2024-06-10
Payer: COMMERCIAL

## 2024-06-10 ENCOUNTER — APPOINTMENT (OUTPATIENT)
Dept: LAB | Facility: HOSPITAL | Age: 64
End: 2024-06-10
Payer: COMMERCIAL

## 2024-06-10 VITALS
SYSTOLIC BLOOD PRESSURE: 130 MMHG | OXYGEN SATURATION: 97 % | HEIGHT: 65 IN | BODY MASS INDEX: 44.62 KG/M2 | WEIGHT: 267.8 LBS | DIASTOLIC BLOOD PRESSURE: 80 MMHG | HEART RATE: 47 BPM

## 2024-06-10 DIAGNOSIS — C61 PROSTATE CANCER (HCC): ICD-10-CM

## 2024-06-10 DIAGNOSIS — E66.01 MORBID OBESITY (HCC): ICD-10-CM

## 2024-06-10 DIAGNOSIS — Z00.00 ANNUAL PHYSICAL EXAM: Primary | ICD-10-CM

## 2024-06-10 DIAGNOSIS — Z79.4 TYPE 2 DIABETES MELLITUS WITH STAGE 3A CHRONIC KIDNEY DISEASE, WITH LONG-TERM CURRENT USE OF INSULIN (HCC): ICD-10-CM

## 2024-06-10 DIAGNOSIS — N18.31 TYPE 2 DIABETES MELLITUS WITH STAGE 3A CHRONIC KIDNEY DISEASE, WITH LONG-TERM CURRENT USE OF INSULIN (HCC): ICD-10-CM

## 2024-06-10 DIAGNOSIS — E11.21 DIABETIC NEPHROPATHY ASSOCIATED WITH TYPE 2 DIABETES MELLITUS (HCC): ICD-10-CM

## 2024-06-10 DIAGNOSIS — E11.22 TYPE 2 DIABETES MELLITUS WITH STAGE 3A CHRONIC KIDNEY DISEASE, WITH LONG-TERM CURRENT USE OF INSULIN (HCC): ICD-10-CM

## 2024-06-10 DIAGNOSIS — E78.5 DYSLIPIDEMIA: ICD-10-CM

## 2024-06-10 DIAGNOSIS — N20.0 NEPHROLITHIASIS: ICD-10-CM

## 2024-06-10 DIAGNOSIS — N18.31 STAGE 3A CHRONIC KIDNEY DISEASE (HCC): ICD-10-CM

## 2024-06-10 DIAGNOSIS — I12.9 PARENCHYMAL RENAL HYPERTENSION, STAGE 1 THROUGH STAGE 4 OR UNSPECIFIED CHRONIC KIDNEY DISEASE: ICD-10-CM

## 2024-06-10 LAB
ANION GAP SERPL CALCULATED.3IONS-SCNC: 9 MMOL/L (ref 4–13)
BUN SERPL-MCNC: 31 MG/DL (ref 5–25)
CALCIUM SERPL-MCNC: 9.7 MG/DL (ref 8.4–10.2)
CHLORIDE SERPL-SCNC: 104 MMOL/L (ref 96–108)
CO2 SERPL-SCNC: 27 MMOL/L (ref 21–32)
CREAT SERPL-MCNC: 1.46 MG/DL (ref 0.6–1.3)
GFR SERPL CREATININE-BSD FRML MDRD: 50 ML/MIN/1.73SQ M
GLUCOSE P FAST SERPL-MCNC: 156 MG/DL (ref 65–99)
MAGNESIUM SERPL-MCNC: 1.5 MG/DL (ref 1.9–2.7)
POTASSIUM SERPL-SCNC: 4.6 MMOL/L (ref 3.5–5.3)
SODIUM SERPL-SCNC: 140 MMOL/L (ref 135–147)

## 2024-06-10 PROCEDURE — 99396 PREV VISIT EST AGE 40-64: CPT | Performed by: INTERNAL MEDICINE

## 2024-06-10 PROCEDURE — 83735 ASSAY OF MAGNESIUM: CPT

## 2024-06-10 PROCEDURE — 36415 COLL VENOUS BLD VENIPUNCTURE: CPT

## 2024-06-10 PROCEDURE — 80048 BASIC METABOLIC PNL TOTAL CA: CPT

## 2024-06-10 NOTE — PROGRESS NOTES
Adult Annual Physical  Name: Bry Weller      : 1960      MRN: [id number]  Encounter Provider: Lea Reyes, MD  Encounter Date: 6/10/2024   [facility]    Assessment & Plan   1. Annual physical exam  2. Type 2 diabetes mellitus with stage 3a chronic kidney disease, with long-term current use of insulin (HCC)  Assessment & Plan:  He did not tolerate Jardiance-had even more urinary frequency and urgency  No anticipated cortisone injections  Continue Trulicity glimepiride metformin Toujeo and Humalog  Lab Results   Component Value Date    HGBA1C 7.1 (H) 2024     Orders:  -     Hemoglobin A1C; Future; Expected date: 2024  -     Comprehensive metabolic panel; Future; Expected date: 2024  -     CBC and differential; Future; Expected date: 2024  -     Albumin / creatinine urine ratio; Future; Expected date: 2024  -     Lipid Panel with Direct LDL reflex; Future; Expected date: 2024  -     Comprehensive metabolic panel; Future; Expected date: 08/10/2024  -     Hemoglobin A1C; Future; Expected date: 08/10/2024  3. Prostate cancer (HCC)  Assessment & Plan:  Prostatectomy  PSA undetectable  Immunizations and preventive care screenings were discussed with patient today. Appropriate education was printed on patient's after visit summary.      Counseling:  Exercise: the importance of regular exercise/physical activity was discussed. Recommend exercise 3-5 times per week for at least 30 minutes.   Shingrix recommended         History of Present Illness     Adult Annual Physical:  Patient presents for annual physical.     Diet and Physical Activity:  - Diet/Nutrition: diabetic diet.  - Exercise: no formal exercise.    Depression Screening:  - PHQ-2 Score: 0    General Health:  - Sleep: sleeps well.  - Hearing: normal hearing left ear and decreased hearing right ear.  - Vision: goes for regular eye exams.  - Dental: regular dental  "visits.     Health:    - Urinary symptoms: urinary urgency.     Review of Systems   Constitutional:  Negative for unexpected weight change.   Respiratory:  Negative for shortness of breath.    Cardiovascular:  Negative for chest pain and palpitations.   Gastrointestinal:  Negative for abdominal pain, constipation and diarrhea.   Genitourinary:  Positive for urgency (incontinence).   Musculoskeletal:  Positive for back pain.         Objective     /80   Pulse (!) 47   Ht 5' 5\" (1.651 m)   Wt 121 kg (267 lb 12.8 oz)   SpO2 97%   BMI 44.56 kg/m²     Physical Exam  Vitals and nursing note reviewed.   Constitutional:       General: He is not in acute distress.     Appearance: He is well-developed. He is obese. He is not ill-appearing.   HENT:      Head: Normocephalic and atraumatic.      Right Ear: Tympanic membrane and ear canal normal.      Left Ear: Tympanic membrane and ear canal normal.   Eyes:      Conjunctiva/sclera: Conjunctivae normal.   Cardiovascular:      Rate and Rhythm: Normal rate and regular rhythm.      Pulses: no weak pulses.           Dorsalis pedis pulses are 2+ on the right side and 2+ on the left side.      Heart sounds: No murmur heard.  Pulmonary:      Effort: Pulmonary effort is normal. No respiratory distress.      Breath sounds: Normal breath sounds.   Abdominal:      Palpations: Abdomen is soft.      Tenderness: There is no abdominal tenderness.   Musculoskeletal:      Cervical back: Neck supple.      Right lower leg: No edema.      Left lower leg: No edema.   Feet:      Right foot:      Skin integrity: No ulcer, skin breakdown, erythema, warmth, callus or dry skin.      Left foot:      Skin integrity: No ulcer, skin breakdown, erythema, warmth, callus or dry skin.   Skin:     Comments: Thick chalky toenails   Neurological:      Mental Status: He is alert.   Psychiatric:         Mood and Affect: Mood normal.         Behavior: Behavior normal.     Patient's shoes and socks " removed.    Right Foot/Ankle   Right Foot Inspection  Skin Exam: skin normal and skin intact. No dry skin, no warmth, no callus, no erythema, no maceration, no abnormal color, no pre-ulcer, no ulcer and no callus.     Toe Exam: ROM and strength within normal limits.     Sensory   Monofilament testing: intact    Vascular  The right DP pulse is 2+.     Left Foot/Ankle  Left Foot Inspection  Skin Exam: skin normal and skin intact. No dry skin, no warmth, no erythema, no maceration, normal color, no pre-ulcer, no ulcer and no callus.     Toe Exam: ROM and strength within normal limits.     Sensory   Monofilament testing: intact    Vascular  The left DP pulse is 2+.     Assign Risk Category  No deformity present  No loss of protective sensation  No weak pulses  Risk: 0      Administrative Statements

## 2024-06-10 NOTE — ASSESSMENT & PLAN NOTE
He did not tolerate Jardiance-had even more urinary frequency and urgency  No anticipated cortisone injections  Continue Trulicity glimepiride metformin Toujeo and Humalog  Lab Results   Component Value Date    HGBA1C 7.1 (H) 05/08/2024

## 2024-06-14 ENCOUNTER — OFFICE VISIT (OUTPATIENT)
Dept: NEPHROLOGY | Facility: CLINIC | Age: 64
End: 2024-06-14
Payer: COMMERCIAL

## 2024-06-14 VITALS
HEIGHT: 65 IN | SYSTOLIC BLOOD PRESSURE: 104 MMHG | DIASTOLIC BLOOD PRESSURE: 66 MMHG | WEIGHT: 265 LBS | HEART RATE: 77 BPM | BODY MASS INDEX: 44.15 KG/M2

## 2024-06-14 DIAGNOSIS — E11.21 DIABETIC NEPHROPATHY ASSOCIATED WITH TYPE 2 DIABETES MELLITUS (HCC): ICD-10-CM

## 2024-06-14 DIAGNOSIS — E87.5 HYPERKALEMIA: ICD-10-CM

## 2024-06-14 DIAGNOSIS — N18.32 STAGE 3B CHRONIC KIDNEY DISEASE (HCC): ICD-10-CM

## 2024-06-14 DIAGNOSIS — N20.0 NEPHROLITHIASIS: ICD-10-CM

## 2024-06-14 DIAGNOSIS — E83.42 HYPOMAGNESEMIA: ICD-10-CM

## 2024-06-14 DIAGNOSIS — E78.5 DYSLIPIDEMIA: ICD-10-CM

## 2024-06-14 DIAGNOSIS — E66.01 MORBID OBESITY (HCC): ICD-10-CM

## 2024-06-14 DIAGNOSIS — I12.9 PARENCHYMAL RENAL HYPERTENSION, STAGE 1 THROUGH STAGE 4 OR UNSPECIFIED CHRONIC KIDNEY DISEASE: Primary | ICD-10-CM

## 2024-06-14 PROCEDURE — 99214 OFFICE O/P EST MOD 30 MIN: CPT | Performed by: INTERNAL MEDICINE

## 2024-06-14 NOTE — PATIENT INSTRUCTIONS
Visit summary:  - Overall labs look excellent  - Try to take different type of magnesium formulation 3 days a week if you tolerate it otherwise please stop it and again continue to try to adjust your magnesium in your diet    Your blood pressure is actually low normal so I am going to stop lisinopril which will help both raise your blood pressure slightly which is the immediate goal, and help the potassium which is improved by the way        1. Medication changes today:  Please stop lisinopril now    2.  General instructions:  Please continue the general stone diet specifically water intake as best she can 2-1/2 to 3 L or 86 to 102 ounces and avoidance of salt as discussed  Continue as you are back allows to exercise and lose weight        3.  Please take 1 week a blood pressure readings at least 2 to 3 weeks after vacation    AS FOLLOWS  MORNING AND EVENING, SITTING as follows:  TAKE THE MORNING READINGS BEFORE ANY MEDICATIONS AND WHEN YOU ARE RELAXED FOR SEVERAL MINUTES  TAKE THE EVENING READINGS:  BETWEEN 7-10 P.M.; PRIOR TO ANY MEDICATIONS; AT LEAST IN OUR  FROM DINNER; AND CERTAINLY AFTER RELAXING FOR A FEW MINUTES  PLEASE INCLUDE HEART RATE WITH YOUR BLOOD PRESSURE READINGS  When taking standing readings, keep your arm supported at heart level and not dangling  Make sure you are sitting with your back supported and feet on the ground and do not cross your legs or feet  Make sure you have not taken any coffee or caffeine products or exercised or smoke cigarettes at least 30 minutes before taking your blood pressure  Then please mail these readings into the office      4.  In 3 months:  Please go for nonfasting lab work but in the morning  Please take 1 week a blood pressure readings as outlined above and mail those into the office      5.  Follow-up in 6 months  Please bring in 1 week a blood pressure readings morning evening, sitting and standing is outlined above  PLEASE BRING AN YOUR BLOOD PRESSURE  MACHINE TO CORRELATE WITH THE OFFICE MACHINE AT THIS NEXT SCHEDULED VISIT  Please go for fasting lab work 1-2 weeks prior to your appointment      6.  General non medical recommendations:  AVOID SALT BUT NOT ADDING AN READING LABELS TO MAKE SURE THERE IS LOW-SALT IN THE FOOD THAT YOU ARE EATING  Goal is less than 2 g of sodium intake or less than 5 g of sodium chloride intake per day    Avoid nonsteroidal anti-inflammatory drugs such as Naprosyn, ibuprofen, Aleve, Advil, Celebrex, Meloxicam (Mobic) etc.  You can use Tylenol as needed if you do not have any liver condition to be concerned about    Avoid medications such as Sudafed or decongestants and antihistamines that contained the D component which is the decongestant.  You can take antihistamines without the decongestant or D component.    Try to avoid medications such as pantoprazole or  Protonix/Nexium or Esomeprazole)/Prilosec or omeprazole/Prevacid or lansoprazole/AcipHex or Rabeprazole.  If you are able to, use Pepcid as this is safer for your kidneys.    Try to exercise at least 30 minutes 3 days a week to begin with with an ultimate goal of 5 days a week for at least 30 minutes    Please do not drink more than 2 glasses of alcohol/wine on a daily basis as this may contribute to your high blood pressure.          Measures to reduce stone development:    Please Drink  oz of water or 2.5L-3 L a day, throughout the day  Avoid salt/low-salt diet   Try to decrease animal protein intake, dairy protein and vegetable base protein are better  Increase fruits and vegetables as much as possible  Avoid calcium products such as Tums or other types of calcium containing antacids, you can use Pepcid for indigestion (but you do not have to restrict your dietary calcium)  Avoid excessive vitamin D   Avoid excessive vitamin C  Try to avoid oxalate products (please refer to the diet sheet)  Limit fructose and sucrose type drinks such as coke

## 2024-06-14 NOTE — LETTER
June 14, 2024     Lea Reyes, MD  [address] [facility] [address]    Patient: Bry Weller   YOB: 1960   Date of Visit: 6/14/2024       Dear Dr. Reyes:    Thank you for referring Bry Weller to me for evaluation. Below are my notes for this consultation.    If you have questions, please do not hesitate to call me. I look forward to following your patient along with you.         Sincerely,        Mateo Montero MD        CC: No Recipients    Mateo Montero MD  6/14/2024 10:23 AM  Sign when Signing Visit  RENAL FOLLOW UP NOTE:.td    ASSESSMENT AND PLAN:  64 y.o. year old male with a history of diabetes mellitus type 2/dyslipidemia/hypertension/prostate cancer status post robotic prostatectomy June 2018/ELENA who we are asked to see regarding nephrolithiasis/CKD        1. CKD stage 3A  Etiology:  Diabetic kidney disease/hypertensive nephrosclerosis/episodes of JAYNE-hydronephrosis of the left kidney/arteriolar nephrosclerosis.  Doubt primary glomerular process.  Baseline creatinine:  1.37-1.60, most recently 1.60  Recommend:  Workup:  Current creatinine: 1.46 at baseline  UA with microscopic: From 1/11/2023: Trace proteinuria, negative heme; 2-4  RBCs WBCs or bacteria seen  Urine protein creatinine ratio: 0.06 g at goal  Renal ultrasound with PVR:  To be done did have moderate left-sided hydroureteronephrosis and 9 mm obstructing stone proximal left ureter  10/6/2022: Bilateral nephrolithiasis without hydronephrosis, 9 mm calculus in the upper pole of the right kidney; 5 and 10 mm calculus in the left  Renal artery duplex to rule out renal artery stenosis: Negative     Treatment:  Treat hypertension, please see below for recommendations  Treat dyslipidemia  Avoid nephrotoxic agents such as NSAIDs, and proton pump inhibitors as able; patient counseled as such  Good overall health recommendations including weight loss as appropriate, isotonic exercise as able, and avoidance of salt; patient  counseled as such  UA: 2-4 RBCs are acceptable: Reviewed with urology: Apparently no issues at this time       2.  Volume: Euvolemic     3.  Hypertension:  Secondary work-up:  Normal plasma free metanephrines  Aldosterone/renin ratio: Negative  TSH normal  Renal work-up as above  ELENA unlikely mild heavy snoring at times but patient would defer at this time       Current blood pressure averages:  None today,     Goal blood pressure: Less than 130/80 given CKD     Recommendations:  Push nonmedical regimen including weight loss, isotonic exercise and a low sodium diet.  Patient has been counseled the such.  MedicationChanges today:     Current recommendations:  Stop lisinopril  Repeat blood pressures 2 to 3 weeks after vacation     4.  Electrolytes:  All acceptable including potassium now 4.6: Will stop lisinopril        5.  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  Dyslipidemia:  Goal LDL: Less than 100  Current lipid profile: 52/HDL 39/triglycerides 128 from 10/25/2023  Recommendations:   Low-cholesterol/low-fat diet / weight loss as appropriate and isotonic exercise   Medication changes today: At goal so no changes     7.  Anemia:  Current hemoglobin: Normal at 13.7  Myeloma evaluation from 10/25/2023 negative including SPEP UPEP and light chain ratio        8.  Nephrolithiasis:Patient has had calcium oxalate stones as many as 13  most recently requiring intervention with cystoscopy laser lithotripsy and stent placement.  Patient with prior calcium oxalate now uric acid stones.  Status post recent  ureteroscopy with laser lithotripsy basket stone extraction on the left and left ureteral stent placement on 8/11/2022 by Dr. Olmedo  Recommend:     Current status/stone passage none since stone extraction     Workup:  Patient with mild hyperparathyroidism will increase gently the vitamin D supplement.  Doubt primary hyperparathyroidism  Given incontinence cannot obtain formal 24 hour urine  General stone as well as low oxalate diet   Patient is slowly increasing water intake decreasing soda intake and gets close to 68 ounces we will continue to work on this!  Can actually take closer to 2.5-3 L              8.  Other problems:  Diabetes mellitus type 2  : To consider SGLT2 inhibitor per primary physician if no contraindications for renal/cardiac protection.  Once he is feeling better regarding the back issue and recommend trying Farxiga 5 mg or Jardiance 10 mg just 3 days a week hold there for renal/cardiac protection and recheck basic metabolic profile 1 to 2 weeks later  Prostate cancer status post robotic prostatectomy 2018  ELENA?  Chronic mild leukocytosis stable  Recent back pain MRI demonstrated spinal canal stenosis mild exacerbated by acquired degenerative disc disease small disc herniations annular bulging posterior element hypertrophic changes.  Foraminal narrowing most pronounced on the left T11-12 and L4-5 level bilaterally.  Seen by Dr. Mark Corral from pain management felt lumbar radiculopathy.  Status post steroid injection about a week plus with marked improvement in pain able to walk  Slight irregular heart rhythm I believe seems premature atrial contraction: ECG with rhythm strip demonstrated normal sinus rhythm from 12/4/2023         GI HEALTH MAINTENANCE: LAST COLONOSCOPY: 2/10/2021       PATIENT INSTRUCTIONS:    Patient Instructions   Visit summary:  - Overall labs look excellent  - Try to take different type of magnesium formulation 3 days a week if you tolerate it otherwise please stop it and  again continue to try to adjust your magnesium in your diet    Your blood pressure is actually low normal so I am going to stop lisinopril which will help both raise your blood pressure slightly which is the immediate goal, and help the potassium which is improved by the way        1. Medication changes today:  Please stop lisinopril now    2.  General instructions:  Please continue the general stone diet specifically water intake as best she can 2-1/2 to 3 L or 86 to 102 ounces and avoidance of salt as discussed  Continue as you are back allows to exercise and lose weight        3.  Please take 1 week a blood pressure readings at least 2 to 3 weeks after vacation    AS FOLLOWS  MORNING AND EVENING, SITTING as follows:  TAKE THE MORNING READINGS BEFORE ANY MEDICATIONS AND WHEN YOU ARE RELAXED FOR SEVERAL MINUTES  TAKE THE EVENING READINGS:  BETWEEN 7-10 P.M.; PRIOR TO ANY MEDICATIONS; AT LEAST IN OUR  FROM DINNER; AND CERTAINLY AFTER RELAXING FOR A FEW MINUTES  PLEASE INCLUDE HEART RATE WITH YOUR BLOOD PRESSURE READINGS  When taking standing readings, keep your arm supported at heart level and not dangling  Make sure you are sitting with your back supported and feet on the ground and do not cross your legs or feet  Make sure you have not taken any coffee or caffeine products or exercised or smoke cigarettes at least 30 minutes before taking your blood pressure  Then please mail these readings into the office      4.  In 3 months:  Please go for nonfasting lab work but in the morning  Please take 1 week a blood pressure readings as outlined above and mail those into the office      5.  Follow-up in 6 months  Please bring in 1 week a blood pressure readings morning evening, sitting and standing is outlined above  PLEASE BRING AN YOUR BLOOD PRESSURE MACHINE TO CORRELATE WITH THE OFFICE MACHINE AT THIS NEXT SCHEDULED VISIT  Please go for fasting lab work 1-2 weeks prior to your appointment      6.  General non  medical recommendations:  AVOID SALT BUT NOT ADDING AN READING LABELS TO MAKE SURE THERE IS LOW-SALT IN THE FOOD THAT YOU ARE EATING  Goal is less than 2 g of sodium intake or less than 5 g of sodium chloride intake per day    Avoid nonsteroidal anti-inflammatory drugs such as Naprosyn, ibuprofen, Aleve, Advil, Celebrex, Meloxicam (Mobic) etc.  You can use Tylenol as needed if you do not have any liver condition to be concerned about    Avoid medications such as Sudafed or decongestants and antihistamines that contained the D component which is the decongestant.  You can take antihistamines without the decongestant or D component.    Try to avoid medications such as pantoprazole or  Protonix/Nexium or Esomeprazole)/Prilosec or omeprazole/Prevacid or lansoprazole/AcipHex or Rabeprazole.  If you are able to, use Pepcid as this is safer for your kidneys.    Try to exercise at least 30 minutes 3 days a week to begin with with an ultimate goal of 5 days a week for at least 30 minutes    Please do not drink more than 2 glasses of alcohol/wine on a daily basis as this may contribute to your high blood pressure.          Measures to reduce stone development:    Please Drink  oz of water or 2.5L-3 L a day, throughout the day  Avoid salt/low-salt diet   Try to decrease animal protein intake, dairy protein and vegetable base protein are better  Increase fruits and vegetables as much as possible  Avoid calcium products such as Tums or other types of calcium containing antacids, you can use Pepcid for indigestion (but you do not have to restrict your dietary calcium)  Avoid excessive vitamin D   Avoid excessive vitamin C  Try to avoid oxalate products (please refer to the diet sheet)  Limit fructose and sucrose type drinks such as coke        Subjective:   There has been no hospitalizations or acute illnesses since last visit.  The patient overall is feeling well.  No fevers, chills, or cough or colds.  Good appetite and  good energy  No hematuria, dysuria, voiding symptoms or foamy urine: No stone passage  No gastrointestinal symptoms  No cardiovascular symptoms including swelling of the legs except for intermittent left foot swelling  No headaches, dizziness or lightheadedness  Blood pressure medications:  Lisinopril 10 mg daily in the morning  HCTZ 6.25 mg every other day  Carvedilol 25 mg twice a day  Amlodipine 5 mg daily in the morning    Renal pertinent medications:  Vitamin D 1000 units daily  Potassium citrate 10 mEq daily  Metformin 1000 mg twice a day  Atorvastatin 80 mg daily  Baby aspirin's twice a day    ROS:  See HPI, otherwise review of systems as completely reviewed with the patient are negative    Past Medical History:   Diagnosis Date   • Acute bronchitis    • Acute low back pain    • Acute low back pain     [id number] resolved   • Acute respiratory infection    • JAYNE (acute kidney injury) (HCC) 06/15/2018   • Cancer (HCC)     prostate   • Cellulitis of scrotum    • Cough    • Diabetes mellitus (HCC)    • Herpes zoster    • High cholesterol    • Hyperkalemia    • Hypertension    • Kidney disease    • Kidney stone    • Leukocytosis 06/15/2018   • Low back pain    • Lumbar radiculopathy    • Pyelonephritis 08/08/2022   • Rash    • Retinopathy, central serous    • Shingles    • Sleep apnea     [id number]   • Trochanteric bursitis      Past Surgical History:   Procedure Laterality Date   • ABDOMINAL ADHESION SURGERY N/A 6/13/2018    Procedure: LYSIS ADHESIONS;  Surgeon: Carlton Wolfe MD;  Location: [facility];  Service: Urology   • FL RETROGRADE PYELOGRAM  8/8/2022   • FL RETROGRADE PYELOGRAM  8/11/2022   • LITHOTRIPSY      renal   • WI CYSTO BLADDER W/URETERAL CATHETERIZATION Left 8/8/2022    Procedure: CYSTOSCOPY RETROGRADE PYELOGRAM WITH INSERTION STENT URETERAL;  Surgeon: Dimple Olmedo MD;  Location: [facility];  Service: Urology   • WI CYSTO/URETERO W/LITHOTRIPSY &INDWELL STENT INSRT Left 8/11/2022    Procedure:  CYSTOSCOPY URETEROSCOPY WITH LITHOTRIPSY HOLMIUM LASER, RETROGRADE PYELOGRAM AND INSERTION STENT URETERAL;  Surgeon: Dimple Olmedo MD;  Location: [facility];  Service: Urology   • MN LAPS SURG [id number] RPBIC RAD W/NRV SPARING ROBOT N/A 6/13/2018    Procedure: PROSTATECTOMY RADICAL W ROBOTICS;  Surgeon: Carlton Wolfe MD;  Location: [facility];  Service: Urology   • PROSTATE SURGERY  06/13/2018   • SHOULDER SURGERY       Family History   Problem Relation Age of Onset   • Other Father         cardiac disorder   • Stroke Father    • Diabetes Father         mellitus   • Hypertension Father    • Heart disease Father         cardiac disorder   • Cancer Sister    • Cancer Paternal Grandmother    • Heart disease Family         cardiac disorder   • Kidney disease Family    • Hypertension Mother    • Cancer Mother       reports that he has never smoked. He has never used smokeless tobacco. He reports that he does not drink alcohol and does not use drugs.    I COMPLETELY REVIEWED THE PAST MEDICAL HISTORY/PAST SURGICAL HISTORY/SOCIAL HISTORY/FAMILY HISTORY/AND MEDICATIONS  AND UPDATED ALL    Objective:     Vitals:   BP sitting on left: 120/60 with a heart rate of 64 slightly irregular  BP standing on left: 120/60 with a heart rate of 72 and slightly irregular  Vitals:    06/14/24 0949   BP: 104/66   Pulse: 77       Weight (last 2 days)       Date/Time Weight    06/14/24 0949 120 (265)          Wt Readings from Last 3 Encounters:   06/14/24 120 kg (265 lb)   06/10/24 121 kg (267 lb 12.8 oz)   05/07/24 122 kg (268 lb)       Body mass index is 44.1 kg/m².    Physical Exam: General:  No acute distress/obese  Skin:  No acute rash  Eyes:  No scleral icterus, noninjected, no discharge from eyes  ENT:  Moist mucous membranes  Neck:  Supple, no jugular venous distention, trachea is midline, no lymphadenopathy and no thyromegaly  Back   No CVAT  Chest:  Clear to auscultation and percussion, good respiratory effort  CVS: Slightly  irregular rhythm without a rub, or gallops or murmurs  Abdomen:  obese, Soft and nontender with normal bowel sounds  Extremities:  No cyanosis and no edema, no arthritic changes, normal range of motion  Neuro:  Grossly intact  Psych:  Alert, oriented x3 and appropriate      Medications:    Current Outpatient Medications:   •  albuterol (PROVENTIL HFA,VENTOLIN HFA) 90 mcg/act inhaler, Inhale 1-2 puffs every 6 (six) hours as needed for wheezing, Disp: 8 g, Rfl: 1  •  amLODIPine (NORVASC) 5 mg tablet, TAKE 1 TABLET BY MOUTH EVERY DAY, Disp: 90 tablet, Rfl: 1  •  ASPIRIN 81 PO, Take 1 capsule by mouth 2 (two) times a day  , Disp: , Rfl:   •  atorvastatin (LIPITOR) 80 mg tablet, TAKE 1 TABLET BY MOUTH EVERY DAY, Disp: 90 tablet, Rfl: 1  •  BD Pen Needle Rosalie 2nd Gen 32G X 4 MM MISC, INJECT UNDER THE SKIN 4 (FOUR) TIMES A DAY, Disp: 400 each, Rfl: 1  •  carvedilol (COREG) 25 mg tablet, TAKE 1 TABLET BY MOUTH TWICE A DAY WITH FOOD, Disp: 180 tablet, Rfl: 1  •  Contour Next Test test strip, CHECK SUGAR 4 TIMES DAILY, Disp: 400 strip, Rfl: 1  •  dulaglutide (Trulicity) 3 MG/0.5ML injection, Inject 0.5 mL (3 mg total) under the skin once a week, Disp: 6 mL, Rfl: 1  •  gabapentin (NEURONTIN) 100 mg capsule, TAKE 2 CAPSULES BY MOUTH 3 TIMES A DAY., Disp: 540 capsule, Rfl: 1  •  glimepiride (AMARYL) 4 mg tablet, Take 1 tablet (4 mg total) by mouth 2 (two) times a day, Disp: 180 tablet, Rfl: 1  •  glucose blood (Contour Next Test) test strip, Use 1 each 4 (four) times a day Use as instructed (Patient taking differently: Use 1 each daily 3 to 4 times daily. Before meals or randomly), Disp: 400 each, Rfl: 3  •  HumaLOG KwikPen 200 units/mL CONCENTRATED U-200 injection pen, INJECT 15 UNITS THREE TIMES DAILY WITH MEALS., Disp: 18 mL, Rfl: 5  •  hydrochlorothiazide (HYDRODIURIL) 12.5 mg tablet, TAKE 0.5 TABLETS BY MOUTH DAILY. (Patient taking differently: Take 12.5 mg by mouth Take 0.5 tab QOD), Disp: 45 tablet, Rfl: 4  •  metFORMIN  "(GLUCOPHAGE) 1000 MG tablet, TAKE 1 TABLET BY MOUTH TWICE A DAY, Disp: 180 tablet, Rfl: 1  •  Microlet Lancets MISC, Use 3 (three) times a day, Disp: 300 each, Rfl: 3  •  Multiple Vitamins-Minerals (MULTIVITAMIN WITH MINERALS) tablet, Take 1 tablet by mouth daily, Disp: , Rfl:   •  potassium citrate (UROCIT-K) 10 mEq, TAKE 1 TABLET BY MOUTH 2 TIMES A DAY. (Patient taking differently: Take 10 mEq by mouth in the morning), Disp: 180 tablet, Rfl: 3  •  tamsulosin (FLOMAX) 0.4 mg, TAKE 1 CAPSULE BY MOUTH EVERY DAY WITH DINNER (Patient taking differently: PRN), Disp: 90 capsule, Rfl: 1  •  Toujeo SoloStar 300 units/mL CONCENTRATED U-300 injection pen (1-unit dial), INJECT 80 UNITS UNDER THE SKIN DAILY, Disp: 22.5 mL, Rfl: 3  •  Vitamin D, Cholecalciferol, 400 units TABS, Take 1,000 Units by mouth daily, Disp: , Rfl:     Lab, Imaging and other studies: I have personally reviewed pertinent labs.  Laboratory Results:  Results for orders placed or performed in visit on 06/10/24   Basic metabolic panel   Result Value Ref Range    Sodium 140 135 - 147 mmol/L    Potassium 4.6 3.5 - 5.3 mmol/L    Chloride 104 96 - 108 mmol/L    CO2 27 21 - 32 mmol/L    ANION GAP 9 4 - 13 mmol/L    BUN 31 (H) 5 - 25 mg/dL    Creatinine 1.46 (H) 0.60 - 1.30 mg/dL    Glucose, Fasting 156 (H) 65 - 99 mg/dL    Calcium 9.7 8.4 - 10.2 mg/dL    eGFR 50 ml/min/1.73sq m   Magnesium   Result Value Ref Range    Magnesium 1.5 (L) 1.9 - 2.7 mg/dL       Results from last 7 days   Lab Units 06/10/24  0833   POTASSIUM mmol/L 4.6   CHLORIDE mmol/L 104   CO2 mmol/L 27   BUN mg/dL 31*   CREATININE mg/dL 1.46*   CALCIUM mg/dL 9.7   MAGNESIUM mg/dL 1.5*         Radiology review:   chest X-ray    Ultrasound      Portions of the record may have been created with voice recognition software.  Occasional wrong word or \"sound a like\" substitutions may have occurred due to the inherent limitations of voice recognition software.  Read the chart carefully and recognize, using " context, where substitutions have occurred.

## 2024-07-07 DIAGNOSIS — E11.65 UNCONTROLLED TYPE 2 DIABETES MELLITUS WITH HYPERGLYCEMIA (HCC): ICD-10-CM

## 2024-07-07 RX ORDER — ATORVASTATIN CALCIUM 80 MG/1
TABLET, FILM COATED ORAL
Qty: 90 TABLET | Refills: 1 | Status: SHIPPED | OUTPATIENT
Start: 2024-07-07

## 2024-08-08 DIAGNOSIS — E11.65 UNCONTROLLED TYPE 2 DIABETES MELLITUS WITH HYPERGLYCEMIA (HCC): ICD-10-CM

## 2024-08-08 RX ORDER — PEN NEEDLE, DIABETIC 32GX 5/32"
NEEDLE, DISPOSABLE MISCELLANEOUS
Qty: 400 EACH | Refills: 1 | Status: SHIPPED | OUTPATIENT
Start: 2024-08-08

## 2024-08-15 DIAGNOSIS — E11.65 UNCONTROLLED TYPE 2 DIABETES MELLITUS WITH HYPERGLYCEMIA (HCC): ICD-10-CM

## 2024-08-28 DIAGNOSIS — E11.65 UNCONTROLLED TYPE 2 DIABETES MELLITUS WITH HYPERGLYCEMIA (HCC): ICD-10-CM

## 2024-08-28 RX ORDER — LANCETS
EACH MISCELLANEOUS 3 TIMES DAILY
Qty: 300 EACH | Refills: 3 | Status: SHIPPED | OUTPATIENT
Start: 2024-08-28

## 2024-08-29 ENCOUNTER — TELEPHONE (OUTPATIENT)
Dept: NEPHROLOGY | Facility: CLINIC | Age: 64
End: 2024-08-29

## 2024-08-29 ENCOUNTER — APPOINTMENT (OUTPATIENT)
Dept: LAB | Facility: MEDICAL CENTER | Age: 64
End: 2024-08-29
Payer: COMMERCIAL

## 2024-08-29 DIAGNOSIS — E11.22 TYPE 2 DIABETES MELLITUS WITH STAGE 3A CHRONIC KIDNEY DISEASE, WITH LONG-TERM CURRENT USE OF INSULIN (HCC): ICD-10-CM

## 2024-08-29 DIAGNOSIS — E83.42 HYPOMAGNESEMIA: ICD-10-CM

## 2024-08-29 DIAGNOSIS — N18.32 STAGE 3B CHRONIC KIDNEY DISEASE (HCC): ICD-10-CM

## 2024-08-29 DIAGNOSIS — Z79.4 TYPE 2 DIABETES MELLITUS WITH STAGE 3A CHRONIC KIDNEY DISEASE, WITH LONG-TERM CURRENT USE OF INSULIN (HCC): ICD-10-CM

## 2024-08-29 DIAGNOSIS — E87.5 HYPERKALEMIA: ICD-10-CM

## 2024-08-29 DIAGNOSIS — N20.0 NEPHROLITHIASIS: ICD-10-CM

## 2024-08-29 DIAGNOSIS — E78.5 DYSLIPIDEMIA: ICD-10-CM

## 2024-08-29 DIAGNOSIS — N18.31 TYPE 2 DIABETES MELLITUS WITH STAGE 3A CHRONIC KIDNEY DISEASE, WITH LONG-TERM CURRENT USE OF INSULIN (HCC): ICD-10-CM

## 2024-08-29 DIAGNOSIS — I12.9 PARENCHYMAL RENAL HYPERTENSION, STAGE 1 THROUGH STAGE 4 OR UNSPECIFIED CHRONIC KIDNEY DISEASE: ICD-10-CM

## 2024-08-29 DIAGNOSIS — E66.01 MORBID OBESITY (HCC): ICD-10-CM

## 2024-08-29 DIAGNOSIS — E11.21 DIABETIC NEPHROPATHY ASSOCIATED WITH TYPE 2 DIABETES MELLITUS (HCC): ICD-10-CM

## 2024-08-29 LAB
ALBUMIN SERPL BCG-MCNC: 4.3 G/DL (ref 3.5–5)
ALP SERPL-CCNC: 72 U/L (ref 34–104)
ALT SERPL W P-5'-P-CCNC: 45 U/L (ref 7–52)
ANION GAP SERPL CALCULATED.3IONS-SCNC: 12 MMOL/L (ref 4–13)
AST SERPL W P-5'-P-CCNC: 32 U/L (ref 13–39)
BILIRUB SERPL-MCNC: 0.54 MG/DL (ref 0.2–1)
BUN SERPL-MCNC: 22 MG/DL (ref 5–25)
CALCIUM SERPL-MCNC: 9.2 MG/DL (ref 8.4–10.2)
CHLORIDE SERPL-SCNC: 101 MMOL/L (ref 96–108)
CO2 SERPL-SCNC: 28 MMOL/L (ref 21–32)
CREAT SERPL-MCNC: 1.52 MG/DL (ref 0.6–1.3)
EST. AVERAGE GLUCOSE BLD GHB EST-MCNC: 157 MG/DL
GFR SERPL CREATININE-BSD FRML MDRD: 48 ML/MIN/1.73SQ M
GLUCOSE SERPL-MCNC: 149 MG/DL (ref 65–140)
HBA1C MFR BLD: 7.1 %
MAGNESIUM SERPL-MCNC: 1.5 MG/DL (ref 1.9–2.7)
POTASSIUM SERPL-SCNC: 4.3 MMOL/L (ref 3.5–5.3)
PROT SERPL-MCNC: 7 G/DL (ref 6.4–8.4)
SODIUM SERPL-SCNC: 141 MMOL/L (ref 135–147)

## 2024-08-29 PROCEDURE — 83735 ASSAY OF MAGNESIUM: CPT

## 2024-08-29 PROCEDURE — 80053 COMPREHEN METABOLIC PANEL: CPT

## 2024-08-29 PROCEDURE — 83036 HEMOGLOBIN GLYCOSYLATED A1C: CPT

## 2024-08-29 PROCEDURE — 36415 COLL VENOUS BLD VENIPUNCTURE: CPT

## 2024-08-29 NOTE — TELEPHONE ENCOUNTER
----- Message from Mateo Montero MD sent at 8/29/2024  4:13 PM EDT -----  Magnesium stable although slightly low would just continue with current regimen to avoid diarrhea  Rest of the labs look good   ----- Message -----  From: Lab, Background User  Sent: 8/29/2024   4:10 PM EDT  To: Mateo Montero MD

## 2024-09-16 DIAGNOSIS — E11.65 UNCONTROLLED TYPE 2 DIABETES MELLITUS WITH HYPERGLYCEMIA (HCC): ICD-10-CM

## 2024-09-18 RX ORDER — DULAGLUTIDE 3 MG/.5ML
3 INJECTION, SOLUTION SUBCUTANEOUS WEEKLY
Qty: 6 ML | Refills: 1 | Status: SHIPPED | OUTPATIENT
Start: 2024-09-18

## 2024-10-01 ENCOUNTER — APPOINTMENT (OUTPATIENT)
Dept: RADIOLOGY | Facility: MEDICAL CENTER | Age: 64
End: 2024-10-01
Payer: COMMERCIAL

## 2024-10-01 ENCOUNTER — OFFICE VISIT (OUTPATIENT)
Dept: URGENT CARE | Facility: MEDICAL CENTER | Age: 64
End: 2024-10-01
Payer: COMMERCIAL

## 2024-10-01 VITALS
HEIGHT: 65 IN | TEMPERATURE: 97.8 F | WEIGHT: 263 LBS | HEART RATE: 55 BPM | RESPIRATION RATE: 18 BRPM | BODY MASS INDEX: 43.82 KG/M2 | OXYGEN SATURATION: 96 %

## 2024-10-01 DIAGNOSIS — M79.672 ACUTE FOOT PAIN, LEFT: ICD-10-CM

## 2024-10-01 DIAGNOSIS — M79.672 ACUTE FOOT PAIN, LEFT: Primary | ICD-10-CM

## 2024-10-01 PROBLEM — R21 RASH: Status: ACTIVE | Noted: 2017-07-07

## 2024-10-01 PROBLEM — E11.9 DIABETES (HCC): Status: ACTIVE | Noted: 2024-10-01

## 2024-10-01 PROCEDURE — G0382 LEV 3 HOSP TYPE B ED VISIT: HCPCS

## 2024-10-01 PROCEDURE — 73630 X-RAY EXAM OF FOOT: CPT

## 2024-10-01 NOTE — PROGRESS NOTES
[facility]        NAME: Bry Weller is a 64 y.o. male  : 1960    MRN: [id number]  DATE: 2024  TIME: 2:59 PM    Assessment and Plan   Acute foot pain, left [M79.672]  1. Acute foot pain, left  XR foot 3+ vw left    Diclofenac Sodium (VOLTAREN) 1 %        XR foot 3+ vw left: Questionable lucency noted over the proximal medial aspect of the second proximal phalanx of the L foot only seen in one view, per my read, pending radiology final read.     Patient Instructions   Topical Voltaren     May kwadwo tape 2nd toe    Supportive shoes    Tylenol for pain  Ice 20 minutes 3-4 times per day for 3 days  Insulate the skin from the ice to prevent frostbite  Rest  Elevate    Follow up with orthopedic/podiatry if symptoms do not improve    Follow up with PCP in 3-5 days.  Proceed to ER if symptoms worsen.    If tests are performed, our office will contact you with results only if changes need to made to the care plan discussed with you at the visit. [facility]    Chief Complaint     Chief Complaint   Patient presents with    Foot Pain     Started  stubbed toe.  Yesterday more painful left foot has a red band behind toes.  Has been taking tylenol alternating with Advil.     History of Present Illness       Toe Pain   The incident occurred 2 days ago (). The incident occurred at home. The injury mechanism was a direct blow (Stubbed 2nd toe on L foot). The pain is present in the left foot. The pain has been Constant since onset. Associated symptoms include a loss of motion (pain to wiggle toes). Pertinent negatives include no loss of sensation, numbness or tingling. He reports no foreign bodies present. The symptoms are aggravated by palpation, weight bearing and movement. He has tried acetaminophen and NSAIDs (Gabapentin) for the symptoms.       Review of Systems   Review of Systems   Constitutional:  Negative for chills, diaphoresis and  fever.   Respiratory: Negative.  Negative for cough, shortness of breath and wheezing.    Cardiovascular: Negative.  Negative for chest pain and palpitations.   Musculoskeletal:  Positive for myalgias.   Skin:  Positive for color change (reddened). Negative for rash and wound.   Neurological:  Negative for tingling and numbness.     Current Medications       Current Outpatient Medications:     albuterol (PROVENTIL HFA,VENTOLIN HFA) 90 mcg/act inhaler, Inhale 1-2 puffs every 6 (six) hours as needed for wheezing, Disp: 8 g, Rfl: 1    amLODIPine (NORVASC) 5 mg tablet, TAKE 1 TABLET BY MOUTH EVERY DAY, Disp: 90 tablet, Rfl: 1    ASPIRIN 81 PO, Take 1 capsule by mouth 2 (two) times a day  , Disp: , Rfl:     atorvastatin (LIPITOR) 80 mg tablet, TAKE 1 TABLET BY MOUTH EVERY DAY, Disp: 90 tablet, Rfl: 1    BD Pen Needle Rosalie 2nd Gen 32G X 4 MM MISC, INJECT UNDER THE SKIN 4 (FOUR) TIMES A DAY, Disp: 400 each, Rfl: 1    carvedilol (COREG) 25 mg tablet, TAKE 1 TABLET BY MOUTH TWICE A DAY WITH FOOD, Disp: 180 tablet, Rfl: 1    Contour Next Test test strip, CHECK SUGAR 4 TIMES DAILY, Disp: 400 strip, Rfl: 1    Diclofenac Sodium (VOLTAREN) 1 %, Apply 2 g topically 4 (four) times a day, Disp: 100 g, Rfl: 0    dulaglutide (Trulicity) 3 MG/0.5ML injection, Inject 0.5 mL (3 mg total) under the skin once a week, Disp: 6 mL, Rfl: 1    gabapentin (NEURONTIN) 100 mg capsule, TAKE 2 CAPSULES BY MOUTH 3 TIMES A DAY., Disp: 540 capsule, Rfl: 1    glimepiride (AMARYL) 4 mg tablet, Take 1 tablet (4 mg total) by mouth 2 (two) times a day, Disp: 180 tablet, Rfl: 1    glucose blood (Contour Next Test) test strip, Use 1 each 4 (four) times a day Use as instructed (Patient taking differently: Use 1 each daily 3 to 4 times daily. Before meals or randomly), Disp: 400 each, Rfl: 3    HumaLOG KwikPen 200 units/mL CONCENTRATED U-200 injection pen, INJECT 15 UNITS THREE TIMES DAILY WITH MEALS., Disp: 18 mL, Rfl: 5    hydrochlorothiazide (HYDRODIURIL)  12.5 mg tablet, TAKE 0.5 TABLETS BY MOUTH DAILY. (Patient taking differently: Take 12.5 mg by mouth Take 0.5 tab QOD), Disp: 45 tablet, Rfl: 4    metFORMIN (GLUCOPHAGE) 1000 MG tablet, Take 1 tablet (1,000 mg total) by mouth 2 (two) times a day, Disp: 180 tablet, Rfl: 1    Microlet Lancets MISC, USE 3 (THREE) TIMES A DAY, Disp: 300 each, Rfl: 3    Multiple Vitamins-Minerals (MULTIVITAMIN WITH MINERALS) tablet, Take 1 tablet by mouth daily, Disp: , Rfl:     potassium citrate (UROCIT-K) 10 mEq, TAKE 1 TABLET BY MOUTH 2 TIMES A DAY. (Patient taking differently: Take 10 mEq by mouth in the morning), Disp: 180 tablet, Rfl: 3    tamsulosin (FLOMAX) 0.4 mg, TAKE 1 CAPSULE BY MOUTH EVERY DAY WITH DINNER (Patient taking differently: PRN), Disp: 90 capsule, Rfl: 1    Toujeo SoloStar 300 units/mL CONCENTRATED U-300 injection pen (1-unit dial), INJECT 80 UNITS UNDER THE SKIN DAILY, Disp: 22.5 mL, Rfl: 3    Vitamin D, Cholecalciferol, 400 units TABS, Take 1,000 Units by mouth daily, Disp: , Rfl:     Current Allergies     Allergies as of 10/01/2024 - Reviewed 10/01/2024   Allergen Reaction Noted    Simvastatin  06/04/2014            The following portions of the patient's history were reviewed and updated as appropriate: allergies, current medications, past family history, past medical history, past social history, past surgical history and problem list.     Past Medical History:   Diagnosis Date    Acute bronchitis     Acute low back pain     Acute low back pain     [id number] resolved    Acute respiratory infection     JAYNE (acute kidney injury) ([facility]) 06/15/2018    Cancer ([facility])     prostate    Cellulitis of scrotum     Cough     Diabetes mellitus (HCC)     Herpes zoster     High cholesterol     Hyperkalemia     Hypertension     Kidney disease     Kidney stone     Leukocytosis 06/15/2018    Low back pain     Lumbar radiculopathy     Pyelonephritis 08/08/2022    Rash     Retinopathy, central serous     Shingles     Sleep apnea      [id number]    Trochanteric bursitis        Past Surgical History:   Procedure Laterality Date    ABDOMINAL ADHESION SURGERY N/A 6/13/2018    Procedure: LYSIS ADHESIONS;  Surgeon: Carlton Wolfe MD;  Location: [facility];  Service: Urology    FL RETROGRADE PYELOGRAM  8/8/2022    FL RETROGRADE PYELOGRAM  8/11/2022    LITHOTRIPSY      renal    KY CYSTO BLADDER W/URETERAL CATHETERIZATION Left 8/8/2022    Procedure: CYSTOSCOPY RETROGRADE PYELOGRAM WITH INSERTION STENT URETERAL;  Surgeon: Dimple Olmedo MD;  Location: [facility];  Service: Urology    KY CYSTO/URETERO W/LITHOTRIPSY &INDWELL STENT INSRT Left 8/11/2022    Procedure: CYSTOSCOPY URETEROSCOPY WITH LITHOTRIPSY HOLMIUM LASER, RETROGRADE PYELOGRAM AND INSERTION STENT URETERAL;  Surgeon: Dimple Olmedo MD;  Location: [facility];  Service: Urology    KY LAPS SURG [id number] RPBIC RAD W/NRV SPARING ROBOT N/A 6/13/2018    Procedure: PROSTATECTOMY RADICAL W ROBOTICS;  Surgeon: Carlton Wolfe MD;  Location: [facility];  Service: Urology    PROSTATE SURGERY  06/13/2018    SHOULDER SURGERY         Family History   Problem Relation Age of Onset    Other Father         cardiac disorder    Stroke Father     Diabetes Father         mellitus    Hypertension Father     Heart disease Father         cardiac disorder    Cancer Sister     Cancer Paternal Grandmother     Heart disease Family         cardiac disorder    Kidney disease Family     Hypertension Mother     Cancer Mother          Medications have been verified.        Objective   Pulse 55   Temp 97.8 °F (36.6 °C) (Temporal)   Resp 18   Ht 5' 5\" (1.651 m)   Wt 119 kg (263 lb)   SpO2 96%   BMI 43.77 kg/m²        Physical Exam     Physical Exam  Vitals and nursing note reviewed.   Constitutional:       General: He is not in acute distress.     Appearance: Normal appearance. He is not ill-appearing, toxic-appearing or diaphoretic.   HENT:      Head: Normocephalic.   Cardiovascular:      Rate and Rhythm: Normal rate and " regular rhythm.      Pulses: Normal pulses.      Heart sounds: Normal heart sounds. No murmur heard.  Pulmonary:      Effort: Pulmonary effort is normal. No respiratory distress.      Breath sounds: Normal breath sounds. No stridor. No wheezing, rhonchi or rales.   Chest:      Chest wall: No tenderness.   Musculoskeletal:         General: Swelling, tenderness and signs of injury present.        Feet:    Feet:      Left foot:      Skin integrity: Dry skin present.   Skin:     General: Skin is warm.      Findings: Erythema present.   Neurological:      Mental Status: He is alert.

## 2024-10-01 NOTE — PATIENT INSTRUCTIONS
Topical Voltaren     May kwadwo tape 2nd toe    Supportive shoes    Tylenol for pain  Ice 20 minutes 3-4 times per day for 3 days  Insulate the skin from the ice to prevent frostbite  Rest  Elevate    Follow up with orthopedic/podiatry if symptoms do not improve    Follow up with PCP in 3-5 days.  Proceed to ER if symptoms worsen.    If tests are performed, our office will contact you with results only if changes need to made to the care plan discussed with you at the visit. You can review your full results on [facility].

## 2024-10-02 DIAGNOSIS — E78.5 DYSLIPIDEMIA: ICD-10-CM

## 2024-10-02 DIAGNOSIS — N18.32 STAGE 3B CHRONIC KIDNEY DISEASE (HCC): ICD-10-CM

## 2024-10-02 DIAGNOSIS — N20.1 URETEROLITHIASIS: ICD-10-CM

## 2024-10-02 DIAGNOSIS — N20.0 NEPHROLITHIASIS: ICD-10-CM

## 2024-10-02 DIAGNOSIS — I10 BENIGN ESSENTIAL HYPERTENSION: ICD-10-CM

## 2024-10-02 DIAGNOSIS — I12.9 PARENCHYMAL RENAL HYPERTENSION, STAGE 1 THROUGH STAGE 4 OR UNSPECIFIED CHRONIC KIDNEY DISEASE: ICD-10-CM

## 2024-10-02 DIAGNOSIS — E11.21 DIABETIC NEPHROPATHY ASSOCIATED WITH TYPE 2 DIABETES MELLITUS (HCC): ICD-10-CM

## 2024-10-02 RX ORDER — CARVEDILOL 25 MG/1
25 TABLET ORAL 2 TIMES DAILY WITH MEALS
Qty: 180 TABLET | Refills: 1 | Status: SHIPPED | OUTPATIENT
Start: 2024-10-02

## 2024-10-02 RX ORDER — POTASSIUM CITRATE 10 MEQ/1
10 TABLET, EXTENDED RELEASE ORAL 2 TIMES DAILY
Qty: 180 TABLET | Refills: 1 | Status: SHIPPED | OUTPATIENT
Start: 2024-10-02

## 2024-10-05 ENCOUNTER — IMMUNIZATIONS (OUTPATIENT)
Dept: INTERNAL MEDICINE CLINIC | Facility: CLINIC | Age: 64
End: 2024-10-05
Payer: COMMERCIAL

## 2024-10-05 DIAGNOSIS — Z23 ENCOUNTER FOR IMMUNIZATION: Primary | ICD-10-CM

## 2024-10-05 PROCEDURE — 90673 RIV3 VACCINE NO PRESERV IM: CPT

## 2024-10-05 PROCEDURE — 90471 IMMUNIZATION ADMIN: CPT

## 2024-10-17 ENCOUNTER — TELEPHONE (OUTPATIENT)
Dept: NEPHROLOGY | Facility: CLINIC | Age: 64
End: 2024-10-17

## 2024-10-17 NOTE — TELEPHONE ENCOUNTER
LM for pt to schedule f/u with Dr. Montero or AP. Please schedule with first available and place on waitlist

## 2024-10-20 DIAGNOSIS — I10 BENIGN ESSENTIAL HYPERTENSION: ICD-10-CM

## 2024-10-20 RX ORDER — AMLODIPINE BESYLATE 5 MG/1
TABLET ORAL
Qty: 90 TABLET | Refills: 1 | Status: SHIPPED | OUTPATIENT
Start: 2024-10-20

## 2024-10-21 DIAGNOSIS — E11.65 UNCONTROLLED TYPE 2 DIABETES MELLITUS WITH HYPERGLYCEMIA (HCC): ICD-10-CM

## 2024-10-22 RX ORDER — INSULIN GLARGINE 300 U/ML
80 INJECTION, SOLUTION SUBCUTANEOUS DAILY
Qty: 22.5 ML | Refills: 1 | Status: SHIPPED | OUTPATIENT
Start: 2024-10-22

## 2024-10-22 RX ORDER — INSULIN LISPRO 200 [IU]/ML
INJECTION, SOLUTION SUBCUTANEOUS
Qty: 18 ML | Refills: 1 | Status: SHIPPED | OUTPATIENT
Start: 2024-10-22

## 2024-10-22 RX ORDER — GLIMEPIRIDE 4 MG/1
4 TABLET ORAL 2 TIMES DAILY
Qty: 180 TABLET | Refills: 1 | Status: SHIPPED | OUTPATIENT
Start: 2024-10-22

## 2024-12-04 ENCOUNTER — APPOINTMENT (OUTPATIENT)
Dept: LAB | Facility: HOSPITAL | Age: 64
End: 2024-12-04
Payer: COMMERCIAL

## 2024-12-04 ENCOUNTER — RESULTS FOLLOW-UP (OUTPATIENT)
Dept: OTHER | Facility: HOSPITAL | Age: 64
End: 2024-12-04

## 2024-12-04 DIAGNOSIS — E83.42 HYPOMAGNESEMIA: ICD-10-CM

## 2024-12-04 DIAGNOSIS — E11.22 TYPE 2 DIABETES MELLITUS WITH STAGE 3A CHRONIC KIDNEY DISEASE, WITH LONG-TERM CURRENT USE OF INSULIN (HCC): ICD-10-CM

## 2024-12-04 DIAGNOSIS — E87.5 HYPERKALEMIA: ICD-10-CM

## 2024-12-04 DIAGNOSIS — E66.01 MORBID OBESITY (HCC): ICD-10-CM

## 2024-12-04 DIAGNOSIS — Z79.4 TYPE 2 DIABETES MELLITUS WITH STAGE 3A CHRONIC KIDNEY DISEASE, WITH LONG-TERM CURRENT USE OF INSULIN (HCC): ICD-10-CM

## 2024-12-04 DIAGNOSIS — N18.31 TYPE 2 DIABETES MELLITUS WITH STAGE 3A CHRONIC KIDNEY DISEASE, WITH LONG-TERM CURRENT USE OF INSULIN (HCC): ICD-10-CM

## 2024-12-04 DIAGNOSIS — N20.0 NEPHROLITHIASIS: ICD-10-CM

## 2024-12-04 DIAGNOSIS — N18.31 STAGE 3A CHRONIC KIDNEY DISEASE (HCC): ICD-10-CM

## 2024-12-04 DIAGNOSIS — N18.32 STAGE 3B CHRONIC KIDNEY DISEASE (HCC): ICD-10-CM

## 2024-12-04 DIAGNOSIS — E78.5 DYSLIPIDEMIA: ICD-10-CM

## 2024-12-04 DIAGNOSIS — I12.9 PARENCHYMAL RENAL HYPERTENSION, STAGE 1 THROUGH STAGE 4 OR UNSPECIFIED CHRONIC KIDNEY DISEASE: ICD-10-CM

## 2024-12-04 DIAGNOSIS — E11.21 DIABETIC NEPHROPATHY ASSOCIATED WITH TYPE 2 DIABETES MELLITUS (HCC): ICD-10-CM

## 2024-12-04 LAB
ALBUMIN SERPL BCG-MCNC: 4.6 G/DL (ref 3.5–5)
ALP SERPL-CCNC: 64 U/L (ref 34–104)
ALT SERPL W P-5'-P-CCNC: 39 U/L (ref 7–52)
ANION GAP SERPL CALCULATED.3IONS-SCNC: 9 MMOL/L (ref 4–13)
AST SERPL W P-5'-P-CCNC: 27 U/L (ref 13–39)
BASOPHILS # BLD AUTO: 0.06 THOUSANDS/ÂΜL (ref 0–0.1)
BASOPHILS NFR BLD AUTO: 1 % (ref 0–1)
BILIRUB SERPL-MCNC: 0.54 MG/DL (ref 0.2–1)
BUN SERPL-MCNC: 24 MG/DL (ref 5–25)
CALCIUM SERPL-MCNC: 9.8 MG/DL (ref 8.4–10.2)
CHLORIDE SERPL-SCNC: 101 MMOL/L (ref 96–108)
CHOLEST SERPL-MCNC: 121 MG/DL (ref ?–200)
CO2 SERPL-SCNC: 31 MMOL/L (ref 21–32)
CREAT SERPL-MCNC: 1.43 MG/DL (ref 0.6–1.3)
CREAT UR-MCNC: 88.6 MG/DL
CREAT UR-MCNC: 90 MG/DL
EOSINOPHIL # BLD AUTO: 0.44 THOUSAND/ÂΜL (ref 0–0.61)
EOSINOPHIL NFR BLD AUTO: 4 % (ref 0–6)
ERYTHROCYTE [DISTWIDTH] IN BLOOD BY AUTOMATED COUNT: 13 % (ref 11.6–15.1)
EST. AVERAGE GLUCOSE BLD GHB EST-MCNC: 157 MG/DL
GFR SERPL CREATININE-BSD FRML MDRD: 51 ML/MIN/1.73SQ M
GLUCOSE P FAST SERPL-MCNC: 119 MG/DL (ref 65–99)
HBA1C MFR BLD: 7.1 %
HCT VFR BLD AUTO: 46.2 % (ref 36.5–49.3)
HDLC SERPL-MCNC: 35 MG/DL
HGB BLD-MCNC: 14.4 G/DL (ref 12–17)
IMM GRANULOCYTES # BLD AUTO: 0.04 THOUSAND/UL (ref 0–0.2)
IMM GRANULOCYTES NFR BLD AUTO: 0 % (ref 0–2)
LDLC SERPL CALC-MCNC: 61 MG/DL (ref 0–100)
LYMPHOCYTES # BLD AUTO: 2.91 THOUSANDS/ÂΜL (ref 0.6–4.47)
LYMPHOCYTES NFR BLD AUTO: 23 % (ref 14–44)
MAGNESIUM SERPL-MCNC: 1.5 MG/DL (ref 1.9–2.7)
MCH RBC QN AUTO: 28.3 PG (ref 26.8–34.3)
MCHC RBC AUTO-ENTMCNC: 31.2 G/DL (ref 31.4–37.4)
MCV RBC AUTO: 91 FL (ref 82–98)
MICROALBUMIN UR-MCNC: 53.3 MG/L
MICROALBUMIN/CREAT 24H UR: 59 MG/G CREATININE (ref 0–30)
MONOCYTES # BLD AUTO: 1.08 THOUSAND/ÂΜL (ref 0.17–1.22)
MONOCYTES NFR BLD AUTO: 9 % (ref 4–12)
NEUTROPHILS # BLD AUTO: 7.97 THOUSANDS/ÂΜL (ref 1.85–7.62)
NEUTS SEG NFR BLD AUTO: 63 % (ref 43–75)
NRBC BLD AUTO-RTO: 0 /100 WBCS
PLATELET # BLD AUTO: 304 THOUSANDS/UL (ref 149–390)
PMV BLD AUTO: 9.3 FL (ref 8.9–12.7)
POTASSIUM SERPL-SCNC: 4.5 MMOL/L (ref 3.5–5.3)
PROT SERPL-MCNC: 7.5 G/DL (ref 6.4–8.4)
PROT UR-MCNC: 17.8 MG/DL
PROT/CREAT UR: 0.2 MG/G{CREAT} (ref 0–0.1)
PTH-INTACT SERPL-MCNC: 107.6 PG/ML (ref 12–88)
RBC # BLD AUTO: 5.08 MILLION/UL (ref 3.88–5.62)
SODIUM SERPL-SCNC: 141 MMOL/L (ref 135–147)
TRIGL SERPL-MCNC: 124 MG/DL (ref ?–150)
WBC # BLD AUTO: 12.5 THOUSAND/UL (ref 4.31–10.16)

## 2024-12-04 PROCEDURE — 82570 ASSAY OF URINE CREATININE: CPT

## 2024-12-04 PROCEDURE — 82043 UR ALBUMIN QUANTITATIVE: CPT

## 2024-12-04 PROCEDURE — 36415 COLL VENOUS BLD VENIPUNCTURE: CPT

## 2024-12-04 PROCEDURE — 80061 LIPID PANEL: CPT

## 2024-12-04 PROCEDURE — 85025 COMPLETE CBC W/AUTO DIFF WBC: CPT

## 2024-12-04 PROCEDURE — 84156 ASSAY OF PROTEIN URINE: CPT

## 2024-12-04 PROCEDURE — 83735 ASSAY OF MAGNESIUM: CPT

## 2024-12-04 PROCEDURE — 80053 COMPREHEN METABOLIC PANEL: CPT

## 2024-12-04 PROCEDURE — 83036 HEMOGLOBIN GLYCOSYLATED A1C: CPT

## 2024-12-04 PROCEDURE — 83970 ASSAY OF PARATHORMONE: CPT

## 2024-12-04 NOTE — RESULT ENCOUNTER NOTE
Please call patient.  Labs reviewed and stable.  Renal function stable.  Follow-up in office as previously scheduled.

## 2024-12-05 NOTE — TELEPHONE ENCOUNTER
Pt advised that labs show stable renal function per Dayanna.  No changes.  F/U in February.    ----- Message from Dayanna Ross PA-C sent at 12/4/2024  4:08 PM EST -----  Please call patient.  Labs reviewed and stable.  Renal function stable.  Follow-up in office as previously scheduled.

## 2024-12-08 DIAGNOSIS — E11.65 UNCONTROLLED TYPE 2 DIABETES MELLITUS WITH HYPERGLYCEMIA (HCC): ICD-10-CM

## 2024-12-10 ENCOUNTER — RESULTS FOLLOW-UP (OUTPATIENT)
Dept: OTHER | Facility: HOSPITAL | Age: 64
End: 2024-12-10

## 2024-12-10 ENCOUNTER — HOSPITAL ENCOUNTER (OUTPATIENT)
Dept: RADIOLOGY | Facility: MEDICAL CENTER | Age: 64
Discharge: HOME | End: 2024-12-10
Payer: COMMERCIAL

## 2024-12-10 DIAGNOSIS — N20.1 URETEROLITHIASIS: ICD-10-CM

## 2024-12-10 PROCEDURE — 74176 CT ABD & PELVIS W/O CONTRAST: CPT

## 2024-12-10 PROCEDURE — G1004 CDSM NDSC: HCPCS

## 2024-12-10 NOTE — RESULT ENCOUNTER NOTE
Michael Solorzano.  Your CT looks very good    Your left kidney looks perfect.  You have what I would describe as punctate stone fragments in this kidney, each are very small and unlikely to be of any clinical significance.    In your other right sided kidney, you do have a 1 cm stone however I looked back and this has been in place since at least 2020 and is in a favorable location that is unlikely to migrate and cause any complications.    As such my recommendation is for observation.  I do not believe you require any additional surgery.  We will plan for annual ultrasound for the next 2 years or so    Great news and happy holidays to you and your family.    Pipo Goetz MD,PhD  [facility]  [phone]

## 2024-12-11 ENCOUNTER — OFFICE VISIT (OUTPATIENT)
Dept: NEPHROLOGY | Facility: CLINIC | Age: 64
End: 2024-12-11
Payer: COMMERCIAL

## 2024-12-11 VITALS — BODY MASS INDEX: 43.65 KG/M2 | WEIGHT: 262 LBS | HEIGHT: 65 IN

## 2024-12-11 DIAGNOSIS — N18.31 TYPE 2 DIABETES MELLITUS WITH STAGE 3A CHRONIC KIDNEY DISEASE, WITH LONG-TERM CURRENT USE OF INSULIN (HCC): Primary | ICD-10-CM

## 2024-12-11 DIAGNOSIS — I12.9 PARENCHYMAL RENAL HYPERTENSION, STAGE 1 THROUGH STAGE 4 OR UNSPECIFIED CHRONIC KIDNEY DISEASE: ICD-10-CM

## 2024-12-11 DIAGNOSIS — N18.31 STAGE 3A CHRONIC KIDNEY DISEASE (HCC): ICD-10-CM

## 2024-12-11 DIAGNOSIS — E11.22 TYPE 2 DIABETES MELLITUS WITH STAGE 3A CHRONIC KIDNEY DISEASE, WITH LONG-TERM CURRENT USE OF INSULIN (HCC): Primary | ICD-10-CM

## 2024-12-11 DIAGNOSIS — Z79.4 TYPE 2 DIABETES MELLITUS WITH STAGE 3A CHRONIC KIDNEY DISEASE, WITH LONG-TERM CURRENT USE OF INSULIN (HCC): Primary | ICD-10-CM

## 2024-12-11 DIAGNOSIS — E87.5 HYPERKALEMIA: ICD-10-CM

## 2024-12-11 DIAGNOSIS — E83.42 HYPOMAGNESEMIA: ICD-10-CM

## 2024-12-11 DIAGNOSIS — E11.21 DIABETIC NEPHROPATHY ASSOCIATED WITH TYPE 2 DIABETES MELLITUS (HCC): ICD-10-CM

## 2024-12-11 DIAGNOSIS — E78.5 DYSLIPIDEMIA: ICD-10-CM

## 2024-12-11 DIAGNOSIS — E66.01 MORBID OBESITY (HCC): ICD-10-CM

## 2024-12-11 PROCEDURE — 99214 OFFICE O/P EST MOD 30 MIN: CPT | Performed by: INTERNAL MEDICINE

## 2024-12-11 NOTE — PROGRESS NOTES
RENAL FOLLOW UP NOTE:.td    ASSESSMENT AND PLAN:  64 y.o. year old male with a history of diabetes mellitus type 2/dyslipidemia/hypertension/prostate cancer status post robotic prostatectomy June 2018/ELENA who we are asked to see regarding nephrolithiasis/CKD        1. CKD stage 3A  Etiology:  Diabetic kidney disease/hypertensive nephrosclerosis/episodes of JAYNE-hydronephrosis of the left kidney/arteriolar nephrosclerosis.  Doubt primary glomerular process.  Baseline creatinine:  1.37-1.60, most recently 1.60  Recommend:  Workup:  Current creatinine: 1.43 at baseline from 12/4/24  UA with microscopic: From 1/11/2023: Trace proteinuria, negative heme; 2-4  RBCs WBCs or bacteria seen  Urine protein creatinine ratio: 0.20 g at goal  Renal ultrasound with PVR:  To be done did have moderate left-sided hydroureteronephrosis and 9 mm obstructing stone proximal left ureter  10/6/2022: Bilateral nephrolithiasis without hydronephrosis, 9 mm calculus in the upper pole of the right kidney; 5 and 10 mm calculus in the left  Renal artery duplex to rule out renal artery stenosis: Negative     Treatment:  Treat hypertension, please see below for recommendations  Treat dyslipidemia  Avoid nephrotoxic agents such as NSAIDs, and proton pump inhibitors as able; patient counseled as such  Good overall health recommendations including weight loss as appropriate, isotonic exercise as able, and avoidance of salt; patient counseled as such  UA: 2-4 RBCs are acceptable: Reviewed with urology: Apparently no issues at this time        2.  Volume: Euvolemic     3.  Hypertension:  Secondary work-up:  Normal plasma free metanephrines  Aldosterone/renin ratio: Negative  TSH normal  Renal work-up as above  ELENA unlikely mild heavy snoring at times but patient would defer at this time       Current blood pressure averages:  None today     Goal blood pressure: Less than 130/80 given CKD     Recommendations:  Push nonmedical regimen including weight  loss, isotonic exercise and a low sodium diet.  Patient has been counseled the such.  MedicationChanges today:     Current recommendations:  No changes pending home readings blood pressure appears fairly good today in the office especially low diastolic readings     4.  Electrolytes:  All acceptable including potassium 4.5        5.  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  Otherwise just monitor for now and eat high magnesium foods.  Phosphorus 3.8 we will just follow for now  PTH intact: 107.6 unchanged Will be monitored do not overtreat to avoid adynamic bone disease: I would just replete vitamin D at this time as outlined below  Vitamin-D: Now actually at goal at 34 from 5/8/2024     6.  Dyslipidemia:  Goal LDL: Less than 100  Current lipid profile: LDL 61/HDL 35/glycerides 124 from 12/4/24  Recommendations:   Low-cholesterol/low-fat diet / weight loss as appropriate and isotonic exercise   Medication changes today: At goal so no changes     7.  Anemia:  Current hemoglobin: Normal at 14.4  Myeloma evaluation from 10/25/2023 negative including SPEP UPEP and light chain ratio        8.  Nephrolithiasis:Patient has had calcium oxalate stones as many as 13  most recently requiring intervention with cystoscopy laser lithotripsy and stent placement.  Patient with prior calcium oxalate now uric acid stones.  Status post recent ureteroscopy with laser lithotripsy basket stone extraction on the left and left ureteral stent placement on 8/11/2022 by Dr. Olmedo  Recommend:     Current status/stone passage none since stone extraction     Workup:  Patient with mild hyperparathyroidism will increase gently the vitamin D supplement.  Doubt primary hyperparathyroidism  Given incontinence cannot obtain formal 24 hour urine  General stone as well as low oxalate diet   Patient is slowly increasing  water intake decreasing soda intake and gets close to 68 ounces we will continue to work on this!  Can actually take closer to 2.5-3 L      CT scan renal stone evaluation as of 12/10/2024: Patent stone fragments in the left kidney very small unlikely of clinical significance, 1 cm stone on the right since 2020 favorable location unlikely to migrate cause any complications.  Continued observation.  Recommended annual ultrasound for the next 2 years per Dr. Goetz.              8.  Other problems:  Diabetes mellitus type 2  : To consider SGLT2 inhibitor per primary physician if no contraindications for renal/cardiac protection.  Once he is feeling better regarding the back issue and recommend trying Farxiga 5 mg or Jardiance 10 mg just 3 days a week hold there for renal/cardiac protection and recheck basic metabolic profile 1 to 2 weeks later  Prostate cancer status post robotic prostatectomy 2018  ELENA?  Chronic mild leukocytosis stable  Recent back pain MRI demonstrated spinal canal stenosis mild exacerbated by acquired degenerative disc disease small disc herniations annular bulging posterior element hypertrophic changes.  Foraminal narrowing most pronounced on the left T11-12 and L4-5 level bilaterally.  Seen by Dr. Mark Corral from pain management felt lumbar radiculopathy.  Status post steroid injection about a week plus with marked improvement in pain able to walk  Slight irregular heart rhythm I believe seems premature atrial contraction: ECG with rhythm strip demonstrated normal sinus rhythm from 12/4/2023                PATIENT INSTRUCTIONS:    Patient Instructions   Visit summary:  - Overall labs look good including your kidney labs  - Magnesium slightly low as discussed try different formulation than Slow-Mag go very slow if you tolerate it great couple times a week or daily if possible if not do not worry about it to the best you can with high magnesium diet    -Your blood pressure appears good but I  will have you send in a week of blood pressure readings morning evening before medications just sitting using your left arm        1. Medication changes today:  No changes today except trying a different formulation for the magnesium supplement    2.  General instructions:  Continue general stone diet try to drink 2-1/2 to 3 L a day or  ounces a day of water and avoid salt  Try to use the treadmill/walking at least 3 days a week for 30 minutes even if you have to break the time up, and then ideally 5 days a week for 30 minutes plus  Try to lose 15 pounds in 6 months      3.  Please take 1 week a blood pressure readings sitting as outlined above    AS FOLLOWS  MORNING AND EVENING, SITTING AND STANDING as follows:  TAKE THE MORNING READINGS BEFORE ANY MEDICATIONS AND WHEN YOU ARE RELAXED FOR SEVERAL MINUTES  TAKE THE EVENING READINGS:  BETWEEN 7-10 P.M.; PRIOR TO ANY MEDICATIONS; AT LEAST IN OUR  FROM DINNER; AND CERTAINLY AFTER RELAXING FOR A FEW MINUTES  PLEASE INCLUDE HEART RATE WITH YOUR BLOOD PRESSURE READINGS  When taking standing readings, keep your arm supported at heart level and not dangling  Make sure you are sitting with your back supported and feet on the ground and do not cross your legs or feet  Make sure you have not taken any coffee or caffeine products or exercised or smoke cigarettes at least 30 minutes before taking your blood pressure  Then please mail these readings into the office      4.  In 3 months:  Please go for nonfasting lab work but in the morning        5.  Follow-up in 6 months  Please bring in 1 week a blood pressure readings morning evening, sitting and standing is outlined above  PLEASE BRING AN YOUR BLOOD PRESSURE MACHINE TO CORRELATE WITH THE OFFICE MACHINE AT THIS NEXT SCHEDULED VISIT  Please go for fasting lab work 1-2 weeks prior to your appointment      6.  General non medical recommendations:  AVOID SALT BUT NOT ADDING AN READING LABELS TO MAKE SURE THERE IS  LOW-SALT IN THE FOOD THAT YOU ARE EATING  Goal is less than 2 g of sodium intake or less than 5 g of sodium chloride intake per day    Avoid nonsteroidal anti-inflammatory drugs such as Naprosyn, ibuprofen, Aleve, Advil, Celebrex, Meloxicam (Mobic) etc.  You can use Tylenol as needed if you do not have any liver condition to be concerned about    Avoid medications such as Sudafed or decongestants and antihistamines that contained the D component which is the decongestant.  You can take antihistamines without the decongestant or D component.    Try to avoid medications such as pantoprazole or  Protonix/Nexium or Esomeprazole)/Prilosec or omeprazole/Prevacid or lansoprazole/AcipHex or Rabeprazole.  If you are able to, use Pepcid as this is safer for your kidneys.    Try to exercise at least 30 minutes 3 days a week to begin with with an ultimate goal of 5 days a week for at least 30 minutes    Please do not drink more than 2 glasses of alcohol/wine on a daily basis as this may contribute to your high blood pressure.            Subjective:   There has been no hospitalizations or acute illnesses since last visit.  The patient overall is feeling well.  No fevers, chills, or cough or colds.  Good appetite and good energy  No hematuria, dysuria, voiding symptoms or foamy urine; no recent stone passage  No gastrointestinal symptoms  No cardiovascular symptoms including swelling of the legs  No headaches, dizziness or lightheadedness  Blood pressure medications:  Amlodipine 5 mg daily in the morning  Carvedilol 25 mg twice a day  HCTZ 6.25 mg daily in the morning every other day    Renal pertinent medications:  Baby aspirin 81 mg twice a day  Atorvastatin 80 mg daily  Metformin 1000 mg twice a day  Urocit-K 10 mill equivalents daily  Vitamin D 1000 units daily    ROS:  See HPI, otherwise review of systems as completely reviewed with the patient are negative    Past Medical History:   Diagnosis Date    Acute bronchitis      Acute low back pain     Acute low back pain     [id number] resolved    Acute respiratory infection     JAYNE (acute kidney injury) (HCC) 06/15/2018    Cancer (HCC)     prostate    Cellulitis of scrotum     Cough     Diabetes mellitus (HCC)     Herpes zoster     High cholesterol     Hyperkalemia     Hypertension     Kidney disease     Kidney stone     Leukocytosis 06/15/2018    Low back pain     Lumbar radiculopathy     Pyelonephritis 08/08/2022    Rash     Retinopathy, central serous     Shingles     Sleep apnea     [id number]    Trochanteric bursitis      Past Surgical History:   Procedure Laterality Date    ABDOMINAL ADHESION SURGERY N/A 6/13/2018    Procedure: LYSIS ADHESIONS;  Surgeon: Carlton Wolfe MD;  Location: [facility];  Service: Urology    FL RETROGRADE PYELOGRAM  8/8/2022    FL RETROGRADE PYELOGRAM  8/11/2022    LITHOTRIPSY      renal    OH CYSTO BLADDER W/URETERAL CATHETERIZATION Left 8/8/2022    Procedure: CYSTOSCOPY RETROGRADE PYELOGRAM WITH INSERTION STENT URETERAL;  Surgeon: Dimple Olmedo MD;  Location: [facility];  Service: Urology    OH CYSTO/URETERO W/LITHOTRIPSY &INDWELL STENT INSRT Left 8/11/2022    Procedure: CYSTOSCOPY URETEROSCOPY WITH LITHOTRIPSY HOLMIUM LASER, RETROGRADE PYELOGRAM AND INSERTION STENT URETERAL;  Surgeon: Dimple Olmedo MD;  Location: [facility];  Service: Urology    OH LAPS SURG [id number] RPBIC RAD W/NRV SPARING ROBOT N/A 6/13/2018    Procedure: PROSTATECTOMY RADICAL W ROBOTICS;  Surgeon: Carlton Wolfe MD;  Location: [facility];  Service: Urology    PROSTATE SURGERY  06/13/2018    SHOULDER SURGERY       Family History   Problem Relation Age of Onset    Other Father         cardiac disorder    Stroke Father     Diabetes Father         mellitus    Hypertension Father     Heart disease Father         cardiac disorder    Cancer Sister     Cancer Paternal Grandmother     Heart disease Family         cardiac disorder    Kidney disease Family     Hypertension Mother     Cancer Mother        reports that he has never smoked. He has never used smokeless tobacco. He reports that he does not drink alcohol and does not use drugs.    I COMPLETELY REVIEWED THE PAST MEDICAL HISTORY/PAST SURGICAL HISTORY/SOCIAL HISTORY/FAMILY HISTORY/AND MEDICATIONS  AND UPDATED ALL    Objective:     Vitals:   BP sitting on left: 136/66 with a heart rate of 64 and slightly irregular  BP standing on left: 134/68 with a heart rate of 64 and slightly irregular    Weight (last 2 days)       Date/Time Weight    12/11/24 1022 119 (262)          Wt Readings from Last 3 Encounters:   12/11/24 119 kg (262 lb)   10/01/24 119 kg (263 lb)   06/14/24 120 kg (265 lb)       Body mass index is 43.6 kg/m².    Physical Exam: General:  No acute distress/obese  Skin:  No acute rash  Eyes:  No scleral icterus, noninjected, no discharge from eyes  ENT:  Moist mucous membranes  Neck:  Supple, no jugular venous distention, trachea is midline, no lymphadenopathy and no thyromegaly  Back   No CVAT  Chest:  Clear to auscultation and percussion, good respiratory effort  CVS: Premature atrial contraction type beats but steady and regular and rhythm without a rub, or gallops or murmurs  Abdomen:  obese, Soft and nontender with normal bowel sounds  Extremities:  No cyanosis and no edema, no arthritic changes, normal range of motion  Neuro:  Grossly intact  Psych:  Alert, oriented x3 and appropriate      Medications:    Current Outpatient Medications:     albuterol (PROVENTIL HFA,VENTOLIN HFA) 90 mcg/act inhaler, Inhale 1-2 puffs every 6 (six) hours as needed for wheezing, Disp: 8 g, Rfl: 1    amLODIPine (NORVASC) 5 mg tablet, TAKE 1 TABLET BY MOUTH EVERY DAY, Disp: 90 tablet, Rfl: 1    ASPIRIN 81 PO, Take 1 capsule by mouth 2 (two) times a day  , Disp: , Rfl:     atorvastatin (LIPITOR) 80 mg tablet, TAKE 1 TABLET BY MOUTH EVERY DAY, Disp: 90 tablet, Rfl: 1    BD Pen Needle Rosalie 2nd Gen 32G X 4 MM MISC, INJECT UNDER THE SKIN 4 (FOUR) TIMES A DAY, Disp:  400 each, Rfl: 1    carvedilol (COREG) 25 mg tablet, TAKE 1 TABLET BY MOUTH TWICE A DAY WITH FOOD, Disp: 180 tablet, Rfl: 1    Contour Next Test test strip, CHECK SUGAR 4 TIMES DAILY, Disp: 400 strip, Rfl: 1    dulaglutide (Trulicity) 3 MG/0.5ML injection, Inject 0.5 mL (3 mg total) under the skin once a week, Disp: 6 mL, Rfl: 1    gabapentin (NEURONTIN) 100 mg capsule, TAKE 2 CAPSULES BY MOUTH 3 TIMES A DAY., Disp: 540 capsule, Rfl: 1    glimepiride (AMARYL) 4 mg tablet, TAKE 1 TABLET BY MOUTH 2 TIMES A DAY., Disp: 180 tablet, Rfl: 1    glucose blood (Contour Next Test) test strip, Use 1 each 4 (four) times a day Use as instructed, Disp: 400 each, Rfl: 3    hydrochlorothiazide (HYDRODIURIL) 12.5 mg tablet, TAKE 0.5 TABLETS BY MOUTH DAILY. (Patient taking differently: Take 12.5 mg by mouth Take 0.5 tab QOD), Disp: 45 tablet, Rfl: 4    insuln lispro (HumaLOG KwikPen) 200 units/mL CONCENTRATED U-200 injection pen, INJECT 15 UNITS THREE TIMES DAILY WITH MEALS., Disp: 18 mL, Rfl: 1    metFORMIN (GLUCOPHAGE) 1000 MG tablet, Take 1 tablet (1,000 mg total) by mouth 2 (two) times a day, Disp: 180 tablet, Rfl: 1    Microlet Lancets MISC, USE 3 (THREE) TIMES A DAY, Disp: 300 each, Rfl: 3    Multiple Vitamins-Minerals (MULTIVITAMIN WITH MINERALS) tablet, Take 1 tablet by mouth daily, Disp: , Rfl:     potassium citrate (UROCIT-K) 10 mEq, TAKE 1 TABLET BY MOUTH TWICE A DAY (Patient taking differently: Take 10 mEq by mouth in the morning), Disp: 180 tablet, Rfl: 1    tamsulosin (FLOMAX) 0.4 mg, TAKE 1 CAPSULE BY MOUTH EVERY DAY WITH DINNER (Patient taking differently: PRN), Disp: 90 capsule, Rfl: 1    Toujeo SoloStar 300 units/mL CONCENTRATED U-300 injection pen (1-unit dial), INJECT 80 UNITS UNDER THE SKIN DAILY, Disp: 22.5 mL, Rfl: 1    Vitamin D, Cholecalciferol, 400 units TABS, Take 1,000 Units by mouth daily, Disp: , Rfl:     Diclofenac Sodium (VOLTAREN) 1 %, Apply 2 g topically 4 (four) times a day (Patient not taking:  Reported on 12/11/2024), Disp: 100 g, Rfl: 0    Lab, Imaging and other studies: I have personally reviewed pertinent labs.  Laboratory Results:  Results for orders placed or performed in visit on 12/04/24   Hemoglobin A1C    Collection Time: 12/04/24  9:14 AM   Result Value Ref Range    Hemoglobin A1C 7.1 (H) Normal 4.0-5.6%; PreDiabetic 5.7-6.4%; Diabetic >=6.5%; Glycemic control for adults with diabetes <7.0% %     mg/dl   Comprehensive metabolic panel    Collection Time: 12/04/24  9:14 AM   Result Value Ref Range    Sodium 141 135 - 147 mmol/L    Potassium 4.5 3.5 - 5.3 mmol/L    Chloride 101 96 - 108 mmol/L    CO2 31 21 - 32 mmol/L    ANION GAP 9 4 - 13 mmol/L    BUN 24 5 - 25 mg/dL    Creatinine 1.43 (H) 0.60 - 1.30 mg/dL    Glucose, Fasting 119 (H) 65 - 99 mg/dL    Calcium 9.8 8.4 - 10.2 mg/dL    AST 27 13 - 39 U/L    ALT 39 7 - 52 U/L    Alkaline Phosphatase 64 34 - 104 U/L    Total Protein 7.5 6.4 - 8.4 g/dL    Albumin 4.6 3.5 - 5.0 g/dL    Total Bilirubin 0.54 0.20 - 1.00 mg/dL    eGFR 51 ml/min/1.73sq m   CBC and differential    Collection Time: 12/04/24  9:14 AM   Result Value Ref Range    WBC 12.50 (H) 4.31 - 10.16 Thousand/uL    RBC 5.08 3.88 - 5.62 Million/uL    Hemoglobin 14.4 12.0 - 17.0 g/dL    Hematocrit 46.2 36.5 - 49.3 %    MCV 91 82 - 98 fL    MCH 28.3 26.8 - 34.3 pg    MCHC 31.2 (L) 31.4 - 37.4 g/dL    RDW 13.0 11.6 - 15.1 %    MPV 9.3 8.9 - 12.7 fL    Platelets 304 149 - 390 Thousands/uL    nRBC 0 /100 WBCs    Segmented % 63 43 - 75 %    Immature Grans % 0 0 - 2 %    Lymphocytes % 23 14 - 44 %    Monocytes % 9 4 - 12 %    Eosinophils Relative 4 0 - 6 %    Basophils Relative 1 0 - 1 %    Absolute Neutrophils 7.97 (H) 1.85 - 7.62 Thousands/µL    Absolute Immature Grans 0.04 0.00 - 0.20 Thousand/uL    Absolute Lymphocytes 2.91 0.60 - 4.47 Thousands/µL    Absolute Monocytes 1.08 0.17 - 1.22 Thousand/µL    Eosinophils Absolute 0.44 0.00 - 0.61 Thousand/µL    Basophils Absolute 0.06 0.00 -  "0.10 Thousands/µL   Albumin / creatinine urine ratio    Collection Time: 12/04/24  9:14 AM   Result Value Ref Range    Creatinine, Ur 90.0 Reference range not established. mg/dL    Albumin,U,Random 53.3 (H) <20.0 mg/L    Albumin Creat Ratio 59 (H) 0 - 30 mg/g creatinine   Lipid Panel with Direct LDL reflex    Collection Time: 12/04/24  9:14 AM   Result Value Ref Range    Cholesterol 121 See Comment mg/dL    Triglycerides 124 See Comment mg/dL    HDL, Direct 35 (L) >=40 mg/dL    LDL Calculated 61 0 - 100 mg/dL   Magnesium    Collection Time: 12/04/24  9:14 AM   Result Value Ref Range    Magnesium 1.5 (L) 1.9 - 2.7 mg/dL   Protein / creatinine ratio, urine    Collection Time: 12/04/24  9:14 AM   Result Value Ref Range    Creatinine, Ur 88.6 Reference range not established. mg/dL    Protein Urine Random 17.8 Reference range not established. mg/dL    Prot/Creat Ratio, Ur 0.2 (H) 0.0 - 0.1   PTH, intact    Collection Time: 12/04/24  9:14 AM   Result Value Ref Range    .6 (H) 12.0 - 88.0 pg/mL             Invalid input(s): \"ALBUMIN\"      Radiology review:   chest X-ray    Ultrasound      Portions of the record may have been created with voice recognition software.  Occasional wrong word or \"sound a like\" substitutions may have occurred due to the inherent limitations of voice recognition software.  Read the chart carefully and recognize, using context, where substitutions have occurred.                    "

## 2024-12-11 NOTE — PATIENT INSTRUCTIONS
Visit summary:  - Overall labs look good including your kidney labs  - Magnesium slightly low as discussed try different formulation than Slow-Mag go very slow if you tolerate it great couple times a week or daily if possible if not do not worry about it to the best you can with high magnesium diet    -Your blood pressure appears good but I will have you send in a week of blood pressure readings morning evening before medications just sitting using your left arm        1. Medication changes today:  No changes today except trying a different formulation for the magnesium supplement    2.  General instructions:  Continue general stone diet try to drink 2-1/2 to 3 L a day or  ounces a day of water and avoid salt  Try to use the treadmill/walking at least 3 days a week for 30 minutes even if you have to break the time up, and then ideally 5 days a week for 30 minutes plus  Try to lose 15 pounds in 6 months      3.  Please take 1 week a blood pressure readings sitting as outlined above    AS FOLLOWS  MORNING AND EVENING, SITTING AND STANDING as follows:  TAKE THE MORNING READINGS BEFORE ANY MEDICATIONS AND WHEN YOU ARE RELAXED FOR SEVERAL MINUTES  TAKE THE EVENING READINGS:  BETWEEN 7-10 P.M.; PRIOR TO ANY MEDICATIONS; AT LEAST IN OUR  FROM DINNER; AND CERTAINLY AFTER RELAXING FOR A FEW MINUTES  PLEASE INCLUDE HEART RATE WITH YOUR BLOOD PRESSURE READINGS  When taking standing readings, keep your arm supported at heart level and not dangling  Make sure you are sitting with your back supported and feet on the ground and do not cross your legs or feet  Make sure you have not taken any coffee or caffeine products or exercised or smoke cigarettes at least 30 minutes before taking your blood pressure  Then please mail these readings into the office      4.  In 3 months:  Please go for nonfasting lab work but in the morning        5.  Follow-up in 6 months  Please bring in 1 week a blood pressure readings morning  evening, sitting and standing is outlined above  PLEASE BRING AN YOUR BLOOD PRESSURE MACHINE TO CORRELATE WITH THE OFFICE MACHINE AT THIS NEXT SCHEDULED VISIT  Please go for fasting lab work 1-2 weeks prior to your appointment      6.  General non medical recommendations:  AVOID SALT BUT NOT ADDING AN READING LABELS TO MAKE SURE THERE IS LOW-SALT IN THE FOOD THAT YOU ARE EATING  Goal is less than 2 g of sodium intake or less than 5 g of sodium chloride intake per day    Avoid nonsteroidal anti-inflammatory drugs such as Naprosyn, ibuprofen, Aleve, Advil, Celebrex, Meloxicam (Mobic) etc.  You can use Tylenol as needed if you do not have any liver condition to be concerned about    Avoid medications such as Sudafed or decongestants and antihistamines that contained the D component which is the decongestant.  You can take antihistamines without the decongestant or D component.    Try to avoid medications such as pantoprazole or  Protonix/Nexium or Esomeprazole)/Prilosec or omeprazole/Prevacid or lansoprazole/AcipHex or Rabeprazole.  If you are able to, use Pepcid as this is safer for your kidneys.    Try to exercise at least 30 minutes 3 days a week to begin with with an ultimate goal of 5 days a week for at least 30 minutes    Please do not drink more than 2 glasses of alcohol/wine on a daily basis as this may contribute to your high blood pressure.

## 2024-12-11 NOTE — LETTER
December 11, 2024     Lea Reyes, MD  [address] [facility] [address]    Patient: Bry Weller   YOB: 1960   Date of Visit: 12/11/2024       Dear Dr. Reyes:    Thank you for referring Bry Weller to me for evaluation. Below are my notes for this consultation.    If you have questions, please do not hesitate to call me. I look forward to following your patient along with you.         Sincerely,        Mateo Montero MD        CC: No Recipients    Mateo Montero MD  12/11/2024 10:47 AM  Sign when Signing Visit  RENAL FOLLOW UP NOTE:.td    ASSESSMENT AND PLAN:  64 y.o. year old male with a history of diabetes mellitus type 2/dyslipidemia/hypertension/prostate cancer status post robotic prostatectomy June 2018/ELENA who we are asked to see regarding nephrolithiasis/CKD        1. CKD stage 3A  Etiology:  Diabetic kidney disease/hypertensive nephrosclerosis/episodes of JAYNE-hydronephrosis of the left kidney/arteriolar nephrosclerosis.  Doubt primary glomerular process.  Baseline creatinine:  1.37-1.60, most recently 1.60  Recommend:  Workup:  Current creatinine: 1.43 at baseline from 12/4/24  UA with microscopic: From 1/11/2023: Trace proteinuria, negative heme; 2-4  RBCs WBCs or bacteria seen  Urine protein creatinine ratio: 0.20 g at goal  Renal ultrasound with PVR:  To be done did have moderate left-sided hydroureteronephrosis and 9 mm obstructing stone proximal left ureter  10/6/2022: Bilateral nephrolithiasis without hydronephrosis, 9 mm calculus in the upper pole of the right kidney; 5 and 10 mm calculus in the left  Renal artery duplex to rule out renal artery stenosis: Negative     Treatment:  Treat hypertension, please see below for recommendations  Treat dyslipidemia  Avoid nephrotoxic agents such as NSAIDs, and proton pump inhibitors as able; patient counseled as such  Good overall health recommendations including weight loss as appropriate, isotonic exercise as able, and avoidance of  salt; patient counseled as such  UA: 2-4 RBCs are acceptable: Reviewed with urology: Apparently no issues at this time        2.  Volume: Euvolemic     3.  Hypertension:  Secondary work-up:  Normal plasma free metanephrines  Aldosterone/renin ratio: Negative  TSH normal  Renal work-up as above  ELENA unlikely mild heavy snoring at times but patient would defer at this time       Current blood pressure averages:  None today     Goal blood pressure: Less than 130/80 given CKD     Recommendations:  Push nonmedical regimen including weight loss, isotonic exercise and a low sodium diet.  Patient has been counseled the such.  MedicationChanges today:     Current recommendations:  No changes pending home readings blood pressure appears fairly good today in the office especially low diastolic readings     4.  Electrolytes:  All acceptable including potassium 4.5        5.  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  Otherwise just monitor for now and eat high magnesium foods.  Phosphorus 3.8 we will just follow for now  PTH intact: 107.6 unchanged Will be monitored do not overtreat to avoid adynamic bone disease: I would just replete vitamin D at this time as outlined below  Vitamin-D: Now actually at goal at 34 from 5/8/2024     6.  Dyslipidemia:  Goal LDL: Less than 100  Current lipid profile: LDL 61/HDL 35/glycerides 124 from 12/4/24  Recommendations:   Low-cholesterol/low-fat diet / weight loss as appropriate and isotonic exercise   Medication changes today: At goal so no changes     7.  Anemia:  Current hemoglobin: Normal at 14.4  Myeloma evaluation from 10/25/2023 negative including SPEP UPEP and light chain ratio        8.  Nephrolithiasis:Patient has had calcium oxalate stones as many as 13  most recently requiring intervention with cystoscopy laser lithotripsy and stent placement.   Patient with prior calcium oxalate now uric acid stones.  Status post recent ureteroscopy with laser lithotripsy basket stone extraction on the left and left ureteral stent placement on 8/11/2022 by Dr. Olmedo  Recommend:     Current status/stone passage none since stone extraction     Workup:  Patient with mild hyperparathyroidism will increase gently the vitamin D supplement.  Doubt primary hyperparathyroidism  Given incontinence cannot obtain formal 24 hour urine  General stone as well as low oxalate diet   Patient is slowly increasing water intake decreasing soda intake and gets close to 68 ounces we will continue to work on this!  Can actually take closer to 2.5-3 L      CT scan renal stone evaluation as of 12/10/2024: Patent stone fragments in the left kidney very small unlikely of clinical significance, 1 cm stone on the right since 2020 favorable location unlikely to migrate cause any complications.  Continued observation.  Recommended annual ultrasound for the next 2 years per Dr. Goetz.              8.  Other problems:  Diabetes mellitus type 2  : To consider SGLT2 inhibitor per primary physician if no contraindications for renal/cardiac protection.  Once he is feeling better regarding the back issue and recommend trying Farxiga 5 mg or Jardiance 10 mg just 3 days a week hold there for renal/cardiac protection and recheck basic metabolic profile 1 to 2 weeks later  Prostate cancer status post robotic prostatectomy 2018  ELENA?  Chronic mild leukocytosis stable  Recent back pain MRI demonstrated spinal canal stenosis mild exacerbated by acquired degenerative disc disease small disc herniations annular bulging posterior element hypertrophic changes.  Foraminal narrowing most pronounced on the left T11-12 and L4-5 level bilaterally.  Seen by Dr. Mark Corral from pain management felt lumbar radiculopathy.  Status post steroid injection about a week plus with marked improvement in pain able to walk  Slight  irregular heart rhythm I believe seems premature atrial contraction: ECG with rhythm strip demonstrated normal sinus rhythm from 12/4/2023                PATIENT INSTRUCTIONS:    Patient Instructions   Visit summary:  - Overall labs look good including your kidney labs  - Magnesium slightly low as discussed try different formulation than Slow-Mag go very slow if you tolerate it great couple times a week or daily if possible if not do not worry about it to the best you can with high magnesium diet    -Your blood pressure appears good but I will have you send in a week of blood pressure readings morning evening before medications just sitting using your left arm        1. Medication changes today:  No changes today except trying a different formulation for the magnesium supplement    2.  General instructions:  Continue general stone diet try to drink 2-1/2 to 3 L a day or  ounces a day of water and avoid salt  Try to use the treadmill/walking at least 3 days a week for 30 minutes even if you have to break the time up, and then ideally 5 days a week for 30 minutes plus  Try to lose 15 pounds in 6 months      3.  Please take 1 week a blood pressure readings sitting as outlined above    AS FOLLOWS  MORNING AND EVENING, SITTING AND STANDING as follows:  TAKE THE MORNING READINGS BEFORE ANY MEDICATIONS AND WHEN YOU ARE RELAXED FOR SEVERAL MINUTES  TAKE THE EVENING READINGS:  BETWEEN 7-10 P.M.; PRIOR TO ANY MEDICATIONS; AT LEAST IN OUR  FROM DINNER; AND CERTAINLY AFTER RELAXING FOR A FEW MINUTES  PLEASE INCLUDE HEART RATE WITH YOUR BLOOD PRESSURE READINGS  When taking standing readings, keep your arm supported at heart level and not dangling  Make sure you are sitting with your back supported and feet on the ground and do not cross your legs or feet  Make sure you have not taken any coffee or caffeine products or exercised or smoke cigarettes at least 30 minutes before taking your blood pressure  Then please  mail these readings into the office      4.  In 3 months:  Please go for nonfasting lab work but in the morning        5.  Follow-up in 6 months  Please bring in 1 week a blood pressure readings morning evening, sitting and standing is outlined above  PLEASE BRING AN YOUR BLOOD PRESSURE MACHINE TO CORRELATE WITH THE OFFICE MACHINE AT THIS NEXT SCHEDULED VISIT  Please go for fasting lab work 1-2 weeks prior to your appointment      6.  General non medical recommendations:  AVOID SALT BUT NOT ADDING AN READING LABELS TO MAKE SURE THERE IS LOW-SALT IN THE FOOD THAT YOU ARE EATING  Goal is less than 2 g of sodium intake or less than 5 g of sodium chloride intake per day    Avoid nonsteroidal anti-inflammatory drugs such as Naprosyn, ibuprofen, Aleve, Advil, Celebrex, Meloxicam (Mobic) etc.  You can use Tylenol as needed if you do not have any liver condition to be concerned about    Avoid medications such as Sudafed or decongestants and antihistamines that contained the D component which is the decongestant.  You can take antihistamines without the decongestant or D component.    Try to avoid medications such as pantoprazole or  Protonix/Nexium or Esomeprazole)/Prilosec or omeprazole/Prevacid or lansoprazole/AcipHex or Rabeprazole.  If you are able to, use Pepcid as this is safer for your kidneys.    Try to exercise at least 30 minutes 3 days a week to begin with with an ultimate goal of 5 days a week for at least 30 minutes    Please do not drink more than 2 glasses of alcohol/wine on a daily basis as this may contribute to your high blood pressure.            Subjective:   There has been no hospitalizations or acute illnesses since last visit.  The patient overall is feeling well.  No fevers, chills, or cough or colds.  Good appetite and good energy  No hematuria, dysuria, voiding symptoms or foamy urine; no recent stone passage  No gastrointestinal symptoms  No cardiovascular symptoms including swelling of the  legs  No headaches, dizziness or lightheadedness  Blood pressure medications:  Amlodipine 5 mg daily in the morning  Carvedilol 25 mg twice a day  HCTZ 6.25 mg daily in the morning every other day    Renal pertinent medications:  Baby aspirin 81 mg twice a day  Atorvastatin 80 mg daily  Metformin 1000 mg twice a day  Urocit-K 10 mill equivalents daily  Vitamin D 1000 units daily    ROS:  See HPI, otherwise review of systems as completely reviewed with the patient are negative    Past Medical History:   Diagnosis Date   • Acute bronchitis    • Acute low back pain    • Acute low back pain     [id number] resolved   • Acute respiratory infection    • JAYNE (acute kidney injury) (HCC) 06/15/2018   • Cancer (HCC)     prostate   • Cellulitis of scrotum    • Cough    • Diabetes mellitus (HCC)    • Herpes zoster    • High cholesterol    • Hyperkalemia    • Hypertension    • Kidney disease    • Kidney stone    • Leukocytosis 06/15/2018   • Low back pain    • Lumbar radiculopathy    • Pyelonephritis 08/08/2022   • Rash    • Retinopathy, central serous    • Shingles    • Sleep apnea     [id number]   • Trochanteric bursitis      Past Surgical History:   Procedure Laterality Date   • ABDOMINAL ADHESION SURGERY N/A 6/13/2018    Procedure: LYSIS ADHESIONS;  Surgeon: Carlton Wolfe MD;  Location: [facility];  Service: Urology   • FL RETROGRADE PYELOGRAM  8/8/2022   • FL RETROGRADE PYELOGRAM  8/11/2022   • LITHOTRIPSY      renal   • AL CYSTO BLADDER W/URETERAL CATHETERIZATION Left 8/8/2022    Procedure: CYSTOSCOPY RETROGRADE PYELOGRAM WITH INSERTION STENT URETERAL;  Surgeon: Dimple Olmedo MD;  Location: [facility];  Service: Urology   • AL CYSTO/URETERO W/LITHOTRIPSY &INDWELL STENT INSRT Left 8/11/2022    Procedure: CYSTOSCOPY URETEROSCOPY WITH LITHOTRIPSY HOLMIUM LASER, RETROGRADE PYELOGRAM AND INSERTION STENT URETERAL;  Surgeon: Dimple Olmedo MD;  Location: [facility];  Service: Urology   • AL LAPS SURG [id number] RPBIC RAD W/NRV  SPARING ROBOT N/A 6/13/2018    Procedure: PROSTATECTOMY RADICAL W ROBOTICS;  Surgeon: Carlton Wolfe MD;  Location: [facility];  Service: Urology   • PROSTATE SURGERY  06/13/2018   • SHOULDER SURGERY       Family History   Problem Relation Age of Onset   • Other Father         cardiac disorder   • Stroke Father    • Diabetes Father         mellitus   • Hypertension Father    • Heart disease Father         cardiac disorder   • Cancer Sister    • Cancer Paternal Grandmother    • Heart disease Family         cardiac disorder   • Kidney disease Family    • Hypertension Mother    • Cancer Mother       reports that he has never smoked. He has never used smokeless tobacco. He reports that he does not drink alcohol and does not use drugs.    I COMPLETELY REVIEWED THE PAST MEDICAL HISTORY/PAST SURGICAL HISTORY/SOCIAL HISTORY/FAMILY HISTORY/AND MEDICATIONS  AND UPDATED ALL    Objective:     Vitals:   BP sitting on left: 136/66 with a heart rate of 64 and slightly irregular  BP standing on left: 134/68 with a heart rate of 64 and slightly irregular    Weight (last 2 days)       Date/Time Weight    12/11/24 1022 119 (262)          Wt Readings from Last 3 Encounters:   12/11/24 119 kg (262 lb)   10/01/24 119 kg (263 lb)   06/14/24 120 kg (265 lb)       Body mass index is 43.6 kg/m².    Physical Exam: General:  No acute distress/obese  Skin:  No acute rash  Eyes:  No scleral icterus, noninjected, no discharge from eyes  ENT:  Moist mucous membranes  Neck:  Supple, no jugular venous distention, trachea is midline, no lymphadenopathy and no thyromegaly  Back   No CVAT  Chest:  Clear to auscultation and percussion, good respiratory effort  CVS: Premature atrial contraction type beats but steady and regular and rhythm without a rub, or gallops or murmurs  Abdomen:  obese, Soft and nontender with normal bowel sounds  Extremities:  No cyanosis and no edema, no arthritic changes, normal range of motion  Neuro:  Grossly intact  Psych:   Alert, oriented x3 and appropriate      Medications:    Current Outpatient Medications:   •  albuterol (PROVENTIL HFA,VENTOLIN HFA) 90 mcg/act inhaler, Inhale 1-2 puffs every 6 (six) hours as needed for wheezing, Disp: 8 g, Rfl: 1  •  amLODIPine (NORVASC) 5 mg tablet, TAKE 1 TABLET BY MOUTH EVERY DAY, Disp: 90 tablet, Rfl: 1  •  ASPIRIN 81 PO, Take 1 capsule by mouth 2 (two) times a day  , Disp: , Rfl:   •  atorvastatin (LIPITOR) 80 mg tablet, TAKE 1 TABLET BY MOUTH EVERY DAY, Disp: 90 tablet, Rfl: 1  •  BD Pen Needle Rosalie 2nd Gen 32G X 4 MM MISC, INJECT UNDER THE SKIN 4 (FOUR) TIMES A DAY, Disp: 400 each, Rfl: 1  •  carvedilol (COREG) 25 mg tablet, TAKE 1 TABLET BY MOUTH TWICE A DAY WITH FOOD, Disp: 180 tablet, Rfl: 1  •  Contour Next Test test strip, CHECK SUGAR 4 TIMES DAILY, Disp: 400 strip, Rfl: 1  •  dulaglutide (Trulicity) 3 MG/0.5ML injection, Inject 0.5 mL (3 mg total) under the skin once a week, Disp: 6 mL, Rfl: 1  •  gabapentin (NEURONTIN) 100 mg capsule, TAKE 2 CAPSULES BY MOUTH 3 TIMES A DAY., Disp: 540 capsule, Rfl: 1  •  glimepiride (AMARYL) 4 mg tablet, TAKE 1 TABLET BY MOUTH 2 TIMES A DAY., Disp: 180 tablet, Rfl: 1  •  glucose blood (Contour Next Test) test strip, Use 1 each 4 (four) times a day Use as instructed, Disp: 400 each, Rfl: 3  •  hydrochlorothiazide (HYDRODIURIL) 12.5 mg tablet, TAKE 0.5 TABLETS BY MOUTH DAILY. (Patient taking differently: Take 12.5 mg by mouth Take 0.5 tab QOD), Disp: 45 tablet, Rfl: 4  •  insuln lispro (HumaLOG KwikPen) 200 units/mL CONCENTRATED U-200 injection pen, INJECT 15 UNITS THREE TIMES DAILY WITH MEALS., Disp: 18 mL, Rfl: 1  •  metFORMIN (GLUCOPHAGE) 1000 MG tablet, Take 1 tablet (1,000 mg total) by mouth 2 (two) times a day, Disp: 180 tablet, Rfl: 1  •  Microlet Lancets MISC, USE 3 (THREE) TIMES A DAY, Disp: 300 each, Rfl: 3  •  Multiple Vitamins-Minerals (MULTIVITAMIN WITH MINERALS) tablet, Take 1 tablet by mouth daily, Disp: , Rfl:   •  potassium citrate  (UROCIT-K) 10 mEq, TAKE 1 TABLET BY MOUTH TWICE A DAY (Patient taking differently: Take 10 mEq by mouth in the morning), Disp: 180 tablet, Rfl: 1  •  tamsulosin (FLOMAX) 0.4 mg, TAKE 1 CAPSULE BY MOUTH EVERY DAY WITH DINNER (Patient taking differently: PRN), Disp: 90 capsule, Rfl: 1  •  Toujeo SoloStar 300 units/mL CONCENTRATED U-300 injection pen (1-unit dial), INJECT 80 UNITS UNDER THE SKIN DAILY, Disp: 22.5 mL, Rfl: 1  •  Vitamin D, Cholecalciferol, 400 units TABS, Take 1,000 Units by mouth daily, Disp: , Rfl:   •  Diclofenac Sodium (VOLTAREN) 1 %, Apply 2 g topically 4 (four) times a day (Patient not taking: Reported on 12/11/2024), Disp: 100 g, Rfl: 0    Lab, Imaging and other studies: I have personally reviewed pertinent labs.  Laboratory Results:  Results for orders placed or performed in visit on 12/04/24   Hemoglobin A1C    Collection Time: 12/04/24  9:14 AM   Result Value Ref Range    Hemoglobin A1C 7.1 (H) Normal 4.0-5.6%; PreDiabetic 5.7-6.4%; Diabetic >=6.5%; Glycemic control for adults with diabetes <7.0% %     mg/dl   Comprehensive metabolic panel    Collection Time: 12/04/24  9:14 AM   Result Value Ref Range    Sodium 141 135 - 147 mmol/L    Potassium 4.5 3.5 - 5.3 mmol/L    Chloride 101 96 - 108 mmol/L    CO2 31 21 - 32 mmol/L    ANION GAP 9 4 - 13 mmol/L    BUN 24 5 - 25 mg/dL    Creatinine 1.43 (H) 0.60 - 1.30 mg/dL    Glucose, Fasting 119 (H) 65 - 99 mg/dL    Calcium 9.8 8.4 - 10.2 mg/dL    AST 27 13 - 39 U/L    ALT 39 7 - 52 U/L    Alkaline Phosphatase 64 34 - 104 U/L    Total Protein 7.5 6.4 - 8.4 g/dL    Albumin 4.6 3.5 - 5.0 g/dL    Total Bilirubin 0.54 0.20 - 1.00 mg/dL    eGFR 51 ml/min/1.73sq m   CBC and differential    Collection Time: 12/04/24  9:14 AM   Result Value Ref Range    WBC 12.50 (H) 4.31 - 10.16 Thousand/uL    RBC 5.08 3.88 - 5.62 Million/uL    Hemoglobin 14.4 12.0 - 17.0 g/dL    Hematocrit 46.2 36.5 - 49.3 %    MCV 91 82 - 98 fL    MCH 28.3 26.8 - 34.3 pg    MCHC 31.2  "(L) 31.4 - 37.4 g/dL    RDW 13.0 11.6 - 15.1 %    MPV 9.3 8.9 - 12.7 fL    Platelets 304 149 - 390 Thousands/uL    nRBC 0 /100 WBCs    Segmented % 63 43 - 75 %    Immature Grans % 0 0 - 2 %    Lymphocytes % 23 14 - 44 %    Monocytes % 9 4 - 12 %    Eosinophils Relative 4 0 - 6 %    Basophils Relative 1 0 - 1 %    Absolute Neutrophils 7.97 (H) 1.85 - 7.62 Thousands/µL    Absolute Immature Grans 0.04 0.00 - 0.20 Thousand/uL    Absolute Lymphocytes 2.91 0.60 - 4.47 Thousands/µL    Absolute Monocytes 1.08 0.17 - 1.22 Thousand/µL    Eosinophils Absolute 0.44 0.00 - 0.61 Thousand/µL    Basophils Absolute 0.06 0.00 - 0.10 Thousands/µL   Albumin / creatinine urine ratio    Collection Time: 12/04/24  9:14 AM   Result Value Ref Range    Creatinine, Ur 90.0 Reference range not established. mg/dL    Albumin,U,Random 53.3 (H) <20.0 mg/L    Albumin Creat Ratio 59 (H) 0 - 30 mg/g creatinine   Lipid Panel with Direct LDL reflex    Collection Time: 12/04/24  9:14 AM   Result Value Ref Range    Cholesterol 121 See Comment mg/dL    Triglycerides 124 See Comment mg/dL    HDL, Direct 35 (L) >=40 mg/dL    LDL Calculated 61 0 - 100 mg/dL   Magnesium    Collection Time: 12/04/24  9:14 AM   Result Value Ref Range    Magnesium 1.5 (L) 1.9 - 2.7 mg/dL   Protein / creatinine ratio, urine    Collection Time: 12/04/24  9:14 AM   Result Value Ref Range    Creatinine, Ur 88.6 Reference range not established. mg/dL    Protein Urine Random 17.8 Reference range not established. mg/dL    Prot/Creat Ratio, Ur 0.2 (H) 0.0 - 0.1   PTH, intact    Collection Time: 12/04/24  9:14 AM   Result Value Ref Range    .6 (H) 12.0 - 88.0 pg/mL             Invalid input(s): \"ALBUMIN\"      Radiology review:   chest X-ray    Ultrasound      Portions of the record may have been created with voice recognition software.  Occasional wrong word or \"sound a like\" substitutions may have occurred due to the inherent limitations of voice recognition software.  Read the " chart carefully and recognize, using context, where substitutions have occurred.

## 2024-12-17 ENCOUNTER — OFFICE VISIT (OUTPATIENT)
Dept: INTERNAL MEDICINE CLINIC | Facility: CLINIC | Age: 64
End: 2024-12-17
Payer: COMMERCIAL

## 2024-12-17 ENCOUNTER — OFFICE VISIT (OUTPATIENT)
Dept: UROLOGY | Facility: CLINIC | Age: 64
End: 2024-12-17
Payer: COMMERCIAL

## 2024-12-17 VITALS
BODY MASS INDEX: 43.27 KG/M2 | OXYGEN SATURATION: 98 % | TEMPERATURE: 97 F | SYSTOLIC BLOOD PRESSURE: 140 MMHG | WEIGHT: 260 LBS | HEART RATE: 48 BPM | DIASTOLIC BLOOD PRESSURE: 72 MMHG

## 2024-12-17 VITALS
DIASTOLIC BLOOD PRESSURE: 56 MMHG | HEART RATE: 51 BPM | OXYGEN SATURATION: 95 % | SYSTOLIC BLOOD PRESSURE: 134 MMHG | HEIGHT: 65 IN | BODY MASS INDEX: 43.95 KG/M2 | WEIGHT: 263.8 LBS

## 2024-12-17 DIAGNOSIS — N18.31 TYPE 2 DIABETES MELLITUS WITH STAGE 3A CHRONIC KIDNEY DISEASE, WITH LONG-TERM CURRENT USE OF INSULIN (HCC): Primary | ICD-10-CM

## 2024-12-17 DIAGNOSIS — E66.01 MORBID OBESITY (HCC): ICD-10-CM

## 2024-12-17 DIAGNOSIS — C61 PROSTATE CANCER (HCC): Primary | ICD-10-CM

## 2024-12-17 DIAGNOSIS — N20.1 URETEROLITHIASIS: ICD-10-CM

## 2024-12-17 DIAGNOSIS — E11.22 TYPE 2 DIABETES MELLITUS WITH STAGE 3A CHRONIC KIDNEY DISEASE, WITH LONG-TERM CURRENT USE OF INSULIN (HCC): Primary | ICD-10-CM

## 2024-12-17 DIAGNOSIS — E78.5 DYSLIPIDEMIA: ICD-10-CM

## 2024-12-17 DIAGNOSIS — Z79.4 TYPE 2 DIABETES MELLITUS WITH STAGE 3A CHRONIC KIDNEY DISEASE, WITH LONG-TERM CURRENT USE OF INSULIN (HCC): Primary | ICD-10-CM

## 2024-12-17 DIAGNOSIS — I10 BENIGN ESSENTIAL HYPERTENSION: ICD-10-CM

## 2024-12-17 PROBLEM — R21 RASH: Status: RESOLVED | Noted: 2017-07-07 | Resolved: 2024-12-17

## 2024-12-17 PROCEDURE — 99213 OFFICE O/P EST LOW 20 MIN: CPT | Performed by: UROLOGY

## 2024-12-17 PROCEDURE — 99214 OFFICE O/P EST MOD 30 MIN: CPT | Performed by: INTERNAL MEDICINE

## 2024-12-17 NOTE — PROGRESS NOTES
Referring Physician: Lea Reyes, MD  A copy of this note was sent to the referring physician.       Diagnoses and all orders for this visit:    Prostate cancer (HCC)  -     PSA Total, Diagnostic; Future    Ureterolithiasis  -     US kidney and bladder; Future              Assessment and plan:          #1 Prostate Cancer  - pT2a GG4+3=7  -  Robotic prostatectomy June 2018  - MAGGIE    #2 post prostatectomy KEEGAN  - 10-14 depends per day    #3 Recurrent nephrolithiasis  -Status post recent ureteroscopy (Dr. Olmedo)  -Intrarenal calculi are present on ultrasound, patient is asymptomatic  -Of note, in the past ultrasound has under detected the patient's stone burden which ultimately necessitated an unplanned surgical intervention  -     Is very happy to review the CT scan.  There is a 1 cm nonobstructing intrarenal stone on the right upper pole.  Notably the stone was present in of a similar size 2 years ago in 2022.  Patient is asymptomatic.  Punctate stones in the left kidney    Recommendation was for active surveillance.  We did discuss preemptive intervention but as the patient is asymptomatic and the stone has not moved in 2 years I did not recommend this as first-line treatment.  He is amenable    He will follow-up annually with a renal ultrasound (his stones are non-radiopaque on KUB), as well as a PSA      Pipo Goetz      Chief Complaint     Follow-up prostate cancer, nephrolithiasis      History of Present Illness     Bry Weller is a 64 y.o.  male returns in follow-up.     Detailed Urologic History     - please refer to HPI    Review of Systems     Review of Systems   Constitutional:  Negative for activity change and fatigue.   HENT:  Negative for congestion.    Eyes:  Negative for visual disturbance.   Respiratory:  Negative for shortness of breath and wheezing.    Cardiovascular:  Negative for chest pain and leg swelling.   Gastrointestinal:  Negative for abdominal pain.   Endocrine: Positive for  "polyuria.   Genitourinary:  Positive for dysuria. Negative for flank pain, hematuria and urgency.   Musculoskeletal:  Negative for back pain.   Allergic/Immunologic: Negative for immunocompromised state.   Neurological:  Negative for dizziness and numbness.   Psychiatric/Behavioral:  Negative for dysphoric mood.    All other systems reviewed and are negative.            Allergies     Allergies   Allergen Reactions    Simvastatin      Increases Liver functoin       Physical Exam     Physical Exam  Constitutional:       General: He is not in acute distress.     Appearance: He is well-developed.   HENT:      Head: Normocephalic and atraumatic.   Cardiovascular:      Comments: Obese abdomen  Pulmonary:      Effort: Pulmonary effort is normal.      Breath sounds: Normal breath sounds.   Abdominal:      Palpations: Abdomen is soft.   Genitourinary:     Comments: Negative CVA tenderness  Musculoskeletal:         General: Normal range of motion.      Cervical back: Normal range of motion.   Skin:     General: Skin is warm.   Neurological:      Mental Status: He is alert and oriented to person, place, and time.   Psychiatric:         Behavior: Behavior normal.             Vital Signs  Vitals:    12/17/24 1448   BP: 134/56   BP Location: Left arm   Patient Position: Sitting   Cuff Size: Large   Pulse: (!) 51   SpO2: 95%   Weight: 120 kg (263 lb 12.8 oz)   Height: 5' 5\" (1.651 m)           Current Medications       Current Outpatient Medications:     albuterol (PROVENTIL HFA,VENTOLIN HFA) 90 mcg/act inhaler, Inhale 1-2 puffs every 6 (six) hours as needed for wheezing, Disp: 8 g, Rfl: 1    amLODIPine (NORVASC) 5 mg tablet, TAKE 1 TABLET BY MOUTH EVERY DAY, Disp: 90 tablet, Rfl: 1    ASPIRIN 81 PO, Take 1 capsule by mouth 2 (two) times a day  , Disp: , Rfl:     atorvastatin (LIPITOR) 80 mg tablet, TAKE 1 TABLET BY MOUTH EVERY DAY, Disp: 90 tablet, Rfl: 1    BD Pen Needle Rosalie 2nd Gen 32G X 4 MM MISC, INJECT UNDER THE SKIN 4 " (FOUR) TIMES A DAY, Disp: 400 each, Rfl: 1    carvedilol (COREG) 25 mg tablet, TAKE 1 TABLET BY MOUTH TWICE A DAY WITH FOOD, Disp: 180 tablet, Rfl: 1    Contour Next Test test strip, CHECK SUGAR 4 TIMES DAILY, Disp: 400 strip, Rfl: 1    dulaglutide (Trulicity) 3 MG/0.5ML injection, Inject 0.5 mL (3 mg total) under the skin once a week, Disp: 6 mL, Rfl: 1    gabapentin (NEURONTIN) 100 mg capsule, TAKE 2 CAPSULES BY MOUTH 3 TIMES A DAY., Disp: 540 capsule, Rfl: 1    glimepiride (AMARYL) 4 mg tablet, TAKE 1 TABLET BY MOUTH 2 TIMES A DAY., Disp: 180 tablet, Rfl: 1    glucose blood (Contour Next Test) test strip, Use 1 each 4 (four) times a day Use as instructed, Disp: 400 each, Rfl: 3    hydrochlorothiazide (HYDRODIURIL) 12.5 mg tablet, TAKE 0.5 TABLETS BY MOUTH DAILY., Disp: 45 tablet, Rfl: 4    insuln lispro (HumaLOG KwikPen) 200 units/mL CONCENTRATED U-200 injection pen, INJECT 15 UNITS THREE TIMES DAILY WITH MEALS., Disp: 18 mL, Rfl: 1    metFORMIN (GLUCOPHAGE) 1000 MG tablet, Take 1 tablet (1,000 mg total) by mouth 2 (two) times a day, Disp: 180 tablet, Rfl: 1    Microlet Lancets MISC, USE 3 (THREE) TIMES A DAY, Disp: 300 each, Rfl: 3    Multiple Vitamins-Minerals (MULTIVITAMIN WITH MINERALS) tablet, Take 1 tablet by mouth daily, Disp: , Rfl:     potassium citrate (UROCIT-K) 10 mEq, TAKE 1 TABLET BY MOUTH TWICE A DAY, Disp: 180 tablet, Rfl: 1    tamsulosin (FLOMAX) 0.4 mg, TAKE 1 CAPSULE BY MOUTH EVERY DAY WITH DINNER, Disp: 90 capsule, Rfl: 1    Toujeo SoloStar 300 units/mL CONCENTRATED U-300 injection pen (1-unit dial), INJECT 80 UNITS UNDER THE SKIN DAILY, Disp: 22.5 mL, Rfl: 1    Vitamin D, Cholecalciferol, 400 units TABS, Take 1,000 Units by mouth daily, Disp: , Rfl:     Diclofenac Sodium (VOLTAREN) 1 %, Apply 2 g topically 4 (four) times a day (Patient not taking: Reported on 12/11/2024), Disp: 100 g, Rfl: 0      Active Problems     Patient Active Problem List   Diagnosis    Ureterolithiasis    Prostate  cancer (HCC)    Benign essential hypertension    Type 2 diabetes mellitus with stage 3a chronic kidney disease, with long-term current use of insulin (HCC)    Dyslipidemia    Morbid obesity (HCC)    Other fatigue    Stress incontinence of urine    Parenchymal renal hypertension    Stage 3 chronic kidney disease (HCC)    Diabetic nephropathy associated with type 2 diabetes mellitus (HCC)    Nephrolithiasis    Intervertebral disc disorder with radiculopathy of lumbar region    Hyperkalemia    Hypomagnesemia    Diabetes (HCC)    Onychomycosis of toenail         Past Medical History     Past Medical History:   Diagnosis Date    Acute bronchitis     Acute low back pain     Acute low back pain     [id number] resolved    Acute respiratory infection     JAYNE (acute kidney injury) (HCC) 06/15/2018    Cancer (HCC)     prostate    Cellulitis of scrotum     Cough     Diabetes mellitus (HCC)     Herpes zoster     High cholesterol     Hyperkalemia     Hypertension     Kidney disease     Kidney stone     Leukocytosis 06/15/2018    Low back pain     Lumbar radiculopathy     Pyelonephritis 08/08/2022    Rash     Rash 07/07/2017    Retinopathy, central serous     Shingles     Sleep apnea     [id number]    Trochanteric bursitis          Surgical History     Past Surgical History:   Procedure Laterality Date    ABDOMINAL ADHESION SURGERY N/A 06/13/2018    Procedure: LYSIS ADHESIONS;  Surgeon: Carlton Wolfe MD;  [facility];  Service: Urology    FL RETROGRADE PYELOGRAM  08/08/2022    FL RETROGRADE PYELOGRAM  08/11/2022    LITHOTRIPSY      renal    MO CYSTO BLADDER W/URETERAL CATHETERIZATION Left 08/08/2022    Procedure: CYSTOSCOPY RETROGRADE PYELOGRAM WITH INSERTION STENT URETERAL;  Surgeon: Dimple Olmedo MD;  [facility];  Service: Urology    MO CYSTO/URETERO W/LITHOTRIPSY &INDWELL STENT INSRT Left 08/11/2022    Procedure: CYSTOSCOPY URETEROSCOPY WITH LITHOTRIPSY HOLMIUM LASER, RETROGRADE PYELOGRAM AND INSERTION STENT  URETERAL;  Surgeon: Dimple Olmedo MD;  [facility];  Service: Urology    OR LAPS SURG [id number] RPBIC RAD W/NRV SPARING ROBOT N/A 06/13/2018    Procedure: PROSTATECTOMY RADICAL W ROBOTICS;  Surgeon: Carlton Wolfe MD;  [facility];  Service: Urology    PROSTATE SURGERY  06/13/2018    PROSTATECTOMY  6/13/18    SHOULDER SURGERY           Family History     Family History   Problem Relation Age of Onset    Stroke Father     Diabetes Father         mellitus    Hypertension Father     Heart disease Father         cardiac disorder    Cancer Sister     Cancer Paternal Grandmother     Heart disease Family         cardiac disorder    Kidney disease Family     Hypertension Mother     Cancer Mother          Social History     Social History     Social History     Tobacco Use   Smoking Status Never   Smokeless Tobacco Never         Pertinent Lab Values     Lab Results   Component Value Date    CREATININE 1.43 (H) 12/04/2024       Lab Results   Component Value Date    PSA <0.01 05/08/2024    PSA 0.01 12/05/2023    PSA <0.1 12/13/2022       @RESULTRCNT(1H])@      Pertinent Imaging        URINARY TRACT FINDINGS:     RIGHT KIDNEY AND URETER:  There is redemonstration of a 1 cm calculus at the upper pole.  There is no hydronephrosis.     LEFT KIDNEY AND URETER:  There are a couple of 2-3 mm calculi at the lower pole.  There is no hydronephrosis.     URINARY BLADDER:  Unremarkable.        ADDITIONAL FINDINGS:     LOWER CHEST:  No clinically significant abnormality identified in the visualized lower chest.     SOLID VISCERA: Limited low radiation dose noncontrast CT evaluation demonstrates no clinically significant abnormality of the imaged portions of the liver, spleen, pancreas, or adrenal glands.       GALLBLADDER/BILIARY TREE:  There are gallstone(s) within the gallbladder, without pericholecystic inflammatory changes.  No biliary dilatation.     STOMACH AND BOWEL:  Unremarkable.     APPENDIX:  No findings to  "suggest appendicitis.     ABDOMINOPELVIC CAVITY:  No ascites.  No pneumoperitoneum.  No lymphadenopathy.     REPRODUCTIVE ORGANS:  Unremarkable for patient's age.     ABDOMINAL WALL/INGUINAL REGIONS:  Small fat-containing subumbilical ventral hernia.     OSSEOUS STRUCTURES:  No acute fracture or destructive osseous lesion.     IMPRESSION:     Bilateral nephrolithiasis without hydronephrosis.  No obstructing urinary tract calculi.    Portions of the record may have been created with voice recognition software.  Occasional wrong word or \"sound a like\" substitutions may have occurred due to the inherent limitations of voice recognition software.  Read the chart carefully and recognize, using context, where substitutions have occurred.  "

## 2024-12-17 NOTE — ASSESSMENT & PLAN NOTE
He has slowly lost weight over the past year with change in diet may be since he is retired  
Stable A1c on current regimen  Some readings under 100, no hypoglycemic symptoms  Sugar aranza significantly when glimepiride was discontinued in the past  Continue current regimen  Lab Results   Component Value Date    HGBA1C 7.1 (H) 12/04/2024     Orders:  •  Hemoglobin A1C; Future  •  Lipid panel; Future  
This has been well-controlled and followed by nephrology  Heart rate low today likely from carvedilol 25 mg twice a day  He is asymptomatic from the bradycardia     
Well-controlled on atorvastatin     
lying flat in NAD

## 2024-12-17 NOTE — PROGRESS NOTES
Name: Bry Weller      : 1960      MRN: [id number]  Encounter Provider: Lea Reyes, MD  Encounter Date: 2024   Encounter department: [facility]    Assessment & Plan  Type 2 diabetes mellitus with stage 3a chronic kidney disease, with long-term current use of insulin ([facility])  Stable A1c on current regimen  Some readings under 100, no hypoglycemic symptoms  Sugar aranza significantly when glimepiride was discontinued in the past  Continue current regimen  Lab Results   Component Value Date    HGBA1C 7.1 (H) 2024     Orders:  •  Hemoglobin A1C; Future  •  Lipid panel; Future    Benign essential hypertension  This has been well-controlled and followed by nephrology  Heart rate low today likely from carvedilol 25 mg twice a day  He is asymptomatic from the bradycardia       Dyslipidemia  Well-controlled on atorvastatin       Morbid obesity (HCC)     He has slowly lost weight over the past year with change in diet may be since he is retired         History of Present Illness     Here for a follow-up  Feeling well overall  Continues to have chronic low back pain sometimes numbness to both lower extremities.  This depends on his level of physical activity  Blood pressure has been well-controlled and he saw nephrology recently, no med changes made  Blood sugar readings reviewed today  Recent labs reviewed and A1c stable at 7.1      Review of Systems   Constitutional:  Negative for unexpected weight change.   Respiratory:  Negative for shortness of breath.    Cardiovascular:  Negative for chest pain and palpitations.   Gastrointestinal:  Negative for abdominal pain, constipation and diarrhea.   Genitourinary:         Incontinence   Musculoskeletal:  Positive for back pain.     Past Medical History:   Diagnosis Date   • Acute bronchitis    • Acute low back pain    • Acute low back pain     2017 resolved   • Acute respiratory infection    • JAYNE (acute kidney injury) ([facility])  06/15/2018   • Cancer (HCC)     prostate   • Cellulitis of scrotum    • Cough    • Diabetes mellitus (HCC)    • Herpes zoster    • High cholesterol    • Hyperkalemia    • Hypertension    • Kidney disease    • Kidney stone    • Leukocytosis 06/15/2018   • Low back pain    • Lumbar radiculopathy    • Pyelonephritis 08/08/2022   • Rash    • Rash 07/07/2017   • Retinopathy, central serous    • Shingles    • Sleep apnea     [id number]   • Trochanteric bursitis      Past Surgical History:   Procedure Laterality Date   • ABDOMINAL ADHESION SURGERY N/A 06/13/2018    Procedure: LYSIS ADHESIONS;  Surgeon: Carlton Wolfe MD;  [facility];  Service: Urology   • FL RETROGRADE PYELOGRAM  08/08/2022   • FL RETROGRADE PYELOGRAM  08/11/2022   • LITHOTRIPSY      renal   • NM CYSTO BLADDER W/URETERAL CATHETERIZATION Left 08/08/2022    Procedure: CYSTOSCOPY RETROGRADE PYELOGRAM WITH INSERTION STENT URETERAL;  Surgeon: Dimple Olmedo MD;  [facility];  Service: Urology   • NM CYSTO/URETERO W/LITHOTRIPSY &INDWELL STENT INSRT Left 08/11/2022    Procedure: CYSTOSCOPY URETEROSCOPY WITH LITHOTRIPSY HOLMIUM LASER, RETROGRADE PYELOGRAM AND INSERTION STENT URETERAL;  Surgeon: Dimple Olmedo MD;  [facility];  Service: Urology   • NM LAPS SURG [id number] RPBIC RAD W/NRV SPARING ROBOT N/A 06/13/2018    Procedure: PROSTATECTOMY RADICAL W ROBOTICS;  Surgeon: Carlton Wolfe MD;  [facility];  Service: Urology   • PROSTATE SURGERY  06/13/2018   • PROSTATECTOMY  6/13/18   • SHOULDER SURGERY       Family History   Problem Relation Age of Onset   • Stroke Father    • Diabetes Father         mellitus   • Hypertension Father    • Heart disease Father         cardiac disorder   • Cancer Sister    • Cancer Paternal Grandmother    • Heart disease Family         cardiac disorder   • Kidney disease Family    • Hypertension Mother    • Cancer Mother      Social History     Tobacco Use   • Smoking status: Never   • Smokeless  tobacco: Never   Vaping Use   • Vaping status: Never Used   Substance and Sexual Activity   • Alcohol use: No   • Drug use: No   • Sexual activity: Not on file     Current Outpatient Medications on File Prior to Visit   Medication Sig   • albuterol (PROVENTIL HFA,VENTOLIN HFA) 90 mcg/act inhaler Inhale 1-2 puffs every 6 (six) hours as needed for wheezing   • amLODIPine (NORVASC) 5 mg tablet TAKE 1 TABLET BY MOUTH EVERY DAY   • ASPIRIN 81 PO Take 1 capsule by mouth 2 (two) times a day     • atorvastatin (LIPITOR) 80 mg tablet TAKE 1 TABLET BY MOUTH EVERY DAY   • BD Pen Needle Rosalie 2nd Gen 32G X 4 MM MISC INJECT UNDER THE SKIN 4 (FOUR) TIMES A DAY   • carvedilol (COREG) 25 mg tablet TAKE 1 TABLET BY MOUTH TWICE A DAY WITH FOOD   • Contour Next Test test strip CHECK SUGAR 4 TIMES DAILY   • dulaglutide (Trulicity) 3 MG/0.5ML injection Inject 0.5 mL (3 mg total) under the skin once a week   • gabapentin (NEURONTIN) 100 mg capsule TAKE 2 CAPSULES BY MOUTH 3 TIMES A DAY.   • glimepiride (AMARYL) 4 mg tablet TAKE 1 TABLET BY MOUTH 2 TIMES A DAY.   • glucose blood (Contour Next Test) test strip Use 1 each 4 (four) times a day Use as instructed   • hydrochlorothiazide (HYDRODIURIL) 12.5 mg tablet TAKE 0.5 TABLETS BY MOUTH DAILY.   • insuln lispro (HumaLOG KwikPen) 200 units/mL CONCENTRATED U-200 injection pen INJECT 15 UNITS THREE TIMES DAILY WITH MEALS.   • metFORMIN (GLUCOPHAGE) 1000 MG tablet Take 1 tablet (1,000 mg total) by mouth 2 (two) times a day   • Microlet Lancets MISC USE 3 (THREE) TIMES A DAY   • Multiple Vitamins-Minerals (MULTIVITAMIN WITH MINERALS) tablet Take 1 tablet by mouth daily   • potassium citrate (UROCIT-K) 10 mEq TAKE 1 TABLET BY MOUTH TWICE A DAY   • tamsulosin (FLOMAX) 0.4 mg TAKE 1 CAPSULE BY MOUTH EVERY DAY WITH DINNER   • Toujeo SoloStar 300 units/mL CONCENTRATED U-300 injection pen (1-unit dial) INJECT 80 UNITS UNDER THE SKIN DAILY   • Vitamin D, Cholecalciferol, 400 units TABS Take 1,000 Units  by mouth daily   • Diclofenac Sodium (VOLTAREN) 1 % Apply 2 g topically 4 (four) times a day (Patient not taking: Reported on 12/17/2024)     Allergies   Allergen Reactions   • Simvastatin      Increases Liver functoin     Immunization History   Administered Date(s) Administered   • COVID-19 PFIZER VACCINE 0.3 ML IM 12/23/2020, 01/12/2021, 09/24/2021   • Hep B, adult 05/02/2011, 09/09/2011, 10/12/2011   • INFLUENZA 10/08/2021   • Influenza Quadrivalent 3 years and older 10/10/2023   • Influenza Quadrivalent Recombinant Preservative Free IM 10/08/2021, 10/03/2022   • Influenza Recombinant Preservative Free Im 10/02/2018, 10/05/2024   • Influenza, recombinant, quadrivalent,injectable, preservative free 09/15/2020   • Influenza, seasonal, injectable 10/01/2014, 10/05/2016   • Pneumococcal Conjugate Vaccine 20-valent (Pcv20), Polysace 01/03/2023   • Pneumococcal Polysaccharide PPV23 03/31/2015   • Respiratory Syncytial Virus Vaccine (Recombinant) 12/04/2023   • Tdap 10/17/2010, 03/19/2021   • Tuberculin Skin Test-PPD Intradermal 03/22/2010     Objective   /72   Pulse (!) 48   Temp (!) 97 °F (36.1 °C)   Wt 118 kg (260 lb)   SpO2 98%   BMI 43.27 kg/m²     Physical Exam  Constitutional:       Appearance: He is well-developed. He is obese. He is not ill-appearing.   Eyes:      Conjunctiva/sclera: Conjunctivae normal.   Cardiovascular:      Rate and Rhythm: Normal rate and regular rhythm.      Heart sounds: Normal heart sounds. No murmur heard.  Pulmonary:      Effort: Pulmonary effort is normal. No respiratory distress.      Breath sounds: Normal breath sounds. No wheezing or rales.   Abdominal:      General: There is no distension.      Palpations: Abdomen is soft. There is no mass.      Tenderness: There is no abdominal tenderness. There is no guarding or rebound.   Musculoskeletal:      Cervical back: Neck supple.      Right lower leg: No edema.      Left lower leg: No edema.   Neurological:      Mental  Status: He is alert and oriented to person, place, and time.   Psychiatric:         Mood and Affect: Mood normal.         Behavior: Behavior normal.         Thought Content: Thought content normal.         Judgment: Judgment normal.

## 2024-12-18 ENCOUNTER — TELEPHONE (OUTPATIENT)
Dept: ADMINISTRATIVE | Facility: OTHER | Age: 64
End: 2024-12-18

## 2024-12-18 NOTE — TELEPHONE ENCOUNTER
----- Message from Lea Reyes, MD sent at 12/17/2024 11:52 AM EST -----  12/17/24 11:52 AM    Hello, our patient Bry Weller has had Diabetic Eye Exam completed/performed. Please assist in updating the patient chart by making an External outreach to [facility] facility located in [place]. The date of service is 2024.    Thank you,  Lea Reyes, MD  [facility]

## 2024-12-18 NOTE — TELEPHONE ENCOUNTER
Upon review of the In Basket request and the patient's chart, initial outreach has been made via fax to facility. Please see Contacts section for details.     Thank you  Rachel Velasquez MA

## 2024-12-18 NOTE — LETTER
Diabetic Eye Exam Form    Date Requested: 24  Patient: Bry Weller  Patient : 1960   Referring Provider: Lea Reyes, MD      DIABETIC Eye Exam Date _______________________________      Type of Exam MUST be documented for Diabetic Eye Exams. Please CHECK ONE.     Retinal Exam       Dilated Retinal Exam       OCT       Optomap-Iris Exam      Fundus Photography       Left Eye - Please check Retinopathy or No Retinopathy        Exam did show retinopathy    Exam did not show retinopathy       Right Eye - Please check Retinopathy or No Retinopathy       Exam did show retinopathy    Exam did not show retinopathy       Comments ___Within 2024  _______________________________________________________    Practice Providing Exam ______________________________________________    Exam Performed By (print name) _______________________________________      Provider Signature ___________________________________________________      These reports are needed for  compliance.  Please fax this completed form and a copy of the Diabetic Eye Exam report to our office located at [address] [place] [address] as soon as possible via Fax [phone] attention Rachel: Phone [phone]  We thank you for your assistance in treating our mutual patient.

## 2024-12-19 NOTE — TELEPHONE ENCOUNTER
Upon review of the In Basket request we were able to locate, review, and update the patient chart as requested for Diabetic Eye Exam.    Any additional questions or concerns should be emailed to the Practice Liaisons via the appropriate education email address, please do not reply via In Basket.    Thank you  Rachel Velasquez MA   [facility]

## 2025-01-10 DIAGNOSIS — E11.65 UNCONTROLLED TYPE 2 DIABETES MELLITUS WITH HYPERGLYCEMIA (HCC): ICD-10-CM

## 2025-01-10 NOTE — TELEPHONE ENCOUNTER
Medication:     Toujeo SoloStar 300 units/mL CONCENTRATED U-300 injection pen (1-unit dial)       Dose/Frequency: INJECT 80 UNITS UNDER THE SKIN DAILY     Quantity: 22.5 mL    Pharmacy: [facility]    Office:   [x] PCP/Provider -   [] Speciality/Provider -     Does the patient have enough for 3 days?   [x] Yes   [] No - Send as HP to POD

## 2025-01-12 DIAGNOSIS — E11.65 UNCONTROLLED TYPE 2 DIABETES MELLITUS WITH HYPERGLYCEMIA (HCC): ICD-10-CM

## 2025-01-13 RX ORDER — ATORVASTATIN CALCIUM 80 MG/1
80 TABLET, FILM COATED ORAL DAILY
Qty: 90 TABLET | Refills: 1 | Status: SHIPPED | OUTPATIENT
Start: 2025-01-13

## 2025-01-13 RX ORDER — INSULIN GLARGINE 300 U/ML
80 INJECTION, SOLUTION SUBCUTANEOUS DAILY
Qty: 22.5 ML | Refills: 1 | OUTPATIENT
Start: 2025-01-13

## 2025-01-14 DIAGNOSIS — E11.65 UNCONTROLLED TYPE 2 DIABETES MELLITUS WITH HYPERGLYCEMIA (HCC): ICD-10-CM

## 2025-01-14 RX ORDER — INSULIN GLARGINE 300 U/ML
80 INJECTION, SOLUTION SUBCUTANEOUS DAILY
Qty: 22.5 ML | Refills: 3 | Status: SHIPPED | OUTPATIENT
Start: 2025-01-14

## 2025-02-06 DIAGNOSIS — E11.65 UNCONTROLLED TYPE 2 DIABETES MELLITUS WITH HYPERGLYCEMIA (HCC): ICD-10-CM

## 2025-02-06 RX ORDER — PEN NEEDLE, DIABETIC 32GX 5/32"
NEEDLE, DISPOSABLE MISCELLANEOUS
Qty: 400 EACH | Refills: 1 | Status: SHIPPED | OUTPATIENT
Start: 2025-02-06

## 2025-02-09 DIAGNOSIS — M54.16 LUMBAR RADICULOPATHY: ICD-10-CM

## 2025-02-11 RX ORDER — GABAPENTIN 100 MG/1
200 CAPSULE ORAL 3 TIMES DAILY
Qty: 540 CAPSULE | Refills: 1 | Status: SHIPPED | OUTPATIENT
Start: 2025-02-11

## 2025-02-12 DIAGNOSIS — N20.1 URETEROLITHIASIS: ICD-10-CM

## 2025-02-12 DIAGNOSIS — I10 BENIGN ESSENTIAL HYPERTENSION: ICD-10-CM

## 2025-02-12 DIAGNOSIS — N20.0 NEPHROLITHIASIS: ICD-10-CM

## 2025-02-12 DIAGNOSIS — E78.5 DYSLIPIDEMIA: ICD-10-CM

## 2025-02-12 DIAGNOSIS — N18.32 STAGE 3B CHRONIC KIDNEY DISEASE (HCC): ICD-10-CM

## 2025-02-12 DIAGNOSIS — I12.9 PARENCHYMAL RENAL HYPERTENSION, STAGE 1 THROUGH STAGE 4 OR UNSPECIFIED CHRONIC KIDNEY DISEASE: ICD-10-CM

## 2025-02-12 DIAGNOSIS — E11.21 DIABETIC NEPHROPATHY ASSOCIATED WITH TYPE 2 DIABETES MELLITUS (HCC): ICD-10-CM

## 2025-02-12 RX ORDER — AMLODIPINE BESYLATE 5 MG/1
5 TABLET ORAL DAILY
Qty: 90 TABLET | Refills: 1 | Status: SHIPPED | OUTPATIENT
Start: 2025-02-12

## 2025-02-12 RX ORDER — CARVEDILOL 25 MG/1
25 TABLET ORAL 2 TIMES DAILY WITH MEALS
Qty: 180 TABLET | Refills: 1 | Status: SHIPPED | OUTPATIENT
Start: 2025-02-12

## 2025-02-12 RX ORDER — POTASSIUM CITRATE 10 MEQ/1
10 TABLET, EXTENDED RELEASE ORAL 2 TIMES DAILY
Qty: 180 TABLET | Refills: 1 | Status: SHIPPED | OUTPATIENT
Start: 2025-02-12

## 2025-02-17 DIAGNOSIS — E11.65 UNCONTROLLED TYPE 2 DIABETES MELLITUS WITH HYPERGLYCEMIA (HCC): ICD-10-CM

## 2025-02-18 RX ORDER — GLIMEPIRIDE 4 MG/1
4 TABLET ORAL 2 TIMES DAILY
Qty: 180 TABLET | Refills: 1 | Status: SHIPPED | OUTPATIENT
Start: 2025-02-18

## 2025-02-18 RX ORDER — DULAGLUTIDE 3 MG/.5ML
INJECTION, SOLUTION SUBCUTANEOUS
Qty: 6 ML | Refills: 1 | Status: SHIPPED | OUTPATIENT
Start: 2025-02-18

## 2025-02-20 ENCOUNTER — TELEPHONE (OUTPATIENT)
Age: 65
End: 2025-02-20

## 2025-02-20 NOTE — TELEPHONE ENCOUNTER
PA for Trulicity 3 mg/0.5 ml SUBMITTED to [facility]     via    []CMM-KEY:   [x]Surescripts-Case ID # [id number]   []Availity-Auth ID # NDC #   []Faxed to plan   []Other website   []Phone call Case ID #     [x]PA sent as URGENT    All office notes, labs and other pertaining documents and studies sent. Clinical questions answered. Awaiting determination from insurance company.     Turnaround time for your insurance to make a decision on your Prior Authorization can take 7-21 business days.

## 2025-02-20 NOTE — TELEPHONE ENCOUNTER
PA for Trulicity 3mg/0.5 ml  APPROVED     Date(s) approved February 20, 2025 to February 20, 2026     Case #[id number]     Patient advised by    patient picked up already per pharmacy      []MyChart Message  []Phone call   []LMOM  []L/M to call office as no active Communication consent on file  []Unable to leave detailed message as VM not approved on Communication consent       Pharmacy advised by    []Fax  [x]Phone call    Specialty Pharmacy    []     Approval letter scanned into Media Yes

## 2025-05-06 DIAGNOSIS — E11.65 UNCONTROLLED TYPE 2 DIABETES MELLITUS WITH HYPERGLYCEMIA (HCC): ICD-10-CM

## 2025-05-27 DIAGNOSIS — N18.31 STAGE 3A CHRONIC KIDNEY DISEASE (HCC): ICD-10-CM

## 2025-05-27 DIAGNOSIS — I12.9 PARENCHYMAL RENAL HYPERTENSION, STAGE 1 THROUGH STAGE 4 OR UNSPECIFIED CHRONIC KIDNEY DISEASE: ICD-10-CM

## 2025-05-27 DIAGNOSIS — E66.01 MORBID OBESITY (HCC): ICD-10-CM

## 2025-05-27 DIAGNOSIS — N20.0 NEPHROLITHIASIS: ICD-10-CM

## 2025-05-27 DIAGNOSIS — E11.21 DIABETIC NEPHROPATHY ASSOCIATED WITH TYPE 2 DIABETES MELLITUS (HCC): ICD-10-CM

## 2025-05-27 DIAGNOSIS — E78.5 DYSLIPIDEMIA: ICD-10-CM

## 2025-05-27 RX ORDER — HYDROCHLOROTHIAZIDE 12.5 MG/1
6.25 TABLET ORAL DAILY
Qty: 45 TABLET | Refills: 4 | Status: SHIPPED | OUTPATIENT
Start: 2025-05-27

## 2025-06-08 ENCOUNTER — PATIENT MESSAGE (OUTPATIENT)
Dept: UROLOGY | Facility: CLINIC | Age: 65
End: 2025-06-08

## 2025-06-08 DIAGNOSIS — N20.1 URETEROLITHIASIS: Primary | ICD-10-CM

## 2025-06-08 DIAGNOSIS — E11.65 UNCONTROLLED TYPE 2 DIABETES MELLITUS WITH HYPERGLYCEMIA (HCC): ICD-10-CM

## 2025-06-09 ENCOUNTER — APPOINTMENT (OUTPATIENT)
Dept: LAB | Facility: MEDICAL CENTER | Age: 65
End: 2025-06-09
Payer: COMMERCIAL

## 2025-06-09 DIAGNOSIS — E11.21 DIABETIC NEPHROPATHY ASSOCIATED WITH TYPE 2 DIABETES MELLITUS (HCC): ICD-10-CM

## 2025-06-09 DIAGNOSIS — Z79.4 TYPE 2 DIABETES MELLITUS WITH STAGE 3A CHRONIC KIDNEY DISEASE, WITH LONG-TERM CURRENT USE OF INSULIN (HCC): ICD-10-CM

## 2025-06-09 DIAGNOSIS — E11.22 TYPE 2 DIABETES MELLITUS WITH STAGE 3A CHRONIC KIDNEY DISEASE, WITH LONG-TERM CURRENT USE OF INSULIN (HCC): ICD-10-CM

## 2025-06-09 DIAGNOSIS — N18.31 TYPE 2 DIABETES MELLITUS WITH STAGE 3A CHRONIC KIDNEY DISEASE, WITH LONG-TERM CURRENT USE OF INSULIN (HCC): ICD-10-CM

## 2025-06-09 DIAGNOSIS — E66.01 MORBID OBESITY (HCC): ICD-10-CM

## 2025-06-09 DIAGNOSIS — E87.5 HYPERKALEMIA: ICD-10-CM

## 2025-06-09 DIAGNOSIS — E78.5 DYSLIPIDEMIA: ICD-10-CM

## 2025-06-09 DIAGNOSIS — E83.42 HYPOMAGNESEMIA: ICD-10-CM

## 2025-06-09 DIAGNOSIS — N18.31 STAGE 3A CHRONIC KIDNEY DISEASE (HCC): ICD-10-CM

## 2025-06-09 DIAGNOSIS — I12.9 PARENCHYMAL RENAL HYPERTENSION, STAGE 1 THROUGH STAGE 4 OR UNSPECIFIED CHRONIC KIDNEY DISEASE: ICD-10-CM

## 2025-06-09 LAB
25(OH)D3 SERPL-MCNC: 42.1 NG/ML (ref 30–100)
ALBUMIN SERPL BCG-MCNC: 4.6 G/DL (ref 3.5–5)
ALP SERPL-CCNC: 70 U/L (ref 34–104)
ALT SERPL W P-5'-P-CCNC: 33 U/L (ref 7–52)
ANION GAP SERPL CALCULATED.3IONS-SCNC: 14 MMOL/L (ref 4–13)
AST SERPL W P-5'-P-CCNC: 26 U/L (ref 13–39)
BILIRUB SERPL-MCNC: 0.51 MG/DL (ref 0.2–1)
BUN SERPL-MCNC: 24 MG/DL (ref 5–25)
CALCIUM SERPL-MCNC: 9.6 MG/DL (ref 8.4–10.2)
CHLORIDE SERPL-SCNC: 105 MMOL/L (ref 96–108)
CHOLEST SERPL-MCNC: 118 MG/DL (ref ?–200)
CO2 SERPL-SCNC: 25 MMOL/L (ref 21–32)
CREAT SERPL-MCNC: 1.35 MG/DL (ref 0.6–1.3)
CREAT UR-MCNC: 107.1 MG/DL
ERYTHROCYTE [DISTWIDTH] IN BLOOD BY AUTOMATED COUNT: 13.2 % (ref 11.6–15.1)
EST. AVERAGE GLUCOSE BLD GHB EST-MCNC: 131 MG/DL
GFR SERPL CREATININE-BSD FRML MDRD: 55 ML/MIN/1.73SQ M
GLUCOSE P FAST SERPL-MCNC: 93 MG/DL (ref 65–99)
HBA1C MFR BLD: 6.2 %
HCT VFR BLD AUTO: 47.7 % (ref 36.5–49.3)
HDLC SERPL-MCNC: 34 MG/DL
HGB BLD-MCNC: 14.5 G/DL (ref 12–17)
LDLC SERPL CALC-MCNC: 62 MG/DL (ref 0–100)
MAGNESIUM SERPL-MCNC: 1.6 MG/DL (ref 1.9–2.7)
MCH RBC QN AUTO: 28.4 PG (ref 26.8–34.3)
MCHC RBC AUTO-ENTMCNC: 30.4 G/DL (ref 31.4–37.4)
MCV RBC AUTO: 94 FL (ref 82–98)
NONHDLC SERPL-MCNC: 84 MG/DL
PHOSPHATE SERPL-MCNC: 3.8 MG/DL (ref 2.3–4.1)
PLATELET # BLD AUTO: 317 THOUSANDS/UL (ref 149–390)
PMV BLD AUTO: 9.7 FL (ref 8.9–12.7)
POTASSIUM SERPL-SCNC: 4.5 MMOL/L (ref 3.5–5.3)
PROT SERPL-MCNC: 6.9 G/DL (ref 6.4–8.4)
PROT UR-MCNC: 13.8 MG/DL
PROT/CREAT UR: 0.1 MG/G{CREAT}
PTH-INTACT SERPL-MCNC: 77.7 PG/ML (ref 12–88)
RBC # BLD AUTO: 5.1 MILLION/UL (ref 3.88–5.62)
SODIUM SERPL-SCNC: 144 MMOL/L (ref 135–147)
TRIGL SERPL-MCNC: 112 MG/DL (ref ?–150)
WBC # BLD AUTO: 11.59 THOUSAND/UL (ref 4.31–10.16)

## 2025-06-09 PROCEDURE — 82306 VITAMIN D 25 HYDROXY: CPT

## 2025-06-09 PROCEDURE — 80061 LIPID PANEL: CPT

## 2025-06-09 PROCEDURE — 80053 COMPREHEN METABOLIC PANEL: CPT

## 2025-06-09 PROCEDURE — 84100 ASSAY OF PHOSPHORUS: CPT

## 2025-06-09 PROCEDURE — 84156 ASSAY OF PROTEIN URINE: CPT

## 2025-06-09 PROCEDURE — 83036 HEMOGLOBIN GLYCOSYLATED A1C: CPT

## 2025-06-09 PROCEDURE — 82570 ASSAY OF URINE CREATININE: CPT

## 2025-06-09 PROCEDURE — 85027 COMPLETE CBC AUTOMATED: CPT

## 2025-06-09 PROCEDURE — 83735 ASSAY OF MAGNESIUM: CPT

## 2025-06-09 PROCEDURE — 83970 ASSAY OF PARATHORMONE: CPT

## 2025-06-09 PROCEDURE — 36415 COLL VENOUS BLD VENIPUNCTURE: CPT

## 2025-06-09 RX ORDER — INSULIN LISPRO 200 [IU]/ML
INJECTION, SOLUTION SUBCUTANEOUS
Qty: 18 ML | Refills: 0 | Status: SHIPPED | OUTPATIENT
Start: 2025-06-09

## 2025-06-09 NOTE — PATIENT COMMUNICATION
Please change expected date on Ultrasound to be complete prior to office visit on 12/17/25.    Please notify patient

## 2025-06-10 ENCOUNTER — TELEPHONE (OUTPATIENT)
Dept: PAIN MEDICINE | Facility: CLINIC | Age: 65
End: 2025-06-10

## 2025-06-10 ENCOUNTER — OFFICE VISIT (OUTPATIENT)
Dept: PAIN MEDICINE | Facility: CLINIC | Age: 65
End: 2025-06-10
Payer: COMMERCIAL

## 2025-06-10 DIAGNOSIS — G89.4 CHRONIC PAIN SYNDROME: ICD-10-CM

## 2025-06-10 DIAGNOSIS — M43.16 SPONDYLOLISTHESIS OF LUMBAR REGION: ICD-10-CM

## 2025-06-10 DIAGNOSIS — M51.16 INTERVERTEBRAL DISC DISORDER WITH RADICULOPATHY OF LUMBAR REGION: Primary | ICD-10-CM

## 2025-06-10 PROBLEM — E11.21 DIABETIC NEPHROPATHY ASSOCIATED WITH TYPE 2 DIABETES MELLITUS (HCC): Status: RESOLVED | Noted: 2022-09-05 | Resolved: 2025-06-10

## 2025-06-10 PROCEDURE — 99214 OFFICE O/P EST MOD 30 MIN: CPT | Performed by: NURSE PRACTITIONER

## 2025-06-10 NOTE — ASSESSMENT & PLAN NOTE
Will continue with increase in magnesium diet will trial certain magnesium supplements after discussion with his pharmacist  Orders:    Basic metabolic panel; Future    losartan (COZAAR) 25 mg tablet; Take 1 tablet (25 mg total) by mouth daily    Basic metabolic panel; Future    Magnesium; Future    Comprehensive metabolic panel; Future    Magnesium; Future    CBC; Future    Protein / creatinine ratio, urine; Future    PTH, intact; Future

## 2025-06-10 NOTE — ASSESSMENT & PLAN NOTE
Asymptomatic monitor for now  Cannot do 24-hour urine  Orders:    Basic metabolic panel; Future    losartan (COZAAR) 25 mg tablet; Take 1 tablet (25 mg total) by mouth daily    Basic metabolic panel; Future    Magnesium; Future    Comprehensive metabolic panel; Future    Magnesium; Future    CBC; Future    Protein / creatinine ratio, urine; Future    PTH, intact; Future

## 2025-06-10 NOTE — ASSESSMENT & PLAN NOTE
At goal no changes  Orders:    Basic metabolic panel; Future    losartan (COZAAR) 25 mg tablet; Take 1 tablet (25 mg total) by mouth daily    Basic metabolic panel; Future    Magnesium; Future    Comprehensive metabolic panel; Future    Magnesium; Future    CBC; Future    Protein / creatinine ratio, urine; Future    PTH, intact; Future

## 2025-06-10 NOTE — PROGRESS NOTES
Name: Bry Weller      : 1960      MRN: [id number]  Encounter Provider: Mateo Montero MD  Encounter Date: 2025   [facility]  :  Assessment & Plan  Type 2 diabetes mellitus with stage 3a chronic kidney disease, with long-term current use of insulin (HCC)  Patient could not tolerate SGLT2 inhibitors with urinary incontinence  Orders:    Basic metabolic panel; Future    losartan (COZAAR) 25 mg tablet; Take 1 tablet (25 mg total) by mouth daily    Basic metabolic panel; Future    Magnesium; Future    Comprehensive metabolic panel; Future    Magnesium; Future    CBC; Future    Protein / creatinine ratio, urine; Future    PTH, intact; Future    Parenchymal renal hypertension, stage 1 through stage 4 or unspecified chronic kidney disease  Slightly elevated therefore going to start losartan 25 mg daily  Orders:    Basic metabolic panel; Future    losartan (COZAAR) 25 mg tablet; Take 1 tablet (25 mg total) by mouth daily    Basic metabolic panel; Future    Magnesium; Future    Comprehensive metabolic panel; Future    Magnesium; Future    CBC; Future    Protein / creatinine ratio, urine; Future    PTH, intact; Future    Stage 3a chronic kidney disease (HCC)  At baseline no changes    Orders:    Basic metabolic panel; Future    losartan (COZAAR) 25 mg tablet; Take 1 tablet (25 mg total) by mouth daily    Basic metabolic panel; Future    Magnesium; Future    Comprehensive metabolic panel; Future    Magnesium; Future    CBC; Future    Protein / creatinine ratio, urine; Future    PTH, intact; Future    Dyslipidemia  At goal no changes  Orders:    Basic metabolic panel; Future    losartan (COZAAR) 25 mg tablet; Take 1 tablet (25 mg total) by mouth daily    Basic metabolic panel; Future    Magnesium; Future    Comprehensive metabolic panel; Future    Magnesium; Future    CBC; Future    Protein / creatinine ratio, urine; Future    PTH, intact;  Future    Nephrolithiasis  Asymptomatic monitor for now  Cannot do 24-hour urine  Orders:    Basic metabolic panel; Future    losartan (COZAAR) 25 mg tablet; Take 1 tablet (25 mg total) by mouth daily    Basic metabolic panel; Future    Magnesium; Future    Comprehensive metabolic panel; Future    Magnesium; Future    CBC; Future    Protein / creatinine ratio, urine; Future    PTH, intact; Future    Morbid obesity (HCC)  Discussed with patient push diet and exercise patient counseled as such  Orders:    Basic metabolic panel; Future    losartan (COZAAR) 25 mg tablet; Take 1 tablet (25 mg total) by mouth daily    Basic metabolic panel; Future    Magnesium; Future    Comprehensive metabolic panel; Future    Magnesium; Future    CBC; Future    Protein / creatinine ratio, urine; Future    PTH, intact; Future    Hypomagnesemia  Will continue with increase in magnesium diet will trial certain magnesium supplements after discussion with his pharmacist  Orders:    Basic metabolic panel; Future    losartan (COZAAR) 25 mg tablet; Take 1 tablet (25 mg total) by mouth daily    Basic metabolic panel; Future    Magnesium; Future    Comprehensive metabolic panel; Future    Magnesium; Future    CBC; Future    Protein / creatinine ratio, urine; Future    PTH, intact; Future        History of Present Illness   HPI  Bry Weller is a 65 y.o. male who presents with follow-up regarding CKD  There has been no hospitalizations or acute illnesses since last visit.  The patient overall is feeling well.  Good appetite and good energy  No fevers, chills, or cough or colds.  No hematuria, dysuria, voiding symptoms or foamy urine  No stone passage  No gastrointestinal symptoms  No cardiovascular symptoms including swelling of the legs  No headaches, dizziness or lightheadedness  Blood pressure medications:  Amlodipine 5 mg daily in the morning  Carvedilol 25 mg twice a day  HCTZ 6.25 mg every other day    Renal pertinent  medications:  Aspirin 81 mg twice a day  Atorvastatin 80 mg daily  Neurontin 200 mg as needed 3 times a day  Metformin 1000 mg twice a day  Potassium citrate 10 mill equivalents daily  Vitamin D 1000 units daily      Review of Systems  Please see HPI, otherwise the review of systems as completely reviewed with the patient are negative    Pertinent Medical History   65 y.o. year old male with a history of diabetes mellitus type 2/dyslipidemia/hypertension/prostate cancer status post robotic prostatectomy June 2018/ELENA who we are asked to see regarding nephrolithiasis/CKD        1. CKD stage 3A  Etiology:  Diabetic kidney disease/hypertensive nephrosclerosis/episodes of JAYNE-hydronephrosis of the left kidney/arteriolar nephrosclerosis.  Doubt primary glomerular process.  Baseline creatinine:  1.37-1.60  Recommend:  Workup:  Current creatinine: 1.35 from 6/9/2025 at baseline  UA with microscopic: From 1/11/2023: Trace proteinuria, negative heme; 2-4  RBCs WBCs or bacteria seen  Urine protein creatinine ratio: 0.10 g at goal  Renal ultrasound with PVR:  To be done did have moderate left-sided hydroureteronephrosis and 9 mm obstructing stone proximal left ureter  10/6/2022: Bilateral nephrolithiasis without hydronephrosis, 9 mm calculus in the upper pole of the right kidney; 5 and 10 mm calculus in the left  Renal artery duplex to rule out renal artery stenosis: Negative     Treatment:  Treat hypertension, please see below for recommendations  Treat dyslipidemia  Avoid nephrotoxic agents such as NSAIDs, and proton pump inhibitors as able; patient counseled as such  Good overall health recommendations including weight loss as appropriate, isotonic exercise as able, and avoidance of salt; patient counseled as such  UA: 2-4 RBCs are acceptable: Reviewed with urology: Apparently no issues at this time        2.  Volume: Euvolemic     3.  Hypertension:  Secondary work-up:  Normal plasma free metanephrines  Aldosterone/renin  ratio: Negative  TSH normal  Renal work-up as above  ELENA unlikely mild heavy snoring at times but patient would defer at this time       Current blood pressure averages:  AM: 138/79  PM: None  Heart rate: None today     Goal blood pressure: Less than 130/80 given CKD     Recommendations:  Push nonmedical regimen including weight loss, isotonic exercise and a low sodium diet.  Patient has been counseled the such.  MedicationChanges today:     Current recommendations:  Losartan 25 mg daily monitor potassium and creatinine  Recheck blood pressures approximately 4 to 6 weeks     4.  Electrolytes:  All acceptable including potassium 4.5        5.  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  I will have them explore the least GI effects for magnesium with their pharmacist.  Phosphorus remains normal  PTH intact: 77.7 now normal  adynamic bone disease: I would just replete vitamin D at this time as outlined below  Vitamin-D: 2.1 at goal from 6/9/2025     6.  Dyslipidemia:  Goal LDL: Less than 100  Current lipid profile: LDL 62/HDL 34/glycerides 112 from 6/9/2025  Recommendations:   Low-cholesterol/low-fat diet / weight loss as appropriate and isotonic exercise   Medication changes today: At goal so no changes     7.  Anemia:  Current hemoglobin: Normal at 14.5  Myeloma evaluation from 10/25/2023 negative including SPEP UPEP and light chain ratio        8.  Nephrolithiasis:Patient has had calcium oxalate stones as many as 13  most recently requiring intervention with cystoscopy laser lithotripsy and stent placement.  Patient with prior calcium oxalate now uric acid stones.  Status post recent ureteroscopy with laser lithotripsy basket stone extraction on the left and left ureteral stent placement on 8/11/2022 by Dr. Olmedo  Recommend:     Current status/stone passage none since stone extraction      Workup:  Patient with mild hyperparathyroidism .  Doubt primary hyperparathyroidism  Given incontinence cannot obtain formal 24 hour urine  General stone as well as low oxalate diet   Patient is slowly increasing water intake decreasing soda intake and gets close to 68 ounces we will continue to work on this!  Can actually take closer to 2.5-3 L        CT scan renal stone evaluation as of 12/10/2024: Patent stone fragments in the left kidney very small unlikely of clinical significance, 1 cm stone on the right since 2020 favorable location unlikely to migrate cause any complications.  Continued observation.  Recommended annual ultrasound for the next 2 years per Dr. Goetz.              8.  Other problems:  Diabetes mellitus type 2  : Patient could not tolerate SGLT2 inhibitors with the urinary incontinence  Prostate cancer status post robotic prostatectomy 2018  ELENA?  Chronic mild leukocytosis stable  Recent back pain MRI demonstrated spinal canal stenosis mild exacerbated by acquired degenerative disc disease small disc herniations annular bulging posterior element hypertrophic changes.  Foraminal narrowing most pronounced on the left T11-12 and L4-5 level bilaterally.  Seen by Dr. Mark Corral from pain management felt lumbar radiculopathy.  Status post steroid injection about a week plus with marked improvement in pain able to walk  Slight irregular heart rhythm I believe seems premature atrial contraction: ECG with rhythm strip demonstrated normal sinus rhythm from 12/4/2023            Medical History Reviewed by provider this encounter:     .  Past Medical History   Past Medical History[1]  Past Surgical History[2]  Family History[3]   reports that he has never smoked. He has never used smokeless tobacco. He reports that he does not drink alcohol and does not use drugs.  Current Outpatient Medications   Medication Instructions    albuterol (PROVENTIL HFA,VENTOLIN HFA) 90 mcg/act inhaler 1-2 puffs,  "Inhalation, Every 6 hours PRN    amLODIPine (NORVASC) 5 mg, Oral, Daily    ASPIRIN 81 PO 1 capsule, 2 times daily    atorvastatin (LIPITOR) 80 mg, Oral, Daily    BD Pen Needle Rosalie 2nd Gen 32G X 4 MM MISC INJECT UNDER THE SKIN 4 (FOUR) TIMES A DAY    carvedilol (COREG) 25 mg, Oral, 2 times daily with meals    Contour Next Test test strip CHECK SUGAR 4 TIMES DAILY    Diclofenac Sodium (VOLTAREN) 2 g, Topical, 4 times daily    Dulaglutide (Trulicity) 3 MG/0.5ML SOAJ INJECT 0.5 ML (3 MG TOTAL) UNDER THE SKIN ONCE A WEEK    gabapentin (NEURONTIN) 200 mg, Oral, 3 times daily    glimepiride (AMARYL) 4 mg, Oral, 2 times daily    glucose blood (Contour Next Test) test strip 1 each, Other, 4 times daily, Use as instructed    hydroCHLOROthiazide 6.25 mg, Oral, Daily    insuln lispro (HumaLOG KwikPen) 200 units/mL CONCENTRATED U-200 injection pen Inject 15 units three times daily with meals.    losartan (COZAAR) 25 mg, Oral, Daily    metFORMIN (GLUCOPHAGE) 1,000 mg, Oral, 2 times daily    Microlet Lancets MISC Does not apply, 3 times daily    Multiple Vitamins-Minerals (MULTIVITAMIN WITH MINERALS) tablet 1 tablet, Daily    potassium citrate (UROCIT-K) 10 mEq 10 mEq, Oral, 2 times daily    tamsulosin (FLOMAX) 0.4 mg TAKE 1 CAPSULE BY MOUTH EVERY DAY WITH DINNER    Toujeo SoloStar 80 Units, Subcutaneous, Daily    Vitamin D (Cholecalciferol) 1,000 Units, Daily   Allergies[4]   Medications Ordered Prior to Encounter[5]   Social History[6]     Objective   Ht 5' 5\" (1.651 m)   Wt 112 kg (248 lb)   BMI 41.27 kg/m²      Physical Exam  BP sitting on left: 130/78 with a heart rate of 60 and slightly irregular  BP standing on left: 132/62 with a heart rate of 68 and slightly irregular  Physical Exam: General:  No acute distress/obese  Skin:  No acute rash  Eyes:  No scleral icterus and noninjected  ENT:  Moist mucous membranes  Neck:  Supple, no jugular venous distention, trachea midline, overall appearance is normal  Chest:  Clear to " auscultation  CVS:  Regular rate and rhythm, without a rub or gallops  Abdomen:  obese, Normal bowel sounds, soft and nontender and nondistended  Extremities:  No edema, and no cyanosis, no significant arthritic changes  Neuro:  No gross focality  Psych:  Alert and oriented and appropriate             [1]   Past Medical History:  Diagnosis Date    Acute bronchitis     Acute low back pain     Acute low back pain     [id number] resolved    Acute respiratory infection     JAYNE (acute kidney injury) (HCC) 06/15/2018    Cancer (HCC)     prostate    Cellulitis of scrotum     Cough     Diabetes mellitus (HCC)     Herpes zoster     High cholesterol     Hyperkalemia     Hypertension     Kidney disease     Kidney stone     Leukocytosis 06/15/2018    Low back pain     Lumbar radiculopathy     Pyelonephritis 08/08/2022    Rash     Rash 07/07/2017    Retinopathy, central serous     Shingles     Sleep apnea     [id number]    Trochanteric bursitis    [2]   Past Surgical History:  Procedure Laterality Date    ABDOMINAL ADHESION SURGERY N/A 06/13/2018    Procedure: LYSIS ADHESIONS;  Surgeon: Carlton Wolfe MD;  Location: [facility];  Service: Urology    FL RETROGRADE PYELOGRAM  08/08/2022    FL RETROGRADE PYELOGRAM  08/11/2022    LITHOTRIPSY      renal    DC CYSTO/URETERO W/LITHOTRIPSY &INDWELL STENT INSRT Left 08/11/2022    Procedure: CYSTOSCOPY URETEROSCOPY WITH LITHOTRIPSY HOLMIUM LASER, RETROGRADE PYELOGRAM AND INSERTION STENT URETERAL;  Surgeon: Dimple Olmedo MD;  Location: [facility];  Service: Urology    DC CYSTOURETHROSCOPY W/URETERAL CATHETERIZATION Left 08/08/2022    Procedure: CYSTOSCOPY RETROGRADE PYELOGRAM WITH INSERTION STENT URETERAL;  Surgeon: Dimple Olmedo MD;  Location: [facility];  Service: Urology    DC LAPS SURG [id number] RPBIC RAD W/NRV SPARING ROBOT N/A 06/13/2018    Procedure: PROSTATECTOMY RADICAL W ROBOTICS;  Surgeon: Carlton Wolfe MD;  Location: [facility];  Service: Urology    PROSTATE SURGERY  06/13/2018     PROSTATECTOMY  6/13/18    SHOULDER SURGERY     [3]   Family History  Problem Relation Name Age of Onset    Stroke Father Efra Weller     Diabetes Father Efra Weller         mellitus    Hypertension Father Efra Weller     Heart disease Father Efra Weller         cardiac disorder    Cancer Sister Amna Wise     Cancer Paternal Grandmother Marleny Weller     Heart disease Family          cardiac disorder    Kidney disease Family      Hypertension Mother Amna Weller     Cancer Mother Amna Weller    [4]   Allergies  Allergen Reactions    Simvastatin      Increases Liver functoin   [5]   Current Outpatient Medications on File Prior to Visit   Medication Sig Dispense Refill    albuterol (PROVENTIL HFA,VENTOLIN HFA) 90 mcg/act inhaler Inhale 1-2 puffs every 6 (six) hours as needed for wheezing 8 g 1    amLODIPine (NORVASC) 5 mg tablet TAKE 1 TABLET BY MOUTH EVERY DAY 90 tablet 1    atorvastatin (LIPITOR) 80 mg tablet TAKE 1 TABLET BY MOUTH EVERY DAY 90 tablet 1    BD Pen Needle Rosalie 2nd Gen 32G X 4 MM MISC INJECT UNDER THE SKIN 4 (FOUR) TIMES A  each 1    carvedilol (COREG) 25 mg tablet TAKE 1 TABLET BY MOUTH TWICE A DAY WITH FOOD 180 tablet 1    Contour Next Test test strip CHECK SUGAR 4 TIMES DAILY 400 strip 1    Dulaglutide (Trulicity) 3 MG/0.5ML SOAJ INJECT 0.5 ML (3 MG TOTAL) UNDER THE SKIN ONCE A WEEK 6 mL 1    gabapentin (NEURONTIN) 100 mg capsule TAKE 2 CAPSULES BY MOUTH 3 TIMES A DAY. 540 capsule 1    glimepiride (AMARYL) 4 mg tablet TAKE 1 TABLET BY MOUTH 2 TIMES A DAY. 180 tablet 1    glucose blood (Contour Next Test) test strip Use 1 each 4 (four) times a day Use as instructed 400 each 3    hydroCHLOROthiazide 12.5 mg tablet Take 0.5 tablets (6.25 mg total) by mouth daily (Patient taking differently: Take 6.25 mg by mouth every other day) 45 tablet 4    insuln lispro (HumaLOG KwikPen) 200 units/mL CONCENTRATED U-200 injection pen Inject 15 units three times daily with  meals. 18 mL 0    metFORMIN (GLUCOPHAGE) 1000 MG tablet TAKE 1 TABLET BY MOUTH TWICE A  tablet 1    Microlet Lancets MISC USE 3 (THREE) TIMES A  each 3    Multiple Vitamins-Minerals (MULTIVITAMIN WITH MINERALS) tablet Take 1 tablet by mouth in the morning.      potassium citrate (UROCIT-K) 10 mEq TAKE 1 TABLET BY MOUTH TWICE A DAY (Patient taking differently: Take 10 mEq by mouth daily) 180 tablet 1    tamsulosin (FLOMAX) 0.4 mg TAKE 1 CAPSULE BY MOUTH EVERY DAY WITH DINNER (Patient taking differently: daily as needed) 90 capsule 1    Toujeo SoloStar 300 units/mL CONCENTRATED U-300 injection pen (1-unit dial) Inject 80 Units under the skin daily 22.5 mL 3    Vitamin D, Cholecalciferol, 400 units TABS Take 1,000 Units by mouth in the morning.      ASPIRIN 81 PO Take 1 capsule by mouth in the morning and 1 capsule in the evening.      Diclofenac Sodium (VOLTAREN) 1 % Apply 2 g topically 4 (four) times a day (Patient not taking: Reported on 12/11/2024) 100 g 0     No current facility-administered medications on file prior to visit.   [6]   Social History  Tobacco Use    Smoking status: Never    Smokeless tobacco: Never   Vaping Use    Vaping status: Never Used   Substance and Sexual Activity    Alcohol use: No    Drug use: No

## 2025-06-10 NOTE — ASSESSMENT & PLAN NOTE
Patient could not tolerate SGLT2 inhibitors with urinary incontinence  Orders:    Basic metabolic panel; Future    losartan (COZAAR) 25 mg tablet; Take 1 tablet (25 mg total) by mouth daily    Basic metabolic panel; Future    Magnesium; Future    Comprehensive metabolic panel; Future    Magnesium; Future    CBC; Future    Protein / creatinine ratio, urine; Future    PTH, intact; Future

## 2025-06-10 NOTE — ASSESSMENT & PLAN NOTE
Discussed with patient push diet and exercise patient counseled as such  Orders:    Basic metabolic panel; Future    losartan (COZAAR) 25 mg tablet; Take 1 tablet (25 mg total) by mouth daily    Basic metabolic panel; Future    Magnesium; Future    Comprehensive metabolic panel; Future    Magnesium; Future    CBC; Future    Protein / creatinine ratio, urine; Future    PTH, intact; Future

## 2025-06-10 NOTE — ASSESSMENT & PLAN NOTE
At baseline no changes    Orders:    Basic metabolic panel; Future    losartan (COZAAR) 25 mg tablet; Take 1 tablet (25 mg total) by mouth daily    Basic metabolic panel; Future    Magnesium; Future    Comprehensive metabolic panel; Future    Magnesium; Future    CBC; Future    Protein / creatinine ratio, urine; Future    PTH, intact; Future

## 2025-06-10 NOTE — PROGRESS NOTES
Name: Bry Weller      : 1960      MRN: [id number]  Encounter Provider: AKSHAT Vasquez  Encounter Date: 6/10/2025   Encounter department: [facility]  :  Assessment & Plan  Intervertebral disc disorder with radiculopathy of lumbar region  Spondylolisthesis of lumbar region  Chronic pain syndrome    Patient has had a return of his back pain with radicular symptoms down both legs.  He last had an injection back in 2024.  He would like to repeat the same injection as it provided him over 1 year of relief.  Will schedule him for a bilateral L4-L5 TFESI with Dr. Cochran    Orders:    FL spine and pain procedure; Future        Complete risks and benefits including bleeding, infection, tissue reaction, nerve injury and allergic reaction were discussed. The approach was demonstrated using models and literature was provided. Verbal and written consent was obtained.    My impressions and treatment recommendations were discussed in detail with the patient who verbalized understanding and had no further questions.  Discharge instructions were provided. I personally saw and examined the patient and I agree with the above discussed plan of care.    Follow-up is planned post injection or sooner as warranted. The patient was advised to contact the office should their symptoms worsen in the interim.     History of Present Illness     Bry Weller is a 65 y.o. male who presents to [facility] for interval re-evaluation in regards to pain in the Low back. The patients last office visit was 2024. Patient presents today with pain in low back that radiates to both legs. Pain is described as Constant, Burning, Dull-aching, Sharp, and Shooting. Symptoms are worse with standing , walking, going over bumps in the car. Alleviating factors identifiable by the patient are rest, sitting. On the numeric pain scale of 1-10, the pain typically increases to max of 7  out of 10, which is currently impacting their quality of life.    Current pain meds include: Gabapentin    Conservative therapy: None  Previous treatments: bilateral L4-L5 TFESI  Date:3/25/2024;  80% relief for 10 months     Review of Systems   Respiratory:  Negative for shortness of breath.    Cardiovascular:  Negative for chest pain.   Gastrointestinal:  Negative for constipation, diarrhea, nausea and vomiting.   Musculoskeletal:  Positive for back pain and gait problem. Negative for arthralgias, joint swelling and myalgias.        Bilateral leg pain   Skin:  Negative for rash.   Neurological:  Negative for dizziness, seizures and weakness.   All other systems reviewed and are negative.      Medical History Reviewed by provider this encounter:  Tobacco  Allergies  Meds  Problems  Med Hx  Surg Hx  Fam Hx     .       Objective   There were no vitals taken for this visit.     Pain Score:   7  Physical Exam  Constitutional:normal, well developed, well nourished, alert, in no distress and non-toxic and no overt pain behavior. and obese  Eyes:anicteric  HEENT:grossly intact  Neck:supple, symmetric, trachea midline and no masses   Pulmonary:even and unlabored  Cardiovascular:No edema or pitting edema present  Skin:Normal without rashes or lesions and well hydrated  Psychiatric:Mood and affect appropriate  Neurologic:Cranial Nerves II-XII grossly intact  Musculoskeletal: normal gait

## 2025-06-10 NOTE — ASSESSMENT & PLAN NOTE
Patient has had a return of his back pain with radicular symptoms down both legs.  He last had an injection back in March 2024.  He would like to repeat the same injection as it provided him over 1 year of relief.  Will schedule him for a bilateral L4-L5 TFESI with Dr. Cochran    Orders:    FL spine and pain procedure; Future

## 2025-06-13 ENCOUNTER — RESULTS FOLLOW-UP (OUTPATIENT)
Dept: INTERNAL MEDICINE CLINIC | Facility: CLINIC | Age: 65
End: 2025-06-13

## 2025-06-18 ENCOUNTER — OFFICE VISIT (OUTPATIENT)
Dept: NEPHROLOGY | Facility: CLINIC | Age: 65
End: 2025-06-18
Payer: COMMERCIAL

## 2025-06-18 VITALS — BODY MASS INDEX: 41.32 KG/M2 | HEIGHT: 65 IN | WEIGHT: 248 LBS

## 2025-06-18 DIAGNOSIS — N20.0 NEPHROLITHIASIS: ICD-10-CM

## 2025-06-18 DIAGNOSIS — N18.31 TYPE 2 DIABETES MELLITUS WITH STAGE 3A CHRONIC KIDNEY DISEASE, WITH LONG-TERM CURRENT USE OF INSULIN (HCC): Primary | ICD-10-CM

## 2025-06-18 DIAGNOSIS — N18.31 STAGE 3A CHRONIC KIDNEY DISEASE (HCC): ICD-10-CM

## 2025-06-18 DIAGNOSIS — Z79.4 TYPE 2 DIABETES MELLITUS WITH STAGE 3A CHRONIC KIDNEY DISEASE, WITH LONG-TERM CURRENT USE OF INSULIN (HCC): Primary | ICD-10-CM

## 2025-06-18 DIAGNOSIS — E66.01 MORBID OBESITY (HCC): ICD-10-CM

## 2025-06-18 DIAGNOSIS — I12.9 PARENCHYMAL RENAL HYPERTENSION, STAGE 1 THROUGH STAGE 4 OR UNSPECIFIED CHRONIC KIDNEY DISEASE: ICD-10-CM

## 2025-06-18 DIAGNOSIS — E11.22 TYPE 2 DIABETES MELLITUS WITH STAGE 3A CHRONIC KIDNEY DISEASE, WITH LONG-TERM CURRENT USE OF INSULIN (HCC): Primary | ICD-10-CM

## 2025-06-18 DIAGNOSIS — E78.5 DYSLIPIDEMIA: ICD-10-CM

## 2025-06-18 DIAGNOSIS — E83.42 HYPOMAGNESEMIA: ICD-10-CM

## 2025-06-18 PROCEDURE — 99214 OFFICE O/P EST MOD 30 MIN: CPT | Performed by: INTERNAL MEDICINE

## 2025-06-18 RX ORDER — LOSARTAN POTASSIUM 25 MG/1
25 TABLET ORAL DAILY
Qty: 30 TABLET | Refills: 5 | Status: SHIPPED | OUTPATIENT
Start: 2025-06-18

## 2025-06-18 NOTE — PATIENT INSTRUCTIONS
Visit summary:  - Overall kidney function doing very well  - No further stone passage  - Your blood pressure is just slightly above goal and going to start you on a medication losartan but please do not start it until you come back from your trip and then we will do follow-up labs a week or so later    - In terms of the magnesium after you come back from your trip discussed with your pharmacist the least insulting supplement that may not affect your intestinal tract and then potentially start it 3 days a week        1. Medication changes today:  Losartan 25 mg daily in the morning after you come back from your trip    2.  General instructions:  Continue to avoid salt  Continue to try to lose weight and exercise like you are doing before the back pain came back    3.  Please go for non fasting  lab work 1-2 weeks after making the above medication change.    4.  Please take 1 week a blood pressure readings 4-6 weeks after starting the new medication please do morning and evening just sitting before taking the medication    AS FOLLOWS  MORNING AND EVENING, SITTING AND STANDING as follows:  TAKE THE MORNING READINGS BEFORE ANY MEDICATIONS AND WHEN YOU ARE RELAXED FOR SEVERAL MINUTES  TAKE THE EVENING READINGS:  BETWEEN 7-10 P.M.; PRIOR TO ANY MEDICATIONS; AT LEAST IN OUR  FROM DINNER; AND CERTAINLY AFTER RELAXING FOR A FEW MINUTES  PLEASE INCLUDE HEART RATE WITH YOUR BLOOD PRESSURE READINGS  When taking standing readings, keep your arm supported at heart level and not dangling  Make sure you are sitting with your back supported and feet on the ground and do not cross your legs or feet  Make sure you have not taken any coffee or caffeine products or exercised or smoke cigarettes at least 30 minutes before taking your blood pressure  Then please mail these readings into the office      5.  In 3 months:  Please go for nonfasting lab work but in the morning  Please take 1 week a blood pressure readings as outlined  above and mail those into the office      6.  Follow-up in 6 months  Please bring in 1 week a blood pressure readings morning evening, sitting and standing is outlined above    Please go for fasting lab work 1-2 weeks prior to your appointment      7.  General non medical recommendations:  AVOID SALT BUT NOT ADDING AN READING LABELS TO MAKE SURE THERE IS LOW-SALT IN THE FOOD THAT YOU ARE EATING  Goal is less than 2 g of sodium intake or less than 5 g of sodium chloride intake per day    Avoid nonsteroidal anti-inflammatory drugs such as Naprosyn, ibuprofen, Aleve, Advil, Celebrex, Meloxicam (Mobic) etc.  You can use Tylenol as needed if you do not have any liver condition to be concerned about    Avoid medications such as Sudafed or decongestants and antihistamines that contained the D component which is the decongestant.  You can take antihistamines without the decongestant or D component.    Try to avoid medications such as pantoprazole or  Protonix/Nexium or Esomeprazole)/Prilosec or omeprazole/Prevacid or lansoprazole/AcipHex or Rabeprazole.  If you are able to, use Pepcid as this is safer for your kidneys.    Please do not drink more than 2 glasses of alcohol/wine on a daily basis as this may contribute to your high blood pressure.

## 2025-07-08 ENCOUNTER — OFFICE VISIT (OUTPATIENT)
Dept: INTERNAL MEDICINE CLINIC | Facility: CLINIC | Age: 65
End: 2025-07-08
Payer: COMMERCIAL

## 2025-07-08 VITALS
SYSTOLIC BLOOD PRESSURE: 122 MMHG | OXYGEN SATURATION: 96 % | HEART RATE: 90 BPM | BODY MASS INDEX: 41.02 KG/M2 | DIASTOLIC BLOOD PRESSURE: 78 MMHG | WEIGHT: 246.2 LBS | HEIGHT: 65 IN

## 2025-07-08 DIAGNOSIS — Z23 ENCOUNTER FOR IMMUNIZATION: ICD-10-CM

## 2025-07-08 DIAGNOSIS — E11.65 UNCONTROLLED TYPE 2 DIABETES MELLITUS WITH HYPERGLYCEMIA (HCC): Primary | ICD-10-CM

## 2025-07-08 DIAGNOSIS — L91.8 SKIN TAG: ICD-10-CM

## 2025-07-08 DIAGNOSIS — C61 PROSTATE CANCER (HCC): ICD-10-CM

## 2025-07-08 PROCEDURE — 90471 IMMUNIZATION ADMIN: CPT

## 2025-07-08 PROCEDURE — 90750 HZV VACC RECOMBINANT IM: CPT

## 2025-07-08 PROCEDURE — 99214 OFFICE O/P EST MOD 30 MIN: CPT | Performed by: INTERNAL MEDICINE

## 2025-07-08 RX ORDER — GLIMEPIRIDE 4 MG/1
4 TABLET ORAL
Qty: 180 TABLET | Refills: 1 | Status: SHIPPED | OUTPATIENT
Start: 2025-07-08

## 2025-07-08 NOTE — PROGRESS NOTES
"Name: Bry Weller      : 1960      MRN: [id number]  Encounter Provider: Lea Reyes, MD  Encounter Date: 2025   Encounter department: [facility]  :  Assessment & Plan  Uncontrolled type 2 diabetes mellitus with hyperglycemia (HCC)  Frequent hypoglycemia overnight and fasting  Reduce glimepiride to only with breakfast, discontinue evening dose  He tried this before but sugar aranza.  With A1c at 6.2, I think it is time to try again  Continue current dose of Toujeo 70 units.  He administers Humalog on a sliding scale anywhere from 0 to 15 units.  Many times, he does not administer it with dinner  Lab Results   Component Value Date    HGBA1C 6.2 (H) 2025       Orders:  •  glimepiride (AMARYL) 4 mg tablet; Take 1 tablet (4 mg total) by mouth daily with breakfast  •  Hemoglobin A1C; Future  •  Lipid panel; Future    Prostate cancer (HCC)         Skin tag    Orders:  •  Ambulatory Referral to Dermatology; Future  •  Ambulatory Referral to Dermatology; Future    Encounter for immunization    Orders:  •  Zoster Vaccine Recombinant IM           History of Present Illness   Here for follow-up with his wife  Sugars reviewed and frequent hypoglycemia with sugars under 70 fasting  Chronic low back pain is getting worse, scheduled for epidural in a few weeks  Losartan started by nephrology recently for hypertension  Most recent labs reviewed A1c 6.2      Review of Systems   Constitutional:  Negative for unexpected weight change.   Respiratory:  Negative for shortness of breath.    Cardiovascular:  Negative for chest pain and palpitations.   Gastrointestinal:  Negative for abdominal pain.   Genitourinary:         Incontinence   Musculoskeletal:  Positive for back pain.       Objective   /78 (BP Location: Left arm, Patient Position: Sitting, Cuff Size: Large)   Pulse 90   Ht 5' 5\" (1.651 m)   Wt 112 kg (246 lb 3.2 oz)   SpO2 96%   BMI 40.97 kg/m²      Physical " Exam  Constitutional:       Appearance: He is well-developed. He is obese. He is not ill-appearing.     Eyes:      Conjunctiva/sclera: Conjunctivae normal.       Cardiovascular:      Rate and Rhythm: Normal rate and regular rhythm.      Heart sounds: Normal heart sounds. No murmur heard.  Pulmonary:      Effort: Pulmonary effort is normal. No respiratory distress.      Breath sounds: Normal breath sounds. No wheezing or rales.   Abdominal:      General: There is no distension.      Palpations: Abdomen is soft. There is no mass.      Tenderness: There is no abdominal tenderness. There is no guarding or rebound.     Musculoskeletal:      Cervical back: Neck supple.      Right lower leg: No edema.      Left lower leg: No edema.     Neurological:      Mental Status: He is alert and oriented to person, place, and time.      Gait: Gait abnormal.     Psychiatric:         Mood and Affect: Mood normal.         Behavior: Behavior normal.         Thought Content: Thought content normal.         Judgment: Judgment normal.

## 2025-07-11 DIAGNOSIS — N18.31 TYPE 2 DIABETES MELLITUS WITH STAGE 3A CHRONIC KIDNEY DISEASE, WITH LONG-TERM CURRENT USE OF INSULIN (HCC): ICD-10-CM

## 2025-07-11 DIAGNOSIS — E78.5 DYSLIPIDEMIA: ICD-10-CM

## 2025-07-11 DIAGNOSIS — I12.9 PARENCHYMAL RENAL HYPERTENSION, STAGE 1 THROUGH STAGE 4 OR UNSPECIFIED CHRONIC KIDNEY DISEASE: ICD-10-CM

## 2025-07-11 DIAGNOSIS — E66.01 MORBID OBESITY (HCC): ICD-10-CM

## 2025-07-11 DIAGNOSIS — N20.0 NEPHROLITHIASIS: ICD-10-CM

## 2025-07-11 DIAGNOSIS — N18.31 STAGE 3A CHRONIC KIDNEY DISEASE (HCC): ICD-10-CM

## 2025-07-11 DIAGNOSIS — E11.22 TYPE 2 DIABETES MELLITUS WITH STAGE 3A CHRONIC KIDNEY DISEASE, WITH LONG-TERM CURRENT USE OF INSULIN (HCC): ICD-10-CM

## 2025-07-11 DIAGNOSIS — E83.42 HYPOMAGNESEMIA: ICD-10-CM

## 2025-07-11 DIAGNOSIS — Z79.4 TYPE 2 DIABETES MELLITUS WITH STAGE 3A CHRONIC KIDNEY DISEASE, WITH LONG-TERM CURRENT USE OF INSULIN (HCC): ICD-10-CM

## 2025-07-11 RX ORDER — LOSARTAN POTASSIUM 25 MG/1
25 TABLET ORAL DAILY
Qty: 90 TABLET | Refills: 1 | Status: SHIPPED | OUTPATIENT
Start: 2025-07-11

## 2025-07-15 ENCOUNTER — TELEPHONE (OUTPATIENT)
Dept: PAIN MEDICINE | Facility: CLINIC | Age: 65
End: 2025-07-15

## 2025-07-16 ENCOUNTER — HOSPITAL ENCOUNTER (OUTPATIENT)
Dept: RADIOLOGY | Facility: HOSPITAL | Age: 65
Discharge: HOME | End: 2025-07-16
Attending: NURSE PRACTITIONER
Payer: COMMERCIAL

## 2025-07-16 VITALS
HEART RATE: 78 BPM | TEMPERATURE: 97.1 F | RESPIRATION RATE: 18 BRPM | DIASTOLIC BLOOD PRESSURE: 68 MMHG | SYSTOLIC BLOOD PRESSURE: 148 MMHG | OXYGEN SATURATION: 98 %

## 2025-07-16 DIAGNOSIS — M51.16 INTERVERTEBRAL DISC DISORDER WITH RADICULOPATHY OF LUMBAR REGION: ICD-10-CM

## 2025-07-16 DIAGNOSIS — G89.4 CHRONIC PAIN SYNDROME: ICD-10-CM

## 2025-07-16 DIAGNOSIS — M43.16 SPONDYLOLISTHESIS OF LUMBAR REGION: ICD-10-CM

## 2025-07-16 DIAGNOSIS — E11.65 UNCONTROLLED TYPE 2 DIABETES MELLITUS WITH HYPERGLYCEMIA (HCC): ICD-10-CM

## 2025-07-16 PROCEDURE — 64483 NJX AA&/STRD TFRM EPI L/S 1: CPT | Performed by: PHYSICAL MEDICINE & REHABILITATION

## 2025-07-16 RX ORDER — BUPIVACAINE HCL/PF 2.5 MG/ML
2 VIAL (ML) INJECTION ONCE
Status: COMPLETED | OUTPATIENT
Start: 2025-07-16 | End: 2025-07-16

## 2025-07-16 RX ORDER — LIDOCAINE HYDROCHLORIDE 10 MG/ML
4 INJECTION, SOLUTION EPIDURAL; INFILTRATION; INTRACAUDAL; PERINEURAL ONCE
Status: DISCONTINUED | OUTPATIENT
Start: 2025-07-16 | End: 2025-07-17 | Stop reason: HOSPADM

## 2025-07-16 RX ORDER — METHYLPREDNISOLONE ACETATE 40 MG/ML
40 INJECTION, SUSPENSION INTRA-ARTICULAR; INTRALESIONAL; INTRAMUSCULAR; PARENTERAL; SOFT TISSUE ONCE
Status: COMPLETED | OUTPATIENT
Start: 2025-07-16 | End: 2025-07-16

## 2025-07-16 RX ADMIN — IOHEXOL 2 ML: 300 INJECTION, SOLUTION INTRAVENOUS at 10:16

## 2025-07-16 RX ADMIN — BUPIVACAINE HYDROCHLORIDE 2 ML: 2.5 INJECTION, SOLUTION EPIDURAL; INFILTRATION; INTRACAUDAL at 10:16

## 2025-07-16 RX ADMIN — METHYLPREDNISOLONE ACETATE 40 MG: 40 INJECTION, SUSPENSION INTRA-ARTICULAR; INTRALESIONAL; INTRAMUSCULAR; SOFT TISSUE at 10:16

## 2025-07-16 NOTE — H&P
History of Present Illness: The patient is a 65 y.o. male who presents with complaints of low back pain    Past Medical History[1]    Past Surgical History[2]    Current Medications[3]    Allergies[4]    Physical Exam: There were no vitals filed for this visit.  General: Awake, Alert, Oriented x 3, Mood and affect appropriate  Respiratory: Respirations even and unlabored  Cardiovascular: Peripheral pulses intact; no edema  Musculoskeletal Exam: tenderness to palpation bilateral lumbar paraspinals     ASA Score: 2         Assessment:   1. Intervertebral disc disorder with radiculopathy of lumbar region    2. Spondylolisthesis of lumbar region    3. Chronic pain syndrome        Plan: B/L L4-L5 TFESI        [1]   Past Medical History:  Diagnosis Date    Acute bronchitis     Acute low back pain     Acute low back pain     [id number] resolved    Acute respiratory infection     JAYNE (acute kidney injury) (HCC) 06/15/2018    Cancer (HCC)     prostate    Cellulitis of scrotum     Cough     Diabetes mellitus (HCC)     Herpes zoster     High cholesterol     Hyperkalemia     Hypertension     Kidney disease     Kidney stone     Leukocytosis 06/15/2018    Low back pain     Lumbar radiculopathy     Pyelonephritis 08/08/2022    Rash     Rash 07/07/2017    Retinopathy, central serous     Shingles     Sleep apnea     [id number]    Trochanteric bursitis    [2]   Past Surgical History:  Procedure Laterality Date    ABDOMINAL ADHESION SURGERY N/A 06/13/2018    Procedure: LYSIS ADHESIONS;  Surgeon: Carlton Wolfe MD;  Location: [facility];  Service: Urology    FL RETROGRADE PYELOGRAM  08/08/2022    FL RETROGRADE PYELOGRAM  08/11/2022    LITHOTRIPSY      renal    MT CYSTO/URETERO W/LITHOTRIPSY &INDWELL STENT INSRT Left 08/11/2022    Procedure: CYSTOSCOPY URETEROSCOPY WITH LITHOTRIPSY HOLMIUM LASER, RETROGRADE PYELOGRAM AND INSERTION STENT URETERAL;  Surgeon: Dimple Olmedo MD;  Location: [facility];  Service: Urology    MT CYSTOURETHROSCOPY  W/URETERAL CATHETERIZATION Left 08/08/2022    Procedure: CYSTOSCOPY RETROGRADE PYELOGRAM WITH INSERTION STENT URETERAL;  Surgeon: Dimple Olmedo MD;  Location: [facility];  Service: Urology    IL LAPS SURG [id number] RPBIC RAD W/NRV SPARING ROBOT N/A 06/13/2018    Procedure: PROSTATECTOMY RADICAL W ROBOTICS;  Surgeon: Carlton Wolfe MD;  Location: [facility];  Service: Urology    PROSTATE SURGERY  06/13/2018    PROSTATECTOMY  6/13/18    SHOULDER SURGERY     [3]   Current Outpatient Medications:     albuterol (PROVENTIL HFA,VENTOLIN HFA) 90 mcg/act inhaler, Inhale 1-2 puffs every 6 (six) hours as needed for wheezing, Disp: 8 g, Rfl: 1    amLODIPine (NORVASC) 5 mg tablet, TAKE 1 TABLET BY MOUTH EVERY DAY, Disp: 90 tablet, Rfl: 1    ASPIRIN 81 PO, Take 1 capsule by mouth in the morning and 1 capsule in the evening., Disp: , Rfl:     atorvastatin (LIPITOR) 80 mg tablet, TAKE 1 TABLET BY MOUTH EVERY DAY, Disp: 90 tablet, Rfl: 1    BD Pen Needle Rosalie 2nd Gen 32G X 4 MM MISC, INJECT UNDER THE SKIN 4 (FOUR) TIMES A DAY, Disp: 400 each, Rfl: 1    carvedilol (COREG) 25 mg tablet, TAKE 1 TABLET BY MOUTH TWICE A DAY WITH FOOD, Disp: 180 tablet, Rfl: 1    Contour Next Test test strip, CHECK SUGAR 4 TIMES DAILY, Disp: 400 strip, Rfl: 1    Diclofenac Sodium (VOLTAREN) 1 %, Apply 2 g topically 4 (four) times a day (Patient not taking: Reported on 12/11/2024), Disp: 100 g, Rfl: 0    Dulaglutide (Trulicity) 3 MG/0.5ML SOAJ, INJECT 0.5 ML (3 MG TOTAL) UNDER THE SKIN ONCE A WEEK, Disp: 6 mL, Rfl: 1    gabapentin (NEURONTIN) 100 mg capsule, TAKE 2 CAPSULES BY MOUTH 3 TIMES A DAY., Disp: 540 capsule, Rfl: 1    glimepiride (AMARYL) 4 mg tablet, Take 1 tablet (4 mg total) by mouth daily with breakfast, Disp: 180 tablet, Rfl: 1    glucose blood (Contour Next Test) test strip, Use 1 each 4 (four) times a day Use as instructed, Disp: 400 each, Rfl: 3    hydroCHLOROthiazide 12.5 mg tablet, Take 0.5 tablets (6.25 mg total) by mouth daily (Patient  taking differently: Take 6.25 mg by mouth every other day), Disp: 45 tablet, Rfl: 4    insuln lispro (HumaLOG KwikPen) 200 units/mL CONCENTRATED U-200 injection pen, Inject 15 units three times daily with meals., Disp: 18 mL, Rfl: 0    losartan (COZAAR) 25 mg tablet, TAKE 1 TABLET (25 MG TOTAL) BY MOUTH DAILY., Disp: 90 tablet, Rfl: 1    metFORMIN (GLUCOPHAGE) 1000 MG tablet, TAKE 1 TABLET BY MOUTH TWICE A DAY, Disp: 180 tablet, Rfl: 1    Microlet Lancets MISC, USE 3 (THREE) TIMES A DAY, Disp: 300 each, Rfl: 3    Multiple Vitamins-Minerals (MULTIVITAMIN WITH MINERALS) tablet, Take 1 tablet by mouth in the morning., Disp: , Rfl:     potassium citrate (UROCIT-K) 10 mEq, TAKE 1 TABLET BY MOUTH TWICE A DAY (Patient taking differently: Take 10 mEq by mouth daily), Disp: 180 tablet, Rfl: 1    tamsulosin (FLOMAX) 0.4 mg, TAKE 1 CAPSULE BY MOUTH EVERY DAY WITH DINNER (Patient taking differently: daily as needed), Disp: 90 capsule, Rfl: 1    Toujeo SoloStar 300 units/mL CONCENTRATED U-300 injection pen (1-unit dial), Inject 80 Units under the skin daily, Disp: 22.5 mL, Rfl: 3    Vitamin D, Cholecalciferol, 400 units TABS, Take 1,000 Units by mouth in the morning., Disp: , Rfl:     Current Facility-Administered Medications:     bupivacaine (PF) (MARCAINE) 0.25 % injection 2 mL, 2 mL, Epidural, Once, Mark Cochran DO    iohexol (OMNIPAQUE) 300 mg/mL injection 50 mL, 50 mL, Epidural, Once, Mark Cochran DO    lidocaine (PF) (XYLOCAINE-MPF) 1 % injection 4 mL, 4 mL, Infiltration, Once, Mark Cochran DO    methylPREDNISolone acetate (DEPO-MEDROL) injection 40 mg, 40 mg, Perineural, Once, Mark Cochran DO  [4]   Allergies  Allergen Reactions    Simvastatin      Increases Liver functoin

## 2025-07-16 NOTE — DISCHARGE INSTR - LAB
Epidural Steroid Injection   WHAT YOU NEED TO KNOW:   An epidural steroid injection (KENTRELL) is a procedure to inject steroid medicine into the epidural space. The epidural space is between your spinal cord and vertebrae. Steroids reduce inflammation and fluid buildup in your spine that may be causing pain. You may be given pain medicine along with the steroids.          ACTIVITY  Do not drive or operate machinery today.  No strenuous activity today - bending, lifting, etc.  You may resume normal activites starting tomorrow - start slowly and as tolerated.  You may shower today, but no tub baths or hot tubs.  You may have numbness for several hours from the local anesthetic. Please use caution and common sense, especially with weight-bearing activities.    CARE OF THE INJECTION SITE  If you have soreness or pain, apply ice to the area today (20 minutes on/20 minutes off).  Starting tomorrow, you may use warm, moist heat or ice if needed.  You may have an increase or change in your discomfort for 36-48 hours after your treatment.  Apply ice and continue with any pain medication you have been prescribed.  Notify the [facility] if you have any of the following: redness, drainage, swelling, headache, stiff neck or fever above 100°F.    SPECIAL INSTRUCTIONS  Our office will contact you in approximately 14 days for a progress report.    MEDICATIONS  Continue to take all routine medications.  Our office may have instructed you to hold some medications.    As no general anesthesia was used in today's procedure, you should not experience any side effects related to anesthesia.     If you are diabetic, the steroids used in today's injection may temporarily increase your blood sugar levels after the first few days after your injection. Please keep a close eye on your sugars and alert the doctor who manages your diabetes if your sugars are significantly high from your baseline or you are symptomatic.     If you have a  problem specifically related to your procedure, please call our office at [phone].  Problems not related to your procedure should be directed to your primary care physician.

## 2025-07-30 ENCOUNTER — TELEPHONE (OUTPATIENT)
Dept: PAIN MEDICINE | Facility: CLINIC | Age: 65
End: 2025-07-30

## 2025-07-31 DIAGNOSIS — E11.65 UNCONTROLLED TYPE 2 DIABETES MELLITUS WITH HYPERGLYCEMIA (HCC): ICD-10-CM

## 2025-08-01 ENCOUNTER — APPOINTMENT (OUTPATIENT)
Dept: RADIOLOGY | Facility: MEDICAL CENTER | Age: 65
End: 2025-08-01
Payer: COMMERCIAL

## 2025-08-01 ENCOUNTER — OFFICE VISIT (OUTPATIENT)
Dept: URGENT CARE | Facility: MEDICAL CENTER | Age: 65
End: 2025-08-01
Payer: COMMERCIAL

## 2025-08-01 RX ORDER — PEN NEEDLE, DIABETIC 32GX 5/32"
NEEDLE, DISPOSABLE MISCELLANEOUS
Qty: 400 EACH | Refills: 1 | Status: SHIPPED | OUTPATIENT
Start: 2025-08-01

## 2025-08-07 DIAGNOSIS — I10 BENIGN ESSENTIAL HYPERTENSION: ICD-10-CM

## 2025-08-08 ENCOUNTER — APPOINTMENT (OUTPATIENT)
Dept: URGENT CARE | Facility: MEDICAL CENTER | Age: 65
End: 2025-08-08

## 2025-08-08 RX ORDER — CARVEDILOL 25 MG/1
25 TABLET ORAL 2 TIMES DAILY WITH MEALS
Qty: 180 TABLET | Refills: 1 | Status: SHIPPED | OUTPATIENT
Start: 2025-08-08

## 2025-08-18 ENCOUNTER — OFFICE VISIT (OUTPATIENT)
Dept: PAIN MEDICINE | Facility: CLINIC | Age: 65
End: 2025-08-18
Payer: COMMERCIAL

## 2025-08-18 VITALS — BODY MASS INDEX: 40.77 KG/M2 | HEIGHT: 65 IN | WEIGHT: 244.71 LBS

## 2025-08-18 DIAGNOSIS — M51.16 INTERVERTEBRAL DISC DISORDER WITH RADICULOPATHY OF LUMBAR REGION: Primary | ICD-10-CM

## 2025-08-18 DIAGNOSIS — M54.16 LUMBAR RADICULOPATHY: ICD-10-CM

## 2025-08-18 DIAGNOSIS — M43.16 SPONDYLOLISTHESIS OF LUMBAR REGION: ICD-10-CM

## 2025-08-18 PROCEDURE — 99214 OFFICE O/P EST MOD 30 MIN: CPT | Performed by: NURSE PRACTITIONER

## 2025-08-18 RX ORDER — GABAPENTIN 300 MG/1
300 CAPSULE ORAL 2 TIMES DAILY
Qty: 180 CAPSULE | Refills: 3 | Status: SHIPPED | OUTPATIENT
Start: 2025-08-18

## (undated) DEVICE — CANNULA SEAL

## (undated) DEVICE — FIBER STD QUANTA 272 MICRON

## (undated) DEVICE — PREMIUM DRY TRAY LF: Brand: MEDLINE INDUSTRIES, INC.

## (undated) DEVICE — INVIEW CLEAR LEGGINGS: Brand: CONVERTORS

## (undated) DEVICE — LUBRICANT SURGILUBE TUBE 4 OZ  FLIP TOP

## (undated) DEVICE — 3M™ TEGADERM™ TRANSPARENT FILM DRESSING FRAME STYLE, 1624W, 2-3/8 IN X 2-3/4 IN (6 CM X 7 CM), 100/CT 4CT/CASE: Brand: 3M™ TEGADERM™

## (undated) DEVICE — SUT MONOCRYL 3-0 RB-1 27 IN Y305H

## (undated) DEVICE — ASTOUND STANDARD SURGICAL GOWN, XL: Brand: CONVERTORS

## (undated) DEVICE — GLOVE INDICATOR PI UNDERGLOVE SZ 7 BLUE

## (undated) DEVICE — JACKSON-PRATT 100CC BULB RESERVOIR: Brand: CARDINAL HEALTH

## (undated) DEVICE — PROGRASP FORCEPS: Brand: ENDOWRIST

## (undated) DEVICE — URETEROSCOPE DIGITAL FLEXIBLE RVS DEFLECTION SNGL USE WISCOPE

## (undated) DEVICE — PACK TUR

## (undated) DEVICE — ANTIBACTERIAL VIOLET BRAIDED (POLYGLACTIN 910), SYNTHETIC ABSORBABLE SUTURE: Brand: COATED VICRYL

## (undated) DEVICE — GUIDEWIRE STRGHT TIP 0.035 IN  SOLO PLUS

## (undated) DEVICE — SYRINGE 10ML LL

## (undated) DEVICE — ENDOPOUCH RETRIEVER SPECIMEN RETRIEVAL BAGS: Brand: ENDOPOUCH RETRIEVER

## (undated) DEVICE — TIP COVER ACCESSORY

## (undated) DEVICE — DRAIN SPONGES,6 PLY: Brand: EXCILON

## (undated) DEVICE — JP PERF DRN SIL FLT 10MM FULL: Brand: CARDINAL HEALTH

## (undated) DEVICE — SUT MONOCRYL 4-0 PS-2 27 IN Y426H

## (undated) DEVICE — CHLORHEXIDINE 4PCT 4 OZ

## (undated) DEVICE — PACK ROBOTIC PROSTATE PBDS DA VINCI SI/XI

## (undated) DEVICE — PERMANENT CAUTERY HOOK: Brand: ENDOWRIST

## (undated) DEVICE — STRL COTTON TIP APPLCTR 6IN PK: Brand: CARDINAL HEALTH

## (undated) DEVICE — ENDOPATH XCEL BLADELESS TROCARS WITH STABILITY SLEEVES: Brand: ENDOPATH XCEL

## (undated) DEVICE — CATH FOLEY 18FR 5ML 2 WAY SILICONE ELASTIMER

## (undated) DEVICE — INTENDED FOR TISSUE SEPARATION, AND OTHER PROCEDURES THAT REQUIRE A SHARP SURGICAL BLADE TO PUNCTURE OR CUT.: Brand: BARD-PARKER SAFETY BLADES SIZE 15, STERILE

## (undated) DEVICE — SPECIMEN CONTAINER STERILE PEEL PACK

## (undated) DEVICE — POSITIONER EGGCRATE

## (undated) DEVICE — ADHESIVE SKN CLSR HISTOACRYL FLEX 0.5ML LF

## (undated) DEVICE — CATH URETERAL 5FR X 70 CM FLEX TIP POLYUR BARD

## (undated) DEVICE — CLAMP TOWEL TUBING DISPOSABLE

## (undated) DEVICE — LUBRICANT INST ELECTROLUBE ANTISTK WO PAD

## (undated) DEVICE — SPONGE STICK WITH PVP-I: Brand: KENDALL

## (undated) DEVICE — COLUMN DRAPE

## (undated) DEVICE — CHLORAPREP HI-LITE 26ML ORANGE

## (undated) DEVICE — LARGE NEEDLE DRIVER: Brand: ENDOWRIST

## (undated) DEVICE — GLOVE SRG BIOGEL ORTHOPEDIC 7.5

## (undated) DEVICE — 3000CC GUARDIAN II: Brand: GUARDIAN

## (undated) DEVICE — SUT PDS II 0 CT-1 27 IN Z340H

## (undated) DEVICE — URIMETER 2500ML

## (undated) DEVICE — VESSEL SEALER: Brand: ENDOWRIST

## (undated) DEVICE — CATH FOLEY COUNCIL 20FR 5ML 2 WAY LUBRICATH

## (undated) DEVICE — KERLIX BANDAGE ROLL: Brand: KERLIX

## (undated) DEVICE — SUT ETHILON 3-0 FS-1 18 IN 663G

## (undated) DEVICE — 3M™ DURAPORE™ SURGICAL TAPE 2 INCHES X 10YARDS (5.0CM X 9.1M) 6ROLLS/CARTON 10CARTONS/CASE 1538-2: Brand: 3M™ DURAPORE™

## (undated) DEVICE — FENESTRATED BIPOLAR FORCEPS: Brand: ENDOWRIST

## (undated) DEVICE — BASKET SPECIMEN RETRIVAL 1.9FR 120CM

## (undated) DEVICE — SUT MONOCRYL 3-0 RB-1 27 IN Y215H

## (undated) DEVICE — ENDOPATH PNEUMONEEDLE INSUFFLATION NEEDLES WITH LUER LOCK CONNECTORS 120MM: Brand: ENDOPATH

## (undated) DEVICE — MONOPOLAR CURVED SCISSORS: Brand: ENDOWRIST

## (undated) DEVICE — ARM DRAPE

## (undated) DEVICE — INTENDED FOR TISSUE SEPARATION, AND OTHER PROCEDURES THAT REQUIRE A SHARP SURGICAL BLADE TO PUNCTURE OR CUT.: Brand: BARD-PARKER SAFETY BLADES SIZE 11, STERILE